# Patient Record
Sex: FEMALE | Race: BLACK OR AFRICAN AMERICAN | NOT HISPANIC OR LATINO | Employment: FULL TIME | URBAN - METROPOLITAN AREA
[De-identification: names, ages, dates, MRNs, and addresses within clinical notes are randomized per-mention and may not be internally consistent; named-entity substitution may affect disease eponyms.]

---

## 2018-07-16 PROBLEM — E78.5 HLD (HYPERLIPIDEMIA): Status: ACTIVE | Noted: 2018-07-16

## 2018-07-16 PROBLEM — I10 HTN (HYPERTENSION), BENIGN: Status: ACTIVE | Noted: 2018-07-16

## 2018-07-16 PROBLEM — E11.9 DM TYPE 2 (DIABETES MELLITUS, TYPE 2): Status: ACTIVE | Noted: 2018-07-16

## 2018-08-22 ENCOUNTER — OFFICE VISIT (OUTPATIENT)
Dept: INTERNAL MEDICINE | Facility: CLINIC | Age: 53
End: 2018-08-22
Payer: COMMERCIAL

## 2018-08-22 VITALS
BODY MASS INDEX: 37.29 KG/M2 | DIASTOLIC BLOOD PRESSURE: 88 MMHG | SYSTOLIC BLOOD PRESSURE: 138 MMHG | RESPIRATION RATE: 18 BRPM | HEIGHT: 62 IN | HEART RATE: 66 BPM | WEIGHT: 202.63 LBS | TEMPERATURE: 99 F

## 2018-08-22 DIAGNOSIS — Z00.00 ANNUAL PHYSICAL EXAM: Primary | ICD-10-CM

## 2018-08-22 DIAGNOSIS — Z79.4 TYPE 2 DIABETES MELLITUS WITHOUT COMPLICATION, WITH LONG-TERM CURRENT USE OF INSULIN: ICD-10-CM

## 2018-08-22 DIAGNOSIS — E11.9 TYPE 2 DIABETES MELLITUS WITHOUT COMPLICATION, WITH LONG-TERM CURRENT USE OF INSULIN: ICD-10-CM

## 2018-08-22 DIAGNOSIS — I10 HTN (HYPERTENSION), BENIGN: ICD-10-CM

## 2018-08-22 DIAGNOSIS — E66.9 OBESITY (BMI 30-39.9): ICD-10-CM

## 2018-08-22 DIAGNOSIS — E78.5 HYPERLIPIDEMIA, UNSPECIFIED HYPERLIPIDEMIA TYPE: ICD-10-CM

## 2018-08-22 DIAGNOSIS — G47.33 OSA (OBSTRUCTIVE SLEEP APNEA): ICD-10-CM

## 2018-08-22 DIAGNOSIS — Z12.31 ENCOUNTER FOR SCREENING MAMMOGRAM FOR BREAST CANCER: ICD-10-CM

## 2018-08-22 PROCEDURE — 99999 PR PBB SHADOW E&M-EST. PATIENT-LVL III: CPT | Mod: PBBFAC,,, | Performed by: INTERNAL MEDICINE

## 2018-08-22 PROCEDURE — 99386 PREV VISIT NEW AGE 40-64: CPT | Mod: S$GLB,,, | Performed by: INTERNAL MEDICINE

## 2018-08-22 RX ORDER — AMLODIPINE BESYLATE 10 MG/1
10 TABLET ORAL DAILY
Qty: 90 TABLET | Refills: 3 | Status: SHIPPED | OUTPATIENT
Start: 2018-08-22 | End: 2019-05-16

## 2018-08-22 NOTE — PROGRESS NOTES
Subjective:       Patient ID: Grecia Boyd is a 53 y.o. female.    Chief Complaint: Establish Care and Annual Exam (diabetes, HTN, & sleep apnea)    HPI   53 y.o. Female here for annual exam.     Cholesterol: (needs)  Vaccines: Influenza (2017); Tetanus (2013); Pneumovax (done)  Eye exam: needs  Mammogram: needs  Gyn exam: declined  Colonoscopy: 2017- normal per pt    Exercise: walks daily  Diet: regular    Past Medical History:  No date: Diabetes mellitus, type 2  No date: Hyperlipidemia  No date: Hypertension  No date: Obesity  Past Surgical History:  No date: ADENOIDECTOMY  No date: HYSTERECTOMY  No date: THYROIDECTOMY, PARTIAL  No date: TONSILLECTOMY  Social History    Socioeconomic History      Marital status:       Spouse name: Not on file      Number of children: 2      Years of education: Not on file      Highest education level: Not on file    Social Needs      Financial resource strain: Not on file      Food insecurity - worry: Not on file      Food insecurity - inability: Not on file      Transportation needs - medical: Not on file      Transportation needs - non-medical: Not on file    Occupational History      Not on file    Tobacco Use      Smoking status: Never Smoker      Smokeless tobacco: Never Used    Substance and Sexual Activity      Alcohol use: Yes        Comment: rare      Drug use: No      Sexual activity: Yes        Partners: Male    Review of patient's allergies indicates:  No Known Allergies  Grecia Boyd had no medications administered during this visit.    Review of Systems   Constitutional: Negative for activity change, appetite change, chills, diaphoresis, fatigue, fever and unexpected weight change.   HENT: Negative for congestion, mouth sores, postnasal drip, rhinorrhea, sinus pressure, sneezing, sore throat, trouble swallowing and voice change.    Eyes: Negative for pain, discharge and visual disturbance.   Respiratory: Negative for cough, shortness of breath and  wheezing.    Cardiovascular: Negative for chest pain, palpitations and leg swelling.   Gastrointestinal: Negative for abdominal pain, blood in stool, constipation, diarrhea, nausea and vomiting.   Endocrine: Negative for cold intolerance and heat intolerance.   Genitourinary: Negative for difficulty urinating, dysuria, frequency, hematuria and urgency.   Musculoskeletal: Negative for arthralgias and myalgias.   Skin: Negative for rash and wound.   Allergic/Immunologic: Negative for environmental allergies and food allergies.   Neurological: Negative for dizziness, tremors, seizures, syncope, weakness, light-headedness and headaches.   Hematological: Negative for adenopathy. Does not bruise/bleed easily.   Psychiatric/Behavioral: Negative for confusion and sleep disturbance. The patient is not nervous/anxious.        Objective:      Physical Exam   Constitutional: She is oriented to person, place, and time. She appears well-developed and well-nourished. No distress.   HENT:   Head: Normocephalic and atraumatic.   Right Ear: External ear normal.   Left Ear: External ear normal.   Nose: Nose normal.   Mouth/Throat: Oropharynx is clear and moist. No oropharyngeal exudate.   Eyes: Conjunctivae and EOM are normal. Pupils are equal, round, and reactive to light. Right eye exhibits no discharge. Left eye exhibits no discharge. No scleral icterus.   Neck: Neck supple. No JVD present. No thyromegaly present.   Cardiovascular: Normal rate, regular rhythm, normal heart sounds and intact distal pulses.   No murmur heard.  Pulses:       Dorsalis pedis pulses are 2+ on the right side, and 2+ on the left side.        Posterior tibial pulses are 2+ on the right side, and 2+ on the left side.   Pulmonary/Chest: Effort normal and breath sounds normal. No respiratory distress. She has no wheezes. She has no rales. She exhibits no tenderness.   Abdominal: Soft. Bowel sounds are normal. She exhibits no distension. There is no tenderness.  There is no guarding.   Musculoskeletal: She exhibits no edema.   Feet:   Right Foot:   Protective Sensation: 4 sites tested. 4 sites sensed.   Skin Integrity: Negative for ulcer, blister or skin breakdown.   Left Foot:   Protective Sensation: 4 sites tested. 4 sites sensed.   Skin Integrity: Negative for ulcer, blister or skin breakdown.   Lymphadenopathy:     She has no cervical adenopathy.   Neurological: She is alert and oriented to person, place, and time. No cranial nerve deficit. Coordination normal.   Skin: Skin is warm and dry. No rash noted. She is not diaphoretic. No pallor.   Psychiatric: She has a normal mood and affect. Judgment normal.   Nursing note and vitals reviewed.      Assessment:       1. Annual physical exam    2. HTN (hypertension), benign    3. Type 2 diabetes mellitus without complication, with long-term current use of insulin    4. Hyperlipidemia, unspecified hyperlipidemia type    5. Obesity (BMI 30-39.9)    6. CHIDI (obstructive sleep apnea)    7. Encounter for screening mammogram for breast cancer        Plan:    1. Blood work ordered       Vaccines: Influenza (2017); Tetanus (2013); Pneumovax (done)       Eye exam: needs       Mammogram: needs       Gyn exam: declined       Colonoscopy: 2017- normal per pt   2. HTN- increase Norvasc to 10 mg and continue HCTZ 25 mg/Losartan 100 mg daily   3. T2DM- check HA1C       Continue Metformin  mg qam and Amaryl 2 mg daily    4. HLD- continue Lipitor 40 mg daily   5. Obesity- pt advised on proper diet/exercise for weight loss   6. CHIDI- stable on CPAP   7. Mammo ordered   8. F/u in 6 months

## 2018-09-01 ENCOUNTER — LAB VISIT (OUTPATIENT)
Dept: LAB | Facility: HOSPITAL | Age: 53
End: 2018-09-01
Attending: INTERNAL MEDICINE
Payer: COMMERCIAL

## 2018-09-01 DIAGNOSIS — Z79.4 TYPE 2 DIABETES MELLITUS WITHOUT COMPLICATION, WITH LONG-TERM CURRENT USE OF INSULIN: ICD-10-CM

## 2018-09-01 DIAGNOSIS — Z00.00 ANNUAL PHYSICAL EXAM: ICD-10-CM

## 2018-09-01 DIAGNOSIS — E11.9 TYPE 2 DIABETES MELLITUS WITHOUT COMPLICATION, WITH LONG-TERM CURRENT USE OF INSULIN: ICD-10-CM

## 2018-09-01 DIAGNOSIS — I10 HTN (HYPERTENSION), BENIGN: ICD-10-CM

## 2018-09-01 DIAGNOSIS — E78.5 HYPERLIPIDEMIA, UNSPECIFIED HYPERLIPIDEMIA TYPE: ICD-10-CM

## 2018-09-01 DIAGNOSIS — Z12.31 ENCOUNTER FOR SCREENING MAMMOGRAM FOR BREAST CANCER: ICD-10-CM

## 2018-09-01 DIAGNOSIS — E66.9 OBESITY (BMI 30-39.9): ICD-10-CM

## 2018-09-01 LAB
ALBUMIN SERPL BCP-MCNC: 3.7 G/DL
ALP SERPL-CCNC: 64 U/L
ALT SERPL W/O P-5'-P-CCNC: 10 U/L
ANION GAP SERPL CALC-SCNC: 11 MMOL/L
AST SERPL-CCNC: 17 U/L
BASOPHILS # BLD AUTO: 0.05 K/UL
BASOPHILS NFR BLD: 0.6 %
BILIRUB SERPL-MCNC: 0.6 MG/DL
BUN SERPL-MCNC: 10 MG/DL
CALCIUM SERPL-MCNC: 9.2 MG/DL
CHLORIDE SERPL-SCNC: 101 MMOL/L
CHOLEST SERPL-MCNC: 162 MG/DL
CHOLEST/HDLC SERPL: 3.4 {RATIO}
CO2 SERPL-SCNC: 28 MMOL/L
CREAT SERPL-MCNC: 0.7 MG/DL
DIFFERENTIAL METHOD: ABNORMAL
EOSINOPHIL # BLD AUTO: 0.1 K/UL
EOSINOPHIL NFR BLD: 1.2 %
ERYTHROCYTE [DISTWIDTH] IN BLOOD BY AUTOMATED COUNT: 14.5 %
EST. GFR  (AFRICAN AMERICAN): >60 ML/MIN/1.73 M^2
EST. GFR  (NON AFRICAN AMERICAN): >60 ML/MIN/1.73 M^2
ESTIMATED AVG GLUCOSE: 146 MG/DL
GLUCOSE SERPL-MCNC: 113 MG/DL
HBA1C MFR BLD HPLC: 6.7 %
HCT VFR BLD AUTO: 36.7 %
HDLC SERPL-MCNC: 48 MG/DL
HDLC SERPL: 29.6 %
HGB BLD-MCNC: 11.5 G/DL
IMM GRANULOCYTES # BLD AUTO: 0.02 K/UL
IMM GRANULOCYTES NFR BLD AUTO: 0.2 %
LDLC SERPL CALC-MCNC: 89.2 MG/DL
LYMPHOCYTES # BLD AUTO: 3.6 K/UL
LYMPHOCYTES NFR BLD: 40.2 %
MCH RBC QN AUTO: 26.6 PG
MCHC RBC AUTO-ENTMCNC: 31.3 G/DL
MCV RBC AUTO: 85 FL
MONOCYTES # BLD AUTO: 0.6 K/UL
MONOCYTES NFR BLD: 7.2 %
NEUTROPHILS # BLD AUTO: 4.5 K/UL
NEUTROPHILS NFR BLD: 50.6 %
NONHDLC SERPL-MCNC: 114 MG/DL
NRBC BLD-RTO: 0 /100 WBC
PLATELET # BLD AUTO: 415 K/UL
PMV BLD AUTO: 8.8 FL
POTASSIUM SERPL-SCNC: 3.8 MMOL/L
PROT SERPL-MCNC: 7.3 G/DL
RBC # BLD AUTO: 4.33 M/UL
SODIUM SERPL-SCNC: 140 MMOL/L
TRIGL SERPL-MCNC: 124 MG/DL
TSH SERPL DL<=0.005 MIU/L-ACNC: 0.66 UIU/ML
WBC # BLD AUTO: 8.92 K/UL

## 2018-09-01 PROCEDURE — 83036 HEMOGLOBIN GLYCOSYLATED A1C: CPT

## 2018-09-01 PROCEDURE — 80053 COMPREHEN METABOLIC PANEL: CPT

## 2018-09-01 PROCEDURE — 36415 COLL VENOUS BLD VENIPUNCTURE: CPT | Mod: PO

## 2018-09-01 PROCEDURE — 80061 LIPID PANEL: CPT

## 2018-09-01 PROCEDURE — 85025 COMPLETE CBC W/AUTO DIFF WBC: CPT

## 2018-09-01 PROCEDURE — 84443 ASSAY THYROID STIM HORMONE: CPT

## 2018-10-19 ENCOUNTER — OFFICE VISIT (OUTPATIENT)
Dept: URGENT CARE | Facility: CLINIC | Age: 53
End: 2018-10-19
Payer: COMMERCIAL

## 2018-10-19 ENCOUNTER — TELEPHONE (OUTPATIENT)
Dept: INTERNAL MEDICINE | Facility: CLINIC | Age: 53
End: 2018-10-19

## 2018-10-19 VITALS
RESPIRATION RATE: 18 BRPM | OXYGEN SATURATION: 100 % | BODY MASS INDEX: 37.17 KG/M2 | DIASTOLIC BLOOD PRESSURE: 54 MMHG | WEIGHT: 202 LBS | TEMPERATURE: 98 F | HEIGHT: 62 IN | SYSTOLIC BLOOD PRESSURE: 111 MMHG | HEART RATE: 70 BPM

## 2018-10-19 DIAGNOSIS — M79.601 PAIN OF RIGHT UPPER EXTREMITY: Primary | ICD-10-CM

## 2018-10-19 PROCEDURE — 96372 THER/PROPH/DIAG INJ SC/IM: CPT | Mod: S$GLB,,, | Performed by: NURSE PRACTITIONER

## 2018-10-19 PROCEDURE — 99214 OFFICE O/P EST MOD 30 MIN: CPT | Mod: 25,S$GLB,, | Performed by: NURSE PRACTITIONER

## 2018-10-19 PROCEDURE — 3008F BODY MASS INDEX DOCD: CPT | Mod: CPTII,S$GLB,, | Performed by: NURSE PRACTITIONER

## 2018-10-19 RX ORDER — TRAMADOL HYDROCHLORIDE 50 MG/1
50 TABLET ORAL
Qty: 7 TABLET | Refills: 0 | Status: SHIPPED | OUTPATIENT
Start: 2018-10-19 | End: 2018-10-26

## 2018-10-19 RX ORDER — METHYLPREDNISOLONE 4 MG/1
TABLET ORAL
Qty: 1 PACKAGE | Refills: 0 | Status: SHIPPED | OUTPATIENT
Start: 2018-10-19 | End: 2019-01-21 | Stop reason: ALTCHOICE

## 2018-10-19 RX ORDER — KETOROLAC TROMETHAMINE 30 MG/ML
30 INJECTION, SOLUTION INTRAMUSCULAR; INTRAVENOUS
Status: COMPLETED | OUTPATIENT
Start: 2018-10-19 | End: 2018-10-19

## 2018-10-19 RX ADMIN — KETOROLAC TROMETHAMINE 30 MG: 30 INJECTION, SOLUTION INTRAMUSCULAR; INTRAVENOUS at 09:10

## 2018-10-19 NOTE — TELEPHONE ENCOUNTER
----- Message from Tatiana Littlejohn sent at 10/19/2018  8:51 AM CDT -----  Contact: Grecia Boyd [phone]  Grecia would like to know if her medical records were received. Please call to advise.

## 2018-10-19 NOTE — PATIENT INSTRUCTIONS
Medrol Dosepak if pain continues to be severe after Toradol injection.  Tramadol at night as needed for severe pain.  Follow up closely with PCP.  Go to the ER for any worsening symptoms including numbness or weakness.  Myalgias  Myalgias are another word for muscle aches and soreness. This is a symptom, not a disease. Myalgias can have many causes. A cold, the flu, or an acute infection can cause them. So can any illness with a high fever. They may happen after exertion (such as heavy exercise) or injury (such as an accident or fall). Some medicines (such as statins and certain antidepressants) can cause myalgias. They can also be a symptom of chronic or ongoing medical problems (such as lupus, chronic fatigue, or hypothyroidism). With these illnesses, other serious symptoms often occur in addition to muscle pain and soreness.    Myalgias most often go away on their own. If they don't go away, come back, or are severe, testing may be needed to help find the cause.  Home care  · Rest until you feel better.  · Follow instructions that you were given for how to care for yourself. This may depend on the cause of your myalgias.   · If myalgia is thought to be due to a medicine, be sure to talk to the doctor that prescribed the medicine about the best course of action.  · To control pain, take prescription or over-the-counter medicines as directed. Unless told not to, you can try acetaminophen or ibuprofen.  Follow-up care  Follow up with your healthcare provider or as advised. If your symptoms do not go away in a few days or if they come back, follow up with your healthcare provider for an exam and testing.  When to see medical advice  Call your healthcare provider for any of the following:  · Fever of 100.4°F (38ºC) or higher, or as directed by your healthcare provider  · Pain that gets worse and not better, or that goes away and comes back  · New joint pains  · New rash  · Severe headache, neck pain, drowsiness, or  confusion  Date Last Reviewed: 3/1/2017  © 2889-7048 [employer]. [address] [place] [address], [place] [address]. All rights reserved. This information is not intended as a substitute for professional medical care. Always follow your healthcare professional's instructions.

## 2018-10-19 NOTE — TELEPHONE ENCOUNTER
Notified patient that we did not receive her medical records from [facility]. Stated she will go to [facility] to get it.

## 2018-10-19 NOTE — PROGRESS NOTES
"Subjective:       Patient ID: Grecia Boyd is a 53 y.o. female.    Vitals:  height is 5' 2" (1.575 m) and weight is 91.6 kg (202 lb). Her temperature is 98.1 °F (36.7 °C). Her blood pressure is 111/54 (abnormal) and her pulse is 70. Her respiration is 18 and oxygen saturation is 100%.     Chief Complaint: Arm Pain    Patient presents with worsening right arm pain x1 week.  No trauma.  Pain worse at night.  States that she had difficulty sleeping last night s/t pain.  She is unable to lift her arm above level of the shoulder s/t pain.  No numbness or weakness.  She did have some tingling in her fingertips yesterday.  Denies headache or visual changes.  No rash.  No neck pain.      Arm Pain    The incident occurred more than 1 week ago. The incident occurred at home. There was no injury mechanism. The quality of the pain is described as aching. The pain is at a severity of 10/10. The pain is severe. The pain has been constant since the incident. Associated symptoms include tingling (none at present.). Pertinent negatives include no chest pain, muscle weakness or numbness. Nothing aggravates the symptoms. She has tried NSAIDs for the symptoms. The treatment provided no relief.     Review of Systems   Constitution: Negative for chills and fever.   HENT: Negative for sore throat.    Eyes: Negative for blurred vision.   Cardiovascular: Negative for chest pain.   Respiratory: Negative for shortness of breath.    Skin: Negative for rash.   Musculoskeletal: Positive for joint pain. Negative for back pain.        Right arm pain      Gastrointestinal: Negative for abdominal pain, diarrhea, nausea and vomiting.   Neurological: Positive for tingling (none at present.). Negative for headaches and numbness.   Psychiatric/Behavioral: The patient is not nervous/anxious.        Objective:      Physical Exam   Constitutional: She is oriented to person, place, and time. Vital signs are normal. She appears well-developed and " well-nourished. She is active and cooperative. No distress.   HENT:   Head: Normocephalic and atraumatic.   Nose: Nose normal.   Mouth/Throat: Oropharynx is clear and moist and mucous membranes are normal.   Eyes: Conjunctivae and lids are normal.   Neck: Trachea normal, normal range of motion, full passive range of motion without pain and phonation normal. Neck supple.   Cardiovascular: Normal rate, regular rhythm, normal heart sounds, intact distal pulses and normal pulses.   Pulmonary/Chest: Effort normal and breath sounds normal.   Abdominal: Soft. Normal appearance and bowel sounds are normal. She exhibits no abdominal bruit, no pulsatile midline mass and no mass.   Musculoskeletal: She exhibits no edema or deformity.        Right shoulder: She exhibits decreased range of motion. She exhibits no tenderness, no bony tenderness, no swelling, no effusion, no crepitus, no deformity, no laceration, no pain, no spasm, normal pulse and normal strength.        Right elbow: Normal.       Right wrist: Normal.        Cervical back: Normal.        Right upper arm: She exhibits no tenderness, no bony tenderness, no swelling, no edema, no deformity and no laceration.        Right forearm: She exhibits no tenderness, no bony tenderness, no swelling, no edema, no deformity and no laceration.        Right hand: Normal.   Neurological: She is alert and oriented to person, place, and time. She has normal strength and normal reflexes. She displays no atrophy and no tremor. No cranial nerve deficit or sensory deficit. She exhibits normal muscle tone. Coordination and gait normal.   Skin: Skin is warm, dry and intact. She is not diaphoretic.   Psychiatric: She has a normal mood and affect. Her speech is normal and behavior is normal. Judgment and thought content normal. Cognition and memory are normal.   Nursing note and vitals reviewed.      Assessment:       1. Pain of right upper extremity        Plan:       Suspect  radiculopathy.  Discussed risks of raising A1C with patient.  She states that pain is severe.  Advised only to start medrol serena if Toradol injection does not work.  Agrees to this.    Pain of right upper extremity  -     ketorolac injection 30 mg  -     methylPREDNISolone (MEDROL DOSEPACK) 4 mg tablet; Take all pills on one row at one time. 24 hours later take the second row etc till all meds taken  Dispense: 1 Package; Refill: 0  -     traMADol (ULTRAM) 50 mg tablet; Take 1 tablet (50 mg total) by mouth every 24 hours as needed for Pain (use especially at bedtime as needed.).  Dispense: 7 tablet; Refill: 0      Patient Instructions   Medrol Dosepak if pain continues to be severe after Toradol injection.  Tramadol at night as needed for severe pain.  Follow up closely with PCP.  Go to the ER for any worsening symptoms including numbness or weakness.  Myalgias  Myalgias are another word for muscle aches and soreness. This is a symptom, not a disease. Myalgias can have many causes. A cold, the flu, or an acute infection can cause them. So can any illness with a high fever. They may happen after exertion (such as heavy exercise) or injury (such as an accident or fall). Some medicines (such as statins and certain antidepressants) can cause myalgias. They can also be a symptom of chronic or ongoing medical problems (such as lupus, chronic fatigue, or hypothyroidism). With these illnesses, other serious symptoms often occur in addition to muscle pain and soreness.    Myalgias most often go away on their own. If they don't go away, come back, or are severe, testing may be needed to help find the cause.  Home care  · Rest until you feel better.  · Follow instructions that you were given for how to care for yourself. This may depend on the cause of your myalgias.   · If myalgia is thought to be due to a medicine, be sure to talk to the doctor that prescribed the medicine about the best course of action.  · To control pain,  take prescription or over-the-counter medicines as directed. Unless told not to, you can try acetaminophen or ibuprofen.  Follow-up care  Follow up with your healthcare provider or as advised. If your symptoms do not go away in a few days or if they come back, follow up with your healthcare provider for an exam and testing.  When to see medical advice  Call your healthcare provider for any of the following:  · Fever of 100.4°F (38ºC) or higher, or as directed by your healthcare provider  · Pain that gets worse and not better, or that goes away and comes back  · New joint pains  · New rash  · Severe headache, neck pain, drowsiness, or confusion  Date Last Reviewed: 3/1/2017  © 4716-2114 [employer]. [address] [place] [address], [place] [address]. All rights reserved. This information is not intended as a substitute for professional medical care. Always follow your healthcare professional's instructions.

## 2018-11-01 ENCOUNTER — TELEPHONE (OUTPATIENT)
Dept: INTERNAL MEDICINE | Facility: CLINIC | Age: 53
End: 2018-11-01

## 2018-11-01 NOTE — TELEPHONE ENCOUNTER
----- Message from Liza Boyd sent at 11/1/2018 12:23 PM CDT -----  Contact: Self [phone] or [phone]  Patient would like to get a referral.  Does the patient already have the specialty clinic appointment scheduled:  no  If yes, what date is the appointment scheduled:     Referral to what specialty:  Orthopedics   Reason (be specific):  Right   Did you confirm the insurance currently in Epic is correct (important for a referral): yes   Does the patient want the referral with a specific physician:  no  Is the specialist an [facility]:  [facility]  Comments:

## 2018-11-01 NOTE — TELEPHONE ENCOUNTER
----- Message from Tatiana Littlejohn sent at 11/1/2018  3:51 PM CDT -----  Contact: Grecia Boyd [phone]  Patient is returning a phone call.  Who left a message for the patient: Debra Shahid LPN   Does patient know what this is regarding:    Comments:

## 2018-11-02 ENCOUNTER — OFFICE VISIT (OUTPATIENT)
Dept: INTERNAL MEDICINE | Facility: CLINIC | Age: 53
End: 2018-11-02
Payer: COMMERCIAL

## 2018-11-02 ENCOUNTER — HOSPITAL ENCOUNTER (OUTPATIENT)
Dept: RADIOLOGY | Facility: HOSPITAL | Age: 53
Discharge: HOME | End: 2018-11-02
Attending: INTERNAL MEDICINE
Payer: COMMERCIAL

## 2018-11-02 VITALS
WEIGHT: 198.44 LBS | DIASTOLIC BLOOD PRESSURE: 92 MMHG | RESPIRATION RATE: 16 BRPM | BODY MASS INDEX: 36.52 KG/M2 | TEMPERATURE: 98 F | SYSTOLIC BLOOD PRESSURE: 124 MMHG | HEART RATE: 68 BPM | HEIGHT: 62 IN

## 2018-11-02 DIAGNOSIS — M25.511 ACUTE PAIN OF RIGHT SHOULDER: ICD-10-CM

## 2018-11-02 DIAGNOSIS — M25.511 ACUTE PAIN OF RIGHT SHOULDER: Primary | ICD-10-CM

## 2018-11-02 DIAGNOSIS — M75.51 BURSITIS OF RIGHT SHOULDER: ICD-10-CM

## 2018-11-02 PROCEDURE — 99999 PR PBB SHADOW E&M-EST. PATIENT-LVL III: CPT | Mod: PBBFAC,,, | Performed by: INTERNAL MEDICINE

## 2018-11-02 PROCEDURE — 20610 DRAIN/INJ JOINT/BURSA W/O US: CPT | Mod: RT,S$GLB,, | Performed by: INTERNAL MEDICINE

## 2018-11-02 PROCEDURE — 99214 OFFICE O/P EST MOD 30 MIN: CPT | Mod: 25,S$GLB,, | Performed by: INTERNAL MEDICINE

## 2018-11-02 PROCEDURE — 3008F BODY MASS INDEX DOCD: CPT | Mod: CPTII,S$GLB,, | Performed by: INTERNAL MEDICINE

## 2018-11-02 PROCEDURE — 73030 X-RAY EXAM OF SHOULDER: CPT | Mod: 26,RT,, | Performed by: RADIOLOGY

## 2018-11-02 PROCEDURE — 73030 X-RAY EXAM OF SHOULDER: CPT | Mod: TC,PO,RT

## 2018-11-02 RX ORDER — TRIAMCINOLONE ACETONIDE 40 MG/ML
40 INJECTION, SUSPENSION INTRA-ARTICULAR; INTRAMUSCULAR
Status: COMPLETED | OUTPATIENT
Start: 2018-11-02 | End: 2018-11-02

## 2018-11-02 RX ORDER — HYDROCODONE BITARTRATE AND ACETAMINOPHEN 7.5; 325 MG/1; MG/1
1 TABLET ORAL EVERY 6 HOURS PRN
Qty: 20 TABLET | Refills: 0 | Status: SHIPPED | OUTPATIENT
Start: 2018-11-02 | End: 2019-06-26

## 2018-11-02 RX ADMIN — TRIAMCINOLONE ACETONIDE 40 MG: 40 INJECTION, SUSPENSION INTRA-ARTICULAR; INTRAMUSCULAR at 04:11

## 2018-11-02 NOTE — PROGRESS NOTES
percocetSubjective:       Patient ID: Grecia Boyd is a 53 y.o. female.    Chief Complaint: right shoulder pain (pain at 6 now, worse when lays down. saw urgent care drElvira)    HPI   Pt c/o 2 weeks of worsening right sided shoulder pain which is radiating down to her hand. It is described as a dull ache at rest and sharp with movement. Pt was given a steroid pack and Tramadol which has not helped much.   Review of Systems   Constitutional: Negative for activity change, appetite change, chills, diaphoresis, fatigue, fever and unexpected weight change.   HENT: Negative for postnasal drip, rhinorrhea, sinus pressure, sneezing, sore throat, trouble swallowing and voice change.    Respiratory: Negative for cough, shortness of breath and wheezing.    Cardiovascular: Negative for chest pain, palpitations and leg swelling.   Gastrointestinal: Negative for abdominal pain, blood in stool, constipation, diarrhea, nausea and vomiting.   Genitourinary: Negative for dysuria.   Musculoskeletal: Negative for arthralgias and myalgias.   Skin: Negative for rash and wound.   Allergic/Immunologic: Negative for environmental allergies and food allergies.   Hematological: Negative for adenopathy. Does not bruise/bleed easily.       Objective:      Physical Exam   Constitutional: She is oriented to person, place, and time. She appears well-developed and well-nourished. No distress.   HENT:   Head: Normocephalic and atraumatic.   Nose: Nose normal.   Eyes: Conjunctivae and EOM are normal. Pupils are equal, round, and reactive to light. Right eye exhibits no discharge. Left eye exhibits no discharge. No scleral icterus.   Neck: Neck supple. No JVD present.   Cardiovascular: Normal rate, regular rhythm, normal heart sounds and intact distal pulses.   Pulmonary/Chest: Effort normal and breath sounds normal. No respiratory distress. She has no wheezes. She has no rales.   Musculoskeletal: She exhibits no edema.        Right shoulder: She  exhibits tenderness and pain.   Lymphadenopathy:     She has no cervical adenopathy.   Neurological: She is alert and oriented to person, place, and time.   Skin: Skin is warm and dry. No rash noted. She is not diaphoretic. No pallor.       Assessment:       1. Acute pain of right shoulder    2. Bursitis of right shoulder        Plan:    1. S/p bursa injection        X-ray shoulder       Rx Norco PRN    Procedure Note(bursa injection):   After the potential risk's and benefit's were discussed with pt and verbal consent was obtained, pt's right lateral shoulder area was prepped with alcohol. A total of 8 cc containing 5 cc Marcaine 0.5 %, 2 cc Lidocaine 1 %, 1 cc Kenalog 40 mg/cc was injected into right subdeltoid bursa. The pt tolerated the procedure well without any complications.

## 2018-11-02 NOTE — TELEPHONE ENCOUNTER
Urgent care note says to see pcp if not better.    I left msg to do this or call to advise where pain is so we can put in referral correctly.

## 2018-11-21 ENCOUNTER — HOSPITAL ENCOUNTER (OUTPATIENT)
Dept: RADIOLOGY | Facility: HOSPITAL | Age: 53
Discharge: HOME | End: 2018-11-21
Attending: INTERNAL MEDICINE
Payer: COMMERCIAL

## 2018-11-21 ENCOUNTER — OFFICE VISIT (OUTPATIENT)
Dept: OPTOMETRY | Facility: CLINIC | Age: 53
End: 2018-11-21
Payer: COMMERCIAL

## 2018-11-21 DIAGNOSIS — I10 HTN (HYPERTENSION), BENIGN: ICD-10-CM

## 2018-11-21 DIAGNOSIS — Z79.4 TYPE 2 DIABETES MELLITUS WITHOUT COMPLICATION, WITH LONG-TERM CURRENT USE OF INSULIN: ICD-10-CM

## 2018-11-21 DIAGNOSIS — Z00.00 ANNUAL PHYSICAL EXAM: ICD-10-CM

## 2018-11-21 DIAGNOSIS — Z12.31 ENCOUNTER FOR SCREENING MAMMOGRAM FOR BREAST CANCER: ICD-10-CM

## 2018-11-21 DIAGNOSIS — H52.4 ASTIGMATISM OF BOTH EYES WITH PRESBYOPIA: ICD-10-CM

## 2018-11-21 DIAGNOSIS — H52.203 ASTIGMATISM OF BOTH EYES WITH PRESBYOPIA: ICD-10-CM

## 2018-11-21 DIAGNOSIS — E66.9 OBESITY (BMI 30-39.9): ICD-10-CM

## 2018-11-21 DIAGNOSIS — E78.5 HYPERLIPIDEMIA, UNSPECIFIED HYPERLIPIDEMIA TYPE: ICD-10-CM

## 2018-11-21 DIAGNOSIS — E11.9 TYPE 2 DIABETES MELLITUS WITHOUT COMPLICATION, WITH LONG-TERM CURRENT USE OF INSULIN: ICD-10-CM

## 2018-11-21 DIAGNOSIS — E11.9 TYPE 2 DIABETES MELLITUS WITHOUT RETINOPATHY: Primary | ICD-10-CM

## 2018-11-21 PROCEDURE — 92015 DETERMINE REFRACTIVE STATE: CPT | Mod: S$GLB,,, | Performed by: OPTOMETRIST

## 2018-11-21 PROCEDURE — 92004 COMPRE OPH EXAM NEW PT 1/>: CPT | Mod: S$GLB,,, | Performed by: OPTOMETRIST

## 2018-11-21 PROCEDURE — 77067 SCR MAMMO BI INCL CAD: CPT | Mod: TC,PO

## 2018-11-21 PROCEDURE — 99999 PR PBB SHADOW E&M-EST. PATIENT-LVL II: CPT | Mod: PBBFAC,,, | Performed by: OPTOMETRIST

## 2018-11-21 PROCEDURE — 77067 SCR MAMMO BI INCL CAD: CPT | Mod: 26,,, | Performed by: RADIOLOGY

## 2018-11-21 PROCEDURE — 77063 BREAST TOMOSYNTHESIS BI: CPT | Mod: TC,PO

## 2018-11-21 PROCEDURE — 77063 BREAST TOMOSYNTHESIS BI: CPT | Mod: 26,,, | Performed by: RADIOLOGY

## 2018-11-21 NOTE — LETTER
November 21, 2018      Scott Tripathi DO  [address] [facility]  [place] [address]           [place] [facility]  [address] [facility] [place] [address]  Phone: [phone]  Fax: [phone]          Patient: Grecia Boyd   MR Number: [id number]   YOB: 1965   Date of Visit: 11/21/2018       Dear Dr. Scott Tripathi:    Thank you for referring Grecia Boyd to me for evaluation. Attached you will find relevant portions of my assessment and plan of care.    If you have questions, please do not hesitate to call me. I look forward to following Grecia Boyd along with you.    Sincerely,    Scott Soto, OD    Enclosure  CC:  No Recipients    If you would like to receive this communication electronically, please contact [email or web address] or [phone] to request more information on [employer] Link access.    For providers and/or their staff who would like to refer a patient to [facility], please contact us through our one-stop-shop provider referral line, [facility] , at [phone].    If you feel you have received this communication in error or would no longer like to receive these types of communications, please e-mail [email or web address]

## 2018-11-21 NOTE — PROGRESS NOTES
HPI     CANDICE: 2017  Pt states her Va is sometimes blurry with her near Va.. States she had got   some OTc readers but did not bring them with her nor knows the strength.   No f/f  DM  No gtts  No Sx   LBS:114 yesterday   Hemoglobin A1C       Date                     Value               Ref Range             Status                09/01/2018               6.7 (H)             4.0 - 5.6 %           Final              Comment:    ADA Screening Guidelines:  5.7-6.4%  Consistent with   prediabetes  >or=6.5%  Consistent with diabetes  High levels of fetal   hemoglobin interfere with the HbA1C  assay. Heterozygous hemoglobin   variants (HbS, HgC, etc)do  not significantly interfere with this assay.     However, presence of multiple variants may affect accuracy.    ----------      Last edited by Scott Soto, OD on 11/21/2018  1:07 PM. (History)        ROS     Positive for: Endocrine (DM)    Negative for: Constitutional, Gastrointestinal, Neurological, Skin,   Genitourinary, Musculoskeletal, HENT, Cardiovascular, Eyes, Respiratory,   Psychiatric, Allergic/Imm, Heme/Lymph    Last edited by Scott Soto, OD on 11/21/2018  1:13 PM. (History)        Assessment /Plan     For exam results, see Encounter Report.    Type 2 diabetes mellitus without retinopathy    Astigmatism of both eyes with presbyopia      1. Pt has been wearing otc readers, now needs bifocals.  Wrote Rx--discussed PALs/adaptation    PLAN:    rtc 1 yr

## 2018-12-04 RX ORDER — LOSARTAN POTASSIUM 100 MG/1
100 TABLET ORAL DAILY
Qty: 90 TABLET | Refills: 3 | Status: SHIPPED | OUTPATIENT
Start: 2018-12-04 | End: 2019-06-04

## 2018-12-04 RX ORDER — HYDROCHLOROTHIAZIDE 25 MG/1
25 TABLET ORAL DAILY
Qty: 90 TABLET | Refills: 3 | Status: SHIPPED | OUTPATIENT
Start: 2018-12-04 | End: 2020-02-06

## 2018-12-04 NOTE — TELEPHONE ENCOUNTER
"----- Message from Marlin Viera sent at 12/4/2018 12:15 PM CST -----  Contact: [phone]  RX request - refill or new RX.  Is this a refill or new RX:  Refill   RX name and strength: amLODIPine (NORVASC) 10 MG tablet  Directions:   Is this a 30 day or 90 day RX:  90   Local pharmacy or mail order pharmacy:  [facility]  Pharmacy name and phone # (DON'T enter "on file" or "in chart"):   Comments:      RX request - refill or new RX.  Is this a refill or new RX:  Refill   RX name and strength: hydroCHLOROthiazide (HYDRODIURIL) 25 MG tablet  Directions:   Is this a 30 day or 90 day RX:  90 day   Local pharmacy or mail order pharmacy:  [facility]   Pharmacy name and phone # (DON'T enter "on file" or "in chart"): [facility] [address] [phone] (Phone)   Comments:      RX request - refill or new RX.  Is this a refill or new RX: refill    RX name and strength: losartan (COZAAR) 100 MG tablet  Directions:   Is this a 30 day or 90 day RX:  90 day   Local pharmacy or mail order pharmacy:  [facility]  Pharmacy name and phone # (DON'T enter "on file" or "in chart"): [facility] [address] [phone] (Phone)   Comments:      RX request - refill or new RX.  Is this a refill or new RX:    RX name and strength:   Directions:   Is this a 30 day or 90 day RX:    Local pharmacy or mail order pharmacy:    Pharmacy name and phone # (DON'T enter "on file" or "in chart"): [facility] [address] [phone] (Phone)   Comments:          "

## 2018-12-04 NOTE — TELEPHONE ENCOUNTER
I confirmed with her amlodipine already sent to pharmacy 8/22/18.  We will send others. Seen for annual 8/22/18    Please approve.  Thanks abbi

## 2019-01-10 ENCOUNTER — TELEPHONE (OUTPATIENT)
Dept: INTERNAL MEDICINE | Facility: CLINIC | Age: 54
End: 2019-01-10

## 2019-01-10 DIAGNOSIS — Z00.00 ANNUAL PHYSICAL EXAM: Primary | ICD-10-CM

## 2019-01-10 DIAGNOSIS — E11.69 TYPE 2 DIABETES MELLITUS WITH OTHER SPECIFIED COMPLICATION, UNSPECIFIED WHETHER LONG TERM INSULIN USE: ICD-10-CM

## 2019-01-10 DIAGNOSIS — I10 HYPERTENSION, UNSPECIFIED TYPE: ICD-10-CM

## 2019-01-10 NOTE — TELEPHONE ENCOUNTER
----- Message from Tatiana Littlejohn sent at 1/10/2019  3:33 PM CST -----  Doctor appointment and lab have been scheduled.  Please link lab orders to the lab appointment.  Date of doctor appointment:  1/21  Physical or EP:  EP   Date of lab appointment:  1/5  Comments:

## 2019-01-19 ENCOUNTER — LAB VISIT (OUTPATIENT)
Dept: LAB | Facility: HOSPITAL | Age: 54
End: 2019-01-19
Attending: INTERNAL MEDICINE
Payer: COMMERCIAL

## 2019-01-19 DIAGNOSIS — I10 HYPERTENSION, UNSPECIFIED TYPE: ICD-10-CM

## 2019-01-19 DIAGNOSIS — E11.69 TYPE 2 DIABETES MELLITUS WITH OTHER SPECIFIED COMPLICATION, UNSPECIFIED WHETHER LONG TERM INSULIN USE: ICD-10-CM

## 2019-01-19 LAB
ALBUMIN SERPL BCP-MCNC: 3.7 G/DL
ALP SERPL-CCNC: 76 U/L
ALT SERPL W/O P-5'-P-CCNC: 11 U/L
ANION GAP SERPL CALC-SCNC: 7 MMOL/L
AST SERPL-CCNC: 15 U/L
BASOPHILS # BLD AUTO: 0.06 K/UL
BASOPHILS NFR BLD: 0.6 %
BILIRUB SERPL-MCNC: 0.5 MG/DL
BUN SERPL-MCNC: 8 MG/DL
CALCIUM SERPL-MCNC: 9.5 MG/DL
CHLORIDE SERPL-SCNC: 102 MMOL/L
CHOLEST SERPL-MCNC: 153 MG/DL
CHOLEST/HDLC SERPL: 3 {RATIO}
CO2 SERPL-SCNC: 30 MMOL/L
CREAT SERPL-MCNC: 0.8 MG/DL
DIFFERENTIAL METHOD: ABNORMAL
EOSINOPHIL # BLD AUTO: 0.1 K/UL
EOSINOPHIL NFR BLD: 1 %
ERYTHROCYTE [DISTWIDTH] IN BLOOD BY AUTOMATED COUNT: 13.6 %
EST. GFR  (AFRICAN AMERICAN): >60 ML/MIN/1.73 M^2
EST. GFR  (NON AFRICAN AMERICAN): >60 ML/MIN/1.73 M^2
ESTIMATED AVG GLUCOSE: 146 MG/DL
GLUCOSE SERPL-MCNC: 135 MG/DL
HBA1C MFR BLD HPLC: 6.7 %
HCT VFR BLD AUTO: 38.1 %
HDLC SERPL-MCNC: 51 MG/DL
HDLC SERPL: 33.3 %
HGB BLD-MCNC: 11.7 G/DL
IMM GRANULOCYTES # BLD AUTO: 0.04 K/UL
IMM GRANULOCYTES NFR BLD AUTO: 0.4 %
LDLC SERPL CALC-MCNC: 83.2 MG/DL
LYMPHOCYTES # BLD AUTO: 3.9 K/UL
LYMPHOCYTES NFR BLD: 37.1 %
MCH RBC QN AUTO: 25.9 PG
MCHC RBC AUTO-ENTMCNC: 30.7 G/DL
MCV RBC AUTO: 85 FL
MONOCYTES # BLD AUTO: 0.7 K/UL
MONOCYTES NFR BLD: 6.4 %
NEUTROPHILS # BLD AUTO: 5.8 K/UL
NEUTROPHILS NFR BLD: 54.5 %
NONHDLC SERPL-MCNC: 102 MG/DL
NRBC BLD-RTO: 0 /100 WBC
PLATELET # BLD AUTO: 462 K/UL
PMV BLD AUTO: 8.7 FL
POTASSIUM SERPL-SCNC: 3.9 MMOL/L
PROT SERPL-MCNC: 7.7 G/DL
RBC # BLD AUTO: 4.51 M/UL
SODIUM SERPL-SCNC: 139 MMOL/L
TRIGL SERPL-MCNC: 94 MG/DL
WBC # BLD AUTO: 10.55 K/UL

## 2019-01-19 PROCEDURE — 85025 COMPLETE CBC W/AUTO DIFF WBC: CPT

## 2019-01-19 PROCEDURE — 80061 LIPID PANEL: CPT

## 2019-01-19 PROCEDURE — 36415 COLL VENOUS BLD VENIPUNCTURE: CPT | Mod: PO

## 2019-01-19 PROCEDURE — 80053 COMPREHEN METABOLIC PANEL: CPT

## 2019-01-19 PROCEDURE — 83036 HEMOGLOBIN GLYCOSYLATED A1C: CPT

## 2019-01-21 ENCOUNTER — OFFICE VISIT (OUTPATIENT)
Dept: INTERNAL MEDICINE | Facility: CLINIC | Age: 54
End: 2019-01-21
Payer: COMMERCIAL

## 2019-01-21 VITALS
TEMPERATURE: 98 F | HEIGHT: 62 IN | RESPIRATION RATE: 18 BRPM | HEART RATE: 66 BPM | SYSTOLIC BLOOD PRESSURE: 120 MMHG | DIASTOLIC BLOOD PRESSURE: 84 MMHG | WEIGHT: 203.94 LBS | BODY MASS INDEX: 37.53 KG/M2

## 2019-01-21 DIAGNOSIS — E78.5 HYPERLIPIDEMIA, UNSPECIFIED HYPERLIPIDEMIA TYPE: ICD-10-CM

## 2019-01-21 DIAGNOSIS — Z79.4 TYPE 2 DIABETES MELLITUS WITHOUT COMPLICATION, WITH LONG-TERM CURRENT USE OF INSULIN: ICD-10-CM

## 2019-01-21 DIAGNOSIS — I10 HTN (HYPERTENSION), BENIGN: Primary | ICD-10-CM

## 2019-01-21 DIAGNOSIS — M79.89 LEFT LEG SWELLING: ICD-10-CM

## 2019-01-21 DIAGNOSIS — G47.33 OSA (OBSTRUCTIVE SLEEP APNEA): ICD-10-CM

## 2019-01-21 DIAGNOSIS — E66.9 OBESITY (BMI 30-39.9): ICD-10-CM

## 2019-01-21 DIAGNOSIS — E11.9 TYPE 2 DIABETES MELLITUS WITHOUT COMPLICATION, WITH LONG-TERM CURRENT USE OF INSULIN: ICD-10-CM

## 2019-01-21 PROCEDURE — 99999 PR PBB SHADOW E&M-EST. PATIENT-LVL III: ICD-10-PCS | Mod: PBBFAC,,, | Performed by: INTERNAL MEDICINE

## 2019-01-21 PROCEDURE — 99214 OFFICE O/P EST MOD 30 MIN: CPT | Mod: S$GLB,,, | Performed by: INTERNAL MEDICINE

## 2019-01-21 PROCEDURE — 99999 PR PBB SHADOW E&M-EST. PATIENT-LVL III: CPT | Mod: PBBFAC,,, | Performed by: INTERNAL MEDICINE

## 2019-01-21 PROCEDURE — 99214 PR OFFICE/OUTPT VISIT, EST, LEVL IV, 30-39 MIN: ICD-10-PCS | Mod: S$GLB,,, | Performed by: INTERNAL MEDICINE

## 2019-01-21 RX ORDER — METFORMIN HYDROCHLORIDE 500 MG/1
TABLET, EXTENDED RELEASE ORAL
Qty: 180 TABLET | Refills: 3 | Status: SHIPPED | OUTPATIENT
Start: 2019-01-21 | End: 2019-06-12 | Stop reason: SDUPTHER

## 2019-01-21 NOTE — PROGRESS NOTES
Subjective:       Patient ID: Grecia Boyd is a 53 y.o. female.    Chief Complaint: Follow-up (6 month); Arm Pain (right); and Leg Swelling    HPI   52 y/o Female with HTN, T2DM, HLD, Obesity and CHIDI is here for 6 month f/u. Pt is c/o 2 wks of persistent LLE leg swelling. It gets worse as the day progresses and improves with elevation.   Review of Systems   Constitutional: Negative for activity change, appetite change, chills, diaphoresis, fatigue, fever and unexpected weight change.   HENT: Negative for postnasal drip, rhinorrhea, sinus pressure, sneezing, sore throat, trouble swallowing and voice change.    Respiratory: Negative for cough, shortness of breath and wheezing.    Cardiovascular: Positive for leg swelling. Negative for chest pain and palpitations.   Gastrointestinal: Negative for abdominal pain, blood in stool, constipation, diarrhea, nausea and vomiting.   Genitourinary: Negative for dysuria.   Musculoskeletal: Negative for arthralgias and myalgias.   Skin: Negative for rash and wound.   Allergic/Immunologic: Negative for environmental allergies and food allergies.   Hematological: Negative for adenopathy. Does not bruise/bleed easily.       Objective:      Physical Exam   Constitutional: She is oriented to person, place, and time. She appears well-developed and well-nourished. No distress.   HENT:   Head: Normocephalic and atraumatic.   Right Ear: External ear normal.   Left Ear: External ear normal.   Nose: Nose normal.   Mouth/Throat: Oropharynx is clear and moist. No oropharyngeal exudate.   Eyes: Conjunctivae and EOM are normal. Pupils are equal, round, and reactive to light. Right eye exhibits no discharge. Left eye exhibits no discharge. No scleral icterus.   Neck: Neck supple. No JVD present.   Cardiovascular: Normal rate, regular rhythm, normal heart sounds and intact distal pulses.   Pulmonary/Chest: Effort normal and breath sounds normal. No respiratory distress. She has no wheezes. She  has no rales.   Musculoskeletal: She exhibits edema (1+ in LLE).   Lymphadenopathy:     She has no cervical adenopathy.   Neurological: She is alert and oriented to person, place, and time.   Skin: Skin is warm and dry. No rash noted. She is not diaphoretic. No pallor.       Assessment:       1. HTN (hypertension), benign    2. Type 2 diabetes mellitus without complication, with long-term current use of insulin    3. Hyperlipidemia, unspecified hyperlipidemia type    4. Obesity (BMI 30-39.9)    5. CHIDI (obstructive sleep apnea)    6. Left leg swelling        Plan:    2. HTN- stable on Norvasc 10 mg/HCTZ 25 mg/Losartan 100 mg daily   3. T2DM- last HA1C of 6.7(1/19)       Increase Metformin XR to 1 gm qam and continue Amaryl 2 mg daily    4. HLD- continue Lipitor 40 mg daily   5. Obesity- pt advised on proper diet/exercise for weight loss   6. CHIDI- stable on CPAP   7. Check LE US to r/o DVT

## 2019-01-21 NOTE — TELEPHONE ENCOUNTER
Left msg rx sent to [facility] at 4:15, give them 2 hours to get it ready for .

## 2019-01-21 NOTE — TELEPHONE ENCOUNTER
"----- Message from Han Davies sent at 1/21/2019  3:07 PM CST -----  Contact: Patient @ [phone]  RX request - refill or new RX.  Is this a refill or new RX:  Refill  ##She is completely out of this medication##  RX name and strength: metFORMIN (GLUCOPHAGE-XR) 500 MG 24 hr tablet  Directions:   Is this a 30 day or 90 day RX:  30 day supply  Local pharmacy or mail order pharmacy:  Take 1 tablet by mouth 2 (two) times daily.  - Oral  Pharmacy name and phone # (DON'T enter "on file" or "in chart"): [facility] [phone] (Phone)  [phone] (Fax)  Comments:  She is completely out of the medication.      "

## 2019-01-24 ENCOUNTER — CLINICAL SUPPORT (OUTPATIENT)
Dept: CARDIOLOGY | Facility: CLINIC | Age: 54
End: 2019-01-24
Attending: INTERNAL MEDICINE
Payer: COMMERCIAL

## 2019-01-24 DIAGNOSIS — M79.89 LEFT LEG SWELLING: ICD-10-CM

## 2019-01-24 PROCEDURE — 93971 CV US DOPPLER VENOUS LEG LEFT (CUPID ONLY): ICD-10-PCS | Mod: LT,S$GLB,, | Performed by: INTERNAL MEDICINE

## 2019-01-24 PROCEDURE — 93971 EXTREMITY STUDY: CPT | Mod: LT,S$GLB,, | Performed by: INTERNAL MEDICINE

## 2019-01-25 ENCOUNTER — TELEPHONE (OUTPATIENT)
Dept: INTERNAL MEDICINE | Facility: CLINIC | Age: 54
End: 2019-01-25

## 2019-01-25 NOTE — TELEPHONE ENCOUNTER
----- Message from Tatiana Littlejohn sent at 1/25/2019 10:28 AM CST -----  Contact: Grecia Boyd [phone]  Patient is returning a phone call.  Who left a message for the patient: Debra Shahid LPN   Does patient know what this is regarding:  Results   Comments:

## 2019-01-25 NOTE — TELEPHONE ENCOUNTER
----- Message from Radha Mai sent at 1/25/2019  9:12 AM CST -----  Contact: [phone]  Patient would like to get test results    Name of test (lab, mammo, etc.):   [facility]    Date of test:  01/24/19    Ordering provider: Bhumi    Where was the test performed:  [facility]    Would you prefer a response via [facility]?:  No    Comments:

## 2019-02-01 ENCOUNTER — TELEPHONE (OUTPATIENT)
Dept: INTERNAL MEDICINE | Facility: CLINIC | Age: 54
End: 2019-02-01

## 2019-02-01 NOTE — TELEPHONE ENCOUNTER
No answer on 1st number and no name. Left msg I was calling cell.    Left msg on cell hctz was [facility] to [facility] 12[facility]4/18 90 pills x 3 refills so talk to pharmacist and they should find it on your profile to fill.

## 2019-02-01 NOTE — TELEPHONE ENCOUNTER
----- Message from Marlin Viera sent at 2/1/2019 12:10 PM CST -----  Contact: call pt [phone] after 3 pm  Patient is returning a phone call.  Who left a message for the patient: abbi  Does patient know what this is regarding:    Comments:

## 2019-02-01 NOTE — TELEPHONE ENCOUNTER
"----- Message from Radha Mai sent at 2/1/2019  9:23 AM CST -----  Contact: [phone] or [phone]  RX request - refill or new RX.  Is this a refill or new RX:  Refill  RX name and strength: hydroCHLOROthiazide (HYDRODIURIL) 25 MG tablet  Directions:   Is this a 30 day or 90 day RX:    Local pharmacy or mail order pharmacy:  Local  Pharmacy name and phone # (DON'T enter "on file" or "in chart"): [facility] [address] [place] [address] - [address] [place] [address] & [facility] [phone] (Phone)  [phone] (Fax)  Comments:        "

## 2019-02-01 NOTE — TELEPHONE ENCOUNTER
Left msg earlier that rx sent to pharmacy 12/4/18 for 90 x 3.  Contact pharmacist for refill that must be on her profile on h old.

## 2019-02-01 NOTE — TELEPHONE ENCOUNTER
She called again and i spoke to her.  She says [facility] states they dont have 12/4 rx.  I told her i'd call it in again.    Left msg on recorder 90x 3 ok 12/4/18 hctz

## 2019-04-22 RX ORDER — GLIMEPIRIDE 2 MG/1
TABLET ORAL
Qty: 90 TABLET | Refills: 3 | Status: SHIPPED | OUTPATIENT
Start: 2019-04-22 | End: 2020-02-07

## 2019-04-22 RX ORDER — ATORVASTATIN CALCIUM 40 MG/1
TABLET, FILM COATED ORAL
Qty: 90 TABLET | Refills: 3 | Status: SHIPPED | OUTPATIENT
Start: 2019-04-22 | End: 2020-07-16 | Stop reason: SDUPTHER

## 2019-05-16 ENCOUNTER — LAB VISIT (OUTPATIENT)
Dept: LAB | Facility: HOSPITAL | Age: 54
End: 2019-05-16
Attending: INTERNAL MEDICINE
Payer: COMMERCIAL

## 2019-05-16 ENCOUNTER — OFFICE VISIT (OUTPATIENT)
Dept: INTERNAL MEDICINE | Facility: CLINIC | Age: 54
End: 2019-05-16
Payer: COMMERCIAL

## 2019-05-16 VITALS
HEART RATE: 92 BPM | RESPIRATION RATE: 18 BRPM | DIASTOLIC BLOOD PRESSURE: 64 MMHG | TEMPERATURE: 98 F | BODY MASS INDEX: 36.26 KG/M2 | HEIGHT: 62 IN | WEIGHT: 197.06 LBS | SYSTOLIC BLOOD PRESSURE: 126 MMHG

## 2019-05-16 DIAGNOSIS — E11.9 TYPE 2 DIABETES MELLITUS WITHOUT COMPLICATION, WITH LONG-TERM CURRENT USE OF INSULIN: ICD-10-CM

## 2019-05-16 DIAGNOSIS — R60.0 BILATERAL LEG EDEMA: Primary | ICD-10-CM

## 2019-05-16 DIAGNOSIS — Z79.4 TYPE 2 DIABETES MELLITUS WITHOUT COMPLICATION, WITH LONG-TERM CURRENT USE OF INSULIN: ICD-10-CM

## 2019-05-16 DIAGNOSIS — E78.5 HYPERLIPIDEMIA, UNSPECIFIED HYPERLIPIDEMIA TYPE: ICD-10-CM

## 2019-05-16 DIAGNOSIS — E66.9 OBESITY (BMI 30-39.9): ICD-10-CM

## 2019-05-16 DIAGNOSIS — R60.0 BILATERAL LEG EDEMA: ICD-10-CM

## 2019-05-16 DIAGNOSIS — G47.33 OSA (OBSTRUCTIVE SLEEP APNEA): ICD-10-CM

## 2019-05-16 DIAGNOSIS — I10 HTN (HYPERTENSION), BENIGN: ICD-10-CM

## 2019-05-16 LAB
ANION GAP SERPL CALC-SCNC: 14 MMOL/L (ref 8–16)
BNP SERPL-MCNC: <10 PG/ML (ref 0–99)
BUN SERPL-MCNC: 10 MG/DL (ref 6–20)
CALCIUM SERPL-MCNC: 11.1 MG/DL (ref 8.7–10.5)
CHLORIDE SERPL-SCNC: 96 MMOL/L (ref 95–110)
CO2 SERPL-SCNC: 30 MMOL/L (ref 23–29)
CREAT SERPL-MCNC: 0.9 MG/DL (ref 0.5–1.4)
EST. GFR  (AFRICAN AMERICAN): >60 ML/MIN/1.73 M^2
EST. GFR  (NON AFRICAN AMERICAN): >60 ML/MIN/1.73 M^2
ESTIMATED AVG GLUCOSE: 217 MG/DL (ref 68–131)
GLUCOSE SERPL-MCNC: 149 MG/DL (ref 70–110)
HBA1C MFR BLD HPLC: 9.2 % (ref 4–5.6)
POTASSIUM SERPL-SCNC: 3.6 MMOL/L (ref 3.5–5.1)
SODIUM SERPL-SCNC: 140 MMOL/L (ref 136–145)

## 2019-05-16 PROCEDURE — 83036 HEMOGLOBIN GLYCOSYLATED A1C: CPT

## 2019-05-16 PROCEDURE — 99214 OFFICE O/P EST MOD 30 MIN: CPT | Mod: S$GLB,,, | Performed by: INTERNAL MEDICINE

## 2019-05-16 PROCEDURE — 36415 COLL VENOUS BLD VENIPUNCTURE: CPT | Mod: PO

## 2019-05-16 PROCEDURE — 83880 ASSAY OF NATRIURETIC PEPTIDE: CPT

## 2019-05-16 PROCEDURE — 99999 PR PBB SHADOW E&M-EST. PATIENT-LVL IV: CPT | Mod: PBBFAC,,, | Performed by: INTERNAL MEDICINE

## 2019-05-16 PROCEDURE — 99999 PR PBB SHADOW E&M-EST. PATIENT-LVL IV: ICD-10-PCS | Mod: PBBFAC,,, | Performed by: INTERNAL MEDICINE

## 2019-05-16 PROCEDURE — 80048 BASIC METABOLIC PNL TOTAL CA: CPT

## 2019-05-16 PROCEDURE — 99214 PR OFFICE/OUTPT VISIT, EST, LEVL IV, 30-39 MIN: ICD-10-PCS | Mod: S$GLB,,, | Performed by: INTERNAL MEDICINE

## 2019-05-16 RX ORDER — AMLODIPINE BESYLATE 5 MG/1
5 TABLET ORAL DAILY
Qty: 90 TABLET | Refills: 3 | Status: SHIPPED | OUTPATIENT
Start: 2019-05-16 | End: 2020-07-16 | Stop reason: SDUPTHER

## 2019-05-16 RX ORDER — FUROSEMIDE 40 MG/1
40 TABLET ORAL EVERY MORNING
Qty: 14 TABLET | Refills: 0 | Status: SHIPPED | OUTPATIENT
Start: 2019-05-16 | End: 2019-06-12

## 2019-05-16 RX ORDER — POTASSIUM CHLORIDE 750 MG/1
10 CAPSULE, EXTENDED RELEASE ORAL DAILY
Qty: 14 CAPSULE | Refills: 0 | Status: SHIPPED | OUTPATIENT
Start: 2019-05-16 | End: 2019-06-26

## 2019-05-16 RX ORDER — PERMETHRIN 50 MG/G
CREAM TOPICAL
COMMUNITY
Start: 2016-10-10 | End: 2019-06-04

## 2019-05-16 NOTE — PROGRESS NOTES
Subjective:       Patient ID: Grecia Boyd is a 53 y.o. female.    Chief Complaint: Leg Swelling (both lower legs & ankles, painful) and Hyperglycemia (BS this am 171)    HPI   54 y/o Female with HTN, T2DM, HLD, Obesity and CHIDI is here for evaluation of 2 wks of persistent B/L LE leg swelling. It is worse the longer she stands and improves with elevation. No hx of CHF.   Review of Systems   Constitutional: Negative for activity change, appetite change, chills, diaphoresis, fatigue, fever and unexpected weight change.   HENT: Negative for postnasal drip, rhinorrhea, sinus pressure, sneezing, sore throat, trouble swallowing and voice change.    Respiratory: Negative for cough, shortness of breath and wheezing.    Cardiovascular: Positive for leg swelling. Negative for chest pain and palpitations.   Gastrointestinal: Negative for abdominal pain, blood in stool, constipation, diarrhea, nausea and vomiting.   Genitourinary: Negative for dysuria.   Musculoskeletal: Negative for arthralgias and myalgias.   Skin: Negative for rash and wound.   Allergic/Immunologic: Negative for environmental allergies and food allergies.   Hematological: Negative for adenopathy. Does not bruise/bleed easily.       Objective:      Physical Exam   Constitutional: She is oriented to person, place, and time. She appears well-developed and well-nourished. No distress.   HENT:   Head: Normocephalic and atraumatic.   Eyes: Pupils are equal, round, and reactive to light. Conjunctivae and EOM are normal. Right eye exhibits no discharge. Left eye exhibits no discharge. No scleral icterus.   Neck: Neck supple. No JVD present.   Cardiovascular: Normal rate, regular rhythm, normal heart sounds and intact distal pulses.   Pulmonary/Chest: Effort normal and breath sounds normal. No respiratory distress. She has no wheezes. She has no rales.   Musculoskeletal: She exhibits edema (1+ in B/L LE's).   Lymphadenopathy:     She has no cervical adenopathy.    Neurological: She is alert and oriented to person, place, and time.   Skin: Skin is warm and dry. No rash noted. She is not diaphoretic. No pallor.       Assessment:       1. Bilateral leg edema    2. HTN (hypertension), benign    3. Type 2 diabetes mellitus without complication, with long-term current use of insulin    4. Hyperlipidemia, unspecified hyperlipidemia type    5. Obesity (BMI 30-39.9)    6. CHIDI (obstructive sleep apnea)        Plan:    1. Check ECHO/BNP/BMP       Rx Lasix 40 mg qam       Decrease Norvasc to 5 mg daily   2. HTN- stable on Norvasc/Losartan/HCTZ   3. T2DM- last HA1C of 6.7(1/19)       Increase Metformin XR to 1 gm qam and continue Amaryl 2 mg daily    4. HLD- continue Lipitor 40 mg daily   5. Obesity- pt advised on proper diet/exercise for weight loss   6. CHIDI- stable on CPAP   7. F/u in 2 wk

## 2019-05-17 ENCOUNTER — TELEPHONE (OUTPATIENT)
Dept: INTERNAL MEDICINE | Facility: CLINIC | Age: 54
End: 2019-05-17

## 2019-05-17 ENCOUNTER — HOSPITAL ENCOUNTER (OUTPATIENT)
Dept: CARDIOLOGY | Facility: CLINIC | Age: 54
Discharge: HOME | End: 2019-05-17
Attending: INTERNAL MEDICINE
Payer: COMMERCIAL

## 2019-05-17 VITALS
BODY MASS INDEX: 36.25 KG/M2 | WEIGHT: 197 LBS | DIASTOLIC BLOOD PRESSURE: 64 MMHG | HEART RATE: 75 BPM | HEIGHT: 62 IN | SYSTOLIC BLOOD PRESSURE: 124 MMHG

## 2019-05-17 DIAGNOSIS — R60.0 BILATERAL LEG EDEMA: ICD-10-CM

## 2019-05-17 LAB
ASCENDING AORTA: 3.2 CM
BSA FOR ECHO PROCEDURE: 1.98 M2
CV ECHO LV RWT: 0.41 CM
DOP CALC LVOT AREA: 2.54 CM2
DOP CALC LVOT DIAMETER: 1.8 CM
DOP CALC LVOT PEAK VEL: 0.93 M/S
DOP CALC LVOT STROKE VOLUME: 47.21 CM3
DOP CALCLVOT PEAK VEL VTI: 18.56 CM
E WAVE DECELERATION TIME: 187.68 MSEC
E/A RATIO: 1.24
E/E' RATIO: 9.33
ECHO LV POSTERIOR WALL: 0.84 CM (ref 0.6–1.1)
FRACTIONAL SHORTENING: 39 % (ref 28–44)
INTERVENTRICULAR SEPTUM: 0.83 CM (ref 0.6–1.1)
IVRT: 0.11 MSEC
LA MAJOR: 4.27 CM
LA MINOR: 4.98 CM
LA WIDTH: 3.17 CM
LEFT ATRIUM SIZE: 3.06 CM
LEFT ATRIUM VOLUME INDEX: 20 ML/M2
LEFT ATRIUM VOLUME: 37.91 CM3
LEFT INTERNAL DIMENSION IN SYSTOLE: 2.5 CM (ref 2.1–4)
LEFT VENTRICLE DIASTOLIC VOLUME INDEX: 38.99 ML/M2
LEFT VENTRICLE DIASTOLIC VOLUME: 74.06 ML
LEFT VENTRICLE MASS INDEX: 54.3 G/M2
LEFT VENTRICLE SYSTOLIC VOLUME INDEX: 11.7 ML/M2
LEFT VENTRICLE SYSTOLIC VOLUME: 22.23 ML
LEFT VENTRICULAR INTERNAL DIMENSION IN DIASTOLE: 4.1 CM (ref 3.5–6)
LEFT VENTRICULAR MASS: 103.08 G
LV LATERAL E/E' RATIO: 8.91
LV SEPTAL E/E' RATIO: 9.8
MV PEAK A VEL: 0.79 M/S
MV PEAK E VEL: 0.98 M/S
PULM VEIN S/D RATIO: 0.94
PV PEAK D VEL: 0.49 M/S
PV PEAK S VEL: 0.46 M/S
RA MAJOR: 3.92 CM
RA PRESSURE: 3 MMHG
RA WIDTH: 3.62 CM
RIGHT VENTRICULAR END-DIASTOLIC DIMENSION: 2.62 CM
RV TISSUE DOPPLER FREE WALL SYSTOLIC VELOCITY 1 (APICAL 4 CHAMBER VIEW): 11.31 M/S
SINUS: 3.12 CM
STJ: 2.79 CM
TDI LATERAL: 0.11
TDI SEPTAL: 0.1
TDI: 0.11
TRICUSPID ANNULAR PLANE SYSTOLIC EXCURSION: 2.02 CM

## 2019-05-17 PROCEDURE — 93306 TRANSTHORACIC ECHO (TTE) COMPLETE (CUPID ONLY): ICD-10-PCS | Mod: S$GLB,,, | Performed by: INTERNAL MEDICINE

## 2019-05-17 PROCEDURE — 93306 TTE W/DOPPLER COMPLETE: CPT | Mod: S$GLB,,, | Performed by: INTERNAL MEDICINE

## 2019-05-17 NOTE — TELEPHONE ENCOUNTER
----- Message from Scott Tripathi DO sent at 5/17/2019  6:49 AM CDT -----  Poor control of diabetes- make sure to document blood sugars 2x/daily until our f/u appt  Normal kidney function

## 2019-05-17 NOTE — TELEPHONE ENCOUNTER
----- Message from Scott Tripathi DO sent at 5/17/2019 11:07 AM CDT -----  No signs of heart failure per ECHO study

## 2019-06-04 ENCOUNTER — HOSPITAL ENCOUNTER (EMERGENCY)
Facility: HOSPITAL | Age: 54
Discharge: HOME | End: 2019-06-04
Attending: EMERGENCY MEDICINE
Payer: COMMERCIAL

## 2019-06-04 ENCOUNTER — NURSE TRIAGE (OUTPATIENT)
Dept: ADMINISTRATIVE | Facility: CLINIC | Age: 54
End: 2019-06-04

## 2019-06-04 VITALS
OXYGEN SATURATION: 99 % | SYSTOLIC BLOOD PRESSURE: 121 MMHG | HEIGHT: 62 IN | TEMPERATURE: 98 F | DIASTOLIC BLOOD PRESSURE: 69 MMHG | RESPIRATION RATE: 17 BRPM | BODY MASS INDEX: 35.7 KG/M2 | HEART RATE: 67 BPM | WEIGHT: 194 LBS

## 2019-06-04 DIAGNOSIS — R53.1 GENERALIZED WEAKNESS: Primary | ICD-10-CM

## 2019-06-04 LAB
ALBUMIN SERPL BCP-MCNC: 3.8 G/DL (ref 3.5–5.2)
ALP SERPL-CCNC: 79 U/L (ref 55–135)
ALT SERPL W/O P-5'-P-CCNC: 15 U/L (ref 10–44)
ANION GAP SERPL CALC-SCNC: 13 MMOL/L (ref 8–16)
AST SERPL-CCNC: 21 U/L (ref 10–40)
BACTERIA #/AREA URNS AUTO: ABNORMAL /HPF
BASOPHILS # BLD AUTO: 0.05 K/UL (ref 0–0.2)
BASOPHILS NFR BLD: 0.5 % (ref 0–1.9)
BILIRUB SERPL-MCNC: 0.4 MG/DL (ref 0.1–1)
BILIRUB UR QL STRIP: NEGATIVE
BUN SERPL-MCNC: 10 MG/DL (ref 6–20)
CALCIUM SERPL-MCNC: 9.3 MG/DL (ref 8.7–10.5)
CHLORIDE SERPL-SCNC: 101 MMOL/L (ref 95–110)
CLARITY UR REFRACT.AUTO: ABNORMAL
CO2 SERPL-SCNC: 25 MMOL/L (ref 23–29)
COLOR UR AUTO: YELLOW
CREAT SERPL-MCNC: 0.8 MG/DL (ref 0.5–1.4)
DIFFERENTIAL METHOD: ABNORMAL
EOSINOPHIL # BLD AUTO: 0.1 K/UL (ref 0–0.5)
EOSINOPHIL NFR BLD: 1.1 % (ref 0–8)
ERYTHROCYTE [DISTWIDTH] IN BLOOD BY AUTOMATED COUNT: 13.6 % (ref 11.5–14.5)
EST. GFR  (AFRICAN AMERICAN): >60 ML/MIN/1.73 M^2
EST. GFR  (NON AFRICAN AMERICAN): >60 ML/MIN/1.73 M^2
GLUCOSE SERPL-MCNC: 119 MG/DL (ref 70–110)
GLUCOSE UR QL STRIP: NEGATIVE
HCT VFR BLD AUTO: 35.8 % (ref 37–48.5)
HGB BLD-MCNC: 11.4 G/DL (ref 12–16)
HGB UR QL STRIP: ABNORMAL
IMM GRANULOCYTES # BLD AUTO: 0.02 K/UL (ref 0–0.04)
IMM GRANULOCYTES NFR BLD AUTO: 0.2 % (ref 0–0.5)
KETONES UR QL STRIP: NEGATIVE
LEUKOCYTE ESTERASE UR QL STRIP: NEGATIVE
LYMPHOCYTES # BLD AUTO: 3.9 K/UL (ref 1–4.8)
LYMPHOCYTES NFR BLD: 40.9 % (ref 18–48)
MCH RBC QN AUTO: 26 PG (ref 27–31)
MCHC RBC AUTO-ENTMCNC: 31.8 G/DL (ref 32–36)
MCV RBC AUTO: 82 FL (ref 82–98)
MICROSCOPIC COMMENT: ABNORMAL
MONOCYTES # BLD AUTO: 0.7 K/UL (ref 0.3–1)
MONOCYTES NFR BLD: 6.8 % (ref 4–15)
NEUTROPHILS # BLD AUTO: 4.8 K/UL (ref 1.8–7.7)
NEUTROPHILS NFR BLD: 50.5 % (ref 38–73)
NITRITE UR QL STRIP: NEGATIVE
NRBC BLD-RTO: 0 /100 WBC
PH UR STRIP: 6 [PH] (ref 5–8)
PLATELET # BLD AUTO: 392 K/UL (ref 150–350)
PMV BLD AUTO: 8.7 FL (ref 9.2–12.9)
POCT GLUCOSE: 141 MG/DL (ref 70–110)
POTASSIUM SERPL-SCNC: 3.3 MMOL/L (ref 3.5–5.1)
PROT SERPL-MCNC: 7.6 G/DL (ref 6–8.4)
PROT UR QL STRIP: NEGATIVE
RBC # BLD AUTO: 4.39 M/UL (ref 4–5.4)
RBC #/AREA URNS AUTO: 2 /HPF (ref 0–4)
SODIUM SERPL-SCNC: 139 MMOL/L (ref 136–145)
SP GR UR STRIP: 1.01 (ref 1–1.03)
SQUAMOUS #/AREA URNS AUTO: 19 /HPF
TSH SERPL DL<=0.005 MIU/L-ACNC: 0.58 UIU/ML (ref 0.4–4)
URN SPEC COLLECT METH UR: ABNORMAL
WBC # BLD AUTO: 9.51 K/UL (ref 3.9–12.7)
WBC #/AREA URNS AUTO: 2 /HPF (ref 0–5)

## 2019-06-04 PROCEDURE — 84443 ASSAY THYROID STIM HORMONE: CPT

## 2019-06-04 PROCEDURE — 25000003 PHARM REV CODE 250: Performed by: PHYSICIAN ASSISTANT

## 2019-06-04 PROCEDURE — 96360 HYDRATION IV INFUSION INIT: CPT

## 2019-06-04 PROCEDURE — 80053 COMPREHEN METABOLIC PANEL: CPT

## 2019-06-04 PROCEDURE — 99284 PR EMERGENCY DEPT VISIT,LEVEL IV: ICD-10-PCS | Mod: ,,, | Performed by: PHYSICIAN ASSISTANT

## 2019-06-04 PROCEDURE — 99284 EMERGENCY DEPT VISIT MOD MDM: CPT | Mod: 25

## 2019-06-04 PROCEDURE — 85025 COMPLETE CBC W/AUTO DIFF WBC: CPT

## 2019-06-04 PROCEDURE — 99284 EMERGENCY DEPT VISIT MOD MDM: CPT | Mod: ,,, | Performed by: PHYSICIAN ASSISTANT

## 2019-06-04 PROCEDURE — 82962 GLUCOSE BLOOD TEST: CPT

## 2019-06-04 PROCEDURE — 81001 URINALYSIS AUTO W/SCOPE: CPT

## 2019-06-04 RX ORDER — POTASSIUM CHLORIDE 20 MEQ/1
40 TABLET, EXTENDED RELEASE ORAL
Status: COMPLETED | OUTPATIENT
Start: 2019-06-04 | End: 2019-06-04

## 2019-06-04 RX ADMIN — SODIUM CHLORIDE 1000 ML: 0.9 INJECTION, SOLUTION INTRAVENOUS at 12:06

## 2019-06-04 RX ADMIN — POTASSIUM CHLORIDE 40 MEQ: 1500 TABLET, EXTENDED RELEASE ORAL at 01:06

## 2019-06-04 NOTE — DISCHARGE INSTRUCTIONS
Rest.  Drink plenty of fluids.  Follow up with your doctor in 2-3 days if you do not improve.  If you have new or worsening symptoms, return to the ER promptly.

## 2019-06-04 NOTE — ED TRIAGE NOTES
Pt reports that she took her blood sugar this morning a couple of times and it was between 180 and 190.  She went to work and became dizzy, took her glucose and it was 160.  Pt states that her blood sugar usually runs around 120.

## 2019-06-04 NOTE — ED PROVIDER NOTES
"Encounter Date: 6/4/2019    SCRIBE #1 NOTE: I, Son Dariela, am scribing for, and in the presence of,  Dr. Alarcon . I have scribed the following portions of the note - the APC attestation.       History     Chief Complaint   Patient presents with    Hyperglycemia     Pt states her blood sugar was 160-190's this am.  States she was feeling bad this am so her doctor told her to come to ED.  Pt states "it never runs that high".  Pt on Metformin & Glyburide  Pt also c/o blurred vision this am-states not as bad as it was earlier.     53-year-old female with medical comorbidities significant for type 2 DM on metformin and glimepiride, HTN, HLD presents to the ED with a chief complaint of hyperglycemia and weakness. Patient reports generalized weakness and malaise as well as some blurry vision that began this morning.  Patient also had an episode of lightheadedness.  Patient checked her sugar at work and it was 190.  Patient states that this is significantly higher than her baseline.  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  Patient denies fever, chills, abdominal pain, nausea vomiting, diarrhea, cough, sore throat, nasal congestion, dysuria, polyuria, chest pain, shortness of breath, heart palpitations, headache.         Review of patient's allergies indicates:  No Known Allergies  Past Medical History:   Diagnosis Date    Diabetes mellitus, type 2     Hyperlipidemia     Hypertension     Obesity      Past Surgical History:   Procedure Laterality Date    ADENOIDECTOMY      BREAST BIOPSY Right     @ age 18    HYSTERECTOMY      THYROIDECTOMY, PARTIAL      TONSILLECTOMY       Family History   Problem Relation Age of Onset    Diabetes Mother     Heart disease Mother     Hypertension Mother     Hyperlipidemia Mother     Stroke Mother     Cataracts Mother     Cancer Paternal Aunt         Lung    Diabetes Paternal Grandmother     Breast cancer Paternal Aunt     Macular degeneration " Paternal Aunt      Social History     Tobacco Use    Smoking status: Never Smoker    Smokeless tobacco: Never Used   Substance Use Topics    Alcohol use: Yes     Comment: rare    Drug use: No     Review of Systems   Constitutional: Positive for fatigue. Negative for chills and fever.   HENT: Negative for congestion and sore throat.    Respiratory: Negative for cough and shortness of breath.    Cardiovascular: Negative for chest pain and palpitations.   Gastrointestinal: Negative for abdominal pain, diarrhea, nausea and vomiting.   Endocrine: Negative for polyuria.   Genitourinary: Negative for dysuria and urgency.   Musculoskeletal: Negative for back pain.   Skin: Negative for rash.   Neurological: Positive for weakness and light-headedness. Negative for headaches.   Hematological: Does not bruise/bleed easily.       Physical Exam     Initial Vitals [06/04/19 1016]   BP Pulse Resp Temp SpO2   (!) 182/97 75 16 98.2 °F (36.8 °C) 100 %      MAP       --         Physical Exam    Nursing note and vitals reviewed.  Constitutional: She appears well-developed and well-nourished. She is not diaphoretic.  Non-toxic appearance. She does not appear ill. No distress.   HENT:   Head: Normocephalic and atraumatic.   Eyes: EOM are normal. Pupils are equal, round, and reactive to light.   Neck: Neck supple.   Cardiovascular: Normal rate and regular rhythm. Exam reveals no gallop and no friction rub.    No murmur heard.  No peripheral pitting edema   Pulmonary/Chest: Effort normal and breath sounds normal. No accessory muscle usage. No tachypnea. No respiratory distress. She has no decreased breath sounds. She has no wheezes. She has no rhonchi. She has no rales.   Abdominal: Soft. She exhibits no distension. There is no tenderness.   Neurological: She is alert. She has normal strength.   No facial weakness, speech clear.    Skin: No rash noted.   Psychiatric: She has a normal mood and affect. Her behavior is normal.         ED  Course   Procedures  Labs Reviewed   URINALYSIS, REFLEX TO URINE CULTURE - Abnormal; Notable for the following components:       Result Value    Appearance, UA Hazy (*)     Occult Blood UA 1+ (*)     All other components within normal limits    Narrative:     Preferred Collection Type->Urine, Clean Catch   CBC W/ AUTO DIFFERENTIAL - Abnormal; Notable for the following components:    Hemoglobin 11.4 (*)     Hematocrit 35.8 (*)     Mean Corpuscular Hemoglobin 26.0 (*)     Mean Corpuscular Hemoglobin Conc 31.8 (*)     Platelets 392 (*)     MPV 8.7 (*)     All other components within normal limits   COMPREHENSIVE METABOLIC PANEL - Abnormal; Notable for the following components:    Potassium 3.3 (*)     Glucose 119 (*)     All other components within normal limits   URINALYSIS MICROSCOPIC - Abnormal; Notable for the following components:    Bacteria Few (*)     All other components within normal limits    Narrative:     Preferred Collection Type->Urine, Clean Catch   POCT GLUCOSE - Abnormal; Notable for the following components:    POCT Glucose 141 (*)     All other components within normal limits   TSH          Imaging Results    None          Medical Decision Making:   History:   Old Medical Records: I decided to obtain old medical records.  Differential Diagnosis:   My differential diagnosis includes but is not limited to:  Anemia, dehydration, electrolyte abnormality, thyroid imbalance, renal insufficiency  Clinical Tests:   Lab Tests: Ordered and Reviewed       APC / Resident Notes:   53-year-old female presents for evaluation of generalized weakness and elevated blood sugars at home.  Patient is hypertensive at 182/97.  Vitals otherwise within normal limits. Patient is nontoxic appearing and in no acute distress. Unremarkable physical exam.    CMP reveals a glucose of 119.  Mild hypokalemia at 3.3.  This was repleted orally in the ED.  No significant anemia.  UA without evidence of significant UTI or hematuria.   Patient was given 1 L of IV fluids in the ED.  No clear etiology of patient's symptoms noted on workup today.  I have advised patient to rest, drink plenty of fluids.  PCP follow-up within the next week if her symptoms do not improve.  ED return precautions given.  Stable for discharge.  I have reviewed the patient's records and discussed this case with my supervising physician.         Scribe Attestation:   Scribe #1: I performed the above scribed service and the documentation accurately describes the services I performed. I attest to the accuracy of the note.    Attending Attestation:     Physician Attestation Statement for NP/PA:   I discussed this assessment and plan of this patient with the NP/PA, but I did not personally examine the patient. The face to face encounter was performed by the NP/PA.    Other NP/PA Attestation Additions:    History of Present Illness: 54 y/o woman presents for evaluation of elevated glucose noted this morning.                       Clinical Impression:       ICD-10-CM ICD-9-CM   1. Generalized weakness R53.1 780.79         Disposition:   Disposition: Discharged  Condition: Stable                        Sonal Brown PA-C  06/04/19 2029       Sonal Brown PA-C  06/04/19 2030

## 2019-06-04 NOTE — TELEPHONE ENCOUNTER
"    Reason for Disposition   SEVERE dizziness (e.g., unable to stand, requires support to walk, feels like passing out now)    Protocols used: DIZZINESS-A-OH    Grecia states she is dizzy, has blurred vision, and woke up this morning to  CBG of 193.  Has not eaten, and is still 162.  She continues to feel very off balance, and says she is no longer taking the lasix, but symptoms worse now.  Has been drinking lots of water this morning, has not eaten, and her CBG is still "very high" per Grecia's report, compared to her norms.  [facility], recommend ED for evaluation.  Message to Scott Tripathi DO, pcp.  Please contact patient directly with any additional care advice.    "

## 2019-06-05 ENCOUNTER — TELEPHONE (OUTPATIENT)
Dept: INTERNAL MEDICINE | Facility: CLINIC | Age: 54
End: 2019-06-05

## 2019-06-05 NOTE — TELEPHONE ENCOUNTER
----- Message from Paula Gomez sent at 6/5/2019  7:10 AM CDT -----  Contact: self/ [phone]  Patient would like to get medical advice.  Symptoms (please be specific):    How long has patient had these symptoms:    Pharmacy name and phone # (copy/paste from chart):    Any drug allergies (copy/paste from chart):      Would the patient rather a call back or a response via [facility]?:    Comments:  Patient sugar is 188 this morning . went to the ER on yesterday. Patient has not taken her medication this morning, please call and advise.

## 2019-06-12 ENCOUNTER — OFFICE VISIT (OUTPATIENT)
Dept: INTERNAL MEDICINE | Facility: CLINIC | Age: 54
End: 2019-06-12
Payer: COMMERCIAL

## 2019-06-12 VITALS
BODY MASS INDEX: 36.84 KG/M2 | RESPIRATION RATE: 18 BRPM | SYSTOLIC BLOOD PRESSURE: 146 MMHG | TEMPERATURE: 98 F | WEIGHT: 200.19 LBS | DIASTOLIC BLOOD PRESSURE: 86 MMHG | HEART RATE: 64 BPM | HEIGHT: 62 IN

## 2019-06-12 DIAGNOSIS — E11.9 TYPE 2 DIABETES MELLITUS WITHOUT COMPLICATION, WITH LONG-TERM CURRENT USE OF INSULIN: ICD-10-CM

## 2019-06-12 DIAGNOSIS — R60.0 BILATERAL LEG EDEMA: Primary | ICD-10-CM

## 2019-06-12 DIAGNOSIS — Z79.4 TYPE 2 DIABETES MELLITUS WITHOUT COMPLICATION, WITH LONG-TERM CURRENT USE OF INSULIN: ICD-10-CM

## 2019-06-12 PROCEDURE — 99214 OFFICE O/P EST MOD 30 MIN: CPT | Mod: S$GLB,,, | Performed by: INTERNAL MEDICINE

## 2019-06-12 PROCEDURE — 99999 PR PBB SHADOW E&M-EST. PATIENT-LVL III: CPT | Mod: PBBFAC,,, | Performed by: INTERNAL MEDICINE

## 2019-06-12 PROCEDURE — 99214 PR OFFICE/OUTPT VISIT, EST, LEVL IV, 30-39 MIN: ICD-10-PCS | Mod: S$GLB,,, | Performed by: INTERNAL MEDICINE

## 2019-06-12 PROCEDURE — 99999 PR PBB SHADOW E&M-EST. PATIENT-LVL III: ICD-10-PCS | Mod: PBBFAC,,, | Performed by: INTERNAL MEDICINE

## 2019-06-12 RX ORDER — METFORMIN HYDROCHLORIDE 500 MG/1
TABLET, EXTENDED RELEASE ORAL
Qty: 360 TABLET | Refills: 3 | Status: SHIPPED | OUTPATIENT
Start: 2019-06-12 | End: 2019-07-19 | Stop reason: SDUPTHER

## 2019-06-12 NOTE — PROGRESS NOTES
Subjective:       Patient ID: Grecia Boyd is a 53 y.o. female.    Chief Complaint: Follow-up (2 week- ER visit 6-4 diabetes & blood pressure check)    HPI   Pt here for 2 wk f/u regarding LE leg swelling. Her Norvasc was decreased and she was started on Lasix 40 mg daily. Her leg swelling has resolved.     Pt also has DM and her last A1c was 9.2. Since that time her Metformin was increased to 2 gm daily and she has continued on Amaryl. Am blood sugars are currently running in the mid 100s.   Review of Systems   Constitutional: Negative for activity change, appetite change, chills, diaphoresis, fatigue, fever and unexpected weight change.   HENT: Negative for postnasal drip, rhinorrhea, sinus pressure, sneezing, sore throat, trouble swallowing and voice change.    Respiratory: Negative for cough, shortness of breath and wheezing.    Cardiovascular: Negative for chest pain, palpitations and leg swelling.   Gastrointestinal: Negative for abdominal pain, blood in stool, constipation, diarrhea, nausea and vomiting.   Genitourinary: Negative for dysuria.   Musculoskeletal: Negative for arthralgias and myalgias.   Skin: Negative for rash and wound.   Allergic/Immunologic: Negative for environmental allergies and food allergies.   Hematological: Negative for adenopathy. Does not bruise/bleed easily.       Objective:      Physical Exam   Constitutional: She is oriented to person, place, and time. She appears well-developed and well-nourished. No distress.   HENT:   Head: Normocephalic and atraumatic.   Eyes: Pupils are equal, round, and reactive to light. Conjunctivae and EOM are normal. Right eye exhibits no discharge. Left eye exhibits no discharge. No scleral icterus.   Neck: Neck supple. No JVD present.   Cardiovascular: Normal rate, regular rhythm, normal heart sounds and intact distal pulses.   Pulmonary/Chest: Effort normal and breath sounds normal. No respiratory distress. She has no wheezes. She has no rales.    Abdominal: Soft. Bowel sounds are normal. There is no tenderness.   Musculoskeletal: She exhibits no edema.   Lymphadenopathy:     She has no cervical adenopathy.   Neurological: She is alert and oriented to person, place, and time.   Skin: Skin is warm and dry. No rash noted. She is not diaphoretic. No pallor.       Assessment:       1. Bilateral leg edema    2. Type 2 diabetes mellitus without complication, with long-term current use of insulin        Plan:    1. Resolved, suspect component from Norvasc which was decreased   2. Rx Jardiance 10 mg qam and continue Metformin/Amaryl        Document blood sugars BID   3. F/u in 2 wks

## 2019-06-24 DIAGNOSIS — E11.9 TYPE 2 DIABETES MELLITUS WITHOUT COMPLICATION: ICD-10-CM

## 2019-06-26 ENCOUNTER — OFFICE VISIT (OUTPATIENT)
Dept: INTERNAL MEDICINE | Facility: CLINIC | Age: 54
End: 2019-06-26
Payer: COMMERCIAL

## 2019-06-26 VITALS
DIASTOLIC BLOOD PRESSURE: 86 MMHG | TEMPERATURE: 98 F | HEIGHT: 62 IN | HEART RATE: 62 BPM | WEIGHT: 194.44 LBS | BODY MASS INDEX: 35.78 KG/M2 | SYSTOLIC BLOOD PRESSURE: 120 MMHG

## 2019-06-26 DIAGNOSIS — E11.9 TYPE 2 DIABETES MELLITUS WITHOUT COMPLICATION, WITH LONG-TERM CURRENT USE OF INSULIN: Primary | ICD-10-CM

## 2019-06-26 DIAGNOSIS — Z79.4 TYPE 2 DIABETES MELLITUS WITHOUT COMPLICATION, WITH LONG-TERM CURRENT USE OF INSULIN: Primary | ICD-10-CM

## 2019-06-26 PROCEDURE — 99213 PR OFFICE/OUTPT VISIT, EST, LEVL III, 20-29 MIN: ICD-10-PCS | Mod: S$GLB,,, | Performed by: INTERNAL MEDICINE

## 2019-06-26 PROCEDURE — 99999 PR PBB SHADOW E&M-EST. PATIENT-LVL III: CPT | Mod: PBBFAC,,, | Performed by: INTERNAL MEDICINE

## 2019-06-26 PROCEDURE — 99213 OFFICE O/P EST LOW 20 MIN: CPT | Mod: S$GLB,,, | Performed by: INTERNAL MEDICINE

## 2019-06-26 PROCEDURE — 99999 PR PBB SHADOW E&M-EST. PATIENT-LVL III: ICD-10-PCS | Mod: PBBFAC,,, | Performed by: INTERNAL MEDICINE

## 2019-06-26 NOTE — PROGRESS NOTES
Subjective:       Patient ID: Grecia Boyd is a 53 y.o. female.    Chief Complaint: Annual Exam (fasting)    HPI   Pt here for 2 wk f/u regarding uncontrolled DM. Jardiance was added to her metformin/Amaryl. Blood sugars are running in the mid 100s. No SE's from the medication.   Review of Systems   Constitutional: Negative for activity change, appetite change, chills, diaphoresis, fatigue, fever and unexpected weight change.   HENT: Negative for postnasal drip, rhinorrhea, sinus pressure, sneezing, sore throat, trouble swallowing and voice change.    Respiratory: Negative for cough, shortness of breath and wheezing.    Cardiovascular: Negative for chest pain, palpitations and leg swelling.   Gastrointestinal: Negative for abdominal pain, blood in stool, constipation, diarrhea, nausea and vomiting.   Genitourinary: Negative for dysuria.   Musculoskeletal: Negative for arthralgias and myalgias.   Skin: Negative for rash and wound.   Allergic/Immunologic: Negative for environmental allergies and food allergies.   Hematological: Negative for adenopathy. Does not bruise/bleed easily.       Objective:      Physical Exam   Constitutional: She is oriented to person, place, and time. She appears well-developed and well-nourished. No distress.   HENT:   Head: Normocephalic and atraumatic.   Eyes: Pupils are equal, round, and reactive to light. Conjunctivae and EOM are normal. Right eye exhibits no discharge. Left eye exhibits no discharge. No scleral icterus.   Neck: Neck supple. No JVD present.   Cardiovascular: Normal rate, regular rhythm, normal heart sounds and intact distal pulses.   Pulmonary/Chest: Effort normal and breath sounds normal. No respiratory distress. She has no wheezes. She has no rales.   Musculoskeletal: She exhibits no edema.   Lymphadenopathy:     She has no cervical adenopathy.   Neurological: She is alert and oriented to person, place, and time.   Skin: Skin is warm and dry. No rash noted. She is  not diaphoretic. No pallor.       Assessment:       1. Type 2 diabetes mellitus without complication, with long-term current use of insulin        Plan:    1. Increase Jardiance to 25 mg qam and Metformin to 1 gm BID, continue Amaryl 2 mg daily       Document blood sugars BID   2. F/u in 2 wks

## 2019-07-19 ENCOUNTER — TELEPHONE (OUTPATIENT)
Dept: INTERNAL MEDICINE | Facility: CLINIC | Age: 54
End: 2019-07-19

## 2019-07-19 RX ORDER — METFORMIN HYDROCHLORIDE 500 MG/1
TABLET, EXTENDED RELEASE ORAL
Qty: 360 TABLET | Refills: 3 | Status: SHIPPED | OUTPATIENT
Start: 2019-07-19 | End: 2020-07-16 | Stop reason: SDUPTHER

## 2019-07-19 NOTE — TELEPHONE ENCOUNTER
----- Message from Christen Goodson sent at 7/19/2019  7:40 AM CDT -----  Contact: pt [phone]  Patient is calling for an RX refill or new RX.  Is this a refill or new RX:  New   RX name and strength: metFORMIN (GLUCOPHAGE-XR) 500 MG 24 hr tablet  Directions (copy/paste from chart):  Per pt taking 2 tablets in am and 2 in pm   Is this a 30 day or 90 day RX:  90  Local pharmacy or mail order pharmacy:  Local   Pharmacy name and phone # (copy/paste from chart):   [facility] [address] [place] [address] [phone] (Phone)  [phone] (Fax)  Comments:

## 2019-07-22 ENCOUNTER — TELEPHONE (OUTPATIENT)
Dept: INTERNAL MEDICINE | Facility: CLINIC | Age: 54
End: 2019-07-22

## 2019-07-22 ENCOUNTER — OFFICE VISIT (OUTPATIENT)
Dept: INTERNAL MEDICINE | Facility: CLINIC | Age: 54
End: 2019-07-22
Payer: COMMERCIAL

## 2019-07-22 VITALS
HEIGHT: 62 IN | TEMPERATURE: 98 F | RESPIRATION RATE: 18 BRPM | WEIGHT: 189.13 LBS | BODY MASS INDEX: 34.8 KG/M2 | SYSTOLIC BLOOD PRESSURE: 122 MMHG | HEART RATE: 63 BPM | DIASTOLIC BLOOD PRESSURE: 88 MMHG

## 2019-07-22 DIAGNOSIS — Z11.1 TUBERCULOSIS SCREENING: ICD-10-CM

## 2019-07-22 DIAGNOSIS — Z00.00 ANNUAL PHYSICAL EXAM: Primary | ICD-10-CM

## 2019-07-22 DIAGNOSIS — E11.9 TYPE 2 DIABETES MELLITUS WITHOUT COMPLICATION, WITH LONG-TERM CURRENT USE OF INSULIN: Primary | ICD-10-CM

## 2019-07-22 DIAGNOSIS — Z00.00 ANNUAL PHYSICAL EXAM: ICD-10-CM

## 2019-07-22 DIAGNOSIS — Z79.4 TYPE 2 DIABETES MELLITUS WITHOUT COMPLICATION, WITH LONG-TERM CURRENT USE OF INSULIN: Primary | ICD-10-CM

## 2019-07-22 PROCEDURE — 99999 PR PBB SHADOW E&M-EST. PATIENT-LVL III: ICD-10-PCS | Mod: PBBFAC,,, | Performed by: INTERNAL MEDICINE

## 2019-07-22 PROCEDURE — 86580 POCT TB SKIN TEST: ICD-10-PCS | Mod: S$GLB,,, | Performed by: INTERNAL MEDICINE

## 2019-07-22 PROCEDURE — 99999 PR PBB SHADOW E&M-EST. PATIENT-LVL III: CPT | Mod: PBBFAC,,, | Performed by: INTERNAL MEDICINE

## 2019-07-22 PROCEDURE — 86580 TB INTRADERMAL TEST: CPT | Mod: S$GLB,,, | Performed by: INTERNAL MEDICINE

## 2019-07-22 PROCEDURE — 99213 OFFICE O/P EST LOW 20 MIN: CPT | Mod: S$GLB,,, | Performed by: INTERNAL MEDICINE

## 2019-07-22 PROCEDURE — 99213 PR OFFICE/OUTPT VISIT, EST, LEVL III, 20-29 MIN: ICD-10-PCS | Mod: S$GLB,,, | Performed by: INTERNAL MEDICINE

## 2019-07-22 NOTE — PROGRESS NOTES
Subjective:       Patient ID: Grecia Boyd is a 53 y.o. female.    Chief Complaint: Follow-up (2 week) and Immunizations (TB test for work)    HPI   Pt here for f/u regarding T2DM. Her Jardiance and Metformin was increased at last visit and she continued on Amaryl. Blood sugars at home are running in the low to mid 100s. No SE's from the medications.     Pt is also requesting a TB skin test for her employer.   Review of Systems   Constitutional: Negative for activity change, appetite change, chills, diaphoresis, fatigue, fever and unexpected weight change.   HENT: Negative for postnasal drip, rhinorrhea, sinus pressure, sneezing, sore throat, trouble swallowing and voice change.    Respiratory: Negative for cough, shortness of breath and wheezing.    Cardiovascular: Negative for chest pain, palpitations and leg swelling.   Gastrointestinal: Negative for abdominal pain, blood in stool, constipation, diarrhea, nausea and vomiting.   Genitourinary: Negative for dysuria.   Musculoskeletal: Negative for arthralgias and myalgias.   Skin: Negative for rash and wound.   Allergic/Immunologic: Negative for environmental allergies and food allergies.   Hematological: Negative for adenopathy. Does not bruise/bleed easily.       Objective:      Physical Exam   Constitutional: She is oriented to person, place, and time. She appears well-developed and well-nourished. No distress.   HENT:   Head: Normocephalic and atraumatic.   Eyes: Pupils are equal, round, and reactive to light. Conjunctivae and EOM are normal. Right eye exhibits no discharge. Left eye exhibits no discharge. No scleral icterus.   Neck: Neck supple. No JVD present.   Cardiovascular: Normal rate, regular rhythm, normal heart sounds and intact distal pulses.   Pulmonary/Chest: Effort normal and breath sounds normal. No respiratory distress. She has no wheezes. She has no rales.   Musculoskeletal: She exhibits no edema.   Lymphadenopathy:     She has no cervical  adenopathy.   Neurological: She is alert and oriented to person, place, and time.   Skin: Skin is warm and dry. No rash noted. She is not diaphoretic. No pallor.       Assessment:       1. Type 2 diabetes mellitus without complication, with long-term current use of insulin    2. Tuberculosis screening    3. Annual physical exam        Plan:    1. Controlled per logs       CPT       Repeat HA1C in 3 months   2. TB skin test placed, RTC on Wed to have it read   3. Annual labs ordered    4. F/u in 3 months for annual exam

## 2019-07-22 NOTE — TELEPHONE ENCOUNTER
----- Message from Ana Mai sent at 7/22/2019  9:25 AM CDT -----  Contact: SELF   Doctor appointment and lab have been scheduled.  Please link lab orders to the lab appointment.  Date of doctor appointment:  09/23  Date of lab appointment:  09/21  Physical or EP: PHYSICAL  Comments:

## 2019-07-24 ENCOUNTER — CLINICAL SUPPORT (OUTPATIENT)
Dept: INTERNAL MEDICINE | Facility: CLINIC | Age: 54
End: 2019-07-24
Payer: COMMERCIAL

## 2019-07-24 LAB
TB INDURATION - 48 HR READ: 0 MM
TB INDURATION - 72 HR READ: NORMAL MM
TB SKIN TEST - 48 HR READ: NEGATIVE
TB SKIN TEST - 72 HR READ: NORMAL

## 2019-07-29 ENCOUNTER — TELEPHONE (OUTPATIENT)
Dept: INTERNAL MEDICINE | Facility: CLINIC | Age: 54
End: 2019-07-29

## 2019-07-29 RX ORDER — NITROFURANTOIN 25; 75 MG/1; MG/1
100 CAPSULE ORAL 2 TIMES DAILY
Qty: 10 CAPSULE | Refills: 0 | Status: SHIPPED | OUTPATIENT
Start: 2019-07-29 | End: 2019-10-23

## 2019-07-29 NOTE — TELEPHONE ENCOUNTER
----- Message from Dannielle Chamberlain sent at 7/29/2019 12:32 PM CDT -----  Patient is requesting a call from the office regarding UTI.  Patient would like the doctor to call something into pharmacy for her to take.    Please advise, thank you

## 2019-07-29 NOTE — TELEPHONE ENCOUNTER
Spoke with pt who advises that she has been experiencing burning with urination today.    Denies lower abd pain, lower back pain or other.    She is requesting that antibiotics are sent in to her pharmacy.    Please advise.    Thanks.

## 2019-08-22 ENCOUNTER — CLINICAL SUPPORT (OUTPATIENT)
Dept: DIABETES | Facility: CLINIC | Age: 54
End: 2019-08-22
Payer: COMMERCIAL

## 2019-08-22 DIAGNOSIS — E11.9 TYPE 2 DIABETES MELLITUS WITHOUT COMPLICATION, WITH LONG-TERM CURRENT USE OF INSULIN: ICD-10-CM

## 2019-08-22 DIAGNOSIS — Z79.4 TYPE 2 DIABETES MELLITUS WITHOUT COMPLICATION, WITH LONG-TERM CURRENT USE OF INSULIN: ICD-10-CM

## 2019-08-22 PROCEDURE — G0108 DIAB MANAGE TRN  PER INDIV: HCPCS | Mod: S$GLB,,, | Performed by: DIETITIAN, REGISTERED

## 2019-08-22 PROCEDURE — G0108 PR DIAB MANAGE TRN  PER INDIV: ICD-10-PCS | Mod: S$GLB,,, | Performed by: DIETITIAN, REGISTERED

## 2019-08-22 NOTE — PROGRESS NOTES
Diabetes Education  Author: Kinga Tee RD, CDE; visit was conducted by Thien Serrato RD; visit observed by Kinga Tee RD, CDE  Date: 8/22/2019    Diabetes Care Management Summary  Diabetes Education Record Assessment/Progress: Initial  Current Diabetes Risk Level: Moderate     Last A1c:   Lab Results   Component Value Date    HGBA1C 9.2 (H) 05/16/2019     Last visit with Diabetes Educator: : 08/22/2019    Diabetes Type  Diabetes Type : Type II    Diabetes History  Diabetes Diagnosis: 5-10 years  Current Treatment: Oral Medication(Jardiance, Metformin, Glimepiride)  Reviewed Problem List with Patient: Yes    Health Maintenance was reviewed today with patient. Discussed with patient importance of routine eye exams, foot exams/foot care, blood work (i.e.: A1c, microalbumin, and lipid), dental visits, yearly flu vaccine, and pneumonia vaccine as indicated by PCP. Patient verbalized understanding.     Health Maintenance Topics with due status: Not Due       Topic Last Completion Date    Colonoscopy 01/01/2017    Mammogram 11/21/2018    Eye Exam 11/21/2018    Lipid Panel 01/19/2019    Hemoglobin A1c 05/16/2019    Low Dose Statin 07/22/2019    Influenza Vaccine Not Due     Health Maintenance Due   Topic Date Due    Urine Microalbumin  08/22/1976    TETANUS VACCINE  08/22/1984    Pneumococcal Vaccine (Medium Risk) (1 of 1 - PPSV23) 08/22/1985    Foot Exam  08/22/2019     Nutrition  Meal Planning: 3 meals per day, diet drinks  What type of beverages do you drink?: diet soda/tea, water, milk  Meal Plan 24 Hour Recall - Breakfast: pancakes, grits  Meal Plan 24 Hour Recall - Lunch: casseroles  Meal Plan 24 Hour Recall - Dinner: salmon, baked chicken, vegetables, starches  Meal Plan 24 Hour Recall - Snack: fruit  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    Monitoring   Self Monitoring : Checks BS daily, in AM  Blood Glucose Logs: No  Do you use a personal  continuous glucose monitor?: No  In the last month, how often have you had a low blood sugar reaction?: never; had one low BG a while back before lunch  What are your symptoms of low blood sugar?: dizzy   How do you treat low blood sugar?: Eat  Can you tell when your blood sugar is too high?: yes  How do you treat high blood sugar?: (None)    Exercise   Exercise Type: bike riding; 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   Intensity: Moderate  Frequency: (once a week)    Current Diabetes Treatment   Current Treatment: Oral Medication(Jardiance, Metformin, Glimepiride)    Social History  Preferred Learning Method: Face to Face  Primary Support: Self  Smoking Status: Never a Smoker  Alcohol Use: Never    Barriers to Change  Barriers to Change: None  Learning Challenges : None    Readiness to Learn   Readiness to Learn : Acceptance    Cultural Influences  Cultural Influences: None    Diabetes Education Assessment/Progress  Diabetes Disease Process (diabetes disease process and treatment options): Discussion, Instructed, Individual Session, Written Materials Provided, Demonstrates Understanding/Competency(verbalizes/demonstrates)  Nutrition (Incorporating nutritional management into one's lifestyle): Discussion, Individual Session, Instructed, Demonstrates Understanding/Competency (verbalizes/demonstrates), Written Materials Provided  Physical Activity (incorporating physical activity into one's lifestyle): Discussion, Written Materials Provided, Instructed, Individual Session, Demonstrates Understanding/Competency (verbalizes/demonstrates)  Medications (states correct name, dose, onset, peak, duration, side effects & timing of meds): Discussion, Demonstrates Understanding/Competency(verbalizes/demonstrates), Individual Session, Instructed, Written Materials Provided  Monitoring (monitoring blood glucose/other parameters & using results): Discussion, Demonstrates Understanding/Competency  (verbalizes/demonstrates), Individual Session, Instructed, Written Materials Provided  Acute Complications (preventing, detecting, and treating acute complications): Discussion, Demonstrates Understanding/Competency (verbalizes/demonstrates), Individual Session, Written Materials Provided, Instructed  Chronic Complications (preventing, detecting, and treating chronic complications): Instructed, Written Materials Provided, Individual Session, Discussion, Demonstrates Understanding/Competency (verbalizes/demonstrates)  Clinical (diabetes, other pertinent medical history, and relevant comorbidities reviewed during visit): Instructed, Individual Session, Discussion  Cognitive (knowledge of self-management skills, functional health literacy): Instructed, Discussion, Individual Session  Psychosocial (emotional response to diabetes): Not Covered/Deferred(Time; will complete at follow up visit)  Diabetes Distress and Support Systems: Not Covered/Deferred(Time; will complete at follow up visit)  Behavioral (readiness for change, lifestyle practices, self-care behaviors): Instructed, Discussion, Individual Session, Demonstrates Understanding/Competency (verbalizes/demonstrates)    Goals  Patient has selected/evaluated goals during today's session: Yes, selected  Healthy Eating: Set(Continue to reduce portions of CHO intake)    Diabetes Care Plan/Intervention  Education Plan/Intervention: Individual Follow-Up DSMT    Diabetes Meal Plan  Carbohydrate Per Meal: 30-45g  Carbohydrate Per Snack : 7-15g    Today's Self-Management Care Plan was developed with the patient's input and is based on barriers identified during today's assessment.    The long and short-term goals in the care plan were written with the patient/caregiver's input. The patient has agreed to work toward these goals to improve her overall diabetes control.      The patient received a copy of today's self-management plan and verbalized understanding of the care  plan, goals, and all of today's instructions.      The patient was encouraged to communicate with her physician and care team regarding her condition(s) and treatment.  I provided the patient with my contact information today and encouraged her to contact me via phone or patient portal as needed.     Education Units of Time   Time Spent: 60 min

## 2019-10-19 ENCOUNTER — LAB VISIT (OUTPATIENT)
Dept: LAB | Facility: HOSPITAL | Age: 54
End: 2019-10-19
Attending: INTERNAL MEDICINE
Payer: COMMERCIAL

## 2019-10-19 DIAGNOSIS — Z79.4 TYPE 2 DIABETES MELLITUS WITHOUT COMPLICATION, WITH LONG-TERM CURRENT USE OF INSULIN: ICD-10-CM

## 2019-10-19 DIAGNOSIS — Z00.00 ANNUAL PHYSICAL EXAM: ICD-10-CM

## 2019-10-19 DIAGNOSIS — E11.9 TYPE 2 DIABETES MELLITUS WITHOUT COMPLICATION, WITH LONG-TERM CURRENT USE OF INSULIN: ICD-10-CM

## 2019-10-19 LAB
BASOPHILS # BLD AUTO: 0.05 K/UL (ref 0–0.2)
BASOPHILS NFR BLD: 0.7 % (ref 0–1.9)
DIFFERENTIAL METHOD: ABNORMAL
EOSINOPHIL # BLD AUTO: 0.1 K/UL (ref 0–0.5)
EOSINOPHIL NFR BLD: 0.9 % (ref 0–8)
ERYTHROCYTE [DISTWIDTH] IN BLOOD BY AUTOMATED COUNT: 15.8 % (ref 11.5–14.5)
ESTIMATED AVG GLUCOSE: 148 MG/DL (ref 68–131)
HBA1C MFR BLD HPLC: 6.8 % (ref 4–5.6)
HCT VFR BLD AUTO: 40.9 % (ref 37–48.5)
HGB BLD-MCNC: 12.5 G/DL (ref 12–16)
IMM GRANULOCYTES # BLD AUTO: 0.02 K/UL (ref 0–0.04)
IMM GRANULOCYTES NFR BLD AUTO: 0.3 % (ref 0–0.5)
LYMPHOCYTES # BLD AUTO: 3.5 K/UL (ref 1–4.8)
LYMPHOCYTES NFR BLD: 45.8 % (ref 18–48)
MCH RBC QN AUTO: 26.4 PG (ref 27–31)
MCHC RBC AUTO-ENTMCNC: 30.6 G/DL (ref 32–36)
MCV RBC AUTO: 87 FL (ref 82–98)
MONOCYTES # BLD AUTO: 0.4 K/UL (ref 0.3–1)
MONOCYTES NFR BLD: 5.7 % (ref 4–15)
NEUTROPHILS # BLD AUTO: 3.6 K/UL (ref 1.8–7.7)
NEUTROPHILS NFR BLD: 46.6 % (ref 38–73)
NRBC BLD-RTO: 0 /100 WBC
PLATELET # BLD AUTO: 436 K/UL (ref 150–350)
PMV BLD AUTO: 8.7 FL (ref 9.2–12.9)
RBC # BLD AUTO: 4.73 M/UL (ref 4–5.4)
TSH SERPL DL<=0.005 MIU/L-ACNC: 1.05 UIU/ML (ref 0.4–4)
WBC # BLD AUTO: 7.67 K/UL (ref 3.9–12.7)

## 2019-10-19 PROCEDURE — 84443 ASSAY THYROID STIM HORMONE: CPT

## 2019-10-19 PROCEDURE — 85025 COMPLETE CBC W/AUTO DIFF WBC: CPT

## 2019-10-19 PROCEDURE — 36415 COLL VENOUS BLD VENIPUNCTURE: CPT | Mod: PO

## 2019-10-19 PROCEDURE — 83036 HEMOGLOBIN GLYCOSYLATED A1C: CPT

## 2019-10-21 ENCOUNTER — TELEPHONE (OUTPATIENT)
Dept: INTERNAL MEDICINE | Facility: CLINIC | Age: 54
End: 2019-10-21

## 2019-10-21 DIAGNOSIS — Z00.00 ANNUAL PHYSICAL EXAM: Primary | ICD-10-CM

## 2019-10-21 NOTE — TELEPHONE ENCOUNTER
----- Message from Parkercarolynn Allen sent at 10/21/2019  9:27 AM CDT -----  Regarding: Lab Client Service  Contact: [phone]  Good Morning,    My name is Mia Allen I work in the Lab Client Services. We had a problem with some lab work on this patient. If someone from your office could call us at [phone] or ext. [phone] that would be great. Anyone in my department can help.      Thank you

## 2019-10-21 NOTE — TELEPHONE ENCOUNTER
----- Message from Parkercarolynn Allen sent at 10/21/2019  9:27 AM CDT -----  Regarding: Lab Client Service  Contact: [phone]  Good Morning,    My name is Mia Allen I work in the Lab Client Services. We had a problem with some lab work on this patient. If someone from your office could call us at [phone] or ext. [phone] that would be great. Anyone in my department can help.      Thank you

## 2019-10-21 NOTE — TELEPHONE ENCOUNTER
----- Message from Tegan Howell sent at 10/21/2019  3:09 PM CDT -----  Contact: [phone]  Type: Returning a call    Who left a message?    When did the practice call?    Comments:please advise, thanks .

## 2019-10-21 NOTE — TELEPHONE ENCOUNTER
CMP,Lipid and TSH was hemolysed .    Need new orders and will call patient to have labs drawn again

## 2019-10-22 ENCOUNTER — LAB VISIT (OUTPATIENT)
Dept: LAB | Facility: HOSPITAL | Age: 54
End: 2019-10-22
Attending: INTERNAL MEDICINE
Payer: COMMERCIAL

## 2019-10-22 DIAGNOSIS — Z00.00 ANNUAL PHYSICAL EXAM: ICD-10-CM

## 2019-10-22 LAB
ALBUMIN SERPL BCP-MCNC: 3.9 G/DL (ref 3.5–5.2)
ALP SERPL-CCNC: 77 U/L (ref 55–135)
ALT SERPL W/O P-5'-P-CCNC: 11 U/L (ref 10–44)
ANION GAP SERPL CALC-SCNC: 10 MMOL/L (ref 8–16)
AST SERPL-CCNC: 14 U/L (ref 10–40)
BILIRUB SERPL-MCNC: 0.6 MG/DL (ref 0.1–1)
BUN SERPL-MCNC: 11 MG/DL (ref 6–20)
CALCIUM SERPL-MCNC: 9.7 MG/DL (ref 8.7–10.5)
CHLORIDE SERPL-SCNC: 100 MMOL/L (ref 95–110)
CHOLEST SERPL-MCNC: 234 MG/DL (ref 120–199)
CHOLEST/HDLC SERPL: 4.5 {RATIO} (ref 2–5)
CO2 SERPL-SCNC: 30 MMOL/L (ref 23–29)
CREAT SERPL-MCNC: 0.8 MG/DL (ref 0.5–1.4)
EST. GFR  (AFRICAN AMERICAN): >60 ML/MIN/1.73 M^2
EST. GFR  (NON AFRICAN AMERICAN): >60 ML/MIN/1.73 M^2
GLUCOSE SERPL-MCNC: 126 MG/DL (ref 70–110)
HDLC SERPL-MCNC: 52 MG/DL (ref 40–75)
HDLC SERPL: 22.2 % (ref 20–50)
LDLC SERPL CALC-MCNC: 152.8 MG/DL (ref 63–159)
NONHDLC SERPL-MCNC: 182 MG/DL
POTASSIUM SERPL-SCNC: 4.3 MMOL/L (ref 3.5–5.1)
PROT SERPL-MCNC: 7.9 G/DL (ref 6–8.4)
SODIUM SERPL-SCNC: 140 MMOL/L (ref 136–145)
TRIGL SERPL-MCNC: 146 MG/DL (ref 30–150)
TSH SERPL DL<=0.005 MIU/L-ACNC: 1.01 UIU/ML (ref 0.4–4)

## 2019-10-22 PROCEDURE — 80061 LIPID PANEL: CPT

## 2019-10-22 PROCEDURE — 84443 ASSAY THYROID STIM HORMONE: CPT

## 2019-10-22 PROCEDURE — 80053 COMPREHEN METABOLIC PANEL: CPT

## 2019-10-22 PROCEDURE — 36415 COLL VENOUS BLD VENIPUNCTURE: CPT | Mod: PO

## 2019-10-23 ENCOUNTER — OFFICE VISIT (OUTPATIENT)
Dept: INTERNAL MEDICINE | Facility: CLINIC | Age: 54
End: 2019-10-23
Payer: COMMERCIAL

## 2019-10-23 VITALS
HEIGHT: 62 IN | SYSTOLIC BLOOD PRESSURE: 130 MMHG | WEIGHT: 191.38 LBS | BODY MASS INDEX: 35.22 KG/M2 | HEART RATE: 60 BPM | TEMPERATURE: 98 F | DIASTOLIC BLOOD PRESSURE: 84 MMHG

## 2019-10-23 DIAGNOSIS — Z79.4 TYPE 2 DIABETES MELLITUS WITHOUT COMPLICATION, WITH LONG-TERM CURRENT USE OF INSULIN: ICD-10-CM

## 2019-10-23 DIAGNOSIS — E11.9 TYPE 2 DIABETES MELLITUS WITHOUT COMPLICATION, WITH LONG-TERM CURRENT USE OF INSULIN: ICD-10-CM

## 2019-10-23 DIAGNOSIS — Z12.31 ENCOUNTER FOR SCREENING MAMMOGRAM FOR BREAST CANCER: ICD-10-CM

## 2019-10-23 DIAGNOSIS — G47.33 OSA (OBSTRUCTIVE SLEEP APNEA): ICD-10-CM

## 2019-10-23 DIAGNOSIS — E66.9 OBESITY (BMI 30-39.9): ICD-10-CM

## 2019-10-23 DIAGNOSIS — I10 HTN (HYPERTENSION), BENIGN: ICD-10-CM

## 2019-10-23 DIAGNOSIS — E78.5 HYPERLIPIDEMIA, UNSPECIFIED HYPERLIPIDEMIA TYPE: ICD-10-CM

## 2019-10-23 DIAGNOSIS — Z00.00 ANNUAL PHYSICAL EXAM: Primary | ICD-10-CM

## 2019-10-23 PROCEDURE — 99999 PR PBB SHADOW E&M-EST. PATIENT-LVL III: CPT | Mod: PBBFAC,,, | Performed by: INTERNAL MEDICINE

## 2019-10-23 PROCEDURE — 90471 FLU VACCINE (QUAD) GREATER THAN OR EQUAL TO 3YO PRESERVATIVE FREE IM: ICD-10-PCS | Mod: S$GLB,,, | Performed by: INTERNAL MEDICINE

## 2019-10-23 PROCEDURE — 99396 PREV VISIT EST AGE 40-64: CPT | Mod: 25,S$GLB,, | Performed by: INTERNAL MEDICINE

## 2019-10-23 PROCEDURE — 90686 FLU VACCINE (QUAD) GREATER THAN OR EQUAL TO 3YO PRESERVATIVE FREE IM: ICD-10-PCS | Mod: S$GLB,,, | Performed by: INTERNAL MEDICINE

## 2019-10-23 PROCEDURE — 99999 PR PBB SHADOW E&M-EST. PATIENT-LVL III: ICD-10-PCS | Mod: PBBFAC,,, | Performed by: INTERNAL MEDICINE

## 2019-10-23 PROCEDURE — 90471 IMMUNIZATION ADMIN: CPT | Mod: S$GLB,,, | Performed by: INTERNAL MEDICINE

## 2019-10-23 PROCEDURE — 90686 IIV4 VACC NO PRSV 0.5 ML IM: CPT | Mod: S$GLB,,, | Performed by: INTERNAL MEDICINE

## 2019-10-23 PROCEDURE — 99396 PR PREVENTIVE VISIT,EST,40-64: ICD-10-PCS | Mod: 25,S$GLB,, | Performed by: INTERNAL MEDICINE

## 2019-10-23 RX ORDER — LOSARTAN POTASSIUM 25 MG/1
25 TABLET ORAL DAILY
Qty: 90 TABLET | Refills: 3 | Status: SHIPPED | OUTPATIENT
Start: 2019-10-23 | End: 2020-07-16 | Stop reason: SDUPTHER

## 2019-10-23 NOTE — PROGRESS NOTES
Subjective:       Patient ID: Grecia Boyd is a 54 y.o. female.    Chief Complaint: Annual Exam (review labs)    HPI   54 y.o. Female here for annual exam.     Vaccines: Influenza (2019); Tetanus (2013); Pneumovax (done)  Eye exam: 11/18  Mammogram: 11/18  Gyn exam: declined  Colonoscopy: 2017- normal per pt     Exercise: walks daily  Diet: regular     Past Medical History:  No date: Diabetes mellitus, type 2  No date: Hyperlipidemia  No date: Hypertension  No date: Obesity  Past Surgical History:  No date: ADENOIDECTOMY  No date: HYSTERECTOMY  No date: THYROIDECTOMY, PARTIAL  No date: TONSILLECTOMY  Social History    Socioeconomic History      Marital status:       Spouse name: Not on file      Number of children: 2      Years of education: Not on file      Highest education level: Not on file    Social Needs      Financial resource strain: Not on file      Food insecurity - worry: Not on file      Food insecurity - inability: Not on file      Transportation needs - medical: Not on file      Transportation needs - non-medical: Not on file    Occupational History      Not on file    Tobacco Use      Smoking status: Never Smoker      Smokeless tobacco: Never Used    Substance and Sexual Activity      Alcohol use: Yes        Comment: rare      Drug use: No      Sexual activity: Yes        Partners: Male     Review of patient's allergies indicates:  No Known Allergies  Review of Systems   Constitutional: Negative for activity change, appetite change, chills, diaphoresis, fatigue, fever and unexpected weight change.   HENT: Negative for congestion, mouth sores, postnasal drip, rhinorrhea, sinus pressure, sneezing, sore throat, trouble swallowing and voice change.    Eyes: Negative for pain, discharge and visual disturbance.   Respiratory: Negative for cough, shortness of breath and wheezing.    Cardiovascular: Negative for chest pain, palpitations and leg swelling.   Gastrointestinal: Negative for abdominal  pain, blood in stool, constipation, diarrhea, nausea and vomiting.   Endocrine: Negative for cold intolerance and heat intolerance.   Genitourinary: Negative for difficulty urinating, dysuria, frequency, hematuria and urgency.   Musculoskeletal: Negative for arthralgias and myalgias.   Skin: Negative for rash and wound.   Allergic/Immunologic: Negative for environmental allergies and food allergies.   Neurological: Negative for dizziness, tremors, seizures, syncope, weakness, light-headedness and headaches.   Hematological: Negative for adenopathy. Does not bruise/bleed easily.   Psychiatric/Behavioral: Negative for confusion and sleep disturbance. The patient is not nervous/anxious.        Objective:      Physical Exam   Constitutional: She is oriented to person, place, and time. She appears well-developed and well-nourished. No distress.   HENT:   Head: Normocephalic and atraumatic.   Right Ear: External ear normal.   Left Ear: External ear normal.   Nose: Nose normal.   Mouth/Throat: Oropharynx is clear and moist. No oropharyngeal exudate.   Eyes: Pupils are equal, round, and reactive to light. Conjunctivae and EOM are normal. Right eye exhibits no discharge. Left eye exhibits no discharge. No scleral icterus.   Neck: Neck supple. No JVD present. No thyromegaly present.   Cardiovascular: Normal rate, regular rhythm, normal heart sounds and intact distal pulses.   No murmur heard.  Pulses:       Dorsalis pedis pulses are 2+ on the right side, and 2+ on the left side.        Posterior tibial pulses are 2+ on the right side, and 2+ on the left side.   Pulmonary/Chest: Effort normal and breath sounds normal. No respiratory distress. She has no wheezes. She has no rales. She exhibits no tenderness.   Abdominal: Soft. Bowel sounds are normal. She exhibits no distension. There is no tenderness. There is no guarding.   Musculoskeletal: She exhibits no edema.   Feet:   Right Foot:   Protective Sensation: 4 sites tested. 4  sites sensed.   Skin Integrity: Negative for ulcer, blister or skin breakdown.   Left Foot:   Protective Sensation: 4 sites tested. 4 sites sensed.   Skin Integrity: Negative for ulcer, blister or skin breakdown.   Lymphadenopathy:     She has no cervical adenopathy.   Neurological: She is alert and oriented to person, place, and time. No cranial nerve deficit. Coordination normal.   Skin: Skin is warm and dry. No rash noted. She is not diaphoretic. No pallor.   Psychiatric: She has a normal mood and affect. Judgment normal.   Nursing note and vitals reviewed.      Assessment:       1. Annual physical exam    2. HTN (hypertension), benign    3. Type 2 diabetes mellitus without complication, with long-term current use of insulin    4. Hyperlipidemia, unspecified hyperlipidemia type    5. Obesity (BMI 30-39.9)    6. CHIDI (obstructive sleep apnea)    7. Encounter for screening mammogram for breast cancer        Plan:    1. Blood work reviewed with pt       Vaccines: Influenza (2019); Tetanus (2013); Pneumovax (done)       Eye exam: 11/18       Mammogram: 11/18       Gyn exam: declined       Colonoscopy: 2017- normal per pt   2. HTN- stable on Norvasc/HCTZ/Losartan    3. T2DM- last HA1C of 6.8(10/19)<--9.2(5/19)<--6.7(1/19)       Stable on Metformin/Jardiance/Amaryl   4. HLD- continue Lipitor 40 mg daily   5. Obesity- pt advised on proper diet/exercise for weight loss   6. CHIDI- stable on CPAP   7. Mammo ordered   8. F/u in 6 months

## 2019-11-04 RX ORDER — EMPAGLIFLOZIN 10 MG/1
TABLET, FILM COATED ORAL
Qty: 30 TABLET | Refills: 0 | Status: SHIPPED | OUTPATIENT
Start: 2019-11-04 | End: 2020-07-16

## 2019-11-19 ENCOUNTER — PATIENT OUTREACH (OUTPATIENT)
Dept: ADMINISTRATIVE | Facility: OTHER | Age: 54
End: 2019-11-19

## 2019-11-19 ENCOUNTER — TELEPHONE (OUTPATIENT)
Dept: INTERNAL MEDICINE | Facility: CLINIC | Age: 54
End: 2019-11-19

## 2019-11-19 NOTE — TELEPHONE ENCOUNTER
Annual 10-23-19    LMVM: needs to make appt to be evaluated for UTI; patient thinks it is due to the Jardiance

## 2019-11-19 NOTE — TELEPHONE ENCOUNTER
----- Message from Amnaarielle Sewell sent at 11/19/2019  7:26 AM CST -----  Contact: [phone]  Patient would like to get medical advice.  Symptoms (please be specific):  Painful urination, frequency. Poss UTI from an Medication she's on  Called JARDIANCE   How long has patient had these symptoms: started Sunday   Pharmacy name and phone # (copy/paste from chart): [facility]  [phone] (Phone)  [phone] (Fax)  Any drug allergies (copy/paste from chart):   NKDA   Would the patient rather a call back or a response via [facility]?:  Phone   Comments:

## 2019-11-19 NOTE — TELEPHONE ENCOUNTER
----- Message from Amna Sewell sent at 11/19/2019  8:17 AM CST -----  Contact: [phone]  Patient is returning a phone call.  Who left a message for the patient: Nurse  Does patient know what this is regarding:  UTI  Comments:

## 2019-11-25 ENCOUNTER — TELEPHONE (OUTPATIENT)
Dept: INTERNAL MEDICINE | Facility: CLINIC | Age: 54
End: 2019-11-25

## 2019-11-25 NOTE — TELEPHONE ENCOUNTER
----- Message from Laura Menezes sent at 11/25/2019 12:20 PM CST -----  Contact: self/[phone]  Patient called in regards her missed appointment on 11/20/19 uti, patient is asking for a courtesy call from office. Thank you

## 2019-11-25 NOTE — TELEPHONE ENCOUNTER
----- Message from Anastasiia Khan sent at 11/25/2019  1:57 PM CST -----  Contact: self   Patient is returning a phone call.  Who left a message for the patient: ?  Does patient know what this is regarding:  appt for UTI  Comments:

## 2019-11-26 ENCOUNTER — HOSPITAL ENCOUNTER (OUTPATIENT)
Dept: RADIOLOGY | Facility: HOSPITAL | Age: 54
Discharge: HOME | End: 2019-11-26
Attending: INTERNAL MEDICINE
Payer: COMMERCIAL

## 2019-11-26 ENCOUNTER — OFFICE VISIT (OUTPATIENT)
Dept: INTERNAL MEDICINE | Facility: CLINIC | Age: 54
End: 2019-11-26
Payer: COMMERCIAL

## 2019-11-26 VITALS
HEART RATE: 69 BPM | TEMPERATURE: 98 F | WEIGHT: 180.56 LBS | DIASTOLIC BLOOD PRESSURE: 82 MMHG | SYSTOLIC BLOOD PRESSURE: 114 MMHG | RESPIRATION RATE: 16 BRPM | BODY MASS INDEX: 33.23 KG/M2 | HEIGHT: 62 IN

## 2019-11-26 DIAGNOSIS — N30.00 ACUTE CYSTITIS WITHOUT HEMATURIA: Primary | ICD-10-CM

## 2019-11-26 DIAGNOSIS — R30.0 DYSURIA: ICD-10-CM

## 2019-11-26 DIAGNOSIS — Z12.31 ENCOUNTER FOR SCREENING MAMMOGRAM FOR BREAST CANCER: ICD-10-CM

## 2019-11-26 PROCEDURE — 99214 PR OFFICE/OUTPT VISIT, EST, LEVL IV, 30-39 MIN: ICD-10-PCS | Mod: S$GLB,,, | Performed by: INTERNAL MEDICINE

## 2019-11-26 PROCEDURE — 77067 SCR MAMMO BI INCL CAD: CPT | Mod: 26,,, | Performed by: RADIOLOGY

## 2019-11-26 PROCEDURE — 77067 SCR MAMMO BI INCL CAD: CPT | Mod: TC,PO

## 2019-11-26 PROCEDURE — 77063 BREAST TOMOSYNTHESIS BI: CPT | Mod: 26,,, | Performed by: RADIOLOGY

## 2019-11-26 PROCEDURE — 77063 MAMMO DIGITAL SCREENING BILAT WITH TOMOSYNTHESIS_CAD: ICD-10-PCS | Mod: 26,,, | Performed by: RADIOLOGY

## 2019-11-26 PROCEDURE — 99999 PR PBB SHADOW E&M-EST. PATIENT-LVL III: ICD-10-PCS | Mod: PBBFAC,,, | Performed by: INTERNAL MEDICINE

## 2019-11-26 PROCEDURE — 99214 OFFICE O/P EST MOD 30 MIN: CPT | Mod: S$GLB,,, | Performed by: INTERNAL MEDICINE

## 2019-11-26 PROCEDURE — 77067 MAMMO DIGITAL SCREENING BILAT WITH TOMOSYNTHESIS_CAD: ICD-10-PCS | Mod: 26,,, | Performed by: RADIOLOGY

## 2019-11-26 PROCEDURE — 99999 PR PBB SHADOW E&M-EST. PATIENT-LVL III: CPT | Mod: PBBFAC,,, | Performed by: INTERNAL MEDICINE

## 2019-11-26 RX ORDER — AMOXICILLIN AND CLAVULANATE POTASSIUM 875; 125 MG/1; MG/1
1 TABLET, FILM COATED ORAL 2 TIMES DAILY
Qty: 14 TABLET | Refills: 0 | Status: SHIPPED | OUTPATIENT
Start: 2019-11-26 | End: 2019-11-29

## 2019-11-26 NOTE — PROGRESS NOTES
Subjective:       Patient ID: Grecia Boyd is a 54 y.o. female.    Chief Complaint: Urinary Tract Infection (burning)    HPI   Pt here for evaluation of a few days of worsening dysuria, increased frequency/urgency. No hematuria, fevers/chills.   Review of Systems   Constitutional: Negative for activity change, appetite change, chills, diaphoresis, fatigue, fever and unexpected weight change.   HENT: Negative for postnasal drip, rhinorrhea, sinus pressure, sneezing, sore throat, trouble swallowing and voice change.    Respiratory: Negative for cough, shortness of breath and wheezing.    Cardiovascular: Negative for chest pain, palpitations and leg swelling.   Gastrointestinal: Negative for abdominal pain, blood in stool, constipation, diarrhea, nausea and vomiting.   Genitourinary: Positive for dysuria, frequency and urgency. Negative for hematuria.   Musculoskeletal: Negative for arthralgias and myalgias.   Skin: Negative for rash and wound.   Allergic/Immunologic: Negative for environmental allergies and food allergies.   Hematological: Negative for adenopathy. Does not bruise/bleed easily.       Objective:      Physical Exam   Constitutional: She is oriented to person, place, and time. She appears well-developed and well-nourished. No distress.   HENT:   Head: Normocephalic and atraumatic.   Eyes: Pupils are equal, round, and reactive to light. Conjunctivae and EOM are normal. Right eye exhibits no discharge. Left eye exhibits no discharge. No scleral icterus.   Neck: Neck supple. No JVD present.   Cardiovascular: Normal rate, regular rhythm, normal heart sounds and intact distal pulses.   Pulmonary/Chest: Effort normal and breath sounds normal. No respiratory distress. She has no wheezes. She has no rales.   Abdominal: Soft. Bowel sounds are normal. There is no tenderness.   Musculoskeletal: She exhibits no edema.   Lymphadenopathy:     She has no cervical adenopathy.   Neurological: She is alert and oriented  to person, place, and time.   Skin: Skin is warm and dry. No rash noted. She is not diaphoretic. No pallor.       Assessment:       1. Acute cystitis without hematuria    2. Dysuria        Plan:    1/2. Check UA/urine Cx          Rx Augmentin BID x 7 days

## 2019-11-29 ENCOUNTER — TELEPHONE (OUTPATIENT)
Dept: INTERNAL MEDICINE | Facility: CLINIC | Age: 54
End: 2019-11-29

## 2019-11-29 RX ORDER — DOXYCYCLINE 100 MG/1
100 CAPSULE ORAL 2 TIMES DAILY
Qty: 14 CAPSULE | Refills: 0 | Status: SHIPPED | OUTPATIENT
Start: 2019-11-29 | End: 2019-12-06

## 2020-02-06 ENCOUNTER — TELEPHONE (OUTPATIENT)
Dept: INTERNAL MEDICINE | Facility: CLINIC | Age: 55
End: 2020-02-06

## 2020-02-06 RX ORDER — HYDROCHLOROTHIAZIDE 25 MG/1
TABLET ORAL
Qty: 90 TABLET | Refills: 3 | Status: SHIPPED | OUTPATIENT
Start: 2020-02-06 | End: 2020-07-16 | Stop reason: SDUPTHER

## 2020-02-06 NOTE — TELEPHONE ENCOUNTER
----- Message from Tegan Howell sent at 2/6/2020  1:19 PM CST -----  Contact: [phone]  Type: Rx Clarification/ Additional Information/ Questions    Medication:JARDIANCE 10 mg Tab    What questions do you have about the medication, if any? Insurance doesn't cover    What information is needed? Medication change     Pharmacy number:[id number] [address] & [facility]   [phone] (Phone)  [phone] (Fax)    Comments:please advise, thanks

## 2020-02-07 RX ORDER — GLIMEPIRIDE 4 MG/1
4 TABLET ORAL
Qty: 90 TABLET | Refills: 3 | Status: SHIPPED | OUTPATIENT
Start: 2020-02-07 | End: 2020-07-16 | Stop reason: SDUPTHER

## 2020-02-08 ENCOUNTER — LAB VISIT (OUTPATIENT)
Dept: LAB | Facility: HOSPITAL | Age: 55
End: 2020-02-08
Attending: INTERNAL MEDICINE
Payer: COMMERCIAL

## 2020-02-08 DIAGNOSIS — E78.5 HYPERLIPIDEMIA, UNSPECIFIED HYPERLIPIDEMIA TYPE: ICD-10-CM

## 2020-02-08 LAB
CHOLEST SERPL-MCNC: 184 MG/DL (ref 120–199)
CHOLEST/HDLC SERPL: 3.3 {RATIO} (ref 2–5)
HDLC SERPL-MCNC: 55 MG/DL (ref 40–75)
HDLC SERPL: 29.9 % (ref 20–50)
LDLC SERPL CALC-MCNC: 110.8 MG/DL (ref 63–159)
NONHDLC SERPL-MCNC: 129 MG/DL
TRIGL SERPL-MCNC: 91 MG/DL (ref 30–150)

## 2020-02-08 PROCEDURE — 36415 COLL VENOUS BLD VENIPUNCTURE: CPT | Mod: PO

## 2020-02-08 PROCEDURE — 80061 LIPID PANEL: CPT

## 2020-02-26 ENCOUNTER — TELEPHONE (OUTPATIENT)
Dept: INTERNAL MEDICINE | Facility: CLINIC | Age: 55
End: 2020-02-26

## 2020-02-26 NOTE — TELEPHONE ENCOUNTER
Per Dr. Tripathi, Mucinex for dry cough; gargle with salt water 3 times a day; throat lozenges. Instructed to call back if she has any other questions

## 2020-02-26 NOTE — TELEPHONE ENCOUNTER
----- Message from Thania Whitney sent at 2/26/2020  9:10 AM CST -----  Contact: Patient [phone]  Patient has been having a dry cough for the last few days. Wants to now what she can take OTC.    Please call and advise.    Thank You

## 2020-02-28 ENCOUNTER — TELEPHONE (OUTPATIENT)
Dept: INTERNAL MEDICINE | Facility: CLINIC | Age: 55
End: 2020-02-28

## 2020-02-28 NOTE — TELEPHONE ENCOUNTER
----- Message from Anastasiia Khan sent at 2/28/2020  4:32 PM CST -----  Contact: self   Would like to get medical advice.  Symptoms (please be specific):  Chest soreness from coughing  How long has patient had these symptoms:  2/25/2020  Pharmacy name and phone #:  [id number] - [place] [id number] [facility] [phone] (Phone)  [phone] (Fax)  Any drug allergies (copy from chart):    See chart  Would the patient rather a call back or a response via [facility]?:  Call back  Comments:

## 2020-02-28 NOTE — TELEPHONE ENCOUNTER
"Was told 2/26 " Per Dr. Tripathi, Mucinex for dry cough; gargle with salt water 3 times a day; throat lozenges. Instructed to call back if she has any other questions "    She says she is doing what  suggested and Says has Non productive cough and chest is sore from coughing      I told her this may need to run its course and it is ok to use advil or tylenol as need and if not improving to go to urgent care for eval.  Also advised to push fluids.     "

## 2020-02-29 ENCOUNTER — OFFICE VISIT (OUTPATIENT)
Dept: URGENT CARE | Facility: CLINIC | Age: 55
End: 2020-02-29
Payer: COMMERCIAL

## 2020-02-29 VITALS
HEART RATE: 58 BPM | DIASTOLIC BLOOD PRESSURE: 78 MMHG | OXYGEN SATURATION: 100 % | RESPIRATION RATE: 20 BRPM | WEIGHT: 180 LBS | BODY MASS INDEX: 33.13 KG/M2 | TEMPERATURE: 98 F | HEIGHT: 62 IN | SYSTOLIC BLOOD PRESSURE: 143 MMHG

## 2020-02-29 DIAGNOSIS — R05.9 COUGH IN ADULT: ICD-10-CM

## 2020-02-29 DIAGNOSIS — J20.8 VIRAL BRONCHITIS: Primary | ICD-10-CM

## 2020-02-29 PROCEDURE — 71046 X-RAY EXAM CHEST 2 VIEWS: CPT | Mod: S$GLB,,, | Performed by: RADIOLOGY

## 2020-02-29 PROCEDURE — 71046 XR CHEST PA AND LATERAL: ICD-10-PCS | Mod: S$GLB,,, | Performed by: RADIOLOGY

## 2020-02-29 PROCEDURE — 99214 PR OFFICE/OUTPT VISIT, EST, LEVL IV, 30-39 MIN: ICD-10-PCS | Mod: S$GLB,,, | Performed by: NURSE PRACTITIONER

## 2020-02-29 PROCEDURE — 99214 OFFICE O/P EST MOD 30 MIN: CPT | Mod: S$GLB,,, | Performed by: NURSE PRACTITIONER

## 2020-02-29 RX ORDER — PROMETHAZINE HYDROCHLORIDE AND DEXTROMETHORPHAN HYDROBROMIDE 6.25; 15 MG/5ML; MG/5ML
5 SYRUP ORAL NIGHTLY PRN
Qty: 120 ML | Refills: 0 | Status: SHIPPED | OUTPATIENT
Start: 2020-02-29 | End: 2020-07-16

## 2020-02-29 RX ORDER — BENZONATATE 100 MG/1
100 CAPSULE ORAL EVERY 6 HOURS PRN
Qty: 30 CAPSULE | Refills: 0 | Status: SHIPPED | OUTPATIENT
Start: 2020-02-29 | End: 2020-03-30

## 2020-02-29 RX ORDER — ALBUTEROL SULFATE 90 UG/1
2 AEROSOL, METERED RESPIRATORY (INHALATION) EVERY 6 HOURS PRN
Qty: 18 G | Refills: 0 | Status: ON HOLD | OUTPATIENT
Start: 2020-02-29 | End: 2020-12-14 | Stop reason: SDUPTHER

## 2020-02-29 NOTE — PROGRESS NOTES
"Subjective:       Patient ID: Grecia Boyd is a 54 y.o. female.    Vitals:  height is 5' 2" (1.575 m) and weight is 81.6 kg (180 lb). Her temperature is 98.1 °F (36.7 °C). Her blood pressure is 143/78 (abnormal) and her pulse is 58 (abnormal). Her respiration is 20 and oxygen saturation is 100%.     Chief Complaint: Cough    Pt has been having a cough since last Saturday. She isn't coughing anything up but is having chest pain from the cough.pt took mucin ex that hasnt helped.    Cough   This is a new problem. The current episode started in the past 7 days. The problem has been unchanged. The problem occurs constantly. The cough is non-productive. Associated symptoms include chest pain. Pertinent negatives include no chills, fever, headaches, myalgias, rash, sore throat or shortness of breath. She has tried OTC cough suppressant for the symptoms. The treatment provided no relief.       Constitution: Negative for chills, fatigue and fever.   HENT: Negative for congestion and sore throat.    Neck: Negative for painful lymph nodes.   Cardiovascular: Positive for chest pain. Negative for leg swelling.   Eyes: Negative for double vision and blurred vision.   Respiratory: Negative for cough and shortness of breath.    Gastrointestinal: Negative for nausea, vomiting and diarrhea.   Genitourinary: Negative for dysuria, frequency, urgency and history of kidney stones.   Musculoskeletal: Negative for joint pain, joint swelling, muscle cramps and muscle ache.   Skin: Negative for color change, pale, rash and bruising.   Allergic/Immunologic: Negative for seasonal allergies.   Neurological: Negative for dizziness, history of vertigo, light-headedness, passing out and headaches.   Hematologic/Lymphatic: Negative for swollen lymph nodes.   Psychiatric/Behavioral: Negative for nervous/anxious, sleep disturbance and depression. The patient is not nervous/anxious.        Objective:      Physical Exam   Constitutional: She is " oriented to person, place, and time. She appears well-developed and well-nourished. She is cooperative.  Non-toxic appearance. She does not have a sickly appearance. She does not appear ill. No distress.   HENT:   Head: Normocephalic and atraumatic.   Right Ear: Hearing, tympanic membrane, external ear and ear canal normal.   Left Ear: Hearing, tympanic membrane, external ear and ear canal normal.   Nose: Nose normal. No mucosal edema, rhinorrhea or nasal deformity. No epistaxis. Right sinus exhibits no maxillary sinus tenderness and no frontal sinus tenderness. Left sinus exhibits no maxillary sinus tenderness and no frontal sinus tenderness.   Mouth/Throat: Uvula is midline, oropharynx is clear and moist and mucous membranes are normal. No trismus in the jaw. Normal dentition. No uvula swelling. No oropharyngeal exudate, posterior oropharyngeal edema or posterior oropharyngeal erythema.   Post nasal drip    Eyes: Conjunctivae and lids are normal. No scleral icterus.   Neck: Trachea normal, full passive range of motion without pain and phonation normal. Neck supple. No neck rigidity. No edema and no erythema present.   Cardiovascular: Normal rate, regular rhythm, normal heart sounds, intact distal pulses and normal pulses.   Pulmonary/Chest: Effort normal and breath sounds normal. No respiratory distress. She has no decreased breath sounds. She has no rhonchi.   Chest tenderness to touch  Dry cough       Abdominal: Normal appearance.   Musculoskeletal: Normal range of motion. She exhibits no edema or deformity.   Neurological: She is alert and oriented to person, place, and time. She exhibits normal muscle tone. Coordination normal.   Skin: Skin is warm, dry, intact, not diaphoretic and not pale.   Psychiatric: She has a normal mood and affect. Her speech is normal and behavior is normal. Judgment and thought content normal. Cognition and memory are normal.   Nursing note and vitals reviewed.      X-ray Chest Pa  And Lateral    Result Date: 2/29/2020  EXAMINATION: XR CHEST PA AND LATERAL CLINICAL HISTORY: Cough TECHNIQUE: PA and lateral views of the chest were performed. COMPARISON: None FINDINGS: The cardiac and mediastinal silhouettes appear within normal limits.   The lungs are clear bilaterally.  No acute osseous findings demonstrated.     No acute findings. Electronically signed by: Gerardo Doyle MD Date:    02/29/2020 Time:    10:29  Assessment:       1. Viral bronchitis    2. Cough in adult        Plan:         Viral bronchitis  -     albuterol (PROVENTIL HFA) 90 mcg/actuation inhaler; Inhale 2 puffs into the lungs every 6 (six) hours as needed for Wheezing. Rescue  Dispense: 18 g; Refill: 0  -     benzonatate (TESSALON PERLES) 100 MG capsule; Take 1 capsule (100 mg total) by mouth every 6 (six) hours as needed.  Dispense: 30 capsule; Refill: 0  -     promethazine-dextromethorphan (PROMETHAZINE-DM) 6.25-15 mg/5 mL Syrp; Take 5 mLs by mouth nightly as needed.  Dispense: 120 mL; Refill: 0    Cough in adult  -     X-Ray Chest PA And Lateral; Future; Expected date: 02/29/2020      Patient Instructions   Use an antihistamine such as Claritin, Zyrtec or Allegra to dry you out.     Use mucinex (guaifenisin) to break up mucous up to 2400mg/day to loosen any mucous. The mucinex DM pill has a cough suppressant that can be used at night to stop the tickle at the back of your throat.    Use albuterol inhaler every 4-6 hours as needed for chest tightness.  Take Tessalon Perles every 6 hr as needed for cough suppression    May use cough syrup nightly.  May make drowsy use with caution.    Use Flonase 1-2 sprays/nostril per day. It is a local acting steroid nasal spray, if you develop a bloody nose, stop using the medication immediately.    Take ibuprofen as directed for minor pain.    Drink plenty of fluids and get rest.  Follow-up with PCP if symptoms persist or worsen.      Bronchitis, Viral (Adult)    You have a viral  bronchitis. Bronchitis is inflammation and swelling of the lining of the lungs. This is often caused by an infection. Symptoms include a dry, hacking cough that is worse at night. The cough may bring up yellow-green mucus. You may also feel short of breath or wheeze. Other symptoms may include tiredness, chest discomfort, and chills.  Bronchitis that is caused by a virus is not treated with antibiotics. Instead, medicines may be given to help relieve symptoms. Symptoms can last up to 2 weeks, although the cough may last much longer.  This illness is contagious during the first few days and is spread through the air by coughing and sneezing, or by direct contact (touching the sick person and then touching your own eyes, nose, or mouth).  Most viral illnesses resolve within 10 to 14 days with rest and simple home remedies, although they may sometimes last for several weeks.  Home care  · If symptoms are severe, rest at home for the first 2 to 3 days. When you go back to your usual activities, don't let yourself get too tired.  · Do not smoke. Also avoid being exposed to secondhand smoke.  · You may use over-the-counter medicine to control fever or pain, unless another pain medicine was prescribed. (Note: If you have chronic liver or kidney disease or have ever had a stomach ulcer or gastrointestinal bleeding, talk with your healthcare provider before using these medicines. Also talk to your provider if you are taking medicine to prevent blood clots.) Aspirin should never be given to anyone younger than 18 years of age who is ill with a viral infection or fever. It may cause severe liver or brain damage.  · Your appetite may be poor, so a light diet is fine. Avoid dehydration by drinking 6 to 8 glasses of fluids per day (such as water, soft drinks, sports drinks, juices, tea, or soup). Extra fluids will help loosen secretions in the nose and lungs.  · Over-the-counter cough, cold, and sore-throat medicines will not  shorten the length of the illness, but they may help to reduce symptoms. (Note: Do not use decongestants if you have high blood pressure.)  Follow-up care  Follow up with your healthcare provider, or as advised. If you had an X-ray or ECG (electrocardiogram), a specialist will review it. You will be notified of any new findings that may affect your care.  Note: If you are age 65 or older, or if you have a chronic lung disease or condition that affects your immune system, or you smoke, talk to your healthcare provider about having pneumococcal vaccinations and a yearly influenza vaccination (flu shot).  When to seek medical advice  Call your healthcare provider right away if any of these occur:  · Fever of 100.4°F (38°C) or higher  · Coughing up increased amounts of colored sputum  · Weakness, drowsiness, headache, facial pain, ear pain, or a stiff neck  Call 911, or get immediate medical care  Contact emergency services right away if any of these occur:  · Coughing up blood  · Worsening weakness, drowsiness, headache, or stiff neck  · Trouble breathing, wheezing, or pain with breathing  Date Last Reviewed: 9/13/2015  © 9602-5007 [employer]. [address] [place] [address], [place] [address]. All rights reserved. This information is not intended as a substitute for professional medical care. Always follow your healthcare professional's instructions.

## 2020-07-16 ENCOUNTER — OFFICE VISIT (OUTPATIENT)
Dept: INTERNAL MEDICINE | Facility: CLINIC | Age: 55
End: 2020-07-16
Payer: COMMERCIAL

## 2020-07-16 VITALS
TEMPERATURE: 98 F | RESPIRATION RATE: 18 BRPM | WEIGHT: 196 LBS | HEIGHT: 62 IN | SYSTOLIC BLOOD PRESSURE: 138 MMHG | BODY MASS INDEX: 36.07 KG/M2 | DIASTOLIC BLOOD PRESSURE: 86 MMHG

## 2020-07-16 DIAGNOSIS — G47.33 OSA (OBSTRUCTIVE SLEEP APNEA): ICD-10-CM

## 2020-07-16 DIAGNOSIS — E11.9 TYPE 2 DIABETES MELLITUS WITHOUT COMPLICATION, WITH LONG-TERM CURRENT USE OF INSULIN: ICD-10-CM

## 2020-07-16 DIAGNOSIS — E78.5 HYPERLIPIDEMIA, UNSPECIFIED HYPERLIPIDEMIA TYPE: ICD-10-CM

## 2020-07-16 DIAGNOSIS — E66.9 OBESITY (BMI 30-39.9): ICD-10-CM

## 2020-07-16 DIAGNOSIS — I10 HTN (HYPERTENSION), BENIGN: Primary | ICD-10-CM

## 2020-07-16 DIAGNOSIS — Z79.4 TYPE 2 DIABETES MELLITUS WITHOUT COMPLICATION, WITH LONG-TERM CURRENT USE OF INSULIN: ICD-10-CM

## 2020-07-16 PROCEDURE — 99214 PR OFFICE/OUTPT VISIT, EST, LEVL IV, 30-39 MIN: ICD-10-PCS | Mod: 25,S$GLB,, | Performed by: INTERNAL MEDICINE

## 2020-07-16 PROCEDURE — 99999 PR PBB SHADOW E&M-EST. PATIENT-LVL III: ICD-10-PCS | Mod: PBBFAC,,, | Performed by: INTERNAL MEDICINE

## 2020-07-16 PROCEDURE — 90732 PPSV23 VACC 2 YRS+ SUBQ/IM: CPT | Mod: S$GLB,,, | Performed by: INTERNAL MEDICINE

## 2020-07-16 PROCEDURE — 90732 PNEUMOCOCCAL POLYSACCHARIDE VACCINE 23-VALENT =>2YO SQ IM: ICD-10-PCS | Mod: S$GLB,,, | Performed by: INTERNAL MEDICINE

## 2020-07-16 PROCEDURE — 99999 PR PBB SHADOW E&M-EST. PATIENT-LVL III: CPT | Mod: PBBFAC,,, | Performed by: INTERNAL MEDICINE

## 2020-07-16 PROCEDURE — 90471 IMMUNIZATION ADMIN: CPT | Mod: S$GLB,,, | Performed by: INTERNAL MEDICINE

## 2020-07-16 PROCEDURE — 90471 PNEUMOCOCCAL POLYSACCHARIDE VACCINE 23-VALENT =>2YO SQ IM: ICD-10-PCS | Mod: S$GLB,,, | Performed by: INTERNAL MEDICINE

## 2020-07-16 PROCEDURE — 99214 OFFICE O/P EST MOD 30 MIN: CPT | Mod: 25,S$GLB,, | Performed by: INTERNAL MEDICINE

## 2020-07-16 RX ORDER — LOSARTAN POTASSIUM 25 MG/1
25 TABLET ORAL DAILY
Qty: 90 TABLET | Refills: 3 | Status: SHIPPED | OUTPATIENT
Start: 2020-07-16 | End: 2020-11-12 | Stop reason: SDUPTHER

## 2020-07-16 RX ORDER — GLIMEPIRIDE 4 MG/1
4 TABLET ORAL
Qty: 90 TABLET | Refills: 3 | Status: SHIPPED | OUTPATIENT
Start: 2020-07-16 | End: 2021-07-21 | Stop reason: SDUPTHER

## 2020-07-16 RX ORDER — METFORMIN HYDROCHLORIDE 500 MG/1
TABLET, EXTENDED RELEASE ORAL
Qty: 360 TABLET | Refills: 3 | Status: SHIPPED | OUTPATIENT
Start: 2020-07-16 | End: 2021-08-09 | Stop reason: SDUPTHER

## 2020-07-16 RX ORDER — HYDROCHLOROTHIAZIDE 25 MG/1
TABLET ORAL
Qty: 90 TABLET | Refills: 3 | Status: SHIPPED | OUTPATIENT
Start: 2020-07-16 | End: 2021-08-26 | Stop reason: SDUPTHER

## 2020-07-16 RX ORDER — AMLODIPINE BESYLATE 5 MG/1
5 TABLET ORAL DAILY
Qty: 90 TABLET | Refills: 3 | Status: SHIPPED | OUTPATIENT
Start: 2020-07-16 | End: 2021-07-21

## 2020-07-16 RX ORDER — ATORVASTATIN CALCIUM 40 MG/1
40 TABLET, FILM COATED ORAL DAILY
Qty: 90 TABLET | Refills: 3 | Status: SHIPPED | OUTPATIENT
Start: 2020-07-16 | End: 2021-08-04

## 2020-07-16 NOTE — PROGRESS NOTES
Subjective:       Patient ID: Grecia Boyd is a 54 y.o. female.    Chief Complaint: Follow-up (6 mo) and Medication Refill    HPI   Pt with HTN, T2DM, HLD, Obesity, CHIDI is here for f/u. She is requesting med refills. No acute complaints today.   Review of Systems   Constitutional: Negative for activity change, appetite change, chills, diaphoresis, fatigue, fever and unexpected weight change.   HENT: Negative for postnasal drip, rhinorrhea, sinus pressure/congestion, sneezing, sore throat, trouble swallowing and voice change.    Respiratory: Negative for cough, shortness of breath and wheezing.    Cardiovascular: Negative for chest pain, palpitations and leg swelling.   Gastrointestinal: Negative for abdominal pain, blood in stool, constipation, diarrhea, nausea and vomiting.   Genitourinary: Negative for dysuria.   Musculoskeletal: Negative for arthralgias and myalgias.   Integumentary:  Negative for rash and wound.   Allergic/Immunologic: Negative for environmental allergies and food allergies.   Hematological: Negative for adenopathy. Does not bruise/bleed easily.         Objective:      Physical Exam  Constitutional:       General: She is not in acute distress.     Appearance: She is well-developed. She is not diaphoretic.   HENT:      Head: Normocephalic and atraumatic.      Right Ear: External ear normal.      Left Ear: External ear normal.      Nose: Nose normal.      Mouth/Throat:      Pharynx: No oropharyngeal exudate.   Eyes:      General: No scleral icterus.        Right eye: No discharge.         Left eye: No discharge.      Conjunctiva/sclera: Conjunctivae normal.      Pupils: Pupils are equal, round, and reactive to light.   Neck:      Musculoskeletal: Neck supple.      Vascular: No JVD.   Cardiovascular:      Rate and Rhythm: Normal rate and regular rhythm.      Heart sounds: Normal heart sounds.   Pulmonary:      Effort: Pulmonary effort is normal. No respiratory distress.      Breath sounds: Normal  breath sounds. No wheezing or rales.   Lymphadenopathy:      Cervical: No cervical adenopathy.   Skin:     General: Skin is warm and dry.      Coloration: Skin is not pale.      Findings: No rash.   Neurological:      Mental Status: She is alert and oriented to person, place, and time.         Assessment:       1. HTN (hypertension), benign    2. Type 2 diabetes mellitus without complication, with long-term current use of insulin    3. Hyperlipidemia, unspecified hyperlipidemia type    4. Obesity (BMI 30-39.9)    5. CHIDI (obstructive sleep apnea)        Plan:    1. HTN- stable on Norvasc/HCTZ/Losartan    2. T2DM- last HA1C of 6.8(10/19)<--9.2(5/19)<--6.7(1/19)       Stable on Metformin/Amaryl, pt unable to afford Jardiance   3. HLD- continue Lipitor 40 mg daily   4. Obesity- pt advised on proper diet/exercise for weight loss   5. CHIDI- stable on CPAP

## 2020-08-03 ENCOUNTER — PATIENT OUTREACH (OUTPATIENT)
Dept: ADMINISTRATIVE | Facility: OTHER | Age: 55
End: 2020-08-03

## 2020-08-03 NOTE — PROGRESS NOTES
Care Everywhere: updated  Immunization: updated  Health Maintenance: updated  Media Review:   Legacy Review:   Order placed:   Upcoming appts:optometry and hemoglobin a1c 8/5/2020

## 2020-08-04 ENCOUNTER — TELEPHONE (OUTPATIENT)
Dept: INTERNAL MEDICINE | Facility: CLINIC | Age: 55
End: 2020-08-04

## 2020-08-04 DIAGNOSIS — Z11.1 SCREENING FOR TUBERCULOSIS: Primary | ICD-10-CM

## 2020-08-04 NOTE — TELEPHONE ENCOUNTER
----- Message from Eli Sanders sent at 8/4/2020  9:52 AM CDT -----  Regarding: tb test  Contact: self   Pt states she would like to schedule a TB test for her job. Please call and advise

## 2020-08-05 ENCOUNTER — TELEPHONE (OUTPATIENT)
Dept: OPTOMETRY | Facility: CLINIC | Age: 55
End: 2020-08-05

## 2020-08-05 ENCOUNTER — CLINICAL SUPPORT (OUTPATIENT)
Dept: INTERNAL MEDICINE | Facility: CLINIC | Age: 55
End: 2020-08-05
Payer: COMMERCIAL

## 2020-08-05 ENCOUNTER — LAB VISIT (OUTPATIENT)
Dept: LAB | Facility: HOSPITAL | Age: 55
End: 2020-08-05
Attending: INTERNAL MEDICINE
Payer: COMMERCIAL

## 2020-08-05 DIAGNOSIS — Z79.4 TYPE 2 DIABETES MELLITUS WITHOUT COMPLICATION, WITH LONG-TERM CURRENT USE OF INSULIN: ICD-10-CM

## 2020-08-05 DIAGNOSIS — I10 HTN (HYPERTENSION), BENIGN: ICD-10-CM

## 2020-08-05 DIAGNOSIS — E66.9 OBESITY (BMI 30-39.9): ICD-10-CM

## 2020-08-05 DIAGNOSIS — E78.5 HYPERLIPIDEMIA, UNSPECIFIED HYPERLIPIDEMIA TYPE: ICD-10-CM

## 2020-08-05 DIAGNOSIS — G47.33 OSA (OBSTRUCTIVE SLEEP APNEA): ICD-10-CM

## 2020-08-05 DIAGNOSIS — E11.9 TYPE 2 DIABETES MELLITUS WITHOUT COMPLICATION, WITH LONG-TERM CURRENT USE OF INSULIN: ICD-10-CM

## 2020-08-05 LAB
ALBUMIN SERPL BCP-MCNC: 4.2 G/DL (ref 3.5–5.2)
ALP SERPL-CCNC: 76 U/L (ref 55–135)
ALT SERPL W/O P-5'-P-CCNC: 12 U/L (ref 10–44)
ANION GAP SERPL CALC-SCNC: 9 MMOL/L (ref 8–16)
AST SERPL-CCNC: 16 U/L (ref 10–40)
BASOPHILS # BLD AUTO: 0.05 K/UL (ref 0–0.2)
BASOPHILS NFR BLD: 0.5 % (ref 0–1.9)
BILIRUB SERPL-MCNC: 0.5 MG/DL (ref 0.1–1)
BUN SERPL-MCNC: 7 MG/DL (ref 6–20)
CALCIUM SERPL-MCNC: 9.9 MG/DL (ref 8.7–10.5)
CHLORIDE SERPL-SCNC: 101 MMOL/L (ref 95–110)
CO2 SERPL-SCNC: 32 MMOL/L (ref 23–29)
CREAT SERPL-MCNC: 0.8 MG/DL (ref 0.5–1.4)
DIFFERENTIAL METHOD: ABNORMAL
EOSINOPHIL # BLD AUTO: 0.1 K/UL (ref 0–0.5)
EOSINOPHIL NFR BLD: 1.2 % (ref 0–8)
ERYTHROCYTE [DISTWIDTH] IN BLOOD BY AUTOMATED COUNT: 13.2 % (ref 11.5–14.5)
EST. GFR  (AFRICAN AMERICAN): >60 ML/MIN/1.73 M^2
EST. GFR  (NON AFRICAN AMERICAN): >60 ML/MIN/1.73 M^2
ESTIMATED AVG GLUCOSE: 157 MG/DL (ref 68–131)
GLUCOSE SERPL-MCNC: 76 MG/DL (ref 70–110)
HBA1C MFR BLD HPLC: 7.1 % (ref 4–5.6)
HCT VFR BLD AUTO: 39.9 % (ref 37–48.5)
HGB BLD-MCNC: 12 G/DL (ref 12–16)
IMM GRANULOCYTES # BLD AUTO: 0.03 K/UL (ref 0–0.04)
IMM GRANULOCYTES NFR BLD AUTO: 0.3 % (ref 0–0.5)
LYMPHOCYTES # BLD AUTO: 4.4 K/UL (ref 1–4.8)
LYMPHOCYTES NFR BLD: 47.1 % (ref 18–48)
MCH RBC QN AUTO: 26 PG (ref 27–31)
MCHC RBC AUTO-ENTMCNC: 30.1 G/DL (ref 32–36)
MCV RBC AUTO: 87 FL (ref 82–98)
MONOCYTES # BLD AUTO: 0.6 K/UL (ref 0.3–1)
MONOCYTES NFR BLD: 6.3 % (ref 4–15)
NEUTROPHILS # BLD AUTO: 4.1 K/UL (ref 1.8–7.7)
NEUTROPHILS NFR BLD: 44.6 % (ref 38–73)
NRBC BLD-RTO: 0 /100 WBC
PLATELET # BLD AUTO: 402 K/UL (ref 150–350)
PMV BLD AUTO: 8.6 FL (ref 9.2–12.9)
POTASSIUM SERPL-SCNC: 3.8 MMOL/L (ref 3.5–5.1)
PROT SERPL-MCNC: 8.1 G/DL (ref 6–8.4)
RBC # BLD AUTO: 4.61 M/UL (ref 4–5.4)
SODIUM SERPL-SCNC: 142 MMOL/L (ref 136–145)
WBC # BLD AUTO: 9.26 K/UL (ref 3.9–12.7)

## 2020-08-05 PROCEDURE — 86580 TB INTRADERMAL TEST: CPT | Mod: S$GLB,,, | Performed by: INTERNAL MEDICINE

## 2020-08-05 PROCEDURE — 85025 COMPLETE CBC W/AUTO DIFF WBC: CPT

## 2020-08-05 PROCEDURE — 83036 HEMOGLOBIN GLYCOSYLATED A1C: CPT

## 2020-08-05 PROCEDURE — 36415 COLL VENOUS BLD VENIPUNCTURE: CPT | Mod: PO

## 2020-08-05 PROCEDURE — 86580 POCT TB SKIN TEST: ICD-10-PCS | Mod: S$GLB,,, | Performed by: INTERNAL MEDICINE

## 2020-08-05 PROCEDURE — 80053 COMPREHEN METABOLIC PANEL: CPT

## 2020-08-05 NOTE — PROGRESS NOTES
pateint arrived for PPD skin test for work. PPD diven to left forearm, tolerated well. See charting

## 2020-08-05 NOTE — TELEPHONE ENCOUNTER
----- Message from Greta Yang sent at 8/5/2020  9:30 AM CDT -----  Pt is requesting to be seen earlier if there is anything open. She has a 1pm appt today

## 2020-08-06 ENCOUNTER — TELEPHONE (OUTPATIENT)
Dept: INTERNAL MEDICINE | Facility: CLINIC | Age: 55
End: 2020-08-06

## 2020-08-06 NOTE — TELEPHONE ENCOUNTER
----- Message from Scott Tripathi DO sent at 8/6/2020  7:51 AM CDT -----  Decent control of diabetes  Normal kidney function/blood counts

## 2020-08-07 ENCOUNTER — CLINICAL SUPPORT (OUTPATIENT)
Dept: INTERNAL MEDICINE | Facility: CLINIC | Age: 55
End: 2020-08-07
Payer: COMMERCIAL

## 2020-08-07 LAB
TB INDURATION - 48 HR READ: 0 MM
TB INDURATION - 72 HR READ: NORMAL
TB SKIN TEST - 48 HR READ: NEGATIVE
TB SKIN TEST - 72 HR READ: NORMAL

## 2020-08-13 ENCOUNTER — TELEPHONE (OUTPATIENT)
Dept: INTERNAL MEDICINE | Facility: CLINIC | Age: 55
End: 2020-08-13

## 2020-08-13 DIAGNOSIS — Z00.00 ANNUAL PHYSICAL EXAM: Primary | ICD-10-CM

## 2020-08-13 NOTE — TELEPHONE ENCOUNTER
----- Message from Mona Cervantes sent at 8/13/2020 12:01 PM CDT -----  Contact: Otoniel [phone]--  Patient Requesting Order    Order Needed:--Annual labs--    Communication Preference:--Grecia--[phone]--    Additional Information:--Please place orders for pt annual labs on 10/23 at 7:15 am and please link to appointment.

## 2020-10-20 ENCOUNTER — LAB VISIT (OUTPATIENT)
Dept: LAB | Facility: HOSPITAL | Age: 55
End: 2020-10-20
Attending: INTERNAL MEDICINE
Payer: COMMERCIAL

## 2020-10-20 DIAGNOSIS — Z00.00 ANNUAL PHYSICAL EXAM: ICD-10-CM

## 2020-10-20 LAB
ALBUMIN SERPL BCP-MCNC: 4.2 G/DL (ref 3.5–5.2)
ALP SERPL-CCNC: 82 U/L (ref 55–135)
ALT SERPL W/O P-5'-P-CCNC: 15 U/L (ref 10–44)
ANION GAP SERPL CALC-SCNC: 13 MMOL/L (ref 8–16)
AST SERPL-CCNC: 14 U/L (ref 10–40)
BASOPHILS # BLD AUTO: 0.05 K/UL (ref 0–0.2)
BASOPHILS NFR BLD: 0.5 % (ref 0–1.9)
BILIRUB SERPL-MCNC: 0.7 MG/DL (ref 0.1–1)
BUN SERPL-MCNC: 10 MG/DL (ref 6–20)
CALCIUM SERPL-MCNC: 9.3 MG/DL (ref 8.7–10.5)
CHLORIDE SERPL-SCNC: 97 MMOL/L (ref 95–110)
CHOLEST SERPL-MCNC: 141 MG/DL (ref 120–199)
CHOLEST/HDLC SERPL: 2.9 {RATIO} (ref 2–5)
CO2 SERPL-SCNC: 28 MMOL/L (ref 23–29)
CREAT SERPL-MCNC: 0.8 MG/DL (ref 0.5–1.4)
DIFFERENTIAL METHOD: ABNORMAL
EOSINOPHIL # BLD AUTO: 0.1 K/UL (ref 0–0.5)
EOSINOPHIL NFR BLD: 1.1 % (ref 0–8)
ERYTHROCYTE [DISTWIDTH] IN BLOOD BY AUTOMATED COUNT: 14.4 % (ref 11.5–14.5)
EST. GFR  (AFRICAN AMERICAN): >60 ML/MIN/1.73 M^2
EST. GFR  (NON AFRICAN AMERICAN): >60 ML/MIN/1.73 M^2
ESTIMATED AVG GLUCOSE: 151 MG/DL (ref 68–131)
GLUCOSE SERPL-MCNC: 126 MG/DL (ref 70–110)
HBA1C MFR BLD HPLC: 6.9 % (ref 4–5.6)
HCT VFR BLD AUTO: 39.4 % (ref 37–48.5)
HDLC SERPL-MCNC: 48 MG/DL (ref 40–75)
HDLC SERPL: 34 % (ref 20–50)
HGB BLD-MCNC: 12 G/DL (ref 12–16)
IMM GRANULOCYTES # BLD AUTO: 0.03 K/UL (ref 0–0.04)
IMM GRANULOCYTES NFR BLD AUTO: 0.3 % (ref 0–0.5)
LDLC SERPL CALC-MCNC: 63 MG/DL (ref 63–159)
LYMPHOCYTES # BLD AUTO: 3.7 K/UL (ref 1–4.8)
LYMPHOCYTES NFR BLD: 40.4 % (ref 18–48)
MCH RBC QN AUTO: 26.3 PG (ref 27–31)
MCHC RBC AUTO-ENTMCNC: 30.5 G/DL (ref 32–36)
MCV RBC AUTO: 86 FL (ref 82–98)
MONOCYTES # BLD AUTO: 0.6 K/UL (ref 0.3–1)
MONOCYTES NFR BLD: 6 % (ref 4–15)
NEUTROPHILS # BLD AUTO: 4.7 K/UL (ref 1.8–7.7)
NEUTROPHILS NFR BLD: 51.7 % (ref 38–73)
NONHDLC SERPL-MCNC: 93 MG/DL
NRBC BLD-RTO: 0 /100 WBC
PLATELET # BLD AUTO: 451 K/UL (ref 150–350)
PMV BLD AUTO: 8.7 FL (ref 9.2–12.9)
POTASSIUM SERPL-SCNC: 3.4 MMOL/L (ref 3.5–5.1)
PROT SERPL-MCNC: 7.8 G/DL (ref 6–8.4)
RBC # BLD AUTO: 4.56 M/UL (ref 4–5.4)
SODIUM SERPL-SCNC: 138 MMOL/L (ref 136–145)
TRIGL SERPL-MCNC: 150 MG/DL (ref 30–150)
TSH SERPL DL<=0.005 MIU/L-ACNC: 1.32 UIU/ML (ref 0.4–4)
WBC # BLD AUTO: 9.18 K/UL (ref 3.9–12.7)

## 2020-10-20 PROCEDURE — 83036 HEMOGLOBIN GLYCOSYLATED A1C: CPT

## 2020-10-20 PROCEDURE — 36415 COLL VENOUS BLD VENIPUNCTURE: CPT | Mod: PN

## 2020-10-20 PROCEDURE — 80053 COMPREHEN METABOLIC PANEL: CPT

## 2020-10-20 PROCEDURE — 85025 COMPLETE CBC W/AUTO DIFF WBC: CPT

## 2020-10-20 PROCEDURE — 80061 LIPID PANEL: CPT

## 2020-10-20 PROCEDURE — 84443 ASSAY THYROID STIM HORMONE: CPT

## 2020-11-12 ENCOUNTER — OFFICE VISIT (OUTPATIENT)
Dept: INTERNAL MEDICINE | Facility: CLINIC | Age: 55
End: 2020-11-12
Payer: COMMERCIAL

## 2020-11-12 VITALS
SYSTOLIC BLOOD PRESSURE: 110 MMHG | WEIGHT: 198 LBS | HEIGHT: 62 IN | TEMPERATURE: 98 F | HEART RATE: 93 BPM | DIASTOLIC BLOOD PRESSURE: 72 MMHG | BODY MASS INDEX: 36.44 KG/M2

## 2020-11-12 DIAGNOSIS — S89.91XS RIGHT KNEE INJURY, SEQUELA: ICD-10-CM

## 2020-11-12 DIAGNOSIS — Z23 NEED FOR VACCINATION: ICD-10-CM

## 2020-11-12 DIAGNOSIS — I10 HTN (HYPERTENSION), BENIGN: ICD-10-CM

## 2020-11-12 DIAGNOSIS — E78.5 HYPERLIPIDEMIA, UNSPECIFIED HYPERLIPIDEMIA TYPE: ICD-10-CM

## 2020-11-12 DIAGNOSIS — Z79.4 TYPE 2 DIABETES MELLITUS WITHOUT COMPLICATION, WITH LONG-TERM CURRENT USE OF INSULIN: ICD-10-CM

## 2020-11-12 DIAGNOSIS — Z12.4 SCREENING FOR CERVICAL CANCER: ICD-10-CM

## 2020-11-12 DIAGNOSIS — Z12.39 ENCOUNTER FOR SCREENING FOR MALIGNANT NEOPLASM OF BREAST, UNSPECIFIED SCREENING MODALITY: ICD-10-CM

## 2020-11-12 DIAGNOSIS — G47.33 OSA (OBSTRUCTIVE SLEEP APNEA): ICD-10-CM

## 2020-11-12 DIAGNOSIS — E66.9 OBESITY (BMI 30-39.9): ICD-10-CM

## 2020-11-12 DIAGNOSIS — M25.461 SWELLING OF JOINT OF RIGHT KNEE: ICD-10-CM

## 2020-11-12 DIAGNOSIS — E11.9 TYPE 2 DIABETES MELLITUS WITHOUT COMPLICATION, WITH LONG-TERM CURRENT USE OF INSULIN: ICD-10-CM

## 2020-11-12 DIAGNOSIS — Z00.00 ANNUAL PHYSICAL EXAM: Primary | ICD-10-CM

## 2020-11-12 DIAGNOSIS — M25.561 ACUTE PAIN OF RIGHT KNEE: ICD-10-CM

## 2020-11-12 PROCEDURE — 99999 PR PBB SHADOW E&M-EST. PATIENT-LVL IV: ICD-10-PCS | Mod: PBBFAC,,, | Performed by: NURSE PRACTITIONER

## 2020-11-12 PROCEDURE — 99396 PR PREVENTIVE VISIT,EST,40-64: ICD-10-PCS | Mod: 25,S$GLB,, | Performed by: NURSE PRACTITIONER

## 2020-11-12 PROCEDURE — 90471 IMMUNIZATION ADMIN: CPT | Mod: S$GLB,,, | Performed by: NURSE PRACTITIONER

## 2020-11-12 PROCEDURE — 99999 PR PBB SHADOW E&M-EST. PATIENT-LVL IV: CPT | Mod: PBBFAC,,, | Performed by: NURSE PRACTITIONER

## 2020-11-12 PROCEDURE — 90686 FLU VACCINE (QUAD) GREATER THAN OR EQUAL TO 3YO PRESERVATIVE FREE IM: ICD-10-PCS | Mod: S$GLB,,, | Performed by: NURSE PRACTITIONER

## 2020-11-12 PROCEDURE — 99396 PREV VISIT EST AGE 40-64: CPT | Mod: 25,S$GLB,, | Performed by: NURSE PRACTITIONER

## 2020-11-12 PROCEDURE — 90686 IIV4 VACC NO PRSV 0.5 ML IM: CPT | Mod: S$GLB,,, | Performed by: NURSE PRACTITIONER

## 2020-11-12 PROCEDURE — 90471 FLU VACCINE (QUAD) GREATER THAN OR EQUAL TO 3YO PRESERVATIVE FREE IM: ICD-10-PCS | Mod: S$GLB,,, | Performed by: NURSE PRACTITIONER

## 2020-11-12 RX ORDER — LOSARTAN POTASSIUM 25 MG/1
25 TABLET ORAL DAILY
Qty: 90 TABLET | Refills: 3 | Status: SHIPPED | OUTPATIENT
Start: 2020-11-12 | End: 2021-09-27

## 2020-11-12 NOTE — PROGRESS NOTES
[facility]    Chief Complaint      Chief Complaint   Patient presents with    Annual Exam    Leg Pain     right     History of Present Illness      Grecia Boyd is a 55 y.o. female patient of Dr. Patton who is new to me and presents today for annual visit exam and ER f/u.  Patient denies shortness of breath or chest pain, reviewed meds and history of patient.  Pt reports she went to the ER on Halloween for R know/leg pain    ER notes:    55-year-old female presents to ED, for evaluation of right knee pain and swelling, S/P ground level fall 2 weeks ago, while coming out of the grocery store.  She reports she was carrying bags, and tripped on the concrete and fell on to her right knee, states she has been ambulatory, with mild pain up until last night, when the pain became extremely severe, to the point she could not sleep.  She reports 10/10 pain, describes it as a throbbing type pain.  She denies hitting her head, or LOC.  She denies any other complaints of pain, injury or otherwise, she denies calf pain.  On my assessment she has obvious swelling to right knee, with no bruising, or erythema, no obvious deformity, peripheral pulses intact, full range of motion, she is ambulatory to ED.  ED Management:  Xray results discussed as well as RICE and pain management with NSAIDs and tylenol; pt also informed of possibility of occult fractures and possible need for repeat imaging in 7-10 days if symptoms do not improve. All questions answered. Pt told to f/u with pcp in the next 2-3 days for re-check and urged to rter with any concerns. Pt v/u       Vaccines: Influenza (2019); Tetanus (2013); Pneumovax (done)  Eye exam: 11/18  Mammogram: ordered  Gyn exam: declined  Colonoscopy: 2017- normal per pt  Foot exam- done in appt today, all ok     Exercise: walks daily  Diet: regular     Past Medical History:  No date: Diabetes mellitus, type 2  No date: Hyperlipidemia  No date: Hypertension  No  date: Obesity  Past Surgical History:  No date: ADENOIDECTOMY  No date: HYSTERECTOMY  No date: THYROIDECTOMY, PARTIAL  No date: TONSILLECTOMY  Social History    Socioeconomic History      Marital status:       Spouse name: Not on file      Number of children: 2      Years of education: Not on file      Highest education level: Not on file    Social Needs      Financial resource strain: Not on file      Food insecurity - worry: Not on file      Food insecurity - inability: Not on file      Transportation needs - medical: Not on file      Transportation needs - non-medical: Not on file    Occupational History      Not on file    Tobacco Use      Smoking status: Never Smoker      Smokeless tobacco: Never Used    Substance and Sexual Activity      Alcohol use: Yes        Comment: rare      Drug use: No      Sexual activity: Yes        Partners: Male       Problem List Items Addressed This Visit        Cardiac/Vascular    HLD (hyperlipidemia)    Relevant Medications    losartan (COZAAR) 25 MG tablet    HTN (hypertension), benign    Relevant Medications    losartan (COZAAR) 25 MG tablet       Endocrine    DM type 2 (diabetes mellitus, type 2)    Relevant Medications    losartan (COZAAR) 25 MG tablet    Obesity (BMI 30-39.9)    Relevant Medications    losartan (COZAAR) 25 MG tablet       Other    CHIDI (obstructive sleep apnea)    Relevant Medications    losartan (COZAAR) 25 MG tablet      Other Visit Diagnoses     Annual physical exam    -  Primary    Right knee injury, sequela        Relevant Orders    Ambulatory referral/consult to Orthopedics    Acute pain of right knee        Relevant Orders    Ambulatory referral/consult to Orthopedics    Swelling of joint of right knee        Relevant Orders    Ambulatory referral/consult to Orthopedics    Encounter for screening for malignant neoplasm of breast, unspecified screening modality        Relevant Orders    Mammo Digital Screening Bilat w/ Andrew    Need for  vaccination        Screening for cervical cancer        Relevant Orders    Ambulatory referral/consult to Gynecology          Health Maintenance   Topic Date Due    Hepatitis C Screening  1965    TETANUS VACCINE  08/22/1984    Eye Exam  11/21/2019    Foot Exam  10/23/2020    Hemoglobin A1c  01/20/2021    Lipid Panel  10/20/2021    Low Dose Statin  11/12/2021    Mammogram  11/26/2021    Pneumococcal Vaccine (Medium Risk)  Completed       Past Medical History:   Diagnosis Date    Diabetes mellitus, type 2     Hyperlipidemia     Hypertension     Obesity        Past Surgical History:   Procedure Laterality Date    ADENOIDECTOMY      BREAST BIOPSY Right     @ age 18    HYSTERECTOMY      THYROIDECTOMY, PARTIAL      TONSILLECTOMY         family history includes Breast cancer in her paternal aunt; Cancer in her paternal aunt; Cataracts in her mother; Diabetes in her mother and paternal grandmother; Heart disease in her mother; Hyperlipidemia in her mother; Hypertension in her mother; Macular degeneration in her paternal aunt; Stroke in her mother.    Social History     Tobacco Use    Smoking status: Never Smoker    Smokeless tobacco: Never Used   Substance Use Topics    Alcohol use: Yes     Comment: rare    Drug use: No       Review of Systems   Constitutional: Negative for chills and fever.   HENT: Negative for congestion, sinus pain and sore throat.    Eyes: Negative for blurred vision.   Respiratory: Negative for cough, shortness of breath and wheezing.    Cardiovascular: Negative for chest pain, palpitations and leg swelling.   Gastrointestinal: Negative for abdominal pain, constipation, diarrhea, nausea and vomiting.   Genitourinary: Negative for dysuria.   Musculoskeletal: Negative for myalgias.   Skin: Negative for rash.   Neurological: Negative for dizziness, weakness and headaches.   Psychiatric/Behavioral: Negative for depression. The patient is not nervous/anxious.         Outpatient  "Encounter Medications as of 11/12/2020   Medication Sig Dispense Refill    albuterol (PROVENTIL HFA) 90 mcg/actuation inhaler Inhale 2 puffs into the lungs every 6 (six) hours as needed for Wheezing. Rescue 18 g 0    amLODIPine (NORVASC) 5 MG tablet Take 1 tablet (5 mg total) by mouth once daily. 90 tablet 3    atorvastatin (LIPITOR) 40 MG tablet Take 1 tablet (40 mg total) by mouth once daily. 90 tablet 3    glimepiride (AMARYL) 4 MG tablet Take 1 tablet (4 mg total) by mouth before breakfast. 90 tablet 3    hydroCHLOROthiazide (HYDRODIURIL) 25 MG tablet TAKE 1 TABLET(25 MG) BY MOUTH EVERY DAY 90 tablet 3    losartan (COZAAR) 25 MG tablet Take 1 tablet (25 mg total) by mouth once daily. 90 tablet 3    metFORMIN (GLUCOPHAGE-XR) 500 MG XR 24hr tablet 2 tabs PO BID  tablet 3    [DISCONTINUED] losartan (COZAAR) 25 MG tablet Take 1 tablet (25 mg total) by mouth once daily. 90 tablet 3     No facility-administered encounter medications on file as of 11/12/2020.         Review of patient's allergies indicates:  No Known Allergies    Physical Exam      Vital Signs  Temp: 97.7 °F (36.5 °C)  Temp src: Temporal  Pulse: 93  BP: 110/72  BP Location: Left arm  Patient Position: Sitting  Pain Score:   8  Pain Loc: Leg  Height and Weight  Height: 5' 2" (157.5 cm)  Weight: 89.8 kg (197 lb 15.6 oz)  BSA (Calculated - sq m): 1.98 sq meters  BMI (Calculated): 36.2  Weight in (lb) to have BMI = 25: 136.4]    Physical Exam  Vitals signs and nursing note reviewed.   Constitutional:       Appearance: Normal appearance. She is well-developed.   HENT:      Head: Normocephalic and atraumatic.      Right Ear: External ear normal.      Left Ear: External ear normal.   Eyes:      Conjunctiva/sclera: Conjunctivae normal.      Pupils: Pupils are equal, round, and reactive to light.   Neck:      Musculoskeletal: Normal range of motion and neck supple.      Thyroid: No thyromegaly.      Vascular: No JVD.      Trachea: No tracheal " deviation.   Cardiovascular:      Rate and Rhythm: Normal rate and regular rhythm.      Heart sounds: Normal heart sounds.   Pulmonary:      Effort: Pulmonary effort is normal.      Breath sounds: Normal breath sounds.   Abdominal:      General: Bowel sounds are normal.      Palpations: Abdomen is soft.   Musculoskeletal: Normal range of motion.   Lymphadenopathy:      Cervical: No cervical adenopathy.   Skin:     General: Skin is warm and dry.   Neurological:      Mental Status: She is alert and oriented to person, place, and time.   Psychiatric:         Behavior: Behavior normal.         Thought Content: Thought content normal.         Judgment: Judgment normal.          Laboratory:  CBC:  Recent Labs   Lab Result Units 10/20/20  0708   WBC K/uL 9.18   RBC M/uL 4.56   Hemoglobin g/dL 12.0   Hematocrit % 39.4   Platelets K/uL 451*   MCV fL 86   MCH pg 26.3*   MCHC g/dL 30.5*     CMP:  Recent Labs   Lab Result Units 10/20/20  0708   Glucose mg/dL 126*   Calcium mg/dL 9.3   Albumin g/dL 4.2   Total Protein g/dL 7.8   Sodium mmol/L 138   Potassium mmol/L 3.4*   CO2 mmol/L 28   Chloride mmol/L 97   BUN mg/dL 10   Alkaline Phosphatase U/L 82   ALT U/L 15   AST U/L 14   Total Bilirubin mg/dL 0.7     URINALYSIS:  Recent Labs   Lab Result Units 10/20/20  0718   Bacteria /hpf Few*      LIPIDS:  Recent Labs   Lab Result Units 10/20/20  0708   TSH uIU/mL 1.316   HDL mg/dL 48   Cholesterol mg/dL 141   Triglycerides mg/dL 150   LDL Cholesterol mg/dL 63.0   HDL/Cholesterol Ratio % 34.0   Non-HDL Cholesterol mg/dL 93   Total Cholesterol/HDL Ratio  2.9     TSH:  Recent Labs   Lab Result Units 10/20/20  0708   TSH uIU/mL 1.316     A1C:  Recent Labs   Lab Result Units 10/20/20  0708   Hemoglobin A1C % 6.9*         Assessment/Plan     Grecia Boyd is a 55 y.o.female with:    Encounter Diagnoses   Name Primary?    Annual physical exam Yes    Hyperlipidemia, unspecified hyperlipidemia type     HTN (hypertension), benign     Type  2 diabetes mellitus without complication, with long-term current use of insulin     Obesity (BMI 30-39.9)     CHIDI (obstructive sleep apnea)     Right knee injury, sequela     Acute pain of right knee     Swelling of joint of right knee     Encounter for screening for malignant neoplasm of breast, unspecified screening modality     Need for vaccination     Screening for cervical cancer         Will refer to ortho for further eval and treat    1.- HTN- stable  2.-obesity- trying to monitor diet better  3.-CHIDI- stable  4.- flu vaccine today in office    -Continue current medications and maintain follow up with specialists.  Return to clinic in 6 months or sooner for any concerns       Erin Jiminez, NP-C [facility]

## 2020-11-27 NOTE — PROGRESS NOTES
CC: right knee pain    55 y.o. Female presents today for evaluation of her right knee pain. Patient reports she fell at the beginning of October 2020, and she states she went to Urgent Care on 10/31/2020 and reports x-rays showed no fracture. Patient states she has been icing and using warm compress, with no relief. Patient reports swelling, difficulty weight-bearing, and difficulty bending her knee. Patient states she bought a brace which has not helped. Patient localizes pain to anterior knee, and reports shooting pains that cause her knee to give out. Patient reports 8/10 burning, achy pain today. Patient denies numbness/tingling.     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  Eyes: Patient denies eye pain or vision changes.  CVS: Patient denies chest pain.  Lungs: Patient denies shortness of breath or cough.  Abdomen: Patient denies any stomach pain, nausea, vomiting, or diarrhea  Skin: Patient denies skin rash or itching.    Musculoskeletal: Patient denies recent injuries or trauma.  Neuro: Patient denies any numbness or tingling in upper or lower extremities.  Psych: Patient denies any current anxiety or nervousness.    PAST MEDICAL HISTORY:   Past Medical History:   Diagnosis Date    Diabetes mellitus, type 2     Hyperlipidemia     Hypertension     Obesity        PAST  SURGICAL HISTORY:  Past Surgical History:   Procedure Laterality Date    ADENOIDECTOMY      BREAST BIOPSY Right     @ age 18    HYSTERECTOMY      2009    THYROIDECTOMY, PARTIAL      TONSILLECTOMY         FAMILY HISTORY:  Family History   Problem Relation Age of Onset    Diabetes Mother     Heart disease Mother     Hypertension Mother     Hyperlipidemia Mother     Stroke Mother     Cataracts Mother     Cancer Paternal Aunt         Lung    Diabetes Paternal Grandmother     Breast cancer Paternal Aunt     Macular degeneration Paternal Aunt     Breast cancer Cousin     Colon cancer Neg Hx     Ovarian cancer Neg Hx        SOCIAL HISTORY:  Social History     Socioeconomic History    Marital status:      Spouse name: Not on file    Number of children: 2    Years of education: Not on file    Highest education level: Not on file   Occupational History    Not on file   Social Needs    Financial resource strain: Not on file    Food insecurity     Worry: Not on file     Inability: Not on file    Transportation needs     Medical: Not on file     Non-medical: Not on file   Tobacco Use    Smoking status: Never Smoker    Smokeless tobacco: Never Used   Substance and Sexual Activity    Alcohol use: Yes     Comment: rare    Drug use: No    Sexual activity: Yes     Partners: Male   Lifestyle    Physical activity     Days per week: Not on file     Minutes per session: Not on file    Stress: Not on file   Relationships    Social connections     Talks on phone: Not on file     Gets together: Not on file     Attends Holiness service: Not on file     Active member of club or organization: Not on file     Attends meetings of clubs or organizations: Not on file     Relationship status: Not on file   Other Topics Concern    Not on file   Social History Narrative    Not on file       MEDICATIONS:     Current Outpatient Medications:     albuterol (PROVENTIL HFA) 90 mcg/actuation inhaler, Inhale 2  "puffs into the lungs every 6 (six) hours as needed for Wheezing. Rescue, Disp: 18 g, Rfl: 0    amLODIPine (NORVASC) 5 MG tablet, Take 1 tablet (5 mg total) by mouth once daily., Disp: 90 tablet, Rfl: 3    estradioL (ESTRACE) 1 MG tablet, Take 1 tablet (1 mg total) by mouth once daily., Disp: 30 tablet, Rfl: 11    glimepiride (AMARYL) 4 MG tablet, Take 1 tablet (4 mg total) by mouth before breakfast., Disp: 90 tablet, Rfl: 3    hydroCHLOROthiazide (HYDRODIURIL) 25 MG tablet, TAKE 1 TABLET(25 MG) BY MOUTH EVERY DAY, Disp: 90 tablet, Rfl: 3    losartan (COZAAR) 25 MG tablet, Take 1 tablet (25 mg total) by mouth once daily., Disp: 90 tablet, Rfl: 3    metFORMIN (GLUCOPHAGE-XR) 500 MG XR 24hr tablet, 2 tabs PO BID WM, Disp: 360 tablet, Rfl: 3    prasterone, dhea, (INTRAROSA) 6.5 mg Inst, Place 6.5 mg vaginally every evening., Disp: 30 each, Rfl: 11    atorvastatin (LIPITOR) 40 MG tablet, Take 1 tablet (40 mg total) by mouth once daily. (Patient not taking: Reported on 2020), Disp: 90 tablet, Rfl: 3    naproxen (NAPROSYN) 500 MG tablet, Take 1 tablet (500 mg total) by mouth 2 (two) times daily with meals., Disp: 60 tablet, Rfl: 3    ALLERGIES:   Review of patient's allergies indicates:  No Known Allergies     IMAGIN. X-ray ordered due to right knee pain. 3 views taken 10/31/2020 at ED.  2. X-ray images were reviewed by Corey Go MD and then directly with patient.  3. FINDINGS: Three views right knee. No acute displaced fracture or dislocation of the knee.  There may be a small suprapatellar effusion.  Degenerative changes are noted of the knee and superior aspect of the patella.  No radiopaque foreign body.    4. IMPRESSION: 1. No acute displaced fracture or dislocation of the knee noting there may be a small suprapatellar effusion.    PHYSICAL EXAMINATION:  /60   Ht 5' 2" (1.575 m)   Wt 92.5 kg (204 lb)   BMI 37.31 kg/m²   Vitals signs and nursing note have been " reviewed.    General: In no acute distress, well developed, well nourished, no diaphoresis  Eyes: EOM full and smooth, no eye redness or discharge  HEENT: normocephalic and atraumatic, neck supple, trachea midline, no nasal discharge  Cardiovascular: no LE edema  Lungs: respirations non-labored, no conversational dyspnea   Neuro: AAOx3, CN2-12 grossly intact  Skin: No rashes, warm and dry  Psychiatric: cooperative, pleasant, mood and affect appropriate for age    Right Knee:   Gait: Severely antalgic    Inspection/Palpation:   -Rubor   -Calor  ++Effusion   +Patella ballotable   -Patellar apprehension  -Retinacular tenderness   +Patellar crepitus   Patellar tilt grossly normal     TTP at:  +Joint line   -MCL   -LCL   -Popliteal region   -Quad tendon   -Patella  -Pat lig  -Pat border  +Med condyle   =Lat condyle   -Pes   -Prox fibula   -Tib tub  -Gerdy's tubercle  -Hamstring tendons/Ischial tuberosity    ROM (*with pain):   Ext: 15*   Flex:30*       Due to severity of patients pain and limited ROM, special tests could not be performed.     IMAGIN. X-ray ordered due to right knee pain. 4 views taken today.   2. X-ray images were reviewed personally by me and then directly with patient.  3. FINDINGS: Three views right knee: No fracture dislocation bone destruction seen.  Joint space is well maintained.  Suprapatellar effusion.   No trauma seen    4. IMPRESSION:  Suprapatellar effusion with no signs of degeneration    ASSESSMENT:      ICD-10-CM ICD-9-CM   1. Right knee injury, sequela  S89.91XS 908.9   2. Acute pain of right knee  M25.561 719.46   3. Knee effusion, right  M25.461 719.06   4. Effusion of right knee  M25.461 719.06         PLAN:  Based on patient history and physical exam findings, I am concerned for internal derangement of the patient's right knee.  I will move for with MRI for further evaluation.  Pending MRI results patient may be referred to one of my surgeon colleagues or I will aspirate her  knee and inject with cortisone.  Follow-up after MRI results    Future planning includes - MRI right knee    All questions were answered to the best of my ability and all concerns were addressed at this time.    Follow up for above, or sooner if needed.      This note is dictated using the M*Modal Fluency Direct word recognition program. There are word recognition mistakes that are occasionally missed on review.

## 2020-11-30 ENCOUNTER — OFFICE VISIT (OUTPATIENT)
Dept: OBSTETRICS AND GYNECOLOGY | Facility: CLINIC | Age: 55
End: 2020-11-30
Payer: COMMERCIAL

## 2020-11-30 ENCOUNTER — HOSPITAL ENCOUNTER (OUTPATIENT)
Dept: RADIOLOGY | Facility: HOSPITAL | Age: 55
Discharge: HOME | End: 2020-11-30
Attending: NURSE PRACTITIONER
Payer: COMMERCIAL

## 2020-11-30 VITALS
SYSTOLIC BLOOD PRESSURE: 120 MMHG | DIASTOLIC BLOOD PRESSURE: 78 MMHG | BODY MASS INDEX: 37.69 KG/M2 | HEIGHT: 62 IN | WEIGHT: 204.81 LBS

## 2020-11-30 DIAGNOSIS — Z01.419 ENCOUNTER FOR ANNUAL ROUTINE GYNECOLOGICAL EXAMINATION: Primary | ICD-10-CM

## 2020-11-30 DIAGNOSIS — N89.8 VAGINAL DRYNESS: ICD-10-CM

## 2020-11-30 DIAGNOSIS — R23.2 HOT FLASHES: ICD-10-CM

## 2020-11-30 DIAGNOSIS — Z12.39 ENCOUNTER FOR SCREENING FOR MALIGNANT NEOPLASM OF BREAST, UNSPECIFIED SCREENING MODALITY: ICD-10-CM

## 2020-11-30 DIAGNOSIS — Z12.4 SCREENING FOR CERVICAL CANCER: ICD-10-CM

## 2020-11-30 PROCEDURE — 77067 SCR MAMMO BI INCL CAD: CPT | Mod: TC,PN

## 2020-11-30 PROCEDURE — 99386 PREV VISIT NEW AGE 40-64: CPT | Mod: S$GLB,,, | Performed by: NURSE PRACTITIONER

## 2020-11-30 PROCEDURE — 77063 MAMMO DIGITAL SCREENING BILAT WITH TOMO: ICD-10-PCS | Mod: 26,,, | Performed by: RADIOLOGY

## 2020-11-30 PROCEDURE — 99999 PR PBB SHADOW E&M-EST. PATIENT-LVL III: ICD-10-PCS | Mod: PBBFAC,,, | Performed by: NURSE PRACTITIONER

## 2020-11-30 PROCEDURE — 77067 MAMMO DIGITAL SCREENING BILAT WITH TOMO: ICD-10-PCS | Mod: 26,,, | Performed by: RADIOLOGY

## 2020-11-30 PROCEDURE — 99386 PR PREVENTIVE VISIT,NEW,40-64: ICD-10-PCS | Mod: S$GLB,,, | Performed by: NURSE PRACTITIONER

## 2020-11-30 PROCEDURE — 77063 BREAST TOMOSYNTHESIS BI: CPT | Mod: 26,,, | Performed by: RADIOLOGY

## 2020-11-30 PROCEDURE — 77067 SCR MAMMO BI INCL CAD: CPT | Mod: 26,,, | Performed by: RADIOLOGY

## 2020-11-30 PROCEDURE — 99999 PR PBB SHADOW E&M-EST. PATIENT-LVL III: CPT | Mod: PBBFAC,,, | Performed by: NURSE PRACTITIONER

## 2020-11-30 RX ORDER — ESTRADIOL 1 MG/1
1 TABLET ORAL DAILY
Qty: 30 TABLET | Refills: 11 | Status: SHIPPED | OUTPATIENT
Start: 2020-11-30 | End: 2021-02-11

## 2020-11-30 RX ORDER — PRASTERONE 6.5 MG/1
6.5 INSERT VAGINAL NIGHTLY
Qty: 30 EACH | Refills: 11 | Status: SHIPPED | OUTPATIENT
Start: 2020-11-30 | End: 2020-12-30

## 2020-11-30 NOTE — PROGRESS NOTES
"CC: Well woman exam    Grecia Boyd is a 55 y.o. female  presents for a well woman exam.  No issues, problems, or complaints.   No LMP recorded. Patient is postmenopausal. H/o hyst for fibroids in  (per pt).  She complains of hot flashes and night sweats. She also c/o vaginal dryness, dyspareunia, and difficulty achieving orgasm. Pt denies h/o VTE, HTN, liver disease, cardiac disease, migraine with aura. Non smoker.        Patient denies any postmenopausal bleeding.     denies vaginal itching or irritation.  denies vaginal discharge.    She is sexually active with 1 partner .    History of abnormal pap: No  Last Pap: Many years ago  Last MMG: Yes, 21- report not yet in chart  Last Colonoscopy:  Yes - at [facility], 5 years ago per pt.       Past Medical History:   Diagnosis Date    Diabetes mellitus, type 2     Hyperlipidemia     Hypertension     Obesity      Past Surgical History:   Procedure Laterality Date    ADENOIDECTOMY      BREAST BIOPSY Right     @ age 18    HYSTERECTOMY      2009    THYROIDECTOMY, PARTIAL      TONSILLECTOMY       Family History   Problem Relation Age of Onset    Diabetes Mother     Heart disease Mother     Hypertension Mother     Hyperlipidemia Mother     Stroke Mother     Cataracts Mother     Cancer Paternal Aunt         Lung    Diabetes Paternal Grandmother     Breast cancer Paternal Aunt     Macular degeneration Paternal Aunt     Breast cancer Cousin     Colon cancer Neg Hx     Ovarian cancer Neg Hx      Social History     Tobacco Use    Smoking status: Never Smoker    Smokeless tobacco: Never Used   Substance Use Topics    Alcohol use: Yes     Comment: rare    Drug use: No     OB History        2    Para   2    Term   2            AB        Living           SAB        TAB        Ectopic        Multiple        Live Births                     /78   Ht 5' 2" (1.575 m)   Wt 92.9 kg (204 lb 12.9 oz)   BMI 37.46 kg/m² "     ROS:  GENERAL: Denies weight gain or weight loss. Feeling well overall.   SKIN: Denies rash or lesions.   HEAD: Denies head injury or headache.   NODES: Denies enlarged lymph nodes.   CHEST: Denies chest pain or shortness of breath.   CARDIOVASCULAR: Denies palpitations or left sided chest pain.   ABDOMEN: No abdominal pain, constipation, diarrhea, nausea, vomiting or rectal bleeding.   URINARY: No frequency, dysuria, hematuria, or burning on urination.  REPRODUCTIVE: See HPI.   BREASTS: The patient performs breast self-examination and denies pain, lumps, or nipple discharge.   HEMATOLOGIC: No easy bruisability or excessive bleeding.   MUSCULOSKELETAL: Denies joint pain or swelling.   NEUROLOGIC: Denies syncope or weakness.   PSYCHIATRIC: Denies depression, anxiety or mood swings.    PHYSICAL EXAM:   APPEARANCE: Well nourished, well developed, in no acute distress.  AFFECT: WNL, alert and oriented x 3.  SKIN: No acne or hirsutism.  NECK: Neck symmetric without masses or thyromegaly.  NODES: No inguinal, cervical, axillary or femoral lymph node enlargement.  CHEST: Good respiratory effort.   ABDOMEN: Soft. No tenderness or masses. No hepatosplenomegaly. No hernias.  BREASTS: Symmetrical, no skin changes or visible lesions. No palpable masses, nipple discharge bilaterally.  PELVIC: Normal external female genitalia without lesions. Normal hair distribution. Adequate perineal body, normal urethral meatus. Vagina atrophic without lesions or discharge. No significant cystocele or rectocele. Bimanual exam shows uterus and cervix to be surgically absent. Adnexa without masses or tenderness.  RECTAL: Rectovaginal exam confirms above with normal sphincter tone, no masses.  EXTREMITIES: No edema.      ASSESSMENT AND PLAN:    ICD-10-CM ICD-9-CM    1. Encounter for annual routine gynecological examination  Z01.419 V72.31    2. Screening for cervical cancer  Z12.4 V76.2 Ambulatory referral/consult to Gynecology   3. Hot  flashes  R23.2 782.62 Estradiol   4. Vaginal dryness  N89.8 625.8 prasterone, dhea, (INTRAROSA) 6.5 mg Inst         Patient was counseled today on A.C.S. Pap guidelines and recommendations for yearly pelvic exams, mammograms and monthly self breast exams; to see her PCP for other health maintenance.     A full discussion of the benefit-risk ratio of hormonal replacement therapy was carried out. Improvement in vasomotor and other climacteric symptoms is discussed, including possible improvements in sleep and mood. Reduction of risk for osteoporosis was explained. We discussed the study data showing increased risk of thrombo-embolic events such as myocardial infarction, stroke and also possibly breast cancer with estrogen replacement, and how this might affect her. The range of side effects such as breast tenderness, weight gain and including possible increases in lifetime risk of breast cancer and possible thrombotic complications was discussed. We also discussed ACOG's recommendation to use hormone replacement therapy for the relief of hot flashes alone and to be on the lowest dose possible for the shortest amount of time. We reviewed alternative methods that have been shown to improve symptoms as well including gabapentin, SSRI, SNRI, clonidine, etc.  Alternative such as herbal and soy-based products were reviewed. Rx for 1 mg estrace sent. Estradiol level checked today. Will send intrarosa for dyspareunia and vaginal dryness.        Well Woman:   - Pap smear: No indication for pap in patient s/p hysterectomy with no hx of VIRAL 2 or greater  - Mammogram: up to date  - Colonoscopy: up to date  - Dexa: due age 66  - Immunizations: with PCP  - Labs: with PCP  - Exercise counseling provided    F/u 1 year/PRN

## 2020-11-30 NOTE — LETTER
November 30, 2020      Kinga Reina NP  [address] [facility] [place] [address]           [facility]  [address] [facility] [address]  Phone: [phone]  Fax: [phone]          Patient: Grecia Boyd   MR Number: [id number]   YOB: 1965   Date of Visit: 11/30/2020       Dear Kinga Reina:    Thank you for referring Grecia Boyd to me for evaluation. Attached you will find relevant portions of my assessment and plan of care.    If you have questions, please do not hesitate to call me. I look forward to following Grecia Boyd along with you.    Sincerely,    Melba Lee NP    Enclosure  CC:  No Recipients    If you would like to receive this communication electronically, please contact [email or web address] or [phone] to request more information on [email or web address] Link access.    For providers and/or their staff who would like to refer a patient to [facility], please contact us through our one-stop-shop provider referral line, [facility], at [phone].    If you feel you have received this communication in error or would no longer like to receive these types of communications, please e-mail [email or web address]

## 2020-12-01 ENCOUNTER — PATIENT OUTREACH (OUTPATIENT)
Dept: ADMINISTRATIVE | Facility: OTHER | Age: 55
End: 2020-12-01

## 2020-12-01 ENCOUNTER — TELEPHONE (OUTPATIENT)
Dept: SPORTS MEDICINE | Facility: CLINIC | Age: 55
End: 2020-12-01

## 2020-12-01 DIAGNOSIS — E11.9 TYPE 2 DIABETES MELLITUS WITHOUT COMPLICATION, UNSPECIFIED WHETHER LONG TERM INSULIN USE: Primary | ICD-10-CM

## 2020-12-01 DIAGNOSIS — M25.561 RIGHT KNEE PAIN: Primary | ICD-10-CM

## 2020-12-01 NOTE — PROGRESS NOTES
Care Everywhere:   Immunization: updated  Health Maintenance: updated  Media Review: review for outside eye exam report  Legacy Review:   Order placed: eye screening photo   Upcoming appts:

## 2020-12-01 NOTE — TELEPHONE ENCOUNTER
Called patient and left VM to inform them to arrive 15 mins early to complete x-rays prior to appointment on 12/2.    Ailyn Burrell MS, OTC  Clinical Assistant to Dr. Liliana Malloy

## 2020-12-02 ENCOUNTER — OFFICE VISIT (OUTPATIENT)
Dept: SPORTS MEDICINE | Facility: CLINIC | Age: 55
End: 2020-12-02
Payer: COMMERCIAL

## 2020-12-02 ENCOUNTER — TELEPHONE (OUTPATIENT)
Dept: INTERNAL MEDICINE | Facility: CLINIC | Age: 55
End: 2020-12-02

## 2020-12-02 ENCOUNTER — HOSPITAL ENCOUNTER (OUTPATIENT)
Dept: RADIOLOGY | Facility: HOSPITAL | Age: 55
Discharge: HOME | End: 2020-12-02
Attending: STUDENT IN AN ORGANIZED HEALTH CARE EDUCATION/TRAINING PROGRAM
Payer: COMMERCIAL

## 2020-12-02 VITALS
BODY MASS INDEX: 37.54 KG/M2 | HEIGHT: 62 IN | DIASTOLIC BLOOD PRESSURE: 60 MMHG | SYSTOLIC BLOOD PRESSURE: 125 MMHG | WEIGHT: 204 LBS

## 2020-12-02 DIAGNOSIS — S89.91XS RIGHT KNEE INJURY, SEQUELA: ICD-10-CM

## 2020-12-02 DIAGNOSIS — M25.561 RIGHT KNEE PAIN: ICD-10-CM

## 2020-12-02 DIAGNOSIS — M25.461 KNEE EFFUSION, RIGHT: ICD-10-CM

## 2020-12-02 DIAGNOSIS — M25.561 ACUTE PAIN OF RIGHT KNEE: ICD-10-CM

## 2020-12-02 DIAGNOSIS — M25.461 EFFUSION OF RIGHT KNEE: ICD-10-CM

## 2020-12-02 PROCEDURE — 99204 OFFICE O/P NEW MOD 45 MIN: CPT | Mod: S$GLB,,, | Performed by: STUDENT IN AN ORGANIZED HEALTH CARE EDUCATION/TRAINING PROGRAM

## 2020-12-02 PROCEDURE — 99999 PR PBB SHADOW E&M-EST. PATIENT-LVL III: ICD-10-PCS | Mod: PBBFAC,,, | Performed by: STUDENT IN AN ORGANIZED HEALTH CARE EDUCATION/TRAINING PROGRAM

## 2020-12-02 PROCEDURE — 73562 XR KNEE ORTHO RIGHT WITH FLEXION: ICD-10-PCS | Mod: 26,LT,, | Performed by: RADIOLOGY

## 2020-12-02 PROCEDURE — 99204 PR OFFICE/OUTPT VISIT, NEW, LEVL IV, 45-59 MIN: ICD-10-PCS | Mod: S$GLB,,, | Performed by: STUDENT IN AN ORGANIZED HEALTH CARE EDUCATION/TRAINING PROGRAM

## 2020-12-02 PROCEDURE — 73564 XR KNEE ORTHO RIGHT WITH FLEXION: ICD-10-PCS | Mod: 26,RT,, | Performed by: RADIOLOGY

## 2020-12-02 PROCEDURE — 73562 X-RAY EXAM OF KNEE 3: CPT | Mod: 26,LT,, | Performed by: RADIOLOGY

## 2020-12-02 PROCEDURE — 73562 X-RAY EXAM OF KNEE 3: CPT | Mod: TC,PN,LT

## 2020-12-02 PROCEDURE — 99999 PR PBB SHADOW E&M-EST. PATIENT-LVL III: CPT | Mod: PBBFAC,,, | Performed by: STUDENT IN AN ORGANIZED HEALTH CARE EDUCATION/TRAINING PROGRAM

## 2020-12-02 PROCEDURE — 73564 X-RAY EXAM KNEE 4 OR MORE: CPT | Mod: 26,RT,, | Performed by: RADIOLOGY

## 2020-12-02 RX ORDER — NAPROXEN 500 MG/1
500 TABLET ORAL 2 TIMES DAILY WITH MEALS
Qty: 60 TABLET | Refills: 3 | Status: SHIPPED | OUTPATIENT
Start: 2020-12-02 | End: 2021-02-11

## 2020-12-02 NOTE — TELEPHONE ENCOUNTER
mammogram result:    Findings:     The breasts are heterogeneously dense, which may obscure small masses. There is no evidence of suspicious masses, microcalcifications or architectural distortion.     Impression:  No mammographic evidence of malignancy.        Recommendation:  Routine screening mammogram in 1 year is recommended.

## 2020-12-02 NOTE — LETTER
December 2, 2020      Kinga Reina NP  [address] [facility] [facility] [place] [address]           [facility]  [address] [address]  Phone: [phone]  Fax: [phone]          Patient: Grecia Boyd   MR Number: [id number]   YOB: 1965   Date of Visit: 12/2/2020       Dear Kinga Reina:    Thank you for referring Grecia Boyd to me for evaluation. Attached you will find relevant portions of my assessment and plan of care.    If you have questions, please do not hesitate to call me. I look forward to following Grecia Boyd along with you.    Sincerely,    Liliana Malloy MD    Enclosure  CC:  No Recipients    If you would like to receive this communication electronically, please contact [email or web address] or [phone] to request more information on [email or web address] Link access.    For providers and/or their staff who would like to refer a patient to [facility], please contact us through our one-stop-shop provider referral line, [facility], at [phone].    If you feel you have received this communication in error or would no longer like to receive these types of communications, please e-mail [email or web address]

## 2020-12-03 ENCOUNTER — TELEPHONE (OUTPATIENT)
Dept: INTERNAL MEDICINE | Facility: CLINIC | Age: 55
End: 2020-12-03

## 2020-12-03 NOTE — TELEPHONE ENCOUNTER
----- Message from Lindsey Gomez MA sent at 12/3/2020 12:52 PM CST -----  Contact: [phone]  Calling to get test results.  Name of test (lab, x-ray): Mammogram  Date of test: 11/30/20  Where was the test performed:   Comments:

## 2020-12-10 ENCOUNTER — TELEPHONE (OUTPATIENT)
Dept: INTERNAL MEDICINE | Facility: CLINIC | Age: 55
End: 2020-12-10

## 2020-12-10 NOTE — TELEPHONE ENCOUNTER
----- Message from Beth Hope sent at 12/10/2020  8:53 AM CST -----  Regarding: Pt [phone]  The patient tested positive for covid and she's been taking Mucinex and Robitussin DM and she wants to know if this is ok because she has diabetes.    Thank you

## 2020-12-10 NOTE — TELEPHONE ENCOUNTER
Spoke to patient , per Dr. Tripathi , okay to take Mucinex or robitussin DM not together follow the  directions.

## 2020-12-11 ENCOUNTER — HOSPITAL ENCOUNTER (INPATIENT)
Facility: HOSPITAL | Age: 55
LOS: 2 days | Discharge: HOME | DRG: 177 | End: 2020-12-14
Attending: EMERGENCY MEDICINE | Admitting: HOSPITALIST
Payer: COMMERCIAL

## 2020-12-11 DIAGNOSIS — R07.9 CHEST PAIN: ICD-10-CM

## 2020-12-11 DIAGNOSIS — U07.1 COVID-19 VIRUS DETECTED: ICD-10-CM

## 2020-12-11 DIAGNOSIS — U07.1 PNEUMONIA DUE TO COVID-19 VIRUS: Primary | ICD-10-CM

## 2020-12-11 DIAGNOSIS — Z20.822 SUSPECTED COVID-19 VIRUS INFECTION: ICD-10-CM

## 2020-12-11 DIAGNOSIS — J20.8 VIRAL BRONCHITIS: ICD-10-CM

## 2020-12-11 DIAGNOSIS — J12.82 PNEUMONIA DUE TO COVID-19 VIRUS: Primary | ICD-10-CM

## 2020-12-11 LAB
ALBUMIN SERPL BCP-MCNC: 3.8 G/DL (ref 3.5–5.2)
ALP SERPL-CCNC: 61 U/L (ref 55–135)
ALT SERPL W/O P-5'-P-CCNC: 14 U/L (ref 10–44)
ANION GAP SERPL CALC-SCNC: 16 MMOL/L (ref 8–16)
AST SERPL-CCNC: 27 U/L (ref 10–40)
BASOPHILS # BLD AUTO: 0.02 K/UL (ref 0–0.2)
BASOPHILS NFR BLD: 0.2 % (ref 0–1.9)
BILIRUB SERPL-MCNC: 0.7 MG/DL (ref 0.1–1)
BUN SERPL-MCNC: 9 MG/DL (ref 6–20)
CALCIUM SERPL-MCNC: 9.1 MG/DL (ref 8.7–10.5)
CHLORIDE SERPL-SCNC: 96 MMOL/L (ref 95–110)
CK SERPL-CCNC: 202 U/L (ref 20–180)
CO2 SERPL-SCNC: 24 MMOL/L (ref 23–29)
CREAT SERPL-MCNC: 1 MG/DL (ref 0.5–1.4)
CRP SERPL-MCNC: 135.3 MG/L (ref 0–8.2)
CTP QC/QA: YES
DIFFERENTIAL METHOD: ABNORMAL
EOSINOPHIL # BLD AUTO: 0 K/UL (ref 0–0.5)
EOSINOPHIL NFR BLD: 0 % (ref 0–8)
ERYTHROCYTE [DISTWIDTH] IN BLOOD BY AUTOMATED COUNT: 13.2 % (ref 11.5–14.5)
EST. GFR  (AFRICAN AMERICAN): >60 ML/MIN/1.73 M^2
EST. GFR  (NON AFRICAN AMERICAN): >60 ML/MIN/1.73 M^2
GLUCOSE SERPL-MCNC: 137 MG/DL (ref 70–110)
HCT VFR BLD AUTO: 35.8 % (ref 37–48.5)
HGB BLD-MCNC: 11.5 G/DL (ref 12–16)
IMM GRANULOCYTES # BLD AUTO: 0.1 K/UL (ref 0–0.04)
IMM GRANULOCYTES NFR BLD AUTO: 0.8 % (ref 0–0.5)
LACTATE SERPL-SCNC: 1.3 MMOL/L (ref 0.5–2.2)
LDH SERPL L TO P-CCNC: 535 U/L (ref 110–260)
LYMPHOCYTES # BLD AUTO: 2 K/UL (ref 1–4.8)
LYMPHOCYTES NFR BLD: 16.8 % (ref 18–48)
MCH RBC QN AUTO: 25.9 PG (ref 27–31)
MCHC RBC AUTO-ENTMCNC: 32.1 G/DL (ref 32–36)
MCV RBC AUTO: 81 FL (ref 82–98)
MONOCYTES # BLD AUTO: 0.4 K/UL (ref 0.3–1)
MONOCYTES NFR BLD: 3.5 % (ref 4–15)
NEUTROPHILS # BLD AUTO: 9.4 K/UL (ref 1.8–7.7)
NEUTROPHILS NFR BLD: 78.7 % (ref 38–73)
NRBC BLD-RTO: 0 /100 WBC
PLATELET # BLD AUTO: 318 K/UL (ref 150–350)
PLATELET BLD QL SMEAR: ABNORMAL
PMV BLD AUTO: 8.9 FL (ref 9.2–12.9)
POTASSIUM SERPL-SCNC: 3.6 MMOL/L (ref 3.5–5.1)
PROT SERPL-MCNC: 8.6 G/DL (ref 6–8.4)
RBC # BLD AUTO: 4.44 M/UL (ref 4–5.4)
SARS-COV-2 RDRP RESP QL NAA+PROBE: POSITIVE
SODIUM SERPL-SCNC: 136 MMOL/L (ref 136–145)
TROPONIN I SERPL DL<=0.01 NG/ML-MCNC: <0.006 NG/ML (ref 0–0.03)
WBC # BLD AUTO: 11.97 K/UL (ref 3.9–12.7)

## 2020-12-11 PROCEDURE — 85025 COMPLETE CBC W/AUTO DIFF WBC: CPT

## 2020-12-11 PROCEDURE — 83605 ASSAY OF LACTIC ACID: CPT

## 2020-12-11 PROCEDURE — 63600175 PHARM REV CODE 636 W HCPCS: Performed by: EMERGENCY MEDICINE

## 2020-12-11 PROCEDURE — 96366 THER/PROPH/DIAG IV INF ADDON: CPT

## 2020-12-11 PROCEDURE — U0002 COVID-19 LAB TEST NON-CDC: HCPCS | Performed by: EMERGENCY MEDICINE

## 2020-12-11 PROCEDURE — 86140 C-REACTIVE PROTEIN: CPT

## 2020-12-11 PROCEDURE — 96367 TX/PROPH/DG ADDL SEQ IV INF: CPT

## 2020-12-11 PROCEDURE — 87040 BLOOD CULTURE FOR BACTERIA: CPT | Mod: 59

## 2020-12-11 PROCEDURE — 82550 ASSAY OF CK (CPK): CPT

## 2020-12-11 PROCEDURE — 83615 LACTATE (LD) (LDH) ENZYME: CPT

## 2020-12-11 PROCEDURE — 93005 ELECTROCARDIOGRAM TRACING: CPT

## 2020-12-11 PROCEDURE — 96365 THER/PROPH/DIAG IV INF INIT: CPT

## 2020-12-11 PROCEDURE — 80053 COMPREHEN METABOLIC PANEL: CPT

## 2020-12-11 PROCEDURE — 25000003 PHARM REV CODE 250: Performed by: EMERGENCY MEDICINE

## 2020-12-11 PROCEDURE — 93010 ELECTROCARDIOGRAM REPORT: CPT | Mod: ,,, | Performed by: INTERNAL MEDICINE

## 2020-12-11 PROCEDURE — 84484 ASSAY OF TROPONIN QUANT: CPT

## 2020-12-11 PROCEDURE — 99291 CRITICAL CARE FIRST HOUR: CPT | Mod: 25

## 2020-12-11 PROCEDURE — 82728 ASSAY OF FERRITIN: CPT

## 2020-12-11 PROCEDURE — 93010 EKG 12-LEAD: ICD-10-PCS | Mod: ,,, | Performed by: INTERNAL MEDICINE

## 2020-12-11 PROCEDURE — 96375 TX/PRO/DX INJ NEW DRUG ADDON: CPT

## 2020-12-11 RX ORDER — IBUPROFEN 600 MG/1
600 TABLET ORAL
Status: COMPLETED | OUTPATIENT
Start: 2020-12-11 | End: 2020-12-11

## 2020-12-11 RX ORDER — PROMETHAZINE HYDROCHLORIDE AND CODEINE PHOSPHATE 6.25; 1 MG/5ML; MG/5ML
5 SOLUTION ORAL
Status: COMPLETED | OUTPATIENT
Start: 2020-12-11 | End: 2020-12-11

## 2020-12-11 RX ORDER — DEXAMETHASONE SODIUM PHOSPHATE 4 MG/ML
6 INJECTION, SOLUTION INTRA-ARTICULAR; INTRALESIONAL; INTRAMUSCULAR; INTRAVENOUS; SOFT TISSUE
Status: COMPLETED | OUTPATIENT
Start: 2020-12-11 | End: 2020-12-11

## 2020-12-11 RX ADMIN — PROMETHAZINE HYDROCHLORIDE AND CODEINE PHOSPHATE 5 ML: 10; 6.25 SOLUTION ORAL at 05:12

## 2020-12-11 RX ADMIN — DEXAMETHASONE SODIUM PHOSPHATE 6 MG: 4 INJECTION, SOLUTION INTRA-ARTICULAR; INTRALESIONAL; INTRAMUSCULAR; INTRAVENOUS; SOFT TISSUE at 09:12

## 2020-12-11 RX ADMIN — CEFTRIAXONE 2 G: 2 INJECTION, SOLUTION INTRAVENOUS at 09:12

## 2020-12-11 RX ADMIN — AZITHROMYCIN 500 MG: 500 INJECTION, POWDER, LYOPHILIZED, FOR SOLUTION INTRAVENOUS at 09:12

## 2020-12-11 RX ADMIN — IBUPROFEN 600 MG: 600 TABLET, FILM COATED ORAL at 04:12

## 2020-12-11 NOTE — ED PROVIDER NOTES
Encounter Date: 12/11/2020    SCRIBE #1 NOTE: I, Janice Nugent, am scribing for, and in the presence of, Dr. Sheridan.       History     Chief Complaint   Patient presents with    Cough     + covid. reports worsening cough and fever     Time seen by provider: 3:40 PM    This is a 55 y.o. female with hx of HTN and DM who presents with complaint of cough. Patient reports there has been a COVID-19 outbreak at work and several of her co-workers tested positive, so they decided to test all employees. Patient was tested four days ago and was told the following day she tested positive. She became symptomatic three days ago, initially with cough and lack of taste and smell. She came here for worsening cough over the last two days. She reports difficulty sleeping due to cough, fatigue, fever, few episodes of diarrhea, and decreased appetite. She denies chest pain. She does not have a pulse oximeter at home. She has been taking tylenol for fever with some relief. This is the extent of the patient's complaints at this time.    The history is provided by the patient.     Review of patient's allergies indicates:  No Known Allergies  Past Medical History:   Diagnosis Date    Diabetes mellitus, type 2     Hyperlipidemia     Hypertension     Obesity      Past Surgical History:   Procedure Laterality Date    ADENOIDECTOMY      BREAST BIOPSY Right     @ age 18    HYSTERECTOMY      2009    THYROIDECTOMY, PARTIAL      TONSILLECTOMY       Family History   Problem Relation Age of Onset    Diabetes Mother     Heart disease Mother     Hypertension Mother     Hyperlipidemia Mother     Stroke Mother     Cataracts Mother     Cancer Paternal Aunt         Lung    Diabetes Paternal Grandmother     Breast cancer Paternal Aunt     Macular degeneration Paternal Aunt     Breast cancer Cousin     Colon cancer Neg Hx     Ovarian cancer Neg Hx      Social History     Tobacco Use    Smoking status: Never Smoker    Smokeless tobacco:  Never Used   Substance Use Topics    Alcohol use: Yes     Comment: rare    Drug use: No     Review of Systems   Constitutional: Positive for appetite change, fatigue and fever.   HENT: Negative for sore throat.         Positive for loss of taste and smell.   Respiratory: Positive for cough. Negative for shortness of breath.    Cardiovascular: Negative for chest pain.   Gastrointestinal: Positive for diarrhea. Negative for nausea.   Genitourinary: Negative for dysuria.   Musculoskeletal: Negative for back pain.   Skin: Negative for color change, rash and wound.   Neurological: Negative for weakness.   Hematological: Does not bruise/bleed easily.   Psychiatric/Behavioral: Positive for sleep disturbance.   All other systems reviewed and are negative.      Physical Exam     Initial Vitals [12/11/20 1527]   BP Pulse Resp Temp SpO2   (!) 120/59 101 (!) 26 (!) 102.9 °F (39.4 °C) 97 %      MAP       --         Physical Exam    Nursing note and vitals reviewed.  Constitutional: She appears well-developed and well-nourished. She is not diaphoretic. No distress.   HENT:   Head: Normocephalic and atraumatic.   Eyes: EOM are normal. Pupils are equal, round, and reactive to light.   Neck: Normal range of motion. Neck supple.   Cardiovascular: Normal rate, regular rhythm and normal heart sounds. Exam reveals no gallop and no friction rub.    No murmur heard.  Pulmonary/Chest: Breath sounds normal. No respiratory distress. She has no wheezes. She has no rhonchi. She has no rales.   Abdominal: Soft. Bowel sounds are normal. She exhibits no distension. There is no abdominal tenderness. There is no rebound and no guarding.   Musculoskeletal: Normal range of motion. No tenderness or edema.      Comments: No peripheral edema.   Neurological: She is alert and oriented to person, place, and time.   Skin: Skin is warm and dry.         ED Course   Critical Care    Date/Time: 12/11/2020 9:25 PM  Performed by: Rima Sheridan MD  Authorized  by: Rima Sheridan MD   Direct patient critical care time: 25 minutes  Additional history critical care time: 9 minutes  Ordering / reviewing critical care time: 8 minutes  Documentation critical care time: 10 minutes  Consulting other physicians critical care time: 8 minutes  Total critical care time (exclusive of procedural time) : 60 minutes  Critical care time was exclusive of separately billable procedures and treating other patients and teaching time.  Critical care was necessary to treat or prevent imminent or life-threatening deterioration of the following conditions: respiratory failure.  Critical care was time spent personally by me on the following activities: examination of patient, review of old charts, obtaining history from patient or surrogate, development of treatment plan with patient or surrogate, ordering and performing treatments and interventions, ordering and review of laboratory studies, ordering and review of radiographic studies, re-evaluation of patient's condition, evaluation of patient's response to treatment and discussions with consultants.        Labs Reviewed   CBC W/ AUTO DIFFERENTIAL - Abnormal; Notable for the following components:       Result Value    Hemoglobin 11.5 (*)     Hematocrit 35.8 (*)     MCV 81 (*)     MCH 25.9 (*)     MPV 8.9 (*)     Immature Granulocytes 0.8 (*)     Gran # (ANC) 9.4 (*)     Immature Grans (Abs) 0.10 (*)     Gran % 78.7 (*)     Lymph % 16.8 (*)     Mono % 3.5 (*)     All other components within normal limits   COMPREHENSIVE METABOLIC PANEL - Abnormal; Notable for the following components:    Glucose 137 (*)     Total Protein 8.6 (*)     All other components within normal limits   C-REACTIVE PROTEIN - Abnormal; Notable for the following components:    .3 (*)     All other components within normal limits   FERRITIN - Abnormal; Notable for the following components:    Ferritin 323 (*)     All other components within normal limits   LACTATE  DEHYDROGENASE - Abnormal; Notable for the following components:     (*)     All other components within normal limits   CK - Abnormal; Notable for the following components:     (*)     All other components within normal limits   SARS-COV-2 RDRP GENE - Abnormal; Notable for the following components:    POC Rapid COVID Positive (*)     All other components within normal limits   LACTIC ACID, PLASMA   TROPONIN I     EKG Readings: (Independently Interpreted)   Normal sinus rhythm at rate of 93. Nonspecific ST abnormality diffusely.     ECG Results          EKG 12-lead (Final result)  Result time 12/14/20 07:37:10    Final result by Interface, Lab In [facility] (12/14/20 07:37:10)                 Narrative:    Test Reason : Z20.828,    Vent. Rate : 093 BPM     Atrial Rate : 093 BPM     P-R Int : 160 ms          QRS Dur : 082 ms      QT Int : 342 ms       P-R-T Axes : 065 085 034 degrees     QTc Int : 425 ms    Normal sinus rhythm  Nonspecific ST abnormality  Abnormal ECG    Confirmed by Martinez Lane MD ([id number]) on 12/14/2020 7:37:03 AM    Referred By: [email or web address]   SELF           Confirmed By:Martinez Lane MD                             EKG 12-LEAD (Final result)  Result time 12/28/20 10:22:29    Final result by Unknown User (12/28/20 10:22:29)                                Imaging Results          X-Ray Chest AP Portable (Final result)  Result time 12/11/20 16:43:04    Final result by Yoandy Carrera MD (12/11/20 16:43:04)                 Impression:      1. Patchy increased interstitial and parenchymal attenuation, differential would potentially include congestive change with edema however infectious or inflammatory process, particularly viral process, can present in this fashion.  Correlation is advised.      Electronically signed by: Yoandy Carrera MD  Date:    12/11/2020  Time:    16:43             Narrative:    EXAMINATION:  XR CHEST AP PORTABLE    CLINICAL HISTORY:  Suspected Covid-19 Virus  Infection;    TECHNIQUE:  Single frontal view of the chest was performed.    COMPARISON:  02/29/2020    FINDINGS:  The cardiomediastinal silhouette is prominent noting magnification by technique.  There is elevation of the right hemidiaphragm.  There is no pleural effusion.  The trachea is midline.  The lungs are symmetrically expanded bilaterally with patchy increased interstitial and parenchymal attenuation projected over the bilateral lower lung zones and in a perihilar district..  No large focal consolidation seen.  There is no pneumothorax.  The osseous structures are remarkable for degenerative changes..                              X-Rays:   Independently Interpreted Readings:   Chest X-Ray: Patchy infiltrates bilateral lower lung fields and left mid lung. No confluent consolidation. No effusion. No edema.     Medical Decision Making:   History:   Old Medical Records: I decided to obtain old medical records.  Initial Assessment:   55 y.o. female with PMHx DM, HTN complaining of worsening cough after recent diagnosis of COVID. .Febrile in ED with normal Oxygen saturation. Plan labs, CXR, cough suppressant, antipyretic, disposition dependent on workup.  Differential Diagnosis:   Bronchitis, URI, allergies, irritants, pulmonary edema, GERD, laryngitis, tracheitis, asthma, sinusitis, pneumonia, viral, COPD, medication side effect, COVID-19  Independently Interpreted Test(s):   I have ordered and independently interpreted X-rays - see prior notes.  I have ordered and independently interpreted EKG Reading(s) - see prior notes  Clinical Tests:   Lab Tests: Ordered and Reviewed  Radiological Study: Ordered and Reviewed  Medical Tests: Ordered and Reviewed  Other:   I have discussed this case with another health care provider.            Scribe Attestation:   Scribe #1: I performed the above scribed service and the documentation accurately describes the services I performed. I attest to the accuracy of the  note.    Attending Attestation:           Physician Attestation for Scribe:  Physician Attestation Statement for Scribe #1: I, Dr. Sheridan, reviewed documentation, as scribed by Janice Nugent in my presence, and it is both accurate and complete.                 ED Course as of Dec 28 2150   Fri Dec 11, 2020   2039 Patient persistently tachypneic throughout ED stay with elevated CRP and low absolute lymphocyte count. [facility]    [AC]   2123 Called Transfer Center. Patient accepted by Dr. Flores at [facility].    [AC]   2129 Discussed plan with patient. She is agreeable with transfer.    [AC]      ED Course User Index  [AC] Janice Nugent            Clinical Impression:       ICD-10-CM ICD-9-CM   1. Pneumonia due to COVID-19 virus  U07.1 480.8    J12.89    2. Suspected COVID-19 virus infection  Z20.828 V01.79   3. Chest pain  R07.9 786.50   4. Viral bronchitis  J20.8 466.0                          ED Disposition Condition    Transfer to Another Facility Stable                            Rima Sheridan MD  12/28/20 2150

## 2020-12-12 PROBLEM — Z20.822 SUSPECTED COVID-19 VIRUS INFECTION: Status: ACTIVE | Noted: 2020-12-12

## 2020-12-12 PROBLEM — J12.82 PNEUMONIA DUE TO COVID-19 VIRUS: Status: ACTIVE | Noted: 2020-12-12

## 2020-12-12 PROBLEM — E11.9 TYPE 2 DIABETES MELLITUS WITHOUT COMPLICATION, WITHOUT LONG-TERM CURRENT USE OF INSULIN: Status: ACTIVE | Noted: 2020-12-12

## 2020-12-12 PROBLEM — U07.1 COVID-19 VIRUS INFECTION: Status: ACTIVE | Noted: 2020-12-12

## 2020-12-12 PROBLEM — E87.6 HYPOKALEMIA: Status: ACTIVE | Noted: 2020-12-12

## 2020-12-12 PROBLEM — J96.01 ACUTE HYPOXEMIC RESPIRATORY FAILURE: Status: ACTIVE | Noted: 2020-12-12

## 2020-12-12 PROBLEM — U07.1 PNEUMONIA DUE TO COVID-19 VIRUS: Status: ACTIVE | Noted: 2020-12-12

## 2020-12-12 LAB
25(OH)D3+25(OH)D2 SERPL-MCNC: 8 NG/ML (ref 30–96)
ALBUMIN SERPL BCP-MCNC: 3.8 G/DL (ref 3.5–5.2)
ALP SERPL-CCNC: 69 U/L (ref 55–135)
ALT SERPL W/O P-5'-P-CCNC: 14 U/L (ref 10–44)
ANION GAP SERPL CALC-SCNC: 18 MMOL/L (ref 8–16)
AST SERPL-CCNC: 24 U/L (ref 10–40)
BASOPHILS # BLD AUTO: 0.02 K/UL (ref 0–0.2)
BASOPHILS NFR BLD: 0.1 % (ref 0–1.9)
BILIRUB SERPL-MCNC: 0.6 MG/DL (ref 0.1–1)
BNP SERPL-MCNC: <10 PG/ML (ref 0–99)
BUN SERPL-MCNC: 13 MG/DL (ref 6–20)
CALCIUM SERPL-MCNC: 8.8 MG/DL (ref 8.7–10.5)
CHLORIDE SERPL-SCNC: 93 MMOL/L (ref 95–110)
CO2 SERPL-SCNC: 26 MMOL/L (ref 23–29)
CREAT SERPL-MCNC: 1.3 MG/DL (ref 0.5–1.4)
D DIMER PPP IA.FEU-MCNC: 0.21 MG/L FEU
DIFFERENTIAL METHOD: ABNORMAL
EOSINOPHIL # BLD AUTO: 0 K/UL (ref 0–0.5)
EOSINOPHIL NFR BLD: 0 % (ref 0–8)
ERYTHROCYTE [DISTWIDTH] IN BLOOD BY AUTOMATED COUNT: 13.3 % (ref 11.5–14.5)
EST. GFR  (AFRICAN AMERICAN): 53.7 ML/MIN/1.73 M^2
EST. GFR  (NON AFRICAN AMERICAN): 46.6 ML/MIN/1.73 M^2
FERRITIN SERPL-MCNC: 323 NG/ML (ref 20–300)
GLUCOSE SERPL-MCNC: 184 MG/DL (ref 70–110)
HCT VFR BLD AUTO: 36.4 % (ref 37–48.5)
HGB BLD-MCNC: 11.6 G/DL (ref 12–16)
IMM GRANULOCYTES # BLD AUTO: 0.19 K/UL (ref 0–0.04)
IMM GRANULOCYTES NFR BLD AUTO: 1.3 % (ref 0–0.5)
INR PPP: 1 (ref 0.8–1.2)
LYMPHOCYTES # BLD AUTO: 2 K/UL (ref 1–4.8)
LYMPHOCYTES NFR BLD: 13 % (ref 18–48)
MAGNESIUM SERPL-MCNC: 2.2 MG/DL (ref 1.6–2.6)
MCH RBC QN AUTO: 26.3 PG (ref 27–31)
MCHC RBC AUTO-ENTMCNC: 31.9 G/DL (ref 32–36)
MCV RBC AUTO: 83 FL (ref 82–98)
MONOCYTES # BLD AUTO: 0.5 K/UL (ref 0.3–1)
MONOCYTES NFR BLD: 3.1 % (ref 4–15)
NEUTROPHILS # BLD AUTO: 12.4 K/UL (ref 1.8–7.7)
NEUTROPHILS NFR BLD: 82.5 % (ref 38–73)
NRBC BLD-RTO: 0 /100 WBC
PHOSPHATE SERPL-MCNC: 3.9 MG/DL (ref 2.7–4.5)
PLATELET # BLD AUTO: 335 K/UL (ref 150–350)
PMV BLD AUTO: 8.9 FL (ref 9.2–12.9)
POCT GLUCOSE: 137 MG/DL (ref 70–110)
POCT GLUCOSE: 145 MG/DL (ref 70–110)
POCT GLUCOSE: 186 MG/DL (ref 70–110)
POCT GLUCOSE: 212 MG/DL (ref 70–110)
POCT GLUCOSE: 226 MG/DL (ref 70–110)
POCT GLUCOSE: 266 MG/DL (ref 70–110)
POTASSIUM SERPL-SCNC: 3.4 MMOL/L (ref 3.5–5.1)
PROCALCITONIN SERPL IA-MCNC: 0.04 NG/ML
PROT SERPL-MCNC: 8.5 G/DL (ref 6–8.4)
PROTHROMBIN TIME: 10.9 SEC (ref 9–12.5)
RBC # BLD AUTO: 4.41 M/UL (ref 4–5.4)
SODIUM SERPL-SCNC: 137 MMOL/L (ref 136–145)
WBC # BLD AUTO: 15 K/UL (ref 3.9–12.7)

## 2020-12-12 PROCEDURE — 83880 ASSAY OF NATRIURETIC PEPTIDE: CPT

## 2020-12-12 PROCEDURE — 36415 COLL VENOUS BLD VENIPUNCTURE: CPT

## 2020-12-12 PROCEDURE — 83735 ASSAY OF MAGNESIUM: CPT

## 2020-12-12 PROCEDURE — 80053 COMPREHEN METABOLIC PANEL: CPT

## 2020-12-12 PROCEDURE — 25000003 PHARM REV CODE 250: Performed by: STUDENT IN AN ORGANIZED HEALTH CARE EDUCATION/TRAINING PROGRAM

## 2020-12-12 PROCEDURE — 94799 UNLISTED PULMONARY SVC/PX: CPT

## 2020-12-12 PROCEDURE — 20600001 HC STEP DOWN PRIVATE ROOM

## 2020-12-12 PROCEDURE — 63700000 PHARM REV CODE 250 ALT 637 W/O HCPCS: Performed by: HOSPITALIST

## 2020-12-12 PROCEDURE — 84100 ASSAY OF PHOSPHORUS: CPT

## 2020-12-12 PROCEDURE — 94761 N-INVAS EAR/PLS OXIMETRY MLT: CPT

## 2020-12-12 PROCEDURE — 85610 PROTHROMBIN TIME: CPT

## 2020-12-12 PROCEDURE — 27000646 HC AEROBIKA DEVICE

## 2020-12-12 PROCEDURE — 94664 DEMO&/EVAL PT USE INHALER: CPT

## 2020-12-12 PROCEDURE — 25000003 PHARM REV CODE 250: Performed by: HOSPITALIST

## 2020-12-12 PROCEDURE — 85025 COMPLETE CBC W/AUTO DIFF WBC: CPT

## 2020-12-12 PROCEDURE — 99223 1ST HOSP IP/OBS HIGH 75: CPT | Mod: ,,, | Performed by: HOSPITALIST

## 2020-12-12 PROCEDURE — 82306 VITAMIN D 25 HYDROXY: CPT

## 2020-12-12 PROCEDURE — C9399 UNCLASSIFIED DRUGS OR BIOLOG: HCPCS | Performed by: HOSPITALIST

## 2020-12-12 PROCEDURE — 85379 FIBRIN DEGRADATION QUANT: CPT

## 2020-12-12 PROCEDURE — 99223 PR INITIAL HOSPITAL CARE,LEVL III: ICD-10-PCS | Mod: ,,, | Performed by: HOSPITALIST

## 2020-12-12 PROCEDURE — 63600175 PHARM REV CODE 636 W HCPCS: Performed by: HOSPITALIST

## 2020-12-12 PROCEDURE — 99900035 HC TECH TIME PER 15 MIN (STAT)

## 2020-12-12 PROCEDURE — 84145 PROCALCITONIN (PCT): CPT

## 2020-12-12 RX ORDER — IBUPROFEN 200 MG
24 TABLET ORAL
Status: DISCONTINUED | OUTPATIENT
Start: 2020-12-12 | End: 2020-12-14 | Stop reason: HOSPADM

## 2020-12-12 RX ORDER — CHOLECALCIFEROL (VITAMIN D3) 25 MCG
1000 TABLET ORAL DAILY
Status: DISCONTINUED | OUTPATIENT
Start: 2020-12-12 | End: 2020-12-14 | Stop reason: HOSPADM

## 2020-12-12 RX ORDER — AZITHROMYCIN 250 MG/1
250 TABLET, FILM COATED ORAL DAILY
Status: DISCONTINUED | OUTPATIENT
Start: 2020-12-12 | End: 2020-12-14 | Stop reason: HOSPADM

## 2020-12-12 RX ORDER — ONDANSETRON 2 MG/ML
4 INJECTION INTRAMUSCULAR; INTRAVENOUS EVERY 8 HOURS PRN
Status: DISCONTINUED | OUTPATIENT
Start: 2020-12-12 | End: 2020-12-14 | Stop reason: HOSPADM

## 2020-12-12 RX ORDER — ACETAMINOPHEN 325 MG/1
650 TABLET ORAL EVERY 4 HOURS PRN
Status: DISCONTINUED | OUTPATIENT
Start: 2020-12-12 | End: 2020-12-14 | Stop reason: HOSPADM

## 2020-12-12 RX ORDER — CEFTRIAXONE 1 G/1
1 INJECTION, POWDER, FOR SOLUTION INTRAMUSCULAR; INTRAVENOUS
Status: DISCONTINUED | OUTPATIENT
Start: 2020-12-12 | End: 2020-12-14 | Stop reason: HOSPADM

## 2020-12-12 RX ORDER — GLUCAGON 1 MG
1 KIT INJECTION
Status: DISCONTINUED | OUTPATIENT
Start: 2020-12-12 | End: 2020-12-14 | Stop reason: HOSPADM

## 2020-12-12 RX ORDER — INSULIN ASPART 100 [IU]/ML
1-10 INJECTION, SOLUTION INTRAVENOUS; SUBCUTANEOUS
Status: DISCONTINUED | OUTPATIENT
Start: 2020-12-12 | End: 2020-12-14 | Stop reason: HOSPADM

## 2020-12-12 RX ORDER — HEPARIN SODIUM 5000 [USP'U]/ML
5000 INJECTION, SOLUTION INTRAVENOUS; SUBCUTANEOUS EVERY 8 HOURS
Status: DISCONTINUED | OUTPATIENT
Start: 2020-12-12 | End: 2020-12-14 | Stop reason: HOSPADM

## 2020-12-12 RX ORDER — BENZONATATE 100 MG/1
100 CAPSULE ORAL 3 TIMES DAILY PRN
Status: DISCONTINUED | OUTPATIENT
Start: 2020-12-12 | End: 2020-12-14 | Stop reason: HOSPADM

## 2020-12-12 RX ORDER — POTASSIUM CHLORIDE 750 MG/1
40 CAPSULE, EXTENDED RELEASE ORAL ONCE
Status: COMPLETED | OUTPATIENT
Start: 2020-12-12 | End: 2020-12-12

## 2020-12-12 RX ORDER — INSULIN ASPART 100 [IU]/ML
5 INJECTION, SOLUTION INTRAVENOUS; SUBCUTANEOUS
Status: DISCONTINUED | OUTPATIENT
Start: 2020-12-12 | End: 2020-12-14 | Stop reason: HOSPADM

## 2020-12-12 RX ORDER — ACETAMINOPHEN 325 MG/1
650 TABLET ORAL EVERY 8 HOURS PRN
Status: DISCONTINUED | OUTPATIENT
Start: 2020-12-12 | End: 2020-12-14 | Stop reason: HOSPADM

## 2020-12-12 RX ORDER — MUPIROCIN 20 MG/G
OINTMENT TOPICAL 2 TIMES DAILY
Status: DISCONTINUED | OUTPATIENT
Start: 2020-12-12 | End: 2020-12-14 | Stop reason: HOSPADM

## 2020-12-12 RX ORDER — ASCORBIC ACID 500 MG
500 TABLET ORAL 2 TIMES DAILY
Status: DISCONTINUED | OUTPATIENT
Start: 2020-12-12 | End: 2020-12-14 | Stop reason: HOSPADM

## 2020-12-12 RX ORDER — SODIUM CHLORIDE 0.9 % (FLUSH) 0.9 %
10 SYRINGE (ML) INJECTION
Status: DISCONTINUED | OUTPATIENT
Start: 2020-12-12 | End: 2020-12-14 | Stop reason: HOSPADM

## 2020-12-12 RX ORDER — IBUPROFEN 200 MG
16 TABLET ORAL
Status: DISCONTINUED | OUTPATIENT
Start: 2020-12-12 | End: 2020-12-14 | Stop reason: HOSPADM

## 2020-12-12 RX ADMIN — INSULIN ASPART 5 UNITS: 100 INJECTION, SOLUTION INTRAVENOUS; SUBCUTANEOUS at 09:12

## 2020-12-12 RX ADMIN — DEXAMETHASONE 6 MG: 4 TABLET ORAL at 01:12

## 2020-12-12 RX ADMIN — OXYCODONE HYDROCHLORIDE AND ACETAMINOPHEN 500 MG: 500 TABLET ORAL at 09:12

## 2020-12-12 RX ADMIN — BENZONATATE 100 MG: 100 CAPSULE ORAL at 08:12

## 2020-12-12 RX ADMIN — MUPIROCIN: 20 OINTMENT TOPICAL at 08:12

## 2020-12-12 RX ADMIN — TRAZODONE HYDROCHLORIDE 25 MG: 50 TABLET ORAL at 11:12

## 2020-12-12 RX ADMIN — THERA TABS 1 TABLET: TAB at 09:12

## 2020-12-12 RX ADMIN — POTASSIUM CHLORIDE 40 MEQ: 750 CAPSULE, EXTENDED RELEASE ORAL at 12:12

## 2020-12-12 RX ADMIN — INSULIN ASPART 1 UNITS: 100 INJECTION, SOLUTION INTRAVENOUS; SUBCUTANEOUS at 03:12

## 2020-12-12 RX ADMIN — OXYCODONE HYDROCHLORIDE AND ACETAMINOPHEN 500 MG: 500 TABLET ORAL at 08:12

## 2020-12-12 RX ADMIN — INSULIN ASPART 4 UNITS: 100 INJECTION, SOLUTION INTRAVENOUS; SUBCUTANEOUS at 12:12

## 2020-12-12 RX ADMIN — INSULIN ASPART 5 UNITS: 100 INJECTION, SOLUTION INTRAVENOUS; SUBCUTANEOUS at 05:12

## 2020-12-12 RX ADMIN — INSULIN DETEMIR 15 UNITS: 100 INJECTION, SOLUTION SUBCUTANEOUS at 02:12

## 2020-12-12 RX ADMIN — INSULIN DETEMIR 15 UNITS: 100 INJECTION, SOLUTION SUBCUTANEOUS at 08:12

## 2020-12-12 RX ADMIN — HEPARIN SODIUM 5000 UNITS: 5000 INJECTION INTRAVENOUS; SUBCUTANEOUS at 05:12

## 2020-12-12 RX ADMIN — HEPARIN SODIUM 5000 UNITS: 5000 INJECTION INTRAVENOUS; SUBCUTANEOUS at 01:12

## 2020-12-12 RX ADMIN — INSULIN ASPART 4 UNITS: 100 INJECTION, SOLUTION INTRAVENOUS; SUBCUTANEOUS at 09:12

## 2020-12-12 RX ADMIN — MUPIROCIN: 20 OINTMENT TOPICAL at 09:12

## 2020-12-12 RX ADMIN — REMDESIVIR 200 MG: 100 INJECTION, POWDER, LYOPHILIZED, FOR SOLUTION INTRAVENOUS at 04:12

## 2020-12-12 RX ADMIN — Medication 1000 UNITS: at 09:12

## 2020-12-12 RX ADMIN — INSULIN ASPART 5 UNITS: 100 INJECTION, SOLUTION INTRAVENOUS; SUBCUTANEOUS at 12:12

## 2020-12-12 RX ADMIN — INSULIN ASPART 2 UNITS: 100 INJECTION, SOLUTION INTRAVENOUS; SUBCUTANEOUS at 08:12

## 2020-12-12 RX ADMIN — BENZONATATE 100 MG: 100 CAPSULE ORAL at 02:12

## 2020-12-12 RX ADMIN — HEPARIN SODIUM 5000 UNITS: 5000 INJECTION INTRAVENOUS; SUBCUTANEOUS at 10:12

## 2020-12-12 RX ADMIN — AZITHROMYCIN MONOHYDRATE 250 MG: 250 TABLET ORAL at 09:12

## 2020-12-12 RX ADMIN — CEFTRIAXONE SODIUM 1 G: 1 INJECTION, POWDER, FOR SOLUTION INTRAMUSCULAR; INTRAVENOUS at 08:12

## 2020-12-12 NOTE — PLAN OF CARE
(Physician in Lead of Transfers)   Outside Transfer Acceptance Note / Regional Referral Center      Upon patient arrival to floor, please call extension [phone] (if no answer, this will flip to a beeper, so enter your call back number) for Hospital Medicine admit team assignment and for additional admit orders for the patient.  Do not page the attending physician associated with the patient on arrival (this physician may not be on duty at the time of arrival).  Rather, always call [phone] to reach the triage physician for orders and team assignment.      Transferring Physician: Dr. Sheridan    Accepting Physician: Alexander West MD    Date of Acceptance: 12/11/2020    Transferring Facility: [facility]    Reason for Transfer: covid +     Report from Transferring Physician/Hospital course: The patient is a 54 y/o female with PMH of HTN, HLP, obesity, and DM who presents with cough and fever. Found to be covid positive as there was an outbreak at her work. She was febrile to 102.9 and CXR showed Patchy increased interstitial and parenchymal attenuation, differential would potentially include congestive change with edema however infectious or inflammatory process, particularly viral process, can present in this fashion. , , .3. Given rocephin and azithromycin. Given antitussives. Stayed tachypneic but does not appear to need ICU. Patient is stable on RA.       Labs & Radiographs: see EPIC      To Do List:   1) Admit with covid + protocols       Alexander West MD  Hospital Medicine Staff

## 2020-12-12 NOTE — CARE UPDATE
RT assessed. Pt bs cl/dim upper lobes and dim middle and LL. No wheeze. Pt spo2 .94% on RA. Pt awake and alert. Pt has dry cough. Rt recommend hyperinflation and airway clearance techniques. O2 PRN for SOB. Rt to continue to monitor.

## 2020-12-12 NOTE — NURSING
No acute events overnight. Pt is AAO, VSS, afebrile, and remained on room air in no respiratory distress. No complaints of pain. Independent and ambulatory. Bed is locked, in lowest position, side rails are raised x2, and call bell is within reach. Will continue to monitor.

## 2020-12-12 NOTE — H&P
[facility] Medicine  History and Physical  [facility]      Patient Name: Grecia Boyd  MRN:  [id number]  Hospital Medicine Team: Networked reference to record PCT  Karina Wood DO  Date of Admission:  12/11/2020     Length of Stay:  LOS: 0 days     Principal Problem: COVID-19 Virus Infection    Chief complaint: Cough and SOB        HPI    Ms. Boyd is a 55 year old female with PMH of HTN, HLD, NIDDM2, obesity presents as a transfer from [facility] ED for management of COVID-19 pneumonia. Patient states there was COVID outbreak at her workplace at  center and supervisor advised her to get tested. Patient got tested positive for COVID on 12/7/20 at a drive thru center and has been isolating herself at home. She presented to ED with worsening dry and difficulty catching her breath during coughing spell. She also reports fever, chills, myalgia, diarrhea, loss of taste and smell. She has been taking tylenol and mucinex at home for fever and cough.     She was febrile to 102.9 in ED  and CXR showed Patchy increased interstitial opacities. She was tachypneic with RR in low 30s and hypoxic to 92% on room air which improved to 96% on 2 liter supplemental oxygen.     She received phenergan with codeine, dexamethasone, ceftriaxone and azithromycin prior to transfer.     During my interview patient has nonproductive cough but not in distress on 2 liter supplemental oxygen.         Review of Systems    Constitutional: Positive for fever, chills, fatigue, poor appetite   HENT: Positive for loss of taste and smell. Negative for sore throat, negative for trouble swallowing.    Eyes: Negative for photophobia, visual disturbance.   Respiratory: Positive for cough, shortness of breath  Cardiovascular: Negative for chest pain, palpitations, leg swelling.   Gastrointestinal: Positive for diarrhea. Negative for abdominal pain, constipation, nausea, vomiting.   Endocrine: Negative for cold intolerance,  heat intolerance.   Genitourinary: Negative for dysuria, frequency.   Musculoskeletal: Negative for arthralgias, myalgias.   Skin: Negative for rash  Neurological: Negative for dizziness, syncope, light-headedness.   Psychiatric/Behavioral: Negative for confusion, hallucinations, anxiety    Past Medical History:   Diagnosis Date    Diabetes mellitus, type 2     Hyperlipidemia     Hypertension     Obesity      Past Surgical History:   Procedure Laterality Date    ADENOIDECTOMY      BREAST BIOPSY Right     @ age 18    HYSTERECTOMY      2009    THYROIDECTOMY, PARTIAL      TONSILLECTOMY       Family History   Problem Relation Age of Onset    Diabetes Mother     Heart disease Mother     Hypertension Mother     Hyperlipidemia Mother     Stroke Mother     Cataracts Mother     Cancer Paternal Aunt         Lung    Diabetes Paternal Grandmother     Breast cancer Paternal Aunt     Macular degeneration Paternal Aunt     Breast cancer Cousin     Colon cancer Neg Hx     Ovarian cancer Neg Hx      Social History     Socioeconomic History    Marital status:      Spouse name: Not on file    Number of children: 2    Years of education: Not on file    Highest education level: Not on file   Occupational History    Not on file   Social Needs    Financial resource strain: Not on file    Food insecurity     Worry: Not on file     Inability: Not on file    Transportation needs     Medical: Not on file     Non-medical: Not on file   Tobacco Use    Smoking status: Never Smoker    Smokeless tobacco: Never Used   Substance and Sexual Activity    Alcohol use: Yes     Comment: rare    Drug use: No    Sexual activity: Yes     Partners: Male   Lifestyle    Physical activity     Days per week: Not on file     Minutes per session: Not on file    Stress: Not on file   Relationships    Social connections     Talks on phone: Not on file     Gets together: Not on file     Attends Dru[employer] service: Not on  file     Active member of club or organization: Not on file     Attends meetings of clubs or organizations: Not on file     Relationship status: Not on file   Other Topics Concern    Not on file   Social History Narrative    Not on file       Medications  No current facility-administered medications on file prior to encounter.      Current Outpatient Medications on File Prior to Encounter   Medication Sig Dispense Refill    albuterol (PROVENTIL HFA) 90 mcg/actuation inhaler Inhale 2 puffs into the lungs every 6 (six) hours as needed for Wheezing. Rescue 18 g 0    amLODIPine (NORVASC) 5 MG tablet Take 1 tablet (5 mg total) by mouth once daily. 90 tablet 3    atorvastatin (LIPITOR) 40 MG tablet Take 1 tablet (40 mg total) by mouth once daily. (Patient not taking: Reported on 11/30/2020) 90 tablet 3    estradioL (ESTRACE) 1 MG tablet Take 1 tablet (1 mg total) by mouth once daily. 30 tablet 11    glimepiride (AMARYL) 4 MG tablet Take 1 tablet (4 mg total) by mouth before breakfast. 90 tablet 3    hydroCHLOROthiazide (HYDRODIURIL) 25 MG tablet TAKE 1 TABLET(25 MG) BY MOUTH EVERY DAY 90 tablet 3    losartan (COZAAR) 25 MG tablet Take 1 tablet (25 mg total) by mouth once daily. 90 tablet 3    metFORMIN (GLUCOPHAGE-XR) 500 MG XR 24hr tablet 2 tabs PO BID  tablet 3    naproxen (NAPROSYN) 500 MG tablet Take 1 tablet (500 mg total) by mouth 2 (two) times daily with meals. 60 tablet 3    prasterone, dhea, (INTRAROSA) 6.5 mg Inst Place 6.5 mg vaginally every evening. 30 each 11       Allergies  Patient has no known allergies.    Physical Examination  Temp:  [97.9 °F (36.6 °C)-102.9 °F (39.4 °C)]   Pulse:  []   Resp:  [18-32]   BP: (103-131)/(55-79)   SpO2:  [91 %-99 %]     Gen: NAD, conversant  Head: NC, AT  Eyes: PERRLA, EOMI  Throat: MMM, OP clear  CV: RRR, no M/R/G, no peripheral edema, no JVD  Resp: coarse bilateral breath sounds, no increased work of breathing on 2 liter supplemental oxygen via N/C    GI: Soft, NT, ND, +BS  Ext: MAEW, no c/c/e  Neuro: AAOx3, CN grossly intact, no focal neurologic deficits  Psychiatry: Normal mood, normal affect    Laboratory:  Recent Labs   Lab 12/11/20  1620 12/12/20  0120   WBC 11.97 15.00*   LYMPH 16.8*  2.0 13.0*  2.0   HGB 11.5* 11.6*   HCT 35.8* 36.4*    335     Recent Labs   Lab 12/11/20  1620 12/12/20  0120    137   K 3.6 3.4*   CL 96 93*   CO2 24 26   BUN 9 13   CREATININE 1.0 1.3   * 184*   CALCIUM 9.1 8.8   MG  --  2.2   PHOS  --  3.9     Recent Labs   Lab 12/11/20  1620 12/12/20  0120   ALKPHOS 61 69   ALT 14 14   AST 27 24   ALBUMIN 3.8 3.8   PROT 8.6* 8.5*   BILITOT 0.7 0.6   INR  --  1.0      Recent Labs     12/11/20  1620 12/11/20  1621 12/12/20  0120   DDIMER  --   --  0.21   FERRITIN 323*  --   --    .3*  --   --    *  --   --    BNP  --   --  <10   TROPONINI  --  <0.006  --    LACTATE 1.3  --   --        All labs within the last 24 hours were reviewed.     Microbiology:  Lab Results   Component Value Date    [id number] Positive (A) 12/11/2020       Microbiology Results (last 7 days)     Procedure Component Value Units Date/Time    Blood culture x two cultures. Draw prior to antibiotics. [[id number]] Collected: 12/11/20 1620    Order Status: Sent Specimen: Blood from Peripheral, Hand, Left Updated: 12/11/20 1849    Blood culture x two cultures. Draw prior to antibiotics. [[id number]] Collected: 12/11/20 1635    Order Status: Sent Specimen: Blood from Peripheral, Hand, Right Updated: 12/11/20 1849            Imaging      Results for orders placed during the hospital encounter of 05/17/19   Transthoracic echo (TTE) 2D with Color Flow    Narrative · Normal left ventricular systolic function. The estimated ejection   fraction is 65%  · Normal LV diastolic function.  · No wall motion abnormalities.  · Normal right ventricular systolic function.  · Normal central venous pressure (3 mm Hg).          X-Ray Chest AP  Portable  Narrative: EXAMINATION:  XR CHEST AP PORTABLE    CLINICAL HISTORY:  Suspected Covid-19 Virus Infection;    TECHNIQUE:  Single frontal view of the chest was performed.    COMPARISON:  02/29/2020    FINDINGS:  The cardiomediastinal silhouette is prominent noting magnification by technique.  There is elevation of the right hemidiaphragm.  There is no pleural effusion.  The trachea is midline.  The lungs are symmetrically expanded bilaterally with patchy increased interstitial and parenchymal attenuation projected over the bilateral lower lung zones and in a perihilar district..  No large focal consolidation seen.  There is no pneumothorax.  The osseous structures are remarkable for degenerative changes..  Impression: 1. Patchy increased interstitial and parenchymal attenuation, differential would potentially include congestive change with edema however infectious or inflammatory process, particularly viral process, can present in this fashion.  Correlation is advised.    Electronically signed by: Yoandy Carrera MD  Date:    12/11/2020  Time:    16:43      All imaging within the last 24 hours was reviewed.       Assessment and Plan:    Active Hospital Problems    Diagnosis  POA    *Pneumonia due to COVID-19 virus [U07.1, J12.89]  Yes    Suspected COVID-19 virus infection [Z20.828]  Yes    COVID-19 virus infection [U07.1]  Yes    Acute hypoxemic respiratory failure [J96.01]  Yes    Type 2 diabetes mellitus without complication, without long-term current use of insulin [E11.9]  Yes    Obesity (BMI 30-39.9) [E66.9]  Yes    HTN (hypertension), benign [I10]  Yes    HLD (hyperlipidemia) [E78.5]  Yes      Resolved Hospital Problems   No resolved problems to display.       COVID-19 Virus Infection  Viral Pneumonia due to COVID-19  - COVID-19 testing: Positive 12/7/20, 12/11/20   - Isolation: Airborne/Droplet. Surgical mask on patient. Notify Infection Control  - Diagnostics: Trend Q48hrs if stable, more  frequently if patient decompensating        - CBC       - CMP       - Mg        - D-dimer       - Ferritin       - CRP       - CPK       - LDH       - Vitamin D       - BNP       - Troponin       - Procalcitonin       - Rapid influenza       - Rapid infection panel (if BMT/organ transplant)       - Legionella antigen       - Sputum Culture       - Blood Culture       - Urinalysis with reflex culture       - ECG       - CXR  Lymphopenia, hyponatremia, hyperferritinemia, elevated troponin, elevated d-dimer, age, and medical comorbidities are significant predictors of poor clinical outcome    - Management: Per [facility] COVID Treatment Protocol    - Telemetry & Continuous Pulse Oximetry    - Nutrition:        - Multivitamin PO daily       - Boost supplement       - Vitamin D 1000IU daily if deficient       - Ascorbic acid 500mg PO bid    - Supportive Care:       - acetaminophen 650mg PO Q6hr PRN fever/headache       - loperamide PRN viral diarrhea       - IVF if indicated, restrictive strategy preferred, no maintenance IV if able       - VTE PPx: enoxaparin or heparin SQ unless contraindicated    - Antibiotics:       - ceftriaxone 1g Q24h x 5 days       - azithromycin 500mg po x1, then 250mg po daily x 4 days     - Remdesivir 200 mg IV x 1 and then 100 mg IV daily x 4 doses      - Dexamethasone 6 mg daily x 10 day.       Acute Hypoxemic Respiratory Failure    -hypoxic on room air with O2 sat 92% which improved to 96% with 2 liter supplemental oxygen via N/C.    - Order RT consult via Respiratory Communication for COVID Protocols    - Incentive Spirometer Q4h, Flutter Valve Q4h    - Continuous Pulse Oximetry, goal SpO2 92-96%    - Supplemental O2 via LFNC, VentiMask, or HFNC (see Respiratory Support Oxygen Therapies)    - If wheezing, albuterol INH Q6h scheduled & PRN    - Proning Protocol if patient is a candidate (see  Proning Protocol)   - GCS >13, able to self-prone    - If deterioration, may warrant trial of NIPPV  in neg pressure room or immediate ICU consult    #HTN  -stable and continue home dose amlodipine, losartan     #NIDDM2 (recent A1C 7)  -hold metformin and glimepiride   -levemir 15, novolog 5 TIDWM and moderate SSI while on steroid   -monitor for hyperglycemia while on dexamethasone     #Hyperlipidemia  -hold home atorvastatin in setting of mild elevated CPK         VTE High Risk Prophylaxis:   VTE Risk Mitigation (From admission, onward)         Ordered     heparin (porcine) injection 5,000 Units  Every 8 hours      12/12/20 0055     IP VTE HIGH RISK PATIENT  Once      12/12/20 0055     Place sequential compression device  Until discontinued      12/12/20 0055                  High Risk Conditions:  Patient has a condition that poses threat to life and bodily function: acute hypoxic respiratory failure due to COVID-19 infection.     Karina Wood DO.  Staff physician, Hospital Medicine.

## 2020-12-13 LAB
ALBUMIN SERPL BCP-MCNC: 3.3 G/DL (ref 3.5–5.2)
ALP SERPL-CCNC: 81 U/L (ref 55–135)
ALT SERPL W/O P-5'-P-CCNC: 17 U/L (ref 10–44)
ANION GAP SERPL CALC-SCNC: 13 MMOL/L (ref 8–16)
AST SERPL-CCNC: 25 U/L (ref 10–40)
BASOPHILS # BLD AUTO: 0.03 K/UL (ref 0–0.2)
BASOPHILS NFR BLD: 0.2 % (ref 0–1.9)
BILIRUB SERPL-MCNC: 0.5 MG/DL (ref 0.1–1)
BUN SERPL-MCNC: 18 MG/DL (ref 6–20)
CALCIUM SERPL-MCNC: 9.4 MG/DL (ref 8.7–10.5)
CHLORIDE SERPL-SCNC: 97 MMOL/L (ref 95–110)
CO2 SERPL-SCNC: 28 MMOL/L (ref 23–29)
CREAT SERPL-MCNC: 0.9 MG/DL (ref 0.5–1.4)
DIFFERENTIAL METHOD: ABNORMAL
EOSINOPHIL # BLD AUTO: 0 K/UL (ref 0–0.5)
EOSINOPHIL NFR BLD: 0 % (ref 0–8)
ERYTHROCYTE [DISTWIDTH] IN BLOOD BY AUTOMATED COUNT: 13.4 % (ref 11.5–14.5)
EST. GFR  (AFRICAN AMERICAN): >60 ML/MIN/1.73 M^2
EST. GFR  (NON AFRICAN AMERICAN): >60 ML/MIN/1.73 M^2
GLUCOSE SERPL-MCNC: 221 MG/DL (ref 70–110)
HCT VFR BLD AUTO: 37.9 % (ref 37–48.5)
HGB BLD-MCNC: 11.6 G/DL (ref 12–16)
IMM GRANULOCYTES # BLD AUTO: 0.33 K/UL (ref 0–0.04)
IMM GRANULOCYTES NFR BLD AUTO: 1.7 % (ref 0–0.5)
LYMPHOCYTES # BLD AUTO: 2.7 K/UL (ref 1–4.8)
LYMPHOCYTES NFR BLD: 14.3 % (ref 18–48)
MAGNESIUM SERPL-MCNC: 2.3 MG/DL (ref 1.6–2.6)
MCH RBC QN AUTO: 25.6 PG (ref 27–31)
MCHC RBC AUTO-ENTMCNC: 30.6 G/DL (ref 32–36)
MCV RBC AUTO: 84 FL (ref 82–98)
MONOCYTES # BLD AUTO: 0.6 K/UL (ref 0.3–1)
MONOCYTES NFR BLD: 3.3 % (ref 4–15)
NEUTROPHILS # BLD AUTO: 15.3 K/UL (ref 1.8–7.7)
NEUTROPHILS NFR BLD: 80.5 % (ref 38–73)
NRBC BLD-RTO: 0 /100 WBC
PHOSPHATE SERPL-MCNC: 3.8 MG/DL (ref 2.7–4.5)
PLATELET # BLD AUTO: 451 K/UL (ref 150–350)
PMV BLD AUTO: 9.4 FL (ref 9.2–12.9)
POCT GLUCOSE: 203 MG/DL (ref 70–110)
POCT GLUCOSE: 224 MG/DL (ref 70–110)
POCT GLUCOSE: 248 MG/DL (ref 70–110)
POCT GLUCOSE: 266 MG/DL (ref 70–110)
POTASSIUM SERPL-SCNC: 3.7 MMOL/L (ref 3.5–5.1)
PROT SERPL-MCNC: 8.1 G/DL (ref 6–8.4)
RBC # BLD AUTO: 4.53 M/UL (ref 4–5.4)
SODIUM SERPL-SCNC: 138 MMOL/L (ref 136–145)
WBC # BLD AUTO: 18.94 K/UL (ref 3.9–12.7)

## 2020-12-13 PROCEDURE — 99232 PR SUBSEQUENT HOSPITAL CARE,LEVL II: ICD-10-PCS | Mod: ,,, | Performed by: STUDENT IN AN ORGANIZED HEALTH CARE EDUCATION/TRAINING PROGRAM

## 2020-12-13 PROCEDURE — 85025 COMPLETE CBC W/AUTO DIFF WBC: CPT

## 2020-12-13 PROCEDURE — 80053 COMPREHEN METABOLIC PANEL: CPT

## 2020-12-13 PROCEDURE — 83735 ASSAY OF MAGNESIUM: CPT

## 2020-12-13 PROCEDURE — 84100 ASSAY OF PHOSPHORUS: CPT

## 2020-12-13 PROCEDURE — 63700000 PHARM REV CODE 250 ALT 637 W/O HCPCS: Performed by: HOSPITALIST

## 2020-12-13 PROCEDURE — 36415 COLL VENOUS BLD VENIPUNCTURE: CPT

## 2020-12-13 PROCEDURE — 25000003 PHARM REV CODE 250: Performed by: STUDENT IN AN ORGANIZED HEALTH CARE EDUCATION/TRAINING PROGRAM

## 2020-12-13 PROCEDURE — 99232 SBSQ HOSP IP/OBS MODERATE 35: CPT | Mod: ,,, | Performed by: STUDENT IN AN ORGANIZED HEALTH CARE EDUCATION/TRAINING PROGRAM

## 2020-12-13 PROCEDURE — 20600001 HC STEP DOWN PRIVATE ROOM

## 2020-12-13 PROCEDURE — 63600175 PHARM REV CODE 636 W HCPCS: Performed by: HOSPITALIST

## 2020-12-13 PROCEDURE — 25000003 PHARM REV CODE 250: Performed by: HOSPITALIST

## 2020-12-13 PROCEDURE — 25000003 PHARM REV CODE 250: Performed by: FAMILY MEDICINE

## 2020-12-13 RX ORDER — GUAIFENESIN/DEXTROMETHORPHAN 100-10MG/5
10 SYRUP ORAL EVERY 6 HOURS PRN
Status: DISCONTINUED | OUTPATIENT
Start: 2020-12-13 | End: 2020-12-14 | Stop reason: HOSPADM

## 2020-12-13 RX ADMIN — INSULIN ASPART 5 UNITS: 100 INJECTION, SOLUTION INTRAVENOUS; SUBCUTANEOUS at 08:12

## 2020-12-13 RX ADMIN — THERA TABS 1 TABLET: TAB at 08:12

## 2020-12-13 RX ADMIN — HEPARIN SODIUM 5000 UNITS: 5000 INJECTION INTRAVENOUS; SUBCUTANEOUS at 05:12

## 2020-12-13 RX ADMIN — GUAIFENESIN AND DEXTROMETHORPHAN 10 ML: 100; 10 SYRUP ORAL at 01:12

## 2020-12-13 RX ADMIN — OXYCODONE HYDROCHLORIDE AND ACETAMINOPHEN 500 MG: 500 TABLET ORAL at 10:12

## 2020-12-13 RX ADMIN — INSULIN ASPART 4 UNITS: 100 INJECTION, SOLUTION INTRAVENOUS; SUBCUTANEOUS at 12:12

## 2020-12-13 RX ADMIN — TRAZODONE HYDROCHLORIDE 25 MG: 50 TABLET ORAL at 10:12

## 2020-12-13 RX ADMIN — GUAIFENESIN AND DEXTROMETHORPHAN HYDROBROMIDE 1 TABLET: 600; 30 TABLET, EXTENDED RELEASE ORAL at 04:12

## 2020-12-13 RX ADMIN — HEPARIN SODIUM 5000 UNITS: 5000 INJECTION INTRAVENOUS; SUBCUTANEOUS at 10:12

## 2020-12-13 RX ADMIN — OXYCODONE HYDROCHLORIDE AND ACETAMINOPHEN 500 MG: 500 TABLET ORAL at 08:12

## 2020-12-13 RX ADMIN — HEPARIN SODIUM 5000 UNITS: 5000 INJECTION INTRAVENOUS; SUBCUTANEOUS at 04:12

## 2020-12-13 RX ADMIN — INSULIN ASPART 4 UNITS: 100 INJECTION, SOLUTION INTRAVENOUS; SUBCUTANEOUS at 08:12

## 2020-12-13 RX ADMIN — INSULIN ASPART 2 UNITS: 100 INJECTION, SOLUTION INTRAVENOUS; SUBCUTANEOUS at 09:12

## 2020-12-13 RX ADMIN — GUAIFENESIN AND DEXTROMETHORPHAN 10 ML: 100; 10 SYRUP ORAL at 10:12

## 2020-12-13 RX ADMIN — AZITHROMYCIN MONOHYDRATE 250 MG: 250 TABLET ORAL at 08:12

## 2020-12-13 RX ADMIN — INSULIN ASPART 5 UNITS: 100 INJECTION, SOLUTION INTRAVENOUS; SUBCUTANEOUS at 04:12

## 2020-12-13 RX ADMIN — CEFTRIAXONE SODIUM 1 G: 1 INJECTION, POWDER, FOR SOLUTION INTRAMUSCULAR; INTRAVENOUS at 10:12

## 2020-12-13 RX ADMIN — BENZONATATE 100 MG: 100 CAPSULE ORAL at 10:12

## 2020-12-13 RX ADMIN — MUPIROCIN: 20 OINTMENT TOPICAL at 10:12

## 2020-12-13 RX ADMIN — REMDESIVIR 100 MG: 100 INJECTION, POWDER, LYOPHILIZED, FOR SOLUTION INTRAVENOUS at 04:12

## 2020-12-13 RX ADMIN — INSULIN ASPART 5 UNITS: 100 INJECTION, SOLUTION INTRAVENOUS; SUBCUTANEOUS at 12:12

## 2020-12-13 RX ADMIN — DEXAMETHASONE 6 MG: 4 TABLET ORAL at 08:12

## 2020-12-13 RX ADMIN — MUPIROCIN: 20 OINTMENT TOPICAL at 08:12

## 2020-12-13 RX ADMIN — INSULIN ASPART 4 UNITS: 100 INJECTION, SOLUTION INTRAVENOUS; SUBCUTANEOUS at 04:12

## 2020-12-13 RX ADMIN — Medication 1000 UNITS: at 08:12

## 2020-12-13 RX ADMIN — INSULIN DETEMIR 15 UNITS: 100 INJECTION, SOLUTION SUBCUTANEOUS at 09:12

## 2020-12-13 NOTE — CONSULTS
[facility]   Hospital Medicine  Telemedicine Consult Note    Patient Name: Grecia Boyd  MRN: [id number]  Admission Date: 12/11/2020  Hospital Length of Stay: 0 days  Attending Physician: Daisy Moore MD   Primary Care Provider: Scott Tripathi DO          [facility] Medicine consulted for Grecia Boyd to be followed through telemedicine modalities.  The patient is currently not accepted for virtual visits due to [facility] service capacity limitations.        Cheryle Ko MD  Department of Hospital Medicine   [facility]

## 2020-12-13 NOTE — NURSING
No acute events overnight. VSS, afebrile, and remained on room air in no respiratory distress. No complaints of pain. Independent and ambulatory. Bed is locked, in lowest position, side rails are raised x2, and call bell is within reach. Will continue to monitor.

## 2020-12-13 NOTE — NURSING
Home Oxygen Evaluation    Date Performed: 2020    1) Patient's Home O2 Sat on room air, while at rest: 94        If O2 sats on room air at rest are 88% or below, patient qualifies. No additional testing needed. Document N/A in steps 2 and 3. If 89% or above, complete steps 2.      2) Patient's O2 Sat on room air while exercisin        If O2 sats on room air while exercising remain 89% or above patient does not qualify, no further testing needed Document N/A in step 3. If O2 sats on room air while exercising are 88% or below, continue to step 3.      3) Patient's O2 Sat while exercising on O2: N/A       (Must show improvement from #2 for patients to qualify)    If O2 sats improve on oxygen, patient qualifies for portable oxygen. If not, the patient does not qualify.

## 2020-12-13 NOTE — PROGRESS NOTES
Hospital Medicine  Progress Note  [facility]      Patient Name: Grecia Boyd  MRN:  [id number]  [facility] Medicine Team: [facility]OSP MED R Daisy Moore MD  Date of Admission:  12/11/2020     Length of Stay:  LOS: 1 day       Principal Problem:  Pneumonia due to COVID-19 virus          Interval History:   Patient with significant improvement in symptoms since admission. Six minute walk test performed today but patient still with intermittent desaturations. Will continue current treatment and anticipate discharge tomorrow.       Review of Systems:  Constitutional: Negative for fever, chills, fatigue, poor appetite   HENT: Negative for loss of taste and smell. Negative for sore throat, negative for trouble swallowing.    Eyes: Negative for photophobia, visual disturbance.   Respiratory: Positive for cough, shortness of breath (improvement)  Cardiovascular: Negative for chest pain, palpitations, leg swelling.   Gastrointestinal: Negative for abdominal pain, constipation, nausea, vomiting.   Endocrine: Negative for cold intolerance, heat intolerance.   Genitourinary: Negative for dysuria, frequency.   Musculoskeletal: Negative for arthralgias, myalgias.   Skin: Negative for rash  Neurological: Negative for dizziness, syncope, light-headedness.   Psychiatric/Behavioral: Negative for confusion, hallucinations, anxiety    Inpatient Medications:    Current Facility-Administered Medications:     acetaminophen tablet 650 mg, 650 mg, Oral, Q4H PRN, Karina Wood, DO    acetaminophen tablet 650 mg, 650 mg, Oral, Q8H PRN, Karina Wood, DO    ascorbic acid (vitamin C) tablet 500 mg, 500 mg, Oral, BID, Karina Wood DO, 500 mg at 12/13/20 0849    azithromycin tablet 250 mg, 250 mg, Oral, Daily, Karina Wood DO, 250 mg at 12/13/20 0849    benzonatate capsule 100 mg, 100 mg, Oral, TID PRN, Karina Wood DO, 100 mg at 12/12/20 2051    cefTRIAXone injection 1 g, 1 g, Intravenous, Q24H, Karina Wood DO,  1 g at 12/12/20 2049    dexAMETHasone tablet 6 mg, 6 mg, Oral, Daily, Arup K. Nina, DO, 6 mg at 12/13/20 0849    dextromethorphan-guaifenesin  mg/5 ml liquid 10 mL, 10 mL, Oral, Q6H PRN, Scott Farrar MD, 10 mL at 12/13/20 0156    dextromethorphan-guaifenesin  mg per 12 hr tablet 1 tablet, 1 tablet, Oral, BID, Daisy Moore MD, 1 tablet at 12/13/20 1621    dextrose 50% injection 12.5 g, 12.5 g, Intravenous, PRN, Arup K. Nina, DO    dextrose 50% injection 12.5 g, 12.5 g, Intravenous, PRN, Arup K. Nina, DO    dextrose 50% injection 25 g, 25 g, Intravenous, PRN, Arup K. Nina, DO    dextrose 50% injection 25 g, 25 g, Intravenous, PRN, Arup K. Nina, DO    glucagon (human recombinant) injection 1 mg, 1 mg, Intramuscular, PRN, Arup K. Nina, DO    glucagon (human recombinant) injection 1 mg, 1 mg, Intramuscular, PRN, Arup K. Nina, DO    glucose chewable tablet 16 g, 16 g, Oral, PRN, Arup K. Nina, DO    glucose chewable tablet 16 g, 16 g, Oral, PRN, Arup K. Nina, DO    glucose chewable tablet 24 g, 24 g, Oral, PRN, Arup K. Nina, DO    glucose chewable tablet 24 g, 24 g, Oral, PRN, Arup K. Nina, DO    heparin (porcine) injection 5,000 Units, 5,000 Units, Subcutaneous, Q8H, Arup K. Nina, DO, 5,000 Units at 12/13/20 1621    insulin aspart U-100 pen 1-10 Units, 1-10 Units, Subcutaneous, QID (AC + HS) PRN, Arup K. Nina, DO, 4 Units at 12/13/20 1623    insulin aspart U-100 pen 5 Units, 5 Units, Subcutaneous, TIDWM, Arup K. Nina, DO, 5 Units at 12/13/20 1622    insulin detemir U-100 pen 15 Units, 15 Units, Subcutaneous, QHS, Karina Wood DO, 15 Units at 12/12/20 2057    multivitamin tablet, 1 tablet, Oral, Daily, Karina Wood DO, 1 tablet at 12/13/20 0849    mupirocin 2 % ointment, , Nasal, BID, Alexander West MD    ondansetron injection 4 mg, 4 mg, Intravenous, Q8H PRN, Karina Wood DO    [COMPLETED] remdesivir 200 mg in sodium chloride 0.9% 250 mL infusion, 200 mg, Intravenous,  "Q24H, Last Rate: 250 mL/hr at 12/12/20 1640, 200 mg at 12/12/20 1640 **FOLLOWED BY** remdesivir 100 mg in sodium chloride 0.9% 250 mL infusion, 100 mg, Intravenous, Q24H, Karina K. Nina, DO, Last Rate: 250 mL/hr at 12/13/20 1621, 100 mg at 12/13/20 1621    sodium chloride 0.9% flush 10 mL, 10 mL, Intravenous, PRN, Arup K. Nina, DO    trazodone split tablet 25 mg, 25 mg, Oral, Nightly PRN, Arup K. Nina, DO, 25 mg at 12/12/20 2339    vitamin D 1000 units tablet 1,000 Units, 1,000 Units, Oral, Daily, Arup K. Nina, DO, 1,000 Units at 12/13/20 0849      Physical Exam:    No intake or output data in the 24 hours ending 12/13/20 1628  Wt Readings from Last 3 Encounters:   12/12/20 86.2 kg (190 lb 0.6 oz)   12/11/20 92.5 kg (204 lb)   12/02/20 92.5 kg (204 lb)       /65 (BP Location: Left arm, Patient Position: Sitting)   Pulse 69   Temp 97.9 °F (36.6 °C) (Oral)   Resp 18   Ht 5' 2" (1.575 m)   Wt 86.2 kg (190 lb 0.6 oz)   SpO2 (!) 93%   BMI 34.76 kg/m²     GEN: NAD, conversant  Resp: coarse bilateral breath sounds, no wheezes or rales, normal work of breathing   CV: RRR, no m/r/g, no edema  GI: soft, NTND  Skin: no rash  Neuro: AAOx3, CN grossly intact, no focal neurologic deficits    Laboratory:  Lab Results   Component Value Date    [id number] Positive (A) 12/11/2020       Recent Labs   Lab 12/11/20  1620 12/12/20  0120 12/13/20  0610   WBC 11.97 15.00* 18.94*   LYMPH 16.8*  2.0 13.0*  2.0 14.3*  2.7   HGB 11.5* 11.6* 11.6*   HCT 35.8* 36.4* 37.9    335 451*     Recent Labs   Lab 12/11/20  1620 12/12/20  0120 12/13/20  0610    137 138   K 3.6 3.4* 3.7   CL 96 93* 97   CO2 24 26 28   BUN 9 13 18   CREATININE 1.0 1.3 0.9   * 184* 221*   CALCIUM 9.1 8.8 9.4   MG  --  2.2 2.3   PHOS  --  3.9 3.8     Recent Labs   Lab 12/11/20  1620 12/12/20  0120 12/13/20  0610   ALKPHOS 61 69 81   ALT 14 14 17   AST 27 24 25   ALBUMIN 3.8 3.8 3.3*   PROT 8.6* 8.5* 8.1   BILITOT 0.7 0.6 0.5   INR  --  " 1.0  --         Recent Labs     12/11/20  1620 12/11/20  1621 12/12/20  0120   DDIMER  --   --  0.21   FERRITIN 323*  --   --    .3*  --   --    *  --   --    BNP  --   --  <10   TROPONINI  --  <0.006  --    *  --   --        All labs within the last 24 hours were reviewed.     Microbiology:  Microbiology Results (last 7 days)     Procedure Component Value Units Date/Time    Blood culture x two cultures. Draw prior to antibiotics. [[id number]] Collected: 12/11/20 1620    Order Status: Completed Specimen: Blood from Peripheral, Hand, Left Updated: 12/12/20 2012     Blood Culture, Routine No growth to date      No Growth to date    Narrative:      Aerobic and anaerobic    Blood culture x two cultures. Draw prior to antibiotics. [[id number]] Collected: 12/11/20 1635    Order Status: Completed Specimen: Blood from Peripheral, Hand, Right Updated: 12/12/20 2012     Blood Culture, Routine No growth to date      No Growth to date    Narrative:      Aerobic and anaerobic            Imaging      Results for orders placed during the hospital encounter of 05/17/19   Transthoracic echo (TTE) 2D with Color Flow    Narrative · Normal left ventricular systolic function. The estimated ejection   fraction is 65%  · Normal LV diastolic function.  · No wall motion abnormalities.  · Normal right ventricular systolic function.  · Normal central venous pressure (3 mm Hg).          X-Ray Chest AP Portable  Narrative: EXAMINATION:  XR CHEST AP PORTABLE    CLINICAL HISTORY:  Suspected Covid-19 Virus Infection;    TECHNIQUE:  Single frontal view of the chest was performed.    COMPARISON:  02/29/2020    FINDINGS:  The cardiomediastinal silhouette is prominent noting magnification by technique.  There is elevation of the right hemidiaphragm.  There is no pleural effusion.  The trachea is midline.  The lungs are symmetrically expanded bilaterally with patchy increased interstitial and parenchymal attenuation projected over  the bilateral lower lung zones and in a perihilar district..  No large focal consolidation seen.  There is no pneumothorax.  The osseous structures are remarkable for degenerative changes..  Impression: 1. Patchy increased interstitial and parenchymal attenuation, differential would potentially include congestive change with edema however infectious or inflammatory process, particularly viral process, can present in this fashion.  Correlation is advised.    Electronically signed by: Yoandy Carrera MD  Date:    12/11/2020  Time:    16:43      All imaging within the last 24 hours was reviewed.     Assessment and Plan:    Active Hospital Problems    Diagnosis  POA    *Pneumonia due to COVID-19 virus [U07.1, J12.89]  Yes    Suspected COVID-19 virus infection [Z20.828]  Yes    COVID-19 virus infection [U07.1]  Yes    Acute hypoxemic respiratory failure [J96.01]  Yes    Type 2 diabetes mellitus without complication, without long-term current use of insulin [E11.9]  Yes    Obesity (BMI 30-39.9) [E66.9]  Yes    HTN (hypertension), benign [I10]  Yes    HLD (hyperlipidemia) [E78.5]  Yes      Resolved Hospital Problems   No resolved problems to display.       COVID-19 Virus Infection  Viral Pneumonia due to COVID-19  - COVID-19 testing: Positive 12/7/20, 12/11/20   - Isolation: Airborne/Droplet. Surgical mask on patient. Notify Infection Control  - Diagnostics: Trend Q48hrs if stable, more frequently if patient decompensating          - CBC       - CMP       - Mg        - D-dimer       - Ferritin       - CRP       - CPK       - LDH       - Vitamin D       - BNP       - Troponin       - Procalcitonin       - Rapid influenza       - Rapid infection panel (if BMT/organ transplant)       - Legionella antigen       - Sputum Culture       - Blood Culture       - Urinalysis with reflex culture       - ECG       - CXR  Lymphopenia, hyponatremia, hyperferritinemia, elevated troponin, elevated d-dimer, age, and medical  comorbidities are significant predictors of poor clinical outcome     - Management: Per [facility] COVID Treatment Protocol    - Telemetry & Continuous Pulse Oximetry    - Nutrition:        - Multivitamin PO daily       - Boost supplement       - Vitamin D 1000IU daily if deficient       - Ascorbic acid 500mg PO bid    - Supportive Care:       - acetaminophen 650mg PO Q6hr PRN fever/headache       - loperamide PRN viral diarrhea       - IVF if indicated, restrictive strategy preferred, no maintenance IV if able       - VTE PPx: enoxaparin or heparin SQ unless contraindicated    - Antibiotics:       - ceftriaxone 1g Q24h x 5 days       - azithromycin 500mg po x1, then 250mg po daily x 4 days     - Remdesivir 200 mg IV x 1 and then 100 mg IV daily x 4 doses      - Dexamethasone 6 mg daily x 10 day.         Acute Hypoxemic Respiratory Failure    -hypoxic on room air with O2 sat 92% which improved to 96% with 2 liter supplemental oxygen via N/C.    - Order RT consult via Respiratory Communication for COVID Protocols    - Incentive Spirometer Q4h, Flutter Valve Q4h    - Continuous Pulse Oximetry, goal SpO2 92-96%    - Supplemental O2 via LFNC, VentiMask, or HFNC (see Respiratory Support Oxygen Therapies)    - If wheezing, albuterol INH Q6h scheduled & PRN    - Proning Protocol if patient is a candidate (see  Proning Protocol)              - GCS >13, able to self-prone    - If deterioration, may warrant trial of NIPPV in neg pressure room or immediate ICU consult     #HTN  -stable and continue home dose amlodipine, losartan      #NIDDM2 (recent A1C 7)  -hold metformin and glimepiride   -levemir 15, novolog 5 TIDWM and moderate SSI while on steroid   -monitor for hyperglycemia while on dexamethasone      #Hyperlipidemia  -hold home atorvastatin in setting of mild elevated CPK            VTE High Risk Prophylaxis:       VTE Risk Mitigation (From admission, onward)                  Ordered        heparin (porcine) injection  5,000 Units  Every 8 hours      12/12/20 0055        IP VTE HIGH RISK PATIENT  Once      12/12/20 0055        Place sequential compression device  Until discontinued      12/12/20 0055                        High Risk Conditions:  Patient has a condition that poses threat to life and bodily function: acute hypoxic respiratory failure due to COVID-19 infection.      Daisy Moore MD  [facility] Medicine

## 2020-12-13 NOTE — PLAN OF CARE
Pt Aox4. Vitals WNL, but having episodes of sob at times physician aware. 6 minute walk test performed today, sats dropped to 86-88. MD made aware, d/c being held until 12/13/20. Patient c/o of congestion, mucinex prescribed and given. Pt instructed on deep cough to help with congestion, verbalized understanding. No s/s of distress noted at this time. Will continue to monitor.

## 2020-12-14 VITALS
HEART RATE: 58 BPM | TEMPERATURE: 98 F | DIASTOLIC BLOOD PRESSURE: 72 MMHG | WEIGHT: 190.06 LBS | HEIGHT: 62 IN | BODY MASS INDEX: 34.98 KG/M2 | OXYGEN SATURATION: 93 % | RESPIRATION RATE: 20 BRPM | SYSTOLIC BLOOD PRESSURE: 136 MMHG

## 2020-12-14 LAB
ALBUMIN SERPL BCP-MCNC: 3.2 G/DL (ref 3.5–5.2)
ALP SERPL-CCNC: 77 U/L (ref 55–135)
ALT SERPL W/O P-5'-P-CCNC: 16 U/L (ref 10–44)
ANION GAP SERPL CALC-SCNC: 12 MMOL/L (ref 8–16)
ANISOCYTOSIS BLD QL SMEAR: SLIGHT
AST SERPL-CCNC: 22 U/L (ref 10–40)
BASOPHILS # BLD AUTO: 0.06 K/UL (ref 0–0.2)
BASOPHILS NFR BLD: 0.4 % (ref 0–1.9)
BILIRUB SERPL-MCNC: 0.5 MG/DL (ref 0.1–1)
BUN SERPL-MCNC: 19 MG/DL (ref 6–20)
CALCIUM SERPL-MCNC: 9.1 MG/DL (ref 8.7–10.5)
CHLORIDE SERPL-SCNC: 98 MMOL/L (ref 95–110)
CO2 SERPL-SCNC: 29 MMOL/L (ref 23–29)
CREAT SERPL-MCNC: 1 MG/DL (ref 0.5–1.4)
CRP SERPL-MCNC: 44.1 MG/L (ref 0–8.2)
D DIMER PPP IA.FEU-MCNC: 0.26 MG/L FEU
DIFFERENTIAL METHOD: ABNORMAL
EOSINOPHIL # BLD AUTO: 0 K/UL (ref 0–0.5)
EOSINOPHIL NFR BLD: 0 % (ref 0–8)
ERYTHROCYTE [DISTWIDTH] IN BLOOD BY AUTOMATED COUNT: 13.3 % (ref 11.5–14.5)
EST. GFR  (AFRICAN AMERICAN): >60 ML/MIN/1.73 M^2
EST. GFR  (NON AFRICAN AMERICAN): >60 ML/MIN/1.73 M^2
FERRITIN SERPL-MCNC: 391 NG/ML (ref 20–300)
GLUCOSE SERPL-MCNC: 202 MG/DL (ref 70–110)
HCT VFR BLD AUTO: 37.1 % (ref 37–48.5)
HGB BLD-MCNC: 11.5 G/DL (ref 12–16)
IMM GRANULOCYTES # BLD AUTO: 0.83 K/UL (ref 0–0.04)
IMM GRANULOCYTES NFR BLD AUTO: 4.9 % (ref 0–0.5)
LDH SERPL L TO P-CCNC: 322 U/L (ref 110–260)
LYMPHOCYTES # BLD AUTO: 2.5 K/UL (ref 1–4.8)
LYMPHOCYTES NFR BLD: 14.8 % (ref 18–48)
MAGNESIUM SERPL-MCNC: 2.5 MG/DL (ref 1.6–2.6)
MCH RBC QN AUTO: 25.8 PG (ref 27–31)
MCHC RBC AUTO-ENTMCNC: 31 G/DL (ref 32–36)
MCV RBC AUTO: 83 FL (ref 82–98)
MONOCYTES # BLD AUTO: 0.9 K/UL (ref 0.3–1)
MONOCYTES NFR BLD: 5.5 % (ref 4–15)
NEUTROPHILS # BLD AUTO: 12.5 K/UL (ref 1.8–7.7)
NEUTROPHILS NFR BLD: 74.4 % (ref 38–73)
NRBC BLD-RTO: 0 /100 WBC
PHOSPHATE SERPL-MCNC: 4.1 MG/DL (ref 2.7–4.5)
PLATELET # BLD AUTO: 555 K/UL (ref 150–350)
PLATELET BLD QL SMEAR: ABNORMAL
PMV BLD AUTO: 9 FL (ref 9.2–12.9)
POCT GLUCOSE: 126 MG/DL (ref 70–110)
POCT GLUCOSE: 223 MG/DL (ref 70–110)
POIKILOCYTOSIS BLD QL SMEAR: SLIGHT
POTASSIUM SERPL-SCNC: 3.6 MMOL/L (ref 3.5–5.1)
PROT SERPL-MCNC: 7.7 G/DL (ref 6–8.4)
RBC # BLD AUTO: 4.45 M/UL (ref 4–5.4)
SODIUM SERPL-SCNC: 139 MMOL/L (ref 136–145)
WBC # BLD AUTO: 16.77 K/UL (ref 3.9–12.7)

## 2020-12-14 PROCEDURE — 99239 HOSP IP/OBS DSCHRG MGMT >30: CPT | Mod: ,,, | Performed by: STUDENT IN AN ORGANIZED HEALTH CARE EDUCATION/TRAINING PROGRAM

## 2020-12-14 PROCEDURE — 63600175 PHARM REV CODE 636 W HCPCS: Performed by: HOSPITALIST

## 2020-12-14 PROCEDURE — 83735 ASSAY OF MAGNESIUM: CPT

## 2020-12-14 PROCEDURE — 86140 C-REACTIVE PROTEIN: CPT

## 2020-12-14 PROCEDURE — 82728 ASSAY OF FERRITIN: CPT

## 2020-12-14 PROCEDURE — 85379 FIBRIN DEGRADATION QUANT: CPT

## 2020-12-14 PROCEDURE — 93005 ELECTROCARDIOGRAM TRACING: CPT

## 2020-12-14 PROCEDURE — 25000003 PHARM REV CODE 250: Performed by: STUDENT IN AN ORGANIZED HEALTH CARE EDUCATION/TRAINING PROGRAM

## 2020-12-14 PROCEDURE — 80053 COMPREHEN METABOLIC PANEL: CPT

## 2020-12-14 PROCEDURE — 25000003 PHARM REV CODE 250: Performed by: HOSPITALIST

## 2020-12-14 PROCEDURE — 84100 ASSAY OF PHOSPHORUS: CPT

## 2020-12-14 PROCEDURE — 25000003 PHARM REV CODE 250: Performed by: FAMILY MEDICINE

## 2020-12-14 PROCEDURE — 36415 COLL VENOUS BLD VENIPUNCTURE: CPT

## 2020-12-14 PROCEDURE — 83615 LACTATE (LD) (LDH) ENZYME: CPT

## 2020-12-14 PROCEDURE — 63700000 PHARM REV CODE 250 ALT 637 W/O HCPCS: Performed by: HOSPITALIST

## 2020-12-14 PROCEDURE — 85025 COMPLETE CBC W/AUTO DIFF WBC: CPT

## 2020-12-14 PROCEDURE — 93010 ELECTROCARDIOGRAM REPORT: CPT | Mod: ,,, | Performed by: INTERNAL MEDICINE

## 2020-12-14 PROCEDURE — 93010 EKG 12-LEAD: ICD-10-PCS | Mod: ,,, | Performed by: INTERNAL MEDICINE

## 2020-12-14 PROCEDURE — 99239 PR HOSPITAL DISCHARGE DAY,>30 MIN: ICD-10-PCS | Mod: ,,, | Performed by: STUDENT IN AN ORGANIZED HEALTH CARE EDUCATION/TRAINING PROGRAM

## 2020-12-14 RX ORDER — ALBUTEROL SULFATE 90 UG/1
2 AEROSOL, METERED RESPIRATORY (INHALATION) EVERY 6 HOURS PRN
Qty: 18 G | Refills: 0 | Status: SHIPPED | OUTPATIENT
Start: 2020-12-14 | End: 2020-12-15 | Stop reason: SDUPTHER

## 2020-12-14 RX ADMIN — Medication 1000 UNITS: at 08:12

## 2020-12-14 RX ADMIN — INSULIN ASPART 5 UNITS: 100 INJECTION, SOLUTION INTRAVENOUS; SUBCUTANEOUS at 08:12

## 2020-12-14 RX ADMIN — GUAIFENESIN AND DEXTROMETHORPHAN 10 ML: 100; 10 SYRUP ORAL at 05:12

## 2020-12-14 RX ADMIN — BENZONATATE 100 MG: 100 CAPSULE ORAL at 05:12

## 2020-12-14 RX ADMIN — MUPIROCIN: 20 OINTMENT TOPICAL at 08:12

## 2020-12-14 RX ADMIN — HEPARIN SODIUM 5000 UNITS: 5000 INJECTION INTRAVENOUS; SUBCUTANEOUS at 05:12

## 2020-12-14 RX ADMIN — INSULIN ASPART 4 UNITS: 100 INJECTION, SOLUTION INTRAVENOUS; SUBCUTANEOUS at 08:12

## 2020-12-14 RX ADMIN — GUAIFENESIN AND DEXTROMETHORPHAN HYDROBROMIDE 1 TABLET: 600; 30 TABLET, EXTENDED RELEASE ORAL at 08:12

## 2020-12-14 RX ADMIN — OXYCODONE HYDROCHLORIDE AND ACETAMINOPHEN 500 MG: 500 TABLET ORAL at 08:12

## 2020-12-14 RX ADMIN — ACETAMINOPHEN 650 MG: 325 TABLET ORAL at 05:12

## 2020-12-14 RX ADMIN — DEXAMETHASONE 6 MG: 4 TABLET ORAL at 08:12

## 2020-12-14 RX ADMIN — THERA TABS 1 TABLET: TAB at 08:12

## 2020-12-14 RX ADMIN — INSULIN ASPART 5 UNITS: 100 INJECTION, SOLUTION INTRAVENOUS; SUBCUTANEOUS at 11:12

## 2020-12-14 RX ADMIN — AZITHROMYCIN MONOHYDRATE 250 MG: 250 TABLET ORAL at 08:12

## 2020-12-14 NOTE — PLAN OF CARE
Currently stable for discharge -  Presently on room air. Will have 6 minute walk test to determine home oxygen needs if indicated / NO OXYGEN NECESSARY -  Anticipate home with FAMILY NO NEEDS.  Will continue to follow.    Harriet Huff RN  Case Management  Ext [phone]        12/14/20 8804   Post-Acute Status   Post-Acute Placement Status Awaiting Internal Medical Clearance   Discharge Delays None known at this time   Discharge Plan   Discharge Plan A Home   Discharge Plan B Home with family

## 2020-12-14 NOTE — PLAN OF CARE
CM called and spoke with patient in [phone] for Discharge Planning Assessment. Per patient, She lives with  and Mother in a single family home on a slab foundation with 8 steps to porch and point of entry.  Patient was independent with ADLS and DID NOT use DME or in-home assistive equipment. She is not on dialysis  or COUMADIN,  takes medications as prescribed / keeps refilled / has resources for all daily and prescriptive needs. Preferred pharmacy is [facility] - Agreeable to bedside delivery.   Will have help from  /daughter and other immediate family upon discharge -  to provide transportation home. All questions addressed. Unit and CM direct numbers provided. Will continue to follow for course of hospitalization    12/11/2020  3:42 PM  Suspected COVID-19 virus infection [Z20.828]  Suspected COVID-19 virus infection [Z20.828]    PCP: Scott Tripathi DO    PHARMACY:   [facility][id number] [address] [address]  Phone: [phone] Fax: [phone]      Payor: [employer] / Plan: [employer] ([employer]) / Product Type: Commercial /       Harriet Huff RN  Case Management  Ext [phone]        12/14/20 1329   Discharge Assessment   Assessment Type Discharge Planning Assessment   Confirmed/corrected address and phone number on facesheet? Yes   Assessment information obtained from? Patient   Expected Length of Stay (days) 2   Communicated expected length of stay with patient/caregiver yes   Prior to hospitilization cognitive status: Alert/Oriented;No Deficits   Prior to hospitalization functional status: Independent   Current cognitive status: Alert/Oriented;No Deficits   Current Functional Status: Independent   Facility Arrived From: [facility]   Lives With spouse;parent(s)   Able to Return to Prior Arrangements yes   Is patient able to care for self  after discharge? Yes   Who are your caregiver(s) and their phone number(s)? Buck Boyd Daughter [phone]   Patient's perception of discharge disposition home or selfcare   Readmission Within the Last 30 Days no previous admission in last 30 days   Patient currently being followed by outpatient case management? No   Patient currently receives any other outside agency services? No   Equipment Currently Used at Home none   Do you have any problems affording any of your prescribed medications? No   Is the patient taking medications as prescribed? yes   Does the patient have transportation home? Yes   Transportation Anticipated family or friend will provide   Dialysis Name and Scheduled days N/A   Does the patient receive services at the Coumadin Clinic? No   Discharge Plan A Home   Discharge Plan B Home with family   DME Needed Upon Discharge  none   Patient/Family in Agreement with Plan yes

## 2020-12-14 NOTE — PLAN OF CARE
"  Problem: Fall Injury Risk  Goal: Absence of Fall and Fall-Related Injury  Outcome: Ongoing, Progressing     Problem: Adult Inpatient Plan of Care  Goal: Plan of Care Review  Outcome: Ongoing, Progressing  Goal: Patient-Specific Goal (Individualization)  Outcome: Ongoing, Progressing  Goal: Absence of Hospital-Acquired Illness or Injury  Outcome: Ongoing, Progressing  Goal: Optimal Comfort and Wellbeing  Outcome: Ongoing, Progressing  Goal: Readiness for Transition of Care  Outcome: Ongoing, Progressing  Goal: Rounds/Family Conference  Outcome: Ongoing, Progressing     Problem: Diabetes Comorbidity  Goal: Blood Glucose Level Within Desired Range  Outcome: Ongoing, Progressing     Patient A&Ox4. Able to follow commands and LE. Reported pleuritic CP once during shift - Med Team R paged and notified. Order for stat EKG placed and monitor showed patient in NSR. Patient denied continuous CP and stated within a few minutes, "It's gone now. The pain is more in my back. I just can't seem to get this cold out my chest." Denies dizziness. PRESLEY noted. Afebrile. VSS on RA. Monitor shows patient in NSR. Encouraged to cough and deep breathe due to congestion. Tylenol, Tessalon Perles and Robitussin given per order PRN - confirmed relief. Free from falls. Call bell within reach. Rounding in place for comfort and safety. Will continue to monitor.  "

## 2020-12-14 NOTE — PROGRESS NOTES
Home Oxygen Evaluation    Date Performed: 2020    1) Patient's Home O2 Sat on room air, while at rest: 95%        If O2 sats on room air at rest are 88% or below, patient qualifies. No additional testing needed. Document N/A in steps 2 and 3. If 89% or above, complete steps 2.      2) Patient's O2 Sat on room air while exercisin%        If O2 sats on room air while exercising remain 89% or above patient does not qualify, no further testing needed Document N/A in step 3. If O2 sats on room air while exercising are 88% or below, continue to step 3.      3) Patient's O2 Sat while exercising on O2: 93% at 0 LPM         (Must show improvement from #2 for patients to qualify)    If O2 sats improve on oxygen, patient qualifies for portable oxygen. If not, the patient does not qualify.

## 2020-12-15 ENCOUNTER — TELEPHONE (OUTPATIENT)
Dept: INTERNAL MEDICINE | Facility: CLINIC | Age: 55
End: 2020-12-15

## 2020-12-15 ENCOUNTER — TELEPHONE (OUTPATIENT)
Dept: SPORTS MEDICINE | Facility: CLINIC | Age: 55
End: 2020-12-15

## 2020-12-15 DIAGNOSIS — J20.8 VIRAL BRONCHITIS: ICD-10-CM

## 2020-12-15 RX ORDER — ALBUTEROL SULFATE 90 UG/1
2 AEROSOL, METERED RESPIRATORY (INHALATION) EVERY 6 HOURS PRN
Qty: 18 G | Refills: 5 | Status: SHIPPED | OUTPATIENT
Start: 2020-12-15 | End: 2021-02-11

## 2020-12-15 NOTE — TELEPHONE ENCOUNTER
----- Message from Sonia Rico sent at 12/15/2020  7:09 AM CST -----  Contact: Patient [phone]  Requesting an RX refill or new RX.  Is this a refill or new RX: Refill  RX name and strength: albuterol (PROVENTIL HFA) 90 mcg/actuation inhaler  Is this a 30 day or 90 day RX: 90  Pharmacy name and phone #:   [id number] - [address] [address]  Phone: [phone] Fax: [phone]    Comments:     Thank You

## 2020-12-15 NOTE — PLAN OF CARE
Patient medically ready for discharge to HOME NO NEEDS Any necessary transport setup by . This CM scheduled  necessary follow-up appointments. Family/patient aware of discharge.    Future Appointments   Date Time Provider Department Center   12/23/2020  9:00 AM Kinga Reina NP [facility] Lilian Huff RN  Case Management  Ext [phone]        12/15/20 1552   Final Note   Assessment Type Final Discharge Note   Anticipated Discharge Disposition Home   What phone number can be called within the next 1-3 days to see how you are doing after discharge? [phone]   Hospital Follow Up  Appt(s) scheduled? Yes  (JOE MANN (DR [facility] OFFICE) 12/23/2020 @ 9AM)   Discharge plans and expectations educations in teach back method with documentation complete? Yes  (PER BEDSIDE NURSE)   Right Care Referral Info   Post Acute Recommendation No Care   Post-Acute Status   Other Status No Post-Acute Service Needs   Discharge Delays None known at this time

## 2020-12-15 NOTE — TELEPHONE ENCOUNTER
A: 11-12-20 Latosha    Albuterol was refilled 12-14-20 Bull    The albuterol inhaler did not go through, can I call it in ??  The order started 12-14 and ended 12-14 ??

## 2020-12-15 NOTE — TELEPHONE ENCOUNTER
Called pt about her appointment and MRI that she cancelled. Pt stated that she tested positive for Covid. I asked pt if she would like to reschedule the MRI and f/u for the results at a later date or she can call back to reschedule when she is feeling better. Pt stated that she is no longer having pain. That it come and goes. Pt said she rather just called back if the pain in her knee comes back.     Jolie Bush MA to Dr. Liliana Higuera

## 2020-12-15 NOTE — TELEPHONE ENCOUNTER
----- Message from Gracia Gomez sent at 12/15/2020 12:54 PM CST -----  Contact: Self [phone]  Requesting an RX refill or new RX.    Is this a refill or new RX: refill    RX name and strength: albuterol (PROVENTIL HFA) 90 mcg/actuation inhaler    Is this a 30 day or 90 day RX: 30    Pharmacy name and phone # (copy/paste from chart):    [id number] [address] [address]  Phone: [phone] Fax: [phone]     Comments: Calling to refill albuterol (PROVENTIL HFA) 90 mcg/actuation inhaler. Patient states the pharmacy stated there is no record of the refill. Patient is requesting g a call back regarding message.

## 2020-12-16 ENCOUNTER — PATIENT OUTREACH (OUTPATIENT)
Dept: ADMINISTRATIVE | Facility: CLINIC | Age: 55
End: 2020-12-16

## 2020-12-16 LAB
BACTERIA BLD CULT: NORMAL
BACTERIA BLD CULT: NORMAL

## 2020-12-16 NOTE — TELEPHONE ENCOUNTER
----- Message from Rebecca Rob sent at 12/16/2020  1:34 PM CST -----  Regarding: Pt self Mobile/Home [phone]  Caller is requesting an earlier appt than we can schedule.  Caller declined first available appointment listed below. Caller will not accept being placed on the wait list and is requesting a message be sent to the provider.  When is the next available appointment:  No solution found until February  Reason for the appointment:  [place]  Patient preference of timeframe to be scheduled:  After 12/28/2020

## 2020-12-16 NOTE — PATIENT INSTRUCTIONS
Pneumonia (Adult)  Pneumonia is an infection deep within the lungs. It is in the small air sacs (alveoli). Pneumonia may be caused by a virus or bacteria. Pneumonia caused by bacteria is usually treated with an antibiotic. Severe cases may need to be treated in the hospital. Milder cases can be treated at home. Symptoms usually start to get better during the first 2 days of treatment.    Home care  Follow these guidelines when caring for yourself at home:  · Rest at home for the first 2 to 3 days, or until you feel stronger. Dont let yourself get overly tired when you go back to your activities.  · Stay away from cigarette smoke - yours or other peoples.  · You may use acetaminophen or ibuprofen to control fever or pain, unless another medicine was prescribed. If you have chronic liver or kidney disease, talk with your healthcare provider before using these medicines. Also talk with your provider if youve had a stomach ulcer or gastrointestinal bleeding. Dont give aspirin to anyone younger than 18 years of age who is ill with a fever. It may cause severe liver damage.  · Your appetite may be poor, so a light diet is fine.  · Drink 6 to 8 glasses of fluids every day to make sure you are getting enough fluids. Beverages can include water, sport drinks, sodas without caffeine, juices, tea, or soup. Fluids will help loosen secretions in the lung. This will make it easier for you to cough up the phlegm (sputum). If you also have heart or kidney disease, check with your healthcare provider before you drink extra fluids.  · Take antibiotic medicine prescribed until it is all gone, even if you are feeling better after a few days.  Follow-up care  Follow up with your healthcare provider in the next 2 to 3 days, or as advised. This is to be sure the medicine is helping you get better.  If you are 65 or older, you should get a pneumococcal vaccine and a yearly flu (influenza) shot. You should also get these vaccines if  you have chronic lung disease like asthma, emphysema, or COPD. Recently, a second type of pneumonia vaccine has become available for everyone over 65 years old. This is in addition to the previous vaccine. Ask your provider about this.  When to seek medical advice  Call your healthcare provider right away if any of these occur:  · You dont get better within the first 48 hours of treatment  · Shortness of breath gets worse  · Rapid breathing (more than 25 breaths per minute)  · Coughing up blood  · Chest pain gets worse with breathing  · Fever of 100.4°F (38°C) or higher that doesnt get better with fever medicine  · Weakness, dizziness, or fainting that gets worse  · Thirst or dry mouth that gets worse  · Sinus pain, headache, or a stiff neck  · Chest pain not caused by coughing  Date Last Reviewed: 1/1/2017 © 2000-2017 [employer]. [address] [place] [address]. All rights reserved. This information is not intended as a substitute for professional medical care. Always follow your healthcare professional's instructions.      Instructions for Patients with Confirmed or Suspected COVID-19    If you are awaiting your test result, you will either be called or it will be released to the patient portal.  If you have any questions about your test, please visit [email or web address] or call our COVID-19 information line at [phone].      Instructions for non-hospitalized or discharged patients with confirmed or suspected COVID-19:       Stay home except to get medical care.    Separate yourself from other people and animals in your home.    Call ahead before visiting your doctor.    Wear a face mask.    Cover your coughs and sneezes.    Clean your hands often.    Avoid sharing personal household items.    Clean all high-touch surfaces every day.    Monitor your symptoms. Seek prompt medical attention if your illness is worsening (e.g., difficulty breathing). Before seeking  care, call your healthcare provider.    If you have a medical emergency and must call 911, notify the dispatcher that you have or are being evaluated for COVID-19. If possible, put on a face mask before emergency medical services arrive.    Use the following symptom-based strategy to return to normal activity following a suspected or confirmed case of COVID-19. Continue isolation until:   o At least 3 days (72 hours) have passed since recovery defined as resolution of fever without the use of fever-reducing medications and improvement in respiratory symptoms (e.g. cough, shortness of breath), and   o At least 10 days have passed since the first positive test.       As one of the next steps, you will receive a call or text from the [place] Department of Health ([facility]) COVID-19 Tracing Team. See the contact information below so you know not to ignore the health departments call. It is important that you contact them back immediately so they can help.     Contact Tracer Number:  [phone]  Caller ID for most carriers: [place] Dept Health    What is contact tracing?   Contact tracing is a process that helps identify everyone who has been in close contact with an infected person. Contact tracers let those people know they may have been exposed and guide them on next steps. Confidentiality is important for everyone; no one will be told who may have exposed them to the virus.   Your involvement is important. The more we know about where and how this virus is spreading, the better chance we have at stopping it from spreading further.  What does exposure mean?   Exposure means you have been within 6 feet for more than 15 minutes with a person who has or had COVID-19.  What kind of questions do the contact tracers ask?   A contact tracer will confirm your basic contact information including name, address, phone number, and next of kin, as well as asking about any symptoms you may have had. Theyll also ask you how you  think you may have gotten sick, such as places where you may have been exposed to the virus, and people you were with. Those names will never be shared with anyone outside of that call, and will only be used to help trace and stop the spread of the virus.   I have privacy concerns. How will the state use my information?   Your privacy about your health is important. All calls are completed using call centers that use the appropriate health privacy protection measures (HIPAA compliance), meaning that your patient information is safe. No one will ever ask you any questions related to immigration status. Your health comes first.   Do I have to participate?   You do not have to participate, but we strongly encourage you to. Contact tracing can help us catch and control new outbreaks as theyre developing to keep your friends and family safe.   What if I dont hear from anyone?   If you dont receive a call within 24 hours, you can call the number above right away to inquire about your status. That line is open from 8:00 am - 8:00 p.m., 7 days a week.  Contact tracing saves lives! Together, we have the power to beat this virus and keep our loved ones and neighbors safe.       Instructions for household members, intimate partners and caregivers in a non-healthcare setting of a patient with confirmed or suspected COVID-19:         Close contacts should monitor their health and call their healthcare provider right away if they develop symptoms suggestive of COVID-19 (e.g., fever, cough, shortness of breath).    Stay home except to get medical care. Separate yourself from other people and animals in the home.   Monitor the patients symptoms. If the patient is getting sicker, call his or her healthcare provider. If the patient has a medical emergency and you need to call 911, notify the dispatch personnel that the patient has or is being evaluated for COVID-19.    Wear a facemask when around other people such as  sharing a room or vehicle and before entering a healthcare provider's office.   Cover coughs and sneezes with a tissue. Throw used tissues in a lined trash can immediately and wash hands.   Clean hands often with soap and water for at least 20 seconds or with an alcohol-based hand , rubbing hands together until they feel dry. Avoid touching your eyes, nose, and mouth with unwashed hands.   Clean all high-touch; surfaces every day, including counters, tabletops, doorknobs, bathroom fixtures, toilets, phones, keyboards, tablets, bedside tables, etc. Use a household cleaning spray or wipe according to label instructions.   Avoid sharing personal household items such as dishes, drinking glasses, cups, towels, bedding, etc. After these items are used, they should be washed thoroughly with soap and water.   Continue isolation until:   At least 3 days (72 hours) have passed since recovery defined as resolution of fever without the use of fever-reducing medications and improvement in respiratory symptoms (e.g. cough, shortness of breath), and    At least 10 days have passed since the patients first positive test.    https://www.cdc.gov/coronavirus/2019-ncov/your-health/index.htm

## 2020-12-21 ENCOUNTER — TELEPHONE (OUTPATIENT)
Dept: INTERNAL MEDICINE | Facility: CLINIC | Age: 55
End: 2020-12-21

## 2020-12-21 NOTE — TELEPHONE ENCOUNTER
Lmvm: re: to callback re:BG being 180, but when she was in the hospital they gave her insulin. Now she is out and was restarted on her metformin. She was controlled before the hospital ? ? Will she go back to only metformin?

## 2020-12-21 NOTE — TELEPHONE ENCOUNTER
----- Message from Beth Hope sent at 12/21/2020 10:48 AM CST -----  The patient said her glucose level is 180 and she wants to know if this is ok.    Thank you

## 2020-12-22 NOTE — TELEPHONE ENCOUNTER
Spoke to patient instructed per Dr. Limon, He wants her back on Metformin and Amaryl  Let me know if blood sugars are still running high next week   Sugars can be high when you are fighting an infection    Stated she would call back next week if her blood sugars continue to run high.

## 2020-12-22 NOTE — TELEPHONE ENCOUNTER
I want her back on Metformin and Amaryl  Let me know if blood sugars are still running high next week   Sugars can be high when you are fighting an infection

## 2020-12-26 ENCOUNTER — OFFICE VISIT (OUTPATIENT)
Dept: URGENT CARE | Facility: CLINIC | Age: 55
End: 2020-12-26
Payer: COMMERCIAL

## 2020-12-26 VITALS
SYSTOLIC BLOOD PRESSURE: 118 MMHG | OXYGEN SATURATION: 95 % | WEIGHT: 190.06 LBS | BODY MASS INDEX: 34.98 KG/M2 | HEIGHT: 62 IN | DIASTOLIC BLOOD PRESSURE: 81 MMHG | TEMPERATURE: 98 F | HEART RATE: 85 BPM

## 2020-12-26 DIAGNOSIS — J12.9 PNEUMONIA, VIRAL: ICD-10-CM

## 2020-12-26 DIAGNOSIS — R05.9 COUGH: ICD-10-CM

## 2020-12-26 DIAGNOSIS — U07.1 COVID-19 VIRUS INFECTION: Primary | ICD-10-CM

## 2020-12-26 PROCEDURE — 99213 PR OFFICE/OUTPT VISIT, EST, LEVL III, 20-29 MIN: ICD-10-PCS | Mod: S$GLB,,, | Performed by: FAMILY MEDICINE

## 2020-12-26 PROCEDURE — 99213 OFFICE O/P EST LOW 20 MIN: CPT | Mod: S$GLB,,, | Performed by: FAMILY MEDICINE

## 2020-12-26 NOTE — PROGRESS NOTES
"Subjective:       Patient ID: Grecia Boyd is a 55 y.o. female.    Vitals:  height is 5' 2" (1.575 m) and weight is 86.2 kg (190 lb 0.6 oz). Her temperature is 98 °F (36.7 °C). Her blood pressure is 118/81 and her pulse is 85. Her oxygen saturation is 95%.     Chief Complaint: COVID-19 Concerns    Patient is here today requesting a return to work note after being dx with COVID 2 weeks ago.  Patient was also diagnosed with viral pneumonia and was treated in hospital for antiviral treatment.  Patient states she has fully recovered apart from slight occasional cough.  Denies chest pain, shortness breath, fever, chills, weakness, body aches, loss of smell or taste.  Patient has a follow-up with PCP next week and that is where she wants to get a follow-up x-ray for pneumonia.       Constitution: Negative for chills, fatigue and fever.   HENT: Negative for congestion and sore throat.    Neck: Negative for painful lymph nodes.   Cardiovascular: Negative for chest pain and leg swelling.   Eyes: Negative for double vision and blurred vision.   Respiratory: Negative for cough and shortness of breath.    Gastrointestinal: Negative for nausea, vomiting and diarrhea.   Genitourinary: Negative for dysuria, frequency, urgency and history of kidney stones.   Musculoskeletal: Negative for joint pain, joint swelling, muscle cramps and muscle ache.   Skin: Negative for color change, pale, rash and bruising.   Allergic/Immunologic: Negative for seasonal allergies.   Neurological: Negative for dizziness, history of vertigo, light-headedness, passing out and headaches.   Hematologic/Lymphatic: Negative for swollen lymph nodes.   Psychiatric/Behavioral: Negative for nervous/anxious, sleep disturbance and depression. The patient is not nervous/anxious.        Objective:      Physical Exam   Constitutional: She is oriented to person, place, and time. She appears well-developed. She is cooperative.  Non-toxic appearance. She does not " appear ill. No distress.   HENT:   Head: Normocephalic and atraumatic.   Ears:   Right Ear: Hearing, tympanic membrane, external ear and ear canal normal.   Left Ear: Hearing, tympanic membrane, external ear and ear canal normal.   Nose: Nose normal. No mucosal edema, rhinorrhea or nasal deformity. No epistaxis. Right sinus exhibits no maxillary sinus tenderness and no frontal sinus tenderness. Left sinus exhibits no maxillary sinus tenderness and no frontal sinus tenderness.   Mouth/Throat: Uvula is midline, oropharynx is clear and moist and mucous membranes are normal. No trismus in the jaw. Normal dentition. No uvula swelling. No posterior oropharyngeal erythema.   Eyes: Conjunctivae and lids are normal. Right eye exhibits no discharge. Left eye exhibits no discharge. No scleral icterus.   Neck: Trachea normal, normal range of motion, full passive range of motion without pain and phonation normal. Neck supple.   Cardiovascular: Normal rate, regular rhythm, normal heart sounds and normal pulses.   No murmur heard.  Pulmonary/Chest: Effort normal and breath sounds normal. No stridor. No respiratory distress. She has no wheezes. She has no rhonchi. She has no rales. She exhibits no tenderness.   Abdominal: Soft. Normal appearance and bowel sounds are normal. She exhibits no distension, no pulsatile midline mass and no mass. There is no abdominal tenderness.   Musculoskeletal: Normal range of motion.         General: No deformity.   Neurological: She is alert and oriented to person, place, and time. She exhibits normal muscle tone. Coordination normal.   Skin: Skin is warm, dry, intact, not diaphoretic and not pale. Psychiatric: Her speech is normal and behavior is normal. Judgment and thought content normal.   Nursing note and vitals reviewed.        Assessment:       1. COVID-19 virus infection    2. Pneumonia, viral    3. Cough        Plan:     follow-up CXR is recommended but patient refused stating she will do  at his primary care doctor's office next week.    COVID-19 virus infection    Pneumonia, viral  -     Cancel: X-Ray Chest PA And Lateral; Future; Expected date: 12/26/2020  -     Cancel: POCT COVID-19 Rapid Screening    Cough    PLEASE READ YOUR DISCHARGE INSTRUCTIONS ENTIRELY AS IT CONTAINS IMPORTANT INFORMATION.    Please return here or go to the Emergency Department for any concerns or worsening of condition.      If you smoke, please stop smoking.    Please return or see your primary care doctor if you develop new or worsening symptoms.     Please arrange follow up with your primary medical clinic as soon as possible. You must understand that you've received an Urgent Care treatment only and that you may be released before all of your medical problems are known or treated. You, the patient, will arrange for follow up as instructed. If your symptoms worsen or fail to improve you should go to the Emergency Room.Instructions for Patients with Confirmed or Suspected COVID-19    If you are awaiting your test result, you will either be called or it will be released to the patient portal.  If you have any questions about your test, please visit [email or web address] or call our COVID-19 information line at [phone].      Instructions for non-hospitalized or discharged patients with confirmed or suspected COVID-19:       Stay home except to get medical care.    Separate yourself from other people and animals in your home.    Call ahead before visiting your doctor.    Wear a face mask.    Cover your coughs and sneezes.    Clean your hands often.    Avoid sharing personal household items.    Clean all high-touch surfaces every day.    Monitor your symptoms. Seek prompt medical attention if your illness is worsening (e.g., difficulty breathing). Before seeking care, call your healthcare provider.    If you have a medical emergency and must call 911, notify the dispatcher that you have or are being  evaluated for COVID-19. If possible, put on a face mask before emergency medical services arrive.    Use the following symptom-based strategy to return to normal activity following a suspected or confirmed case of COVID-19. Continue isolation until:   o At least 3 days (72 hours) have passed since recovery defined as resolution of fever without the use of fever-reducing medications and improvement in respiratory symptoms (e.g. cough, shortness of breath), and   o At least 10 days have passed since the first positive test.       As one of the next steps, you will receive a call or text from the [place] Department of Health ([facility]) COVID-19 Tracing Team. See the contact information below so you know not to ignore the health departments call. It is important that you contact them back immediately so they can help.     Contact Tracer Number:  [phone]  Caller ID for most carriers: [facility]    What is contact tracing?   Contact tracing is a process that helps identify everyone who has been in close contact with an infected person. Contact tracers let those people know they may have been exposed and guide them on next steps. Confidentiality is important for everyone; no one will be told who may have exposed them to the virus.   Your involvement is important. The more we know about where and how this virus is spreading, the better chance we have at stopping it from spreading further.  What does exposure mean?   Exposure means you have been within 6 feet for more than 15 minutes with a person who has or had COVID-19.  What kind of questions do the contact tracers ask?   A contact tracer will confirm your basic contact information including name, address, phone number, and next of kin, as well as asking about any symptoms you may have had. Theyll also ask you how you think you may have gotten sick, such as places where you may have been exposed to the virus, and people you were with. Those names will never  be shared with anyone outside of that call, and will only be used to help trace and stop the spread of the virus.   I have privacy concerns. How will the state use my information?   Your privacy about your health is important. All calls are completed using call centers that use the appropriate health privacy protection measures (HIPAA compliance), meaning that your patient information is safe. No one will ever ask you any questions related to immigration status. Your health comes first.   Do I have to participate?   You do not have to participate, but we strongly encourage you to. Contact tracing can help us catch and control new outbreaks as theyre developing to keep your friends and family safe.   What if I dont hear from anyone?   If you dont receive a call within 24 hours, you can call the number above right away to inquire about your status. That line is open from 8:00 am - 8:00 p.m., 7 days a week.  Contact tracing saves lives! Together, we have the power to beat this virus and keep our loved ones and neighbors safe.       Instructions for household members, intimate partners and caregivers in a non-healthcare setting of a patient with confirmed or suspected COVID-19:         Close contacts should monitor their health and call their healthcare provider right away if they develop symptoms suggestive of COVID-19 (e.g., fever, cough, shortness of breath).    Stay home except to get medical care. Separate yourself from other people and animals in the home.   Monitor the patients symptoms. If the patient is getting sicker, call his or her healthcare provider. If the patient has a medical emergency and you need to call 911, notify the dispatch personnel that the patient has or is being evaluated for COVID-19.    Wear a facemask when around other people such as sharing a room or vehicle and before entering a healthcare provider's office.   Cover coughs and sneezes with a tissue. Throw used tissues in a  lined trash can immediately and wash hands.   Clean hands often with soap and water for at least 20 seconds or with an alcohol-based hand , rubbing hands together until they feel dry. Avoid touching your eyes, nose, and mouth with unwashed hands.   Clean all high-touch; surfaces every day, including counters, tabletops, doorknobs, bathroom fixtures, toilets, phones, keyboards, tablets, bedside tables, etc. Use a household cleaning spray or wipe according to label instructions.   Avoid sharing personal household items such as dishes, drinking glasses, cups, towels, bedding, etc. After these items are used, they should be washed thoroughly with soap and water.   Continue isolation until:   At least 3 days (72 hours) have passed since recovery defined as resolution of fever without the use of fever-reducing medications and improvement in respiratory symptoms (e.g. cough, shortness of breath), and    At least 10 days have passed since the patients first positive test.    https://www.cdc.gov/coronavirus/2019-ncov/your-health/index.htm    What is Pneumonia?    Pneumonia is a serious lung infection. Many cases of pneumonia are caused by bacteria or viruses. Fungi may also cause pneumonia, but this is less common.  You may also get pneumonia after another illness, such as a cold, flu, or bronchitis. Those most at risk include older adults, smokers, and people with long-term (chronic) health problems or weak immune systems.  Healthy lungs  · Air travels in and out of the lungs through tubes called airways.  · The tubes branch into smaller passages called bronchioles. These end in tiny sacs called alveoli.  · Blood vessels surrounding the alveoli take oxygen into the bloodstream. At the same time, the alveoli remove carbon dioxide (a waste gas) from the blood. The carbon dioxide is then exhaled.  When you have pneumonia  · Pneumonia causes the bronchioles and the alveoli to fill with excess mucus and become  inflamed.  · Your bodys response may be to cough. This can help clear out the fluid.  · The fluid (or mucus) you cough up may appear green or dark yellow.  · The excess mucus may make you feel short of breath.  · The inflammation and infection may give you a fever.  What are the symptoms?  Symptoms of pneumonia can come without warning. At first, you may think you have a cold or flu. But symptoms may get worse quickly, turning into pneumonia. Symptoms can be different for bacterial and viral pneumonia. Common symptoms may include the following:  · Severe cough with green or yellow mucus that doesn't improve or that gets worse  · Fever and chills  · Nausea, vomiting or diarrhea  · Shortness of breath with normal daily activities  · Increased heart rate  · Chest pain or discomfort when breathing in or coughing  · Headache  · Excessive sweating and clammy skin  Date Last Reviewed: 12/1/2016  © 0222-8898 [employer]. [address] [place] [address], [place] [address]. All rights reserved. This information is not intended as a substitute for professional medical care. Always follow your healthcare professional's instructions.

## 2020-12-26 NOTE — LETTER
December 26, 2020      [facility] [address] [address]  Phone: [phone]  Fax: [phone]       Patient: Grecia Boyd   YOB: 1965  Date of Visit: 12/26/2020    To Whom It May Concern:    Nanci Boyd  was at [facility] on 12/26/2020.  Ms. Boyd was tested positive for COVID on 12/11/2020 and has recovered now.  She may return to work/school on 12/28/2020 with no restrictions. If you have any questions or concerns, or if I can be of further assistance, please do not hesitate to contact me.    Sincerely,    Benja Pettit MD

## 2020-12-26 NOTE — PATIENT INSTRUCTIONS
PLEASE READ YOUR DISCHARGE INSTRUCTIONS ENTIRELY AS IT CONTAINS IMPORTANT INFORMATION.    Please return here or go to the Emergency Department for any concerns or worsening of condition.      If you smoke, please stop smoking.    Please return or see your primary care doctor if you develop new or worsening symptoms.     Please arrange follow up with your primary medical clinic as soon as possible. You must understand that you've received an Urgent Care treatment only and that you may be released before all of your medical problems are known or treated. You, the patient, will arrange for follow up as instructed. If your symptoms worsen or fail to improve you should go to the Emergency Room.Instructions for Patients with Confirmed or Suspected COVID-19    If you are awaiting your test result, you will either be called or it will be released to the patient portal.  If you have any questions about your test, please visit [email or web address] or call our COVID-19 information line at [phone].      Instructions for non-hospitalized or discharged patients with confirmed or suspected COVID-19:       Stay home except to get medical care.    Separate yourself from other people and animals in your home.    Call ahead before visiting your doctor.    Wear a face mask.    Cover your coughs and sneezes.    Clean your hands often.    Avoid sharing personal household items.    Clean all high-touch surfaces every day.    Monitor your symptoms. Seek prompt medical attention if your illness is worsening (e.g., difficulty breathing). Before seeking care, call your healthcare provider.    If you have a medical emergency and must call 911, notify the dispatcher that you have or are being evaluated for COVID-19. If possible, put on a face mask before emergency medical services arrive.    Use the following symptom-based strategy to return to normal activity following a suspected or confirmed case of COVID-19.  Continue isolation until:   o At least 3 days (72 hours) have passed since recovery defined as resolution of fever without the use of fever-reducing medications and improvement in respiratory symptoms (e.g. cough, shortness of breath), and   o At least 10 days have passed since the first positive test.       As one of the next steps, you will receive a call or text from the [place] Department of Health ([facility]) COVID-19 Tracing Team. See the contact information below so you know not to ignore the health departments call. It is important that you contact them back immediately so they can help.     Contact Tracer Number:  [phone]  Caller ID for most carriers: [place] Dept Health    What is contact tracing?   Contact tracing is a process that helps identify everyone who has been in close contact with an infected person. Contact tracers let those people know they may have been exposed and guide them on next steps. Confidentiality is important for everyone; no one will be told who may have exposed them to the virus.   Your involvement is important. The more we know about where and how this virus is spreading, the better chance we have at stopping it from spreading further.  What does exposure mean?   Exposure means you have been within 6 feet for more than 15 minutes with a person who has or had COVID-19.  What kind of questions do the contact tracers ask?   A contact tracer will confirm your basic contact information including name, address, phone number, and next of kin, as well as asking about any symptoms you may have had. Theyll also ask you how you think you may have gotten sick, such as places where you may have been exposed to the virus, and people you were with. Those names will never be shared with anyone outside of that call, and will only be used to help trace and stop the spread of the virus.   I have privacy concerns. How will the state use my information?   Your privacy about your health is  important. All calls are completed using call centers that use the appropriate health privacy protection measures (HIPAA compliance), meaning that your patient information is safe. No one will ever ask you any questions related to immigration status. Your health comes first.   Do I have to participate?   You do not have to participate, but we strongly encourage you to. Contact tracing can help us catch and control new outbreaks as theyre developing to keep your friends and family safe.   What if I dont hear from anyone?   If you dont receive a call within 24 hours, you can call the number above right away to inquire about your status. That line is open from 8:00 am - 8:00 p.m., 7 days a week.  Contact tracing saves lives! Together, we have the power to beat this virus and keep our loved ones and neighbors safe.       Instructions for household members, intimate partners and caregivers in a non-healthcare setting of a patient with confirmed or suspected COVID-19:         Close contacts should monitor their health and call their healthcare provider right away if they develop symptoms suggestive of COVID-19 (e.g., fever, cough, shortness of breath).    Stay home except to get medical care. Separate yourself from other people and animals in the home.   Monitor the patients symptoms. If the patient is getting sicker, call his or her healthcare provider. If the patient has a medical emergency and you need to call 911, notify the dispatch personnel that the patient has or is being evaluated for COVID-19.    Wear a facemask when around other people such as sharing a room or vehicle and before entering a healthcare provider's office.   Cover coughs and sneezes with a tissue. Throw used tissues in a lined trash can immediately and wash hands.   Clean hands often with soap and water for at least 20 seconds or with an alcohol-based hand , rubbing hands together until they feel dry. Avoid touching your eyes,  nose, and mouth with unwashed hands.   Clean all high-touch; surfaces every day, including counters, tabletops, doorknobs, bathroom fixtures, toilets, phones, keyboards, tablets, bedside tables, etc. Use a household cleaning spray or wipe according to label instructions.   Avoid sharing personal household items such as dishes, drinking glasses, cups, towels, bedding, etc. After these items are used, they should be washed thoroughly with soap and water.   Continue isolation until:   At least 3 days (72 hours) have passed since recovery defined as resolution of fever without the use of fever-reducing medications and improvement in respiratory symptoms (e.g. cough, shortness of breath), and    At least 10 days have passed since the patients first positive test.    https://www.cdc.gov/coronavirus/2019-ncov/your-health/index.htm    What is Pneumonia?    Pneumonia is a serious lung infection. Many cases of pneumonia are caused by bacteria or viruses. Fungi may also cause pneumonia, but this is less common.  You may also get pneumonia after another illness, such as a cold, flu, or bronchitis. Those most at risk include older adults, smokers, and people with long-term (chronic) health problems or weak immune systems.  Healthy lungs  · Air travels in and out of the lungs through tubes called airways.  · The tubes branch into smaller passages called bronchioles. These end in tiny sacs called alveoli.  · Blood vessels surrounding the alveoli take oxygen into the bloodstream. At the same time, the alveoli remove carbon dioxide (a waste gas) from the blood. The carbon dioxide is then exhaled.  When you have pneumonia  · Pneumonia causes the bronchioles and the alveoli to fill with excess mucus and become inflamed.  · Your bodys response may be to cough. This can help clear out the fluid.  · The fluid (or mucus) you cough up may appear green or dark yellow.  · The excess mucus may make you feel short of breath.  · The  inflammation and infection may give you a fever.  What are the symptoms?  Symptoms of pneumonia can come without warning. At first, you may think you have a cold or flu. But symptoms may get worse quickly, turning into pneumonia. Symptoms can be different for bacterial and viral pneumonia. Common symptoms may include the following:  · Severe cough with green or yellow mucus that doesn't improve or that gets worse  · Fever and chills  · Nausea, vomiting or diarrhea  · Shortness of breath with normal daily activities  · Increased heart rate  · Chest pain or discomfort when breathing in or coughing  · Headache  · Excessive sweating and clammy skin  Date Last Reviewed: 12/1/2016  © 8473-4764 [employer]. [address] [place] [address], [place] [address]. All rights reserved. This information is not intended as a substitute for professional medical care. Always follow your healthcare professional's instructions.

## 2020-12-28 ENCOUNTER — TELEPHONE (OUTPATIENT)
Dept: SPORTS MEDICINE | Facility: CLINIC | Age: 55
End: 2020-12-28

## 2020-12-28 NOTE — TELEPHONE ENCOUNTER
Called pt on number provided. Left VM requesting return phone call.     Ailyn Burrell MS, OTC  Clinical Assistant to Dr. Liliana Malloy    ----- Message from Damaris Cardoza sent at 12/28/2020 11:58 AM CST -----  Contact: pt  Please call pt at [phone]    Patient is requesting another MRI scan appt    Thank you

## 2020-12-28 NOTE — DISCHARGE SUMMARY
Discharge Summary  Hospital Medicine  [facility]      Attending Physician on Discharge: Daisy Moore MD  Hospital Medicine Team: [facility]  Date of Admission:  12/11/2020     Date of Discharge:  12/14/2020    Active Hospital Problems    Diagnosis  POA    *Pneumonia due to COVID-19 virus [U07.1, J12.89]  Yes    Suspected COVID-19 virus infection [Z20.828]  Yes    COVID-19 virus infection [U07.1]  Yes    Acute hypoxemic respiratory failure [J96.01]  Yes    Type 2 diabetes mellitus without complication, without long-term current use of insulin [E11.9]  Yes    Obesity (BMI 30-39.9) [E66.9]  Yes    HTN (hypertension), benign [I10]  Yes    HLD (hyperlipidemia) [E78.5]  Yes      Resolved Hospital Problems   No resolved problems to display.       Consults: N/A     History of Present Illness: Ms. Boyd is a 55 year old female with PMH of HTN, HLD, NIDDM2, obesity presents as a transfer from [facility] ED for management of COVID-19 pneumonia. Patient states there was COVID outbreak at her workplace at  center and supervisor advised her to get tested. Patient got tested positive for COVID on 12/7/20 at a drive thru center and has been isolating herself at home. She presented to ED with worsening dry and difficulty catching her breath during coughing spell. She also reports fever, chills, myalgia, diarrhea, loss of taste and smell. She has been taking tylenol and mucinex at home for fever and cough.      She was febrile to 102.9 in ED  and CXR showed Patchy increased interstitial opacities. She was tachypneic with RR in low 30s and hypoxic to 92% on room air which improved to 96% on 2 liter supplemental oxygen.      She received phenergan with codeine, dexamethasone, ceftriaxone and azithromycin prior to transfer.      During my interview patient has nonproductive cough but not in distress on 2 liter supplemental oxygen.       Hospital Course:     Grecia Boyd was admitted  to Hospital Medicine for treatment of suspected COVID-19 viral infection and was treated with supportive care following a comprehensive physical, radiographic, and lab evaluation tailored to the current standard of care for COVID19. Please review the admission H&P and the studies listed below for details. She improved with supportive care and was found to be suitable for discharge home without oxygen.      On the day of discharge, isolation precautions were reviewed at length verbally. The patient was also provided with written isolation guidelines modified from the [place] Department of Health and hospitals as well as the CDC, as part of discharge paperwork. An updated phone number was obtained, which will be used to contact the patient when results are available, and positive patients will be enrolled in both the COVID-19 Home Symptom Monitoring program.    Laboratory Values:  Lab Results   Component Value Date    [id number] Positive (A) 12/11/2020       No results for input(s): WBC, LYMPH, HGB, HCT, PLT in the last 168 hours.  No results for input(s): NA, K, CL, CO2, BUN, CREATININE, GLU, CALCIUM, MG, PHOS, LIPASE, AMYLASE in the last 168 hours.  No results for input(s): ALKPHOS, ALT, AST, ALBUMIN, PROT, BILITOT, INR in the last 168 hours.     No results for input(s): DDIMER, FERRITIN, CRP, LDH, BNP, TROPONINI, CPK in the last 72 hours.    Invalid input(s): PROCALCITONIN      Microbiology:  Microbiology Results (last 7 days)     ** No results found for the last 168 hours. **          Imaging:  Imaging Results          X-Ray Chest AP Portable (Final result)  Result time 12/11/20 16:43:04    Final result by Yoandy Carrera MD (12/11/20 16:43:04)                 Impression:      1. Patchy increased interstitial and parenchymal attenuation, differential would potentially include congestive change with edema however infectious or inflammatory process, particularly viral process, can present in this fashion.   Correlation is advised.      Electronically signed by: Yoandy Carrera MD  Date:    12/11/2020  Time:    16:43             Narrative:    EXAMINATION:  XR CHEST AP PORTABLE    CLINICAL HISTORY:  Suspected Covid-19 Virus Infection;    TECHNIQUE:  Single frontal view of the chest was performed.    COMPARISON:  02/29/2020    FINDINGS:  The cardiomediastinal silhouette is prominent noting magnification by technique.  There is elevation of the right hemidiaphragm.  There is no pleural effusion.  The trachea is midline.  The lungs are symmetrically expanded bilaterally with patchy increased interstitial and parenchymal attenuation projected over the bilateral lower lung zones and in a perihilar district..  No large focal consolidation seen.  There is no pneumothorax.  The osseous structures are remarkable for degenerative changes..                                Cardiac:  ECG Results          EKG 12-lead (Final result)  Result time 12/14/20 07:37:10    Final result by Interface, [facility] (12/14/20 07:37:10)                 Narrative:    Test Reason : Z20.828,    Vent. Rate : 093 BPM     Atrial Rate : 093 BPM     P-R Int : 160 ms          QRS Dur : 082 ms      QT Int : 342 ms       P-R-T Axes : 065 085 034 degrees     QTc Int : 425 ms    Normal sinus rhythm  Nonspecific ST abnormality  Abnormal ECG    Confirmed by Martinez Lane MD ([id number]) on 12/14/2020 7:37:03 AM    Referred By: [email or web address]   SELF           Confirmed By:Martinez Lane MD                             EKG 12-LEAD (Final result)  Result time 12/28/20 10:22:29    Final result by Unknown User (12/28/20 10:22:29)                                  Results for orders placed during the hospital encounter of 05/17/19   Transthoracic echo (TTE) 2D with Color Flow    Narrative · Normal left ventricular systolic function. The estimated ejection   fraction is 65%  · Normal LV diastolic function.  · No wall motion abnormalities.  · Normal right ventricular  systolic function.  · Normal central venous pressure (3 mm Hg).            Procedures:   * No surgery found *        Discharge Medication List as of 12/14/2020 12:49 PM      START taking these medications    Details   pulse oximeter (PULSE OXIMETER) device by Apply Externally route 2 (two) times a day. Use twice daily at 8 AM and 3 PM and record the value in [employer] as directed., Starting Mon 12/14/2020, Normal      dextromethorphan-guaifenesin  mg (MUCINEX DM)  mg per 12 hr tablet Take 1 tablet by mouth 2 (two) times daily. for 10 days, Starting Mon 12/14/2020, Until Thu 12/24/2020, OTC         CONTINUE these medications which have CHANGED    Details   albuterol (PROVENTIL HFA) 90 mcg/actuation inhaler Inhale 2 puffs into the lungs every 6 (six) hours as needed for Wheezing or Shortness of Breath. Rescue, Starting Mon 12/14/2020, Until Tue 12/14/2021, Normal         CONTINUE these medications which have NOT CHANGED    Details   amLODIPine (NORVASC) 5 MG tablet Take 1 tablet (5 mg total) by mouth once daily., Starting Thu 7/16/2020, Normal      atorvastatin (LIPITOR) 40 MG tablet Take 1 tablet (40 mg total) by mouth once daily., Starting Thu 7/16/2020, Normal      estradioL (ESTRACE) 1 MG tablet Take 1 tablet (1 mg total) by mouth once daily., Starting Mon 11/30/2020, Until Tue 11/30/2021, Normal      glimepiride (AMARYL) 4 MG tablet Take 1 tablet (4 mg total) by mouth before breakfast., Starting Thu 7/16/2020, Until Fri 7/16/2021, Normal      hydroCHLOROthiazide (HYDRODIURIL) 25 MG tablet TAKE 1 TABLET(25 MG) BY MOUTH EVERY DAY, Normal      losartan (COZAAR) 25 MG tablet Take 1 tablet (25 mg total) by mouth once daily., Starting Thu 11/12/2020, Until Fri 11/12/2021, Normal      metFORMIN (GLUCOPHAGE-XR) 500 MG XR 24hr tablet 2 tabs PO BID WM, Normal      naproxen (NAPROSYN) 500 MG tablet Take 1 tablet (500 mg total) by mouth 2 (two) times daily with meals., Starting Wed 12/2/2020, Normal       prasterone, dhea, (INTRAROSA) 6.5 mg Inst Place 6.5 mg vaginally every evening., Starting Mon 11/30/2020, Until Wed 12/30/2020, Normal               Discharge Diet:diabetic diet: 2000 calorie with Normal Fluid intake of 1500 - 2000 mL per day    Activity: activity as tolerated    Discharge Condition: Good    Disposition: Home or Self Care    Follow up:    Follow-up Information     Kinga Reina NP On 12/23/2020.    Specialty: Family Medicine  Why: hospital follow up @ 9:00am (IN SAME OFFICE WITH DR VENTURA)  Contact information:  [address] [facility] [address]  [phone]                   Tests pending at the time of discharge: none       Time spent  on the discharge of the patient including review of hospital course with the patient. reviewing discharge medications and arranging follow-up care: > 30 mins    Discharge examination: Patient was seen and examined on the date of discharge and determined to be suitable for discharge.      Daisy Moore MD  [facility]

## 2020-12-29 ENCOUNTER — OFFICE VISIT (OUTPATIENT)
Dept: INTERNAL MEDICINE | Facility: CLINIC | Age: 55
End: 2020-12-29
Payer: COMMERCIAL

## 2020-12-29 VITALS
HEIGHT: 62 IN | DIASTOLIC BLOOD PRESSURE: 80 MMHG | SYSTOLIC BLOOD PRESSURE: 122 MMHG | RESPIRATION RATE: 16 BRPM | HEART RATE: 75 BPM | TEMPERATURE: 97 F | WEIGHT: 187.81 LBS | OXYGEN SATURATION: 98 % | BODY MASS INDEX: 34.56 KG/M2

## 2020-12-29 DIAGNOSIS — E66.9 OBESITY (BMI 30-39.9): ICD-10-CM

## 2020-12-29 DIAGNOSIS — E78.5 HYPERLIPIDEMIA, UNSPECIFIED HYPERLIPIDEMIA TYPE: ICD-10-CM

## 2020-12-29 DIAGNOSIS — I10 HTN (HYPERTENSION), BENIGN: ICD-10-CM

## 2020-12-29 DIAGNOSIS — G47.33 OSA (OBSTRUCTIVE SLEEP APNEA): ICD-10-CM

## 2020-12-29 DIAGNOSIS — U07.1 PNEUMONIA DUE TO COVID-19 VIRUS: Primary | ICD-10-CM

## 2020-12-29 DIAGNOSIS — E11.9 TYPE 2 DIABETES MELLITUS WITHOUT COMPLICATION, WITHOUT LONG-TERM CURRENT USE OF INSULIN: ICD-10-CM

## 2020-12-29 DIAGNOSIS — J12.82 PNEUMONIA DUE TO COVID-19 VIRUS: Primary | ICD-10-CM

## 2020-12-29 PROBLEM — Z20.822 SUSPECTED COVID-19 VIRUS INFECTION: Status: RESOLVED | Noted: 2020-12-12 | Resolved: 2020-12-29

## 2020-12-29 PROCEDURE — 99999 PR PBB SHADOW E&M-EST. PATIENT-LVL III: CPT | Mod: PBBFAC,,, | Performed by: INTERNAL MEDICINE

## 2020-12-29 PROCEDURE — 99215 PR OFFICE/OUTPT VISIT, EST, LEVL V, 40-54 MIN: ICD-10-PCS | Mod: S$GLB,,, | Performed by: INTERNAL MEDICINE

## 2020-12-29 PROCEDURE — 99215 OFFICE O/P EST HI 40 MIN: CPT | Mod: S$GLB,,, | Performed by: INTERNAL MEDICINE

## 2020-12-29 PROCEDURE — 99999 PR PBB SHADOW E&M-EST. PATIENT-LVL III: ICD-10-PCS | Mod: PBBFAC,,, | Performed by: INTERNAL MEDICINE

## 2020-12-29 NOTE — PROGRESS NOTES
"Subjective:       Patient ID: Grecia Boyd is a 55 y.o. female.    Chief Complaint: Hospital Follow Up    HPI   Pt with HTN, T2DM, HLD, Obesity, CHIDI is here for hospital f/u from 12/11/20-12/14/20 for CV 19 PNA. Per records, "Patient tested positive for COVID on 12/7/20 at a drive thru center and has been isolating herself at home. She presented to ED with worsening dry and difficulty catching her breath during coughing spell. She also reports fever, chills, myalgia, diarrhea, loss of taste and smell. She has been taking tylenol and mucinex at home for fever and cough.      She was febrile to 102.9 in ED and CXR showed Patchy increased interstitial opacities. She was tachypneic with RR in low 30s and hypoxic to 92% on room air which improved to 96% on 2 liter supplemental oxygen.     Hospital Course:     Grecia Boyd was admitted to Hospital Medicine for treatment of suspected COVID-19 viral infection and was treated with supportive care following a comprehensive physical, radiographic, and lab evaluation tailored to the current standard of care for COVID19."    Since discharge pt has been feeling much better. Still with mild dry cough but no fevers/chills, SOB, fatigue, etc.  Review of Systems   Constitutional: Negative for activity change, appetite change, chills, diaphoresis, fatigue, fever and unexpected weight change.   HENT: Negative for nasal congestion, mouth sores, postnasal drip, rhinorrhea, sinus pressure/congestion, sneezing, sore throat, trouble swallowing and voice change.    Eyes: Negative for pain, discharge and visual disturbance.   Respiratory: Positive for cough. Negative for shortness of breath and wheezing.    Cardiovascular: Negative for chest pain, palpitations and leg swelling.   Gastrointestinal: Negative for abdominal pain, blood in stool, constipation, diarrhea, nausea and vomiting.   Endocrine: Negative for cold intolerance and heat intolerance.   Genitourinary: Negative for " difficulty urinating, dysuria, frequency, hematuria and urgency.   Musculoskeletal: Negative for arthralgias and myalgias.   Integumentary:  Negative for rash and wound.   Allergic/Immunologic: Negative for environmental allergies and food allergies.   Neurological: Negative for dizziness, tremors, seizures, syncope, weakness, light-headedness and headaches.   Hematological: Negative for adenopathy. Does not bruise/bleed easily.   Psychiatric/Behavioral: Negative for confusion and sleep disturbance. The patient is not nervous/anxious.          Objective:      Physical Exam  Vitals signs and nursing note reviewed.   Constitutional:       General: She is not in acute distress.     Appearance: She is well-developed. She is not diaphoretic.   HENT:      Head: Normocephalic and atraumatic.      Right Ear: Tympanic membrane, ear canal and external ear normal.      Left Ear: Tympanic membrane, ear canal and external ear normal.      Nose: Nose normal.      Mouth/Throat:      Pharynx: No oropharyngeal exudate.   Eyes:      General: No scleral icterus.        Right eye: No discharge.         Left eye: No discharge.      Conjunctiva/sclera: Conjunctivae normal.      Pupils: Pupils are equal, round, and reactive to light.   Neck:      Musculoskeletal: Neck supple.      Thyroid: No thyromegaly.      Vascular: No JVD.   Cardiovascular:      Rate and Rhythm: Normal rate and regular rhythm.      Heart sounds: Normal heart sounds. No murmur.   Pulmonary:      Effort: Pulmonary effort is normal. No respiratory distress.      Breath sounds: Normal breath sounds. No wheezing or rales.   Chest:      Chest wall: No tenderness.   Abdominal:      General: Bowel sounds are normal. There is no distension.      Palpations: Abdomen is soft.      Tenderness: There is no abdominal tenderness. There is no guarding.   Lymphadenopathy:      Cervical: No cervical adenopathy.   Skin:     General: Skin is warm and dry.      Coloration: Skin is not  pale.      Findings: No rash.   Neurological:      Mental Status: She is alert and oriented to person, place, and time.   Psychiatric:         Judgment: Judgment normal.         Assessment:       1. Pneumonia due to COVID-19 virus    2. HTN (hypertension), benign    3. Type 2 diabetes mellitus without complication, without long-term current use of insulin    4. Hyperlipidemia, unspecified hyperlipidemia type    5. Obesity (BMI 30-39.9)    6. CHIDI (obstructive sleep apnea)        Plan:    CV 19 PNA- stable       HTN- stable on Norvasc/HCTZ/Losartan       T2DM- last HA1C of 6.9(10/20)<--7.1(8/20)<--6.8(10/19)<--9.2(5/19)<--6.7(1/19)       Stable on Metformin/Amaryl, pt unable to afford Jardiance      HLD- continue Lipitor 40 mg daily      Obesity- pt advised on proper diet/exercise for weight loss      CHIDI- stable on CPAP     Over 1/2 of 40 minute visit spent reviewing pt's medical records, education/discussion of pt's medical conditions and medical management

## 2021-01-14 ENCOUNTER — TELEPHONE (OUTPATIENT)
Dept: SPORTS MEDICINE | Facility: CLINIC | Age: 56
End: 2021-01-14

## 2021-01-14 DIAGNOSIS — M25.461 EFFUSION OF RIGHT KNEE: Primary | ICD-10-CM

## 2021-01-14 DIAGNOSIS — M25.469 EFFUSION, UNSPECIFIED KNEE: ICD-10-CM

## 2021-01-22 ENCOUNTER — TELEPHONE (OUTPATIENT)
Dept: SPORTS MEDICINE | Facility: CLINIC | Age: 56
End: 2021-01-22

## 2021-01-22 ENCOUNTER — HOSPITAL ENCOUNTER (OUTPATIENT)
Dept: RADIOLOGY | Facility: HOSPITAL | Age: 56
Discharge: HOME | End: 2021-01-22
Attending: STUDENT IN AN ORGANIZED HEALTH CARE EDUCATION/TRAINING PROGRAM
Payer: COMMERCIAL

## 2021-01-22 DIAGNOSIS — M25.461 EFFUSION OF RIGHT KNEE: ICD-10-CM

## 2021-01-22 PROCEDURE — 73721 MRI JNT OF LWR EXTRE W/O DYE: CPT | Mod: TC,RT

## 2021-01-22 PROCEDURE — 73721 MRI JNT OF LWR EXTRE W/O DYE: CPT | Mod: 26,RT,, | Performed by: RADIOLOGY

## 2021-01-22 PROCEDURE — 73721 MRI KNEE WITHOUT CONTRAST RIGHT: ICD-10-PCS | Mod: 26,RT,, | Performed by: RADIOLOGY

## 2021-01-27 ENCOUNTER — OFFICE VISIT (OUTPATIENT)
Dept: SPORTS MEDICINE | Facility: CLINIC | Age: 56
End: 2021-01-27
Payer: COMMERCIAL

## 2021-01-27 VITALS
SYSTOLIC BLOOD PRESSURE: 108 MMHG | DIASTOLIC BLOOD PRESSURE: 68 MMHG | WEIGHT: 192.69 LBS | HEIGHT: 62 IN | BODY MASS INDEX: 35.46 KG/M2

## 2021-01-27 DIAGNOSIS — M25.461 EFFUSION OF RIGHT KNEE: Primary | ICD-10-CM

## 2021-01-27 DIAGNOSIS — M25.561 ACUTE PAIN OF RIGHT KNEE: ICD-10-CM

## 2021-01-27 DIAGNOSIS — S83.241D TEAR OF MEDIAL MENISCUS OF RIGHT KNEE, CURRENT, UNSPECIFIED TEAR TYPE, SUBSEQUENT ENCOUNTER: ICD-10-CM

## 2021-01-27 PROCEDURE — 99999 PR PBB SHADOW E&M-EST. PATIENT-LVL III: ICD-10-PCS | Mod: PBBFAC,,, | Performed by: STUDENT IN AN ORGANIZED HEALTH CARE EDUCATION/TRAINING PROGRAM

## 2021-01-27 PROCEDURE — 99999 PR PBB SHADOW E&M-EST. PATIENT-LVL III: CPT | Mod: PBBFAC,,, | Performed by: STUDENT IN AN ORGANIZED HEALTH CARE EDUCATION/TRAINING PROGRAM

## 2021-01-27 PROCEDURE — 99214 OFFICE O/P EST MOD 30 MIN: CPT | Mod: S$GLB,,, | Performed by: STUDENT IN AN ORGANIZED HEALTH CARE EDUCATION/TRAINING PROGRAM

## 2021-01-27 PROCEDURE — 99214 PR OFFICE/OUTPT VISIT, EST, LEVL IV, 30-39 MIN: ICD-10-PCS | Mod: S$GLB,,, | Performed by: STUDENT IN AN ORGANIZED HEALTH CARE EDUCATION/TRAINING PROGRAM

## 2021-01-28 DIAGNOSIS — E11.9 TYPE 2 DIABETES MELLITUS WITHOUT COMPLICATION: ICD-10-CM

## 2021-02-05 ENCOUNTER — TELEPHONE (OUTPATIENT)
Dept: SPORTS MEDICINE | Facility: CLINIC | Age: 56
End: 2021-02-05

## 2021-02-08 ENCOUNTER — PATIENT OUTREACH (OUTPATIENT)
Dept: ADMINISTRATIVE | Facility: OTHER | Age: 56
End: 2021-02-08

## 2021-02-09 ENCOUNTER — TELEPHONE (OUTPATIENT)
Dept: INTERNAL MEDICINE | Facility: CLINIC | Age: 56
End: 2021-02-09

## 2021-02-09 ENCOUNTER — OFFICE VISIT (OUTPATIENT)
Dept: SPORTS MEDICINE | Facility: CLINIC | Age: 56
End: 2021-02-09
Payer: COMMERCIAL

## 2021-02-09 VITALS
SYSTOLIC BLOOD PRESSURE: 138 MMHG | HEIGHT: 62 IN | DIASTOLIC BLOOD PRESSURE: 76 MMHG | HEART RATE: 70 BPM | BODY MASS INDEX: 35.33 KG/M2 | WEIGHT: 192 LBS

## 2021-02-09 VITALS — BODY MASS INDEX: 35.33 KG/M2 | WEIGHT: 192 LBS | HEIGHT: 62 IN

## 2021-02-09 DIAGNOSIS — M23.203 OLD TEAR OF MEDIAL MENISCUS OF RIGHT KNEE, UNSPECIFIED TEAR TYPE: Primary | ICD-10-CM

## 2021-02-09 DIAGNOSIS — M25.561 CHRONIC PAIN OF RIGHT KNEE: ICD-10-CM

## 2021-02-09 DIAGNOSIS — M23.41 LOOSE BODY OF RIGHT KNEE: ICD-10-CM

## 2021-02-09 DIAGNOSIS — M23.321 DEGENERATIVE TEAR OF POSTERIOR HORN OF MEDIAL MENISCUS OF RIGHT KNEE: Primary | ICD-10-CM

## 2021-02-09 DIAGNOSIS — R94.31 ABNORMAL EKG: Primary | ICD-10-CM

## 2021-02-09 DIAGNOSIS — G89.29 CHRONIC PAIN OF RIGHT KNEE: ICD-10-CM

## 2021-02-09 DIAGNOSIS — Z01.818 PRE-OP EXAM: ICD-10-CM

## 2021-02-09 DIAGNOSIS — M94.261 CHONDROMALACIA OF RIGHT KNEE: ICD-10-CM

## 2021-02-09 PROCEDURE — 99499 NO LOS: ICD-10-PCS | Mod: S$GLB,,, | Performed by: PHYSICIAN ASSISTANT

## 2021-02-09 PROCEDURE — 99999 PR PBB SHADOW E&M-EST. PATIENT-LVL II: CPT | Mod: PBBFAC,,, | Performed by: ORTHOPAEDIC SURGERY

## 2021-02-09 PROCEDURE — 99499 UNLISTED E&M SERVICE: CPT | Mod: S$GLB,,, | Performed by: PHYSICIAN ASSISTANT

## 2021-02-09 PROCEDURE — 99214 PR OFFICE/OUTPT VISIT, EST, LEVL IV, 30-39 MIN: ICD-10-PCS | Mod: S$GLB,,, | Performed by: ORTHOPAEDIC SURGERY

## 2021-02-09 PROCEDURE — 99214 OFFICE O/P EST MOD 30 MIN: CPT | Mod: S$GLB,,, | Performed by: ORTHOPAEDIC SURGERY

## 2021-02-09 PROCEDURE — 99999 PR PBB SHADOW E&M-EST. PATIENT-LVL III: ICD-10-PCS | Mod: PBBFAC,,, | Performed by: PHYSICIAN ASSISTANT

## 2021-02-09 PROCEDURE — 99999 PR PBB SHADOW E&M-EST. PATIENT-LVL II: ICD-10-PCS | Mod: PBBFAC,,, | Performed by: ORTHOPAEDIC SURGERY

## 2021-02-09 PROCEDURE — 99999 PR PBB SHADOW E&M-EST. PATIENT-LVL III: CPT | Mod: PBBFAC,,, | Performed by: PHYSICIAN ASSISTANT

## 2021-02-09 RX ORDER — ASPIRIN 81 MG/1
81 TABLET ORAL 2 TIMES DAILY
Qty: 28 TABLET | Refills: 0 | Status: SHIPPED | OUTPATIENT
Start: 2021-02-09 | End: 2021-07-19

## 2021-02-09 RX ORDER — OXYCODONE HYDROCHLORIDE 5 MG/1
5 TABLET ORAL EVERY 6 HOURS PRN
Qty: 28 TABLET | Refills: 0 | Status: SHIPPED | OUTPATIENT
Start: 2021-02-09 | End: 2021-02-19

## 2021-02-09 RX ORDER — MUPIROCIN 20 MG/G
OINTMENT TOPICAL
Status: CANCELLED | OUTPATIENT
Start: 2021-02-09

## 2021-02-09 RX ORDER — ONDANSETRON 4 MG/1
4 TABLET, FILM COATED ORAL EVERY 8 HOURS PRN
Qty: 21 TABLET | Refills: 0 | Status: SHIPPED | OUTPATIENT
Start: 2021-02-09 | End: 2021-02-19

## 2021-02-09 RX ORDER — CEFAZOLIN SODIUM 2 G/50ML
2 SOLUTION INTRAVENOUS
Status: CANCELLED | OUTPATIENT
Start: 2021-02-09

## 2021-02-09 RX ORDER — SODIUM CHLORIDE 9 MG/ML
INJECTION, SOLUTION INTRAVENOUS CONTINUOUS
Status: CANCELLED | OUTPATIENT
Start: 2021-02-09

## 2021-02-10 ENCOUNTER — LAB VISIT (OUTPATIENT)
Dept: LAB | Facility: HOSPITAL | Age: 56
End: 2021-02-10
Attending: INTERNAL MEDICINE
Payer: COMMERCIAL

## 2021-02-10 ENCOUNTER — OFFICE VISIT (OUTPATIENT)
Dept: INTERNAL MEDICINE | Facility: CLINIC | Age: 56
End: 2021-02-10
Payer: COMMERCIAL

## 2021-02-10 VITALS
OXYGEN SATURATION: 95 % | DIASTOLIC BLOOD PRESSURE: 82 MMHG | RESPIRATION RATE: 16 BRPM | TEMPERATURE: 97 F | WEIGHT: 192 LBS | HEIGHT: 62 IN | HEART RATE: 78 BPM | SYSTOLIC BLOOD PRESSURE: 124 MMHG | BODY MASS INDEX: 35.33 KG/M2

## 2021-02-10 DIAGNOSIS — Z01.818 PRE-OP EXAM: Primary | ICD-10-CM

## 2021-02-10 DIAGNOSIS — E78.00 PURE HYPERCHOLESTEROLEMIA: ICD-10-CM

## 2021-02-10 DIAGNOSIS — M23.321 DEGENERATIVE TEAR OF POSTERIOR HORN OF MEDIAL MENISCUS OF RIGHT KNEE: ICD-10-CM

## 2021-02-10 DIAGNOSIS — G47.33 OSA (OBSTRUCTIVE SLEEP APNEA): ICD-10-CM

## 2021-02-10 DIAGNOSIS — E11.9 TYPE 2 DIABETES MELLITUS WITHOUT COMPLICATION, WITHOUT LONG-TERM CURRENT USE OF INSULIN: ICD-10-CM

## 2021-02-10 DIAGNOSIS — Z01.818 PRE-OP EXAM: ICD-10-CM

## 2021-02-10 DIAGNOSIS — E66.9 OBESITY (BMI 30-39.9): ICD-10-CM

## 2021-02-10 DIAGNOSIS — I10 HTN (HYPERTENSION), BENIGN: ICD-10-CM

## 2021-02-10 LAB
ANION GAP SERPL CALC-SCNC: 13 MMOL/L (ref 8–16)
BASOPHILS # BLD AUTO: 0.06 K/UL (ref 0–0.2)
BASOPHILS NFR BLD: 0.6 % (ref 0–1.9)
BUN SERPL-MCNC: 8 MG/DL (ref 6–20)
CALCIUM SERPL-MCNC: 10 MG/DL (ref 8.7–10.5)
CHLORIDE SERPL-SCNC: 98 MMOL/L (ref 95–110)
CO2 SERPL-SCNC: 29 MMOL/L (ref 23–29)
CREAT SERPL-MCNC: 0.7 MG/DL (ref 0.5–1.4)
DIFFERENTIAL METHOD: ABNORMAL
EOSINOPHIL # BLD AUTO: 0.2 K/UL (ref 0–0.5)
EOSINOPHIL NFR BLD: 1.6 % (ref 0–8)
ERYTHROCYTE [DISTWIDTH] IN BLOOD BY AUTOMATED COUNT: 15.5 % (ref 11.5–14.5)
EST. GFR  (AFRICAN AMERICAN): >60 ML/MIN/1.73 M^2
EST. GFR  (NON AFRICAN AMERICAN): >60 ML/MIN/1.73 M^2
GLUCOSE SERPL-MCNC: 173 MG/DL (ref 70–110)
HCT VFR BLD AUTO: 40.4 % (ref 37–48.5)
HGB BLD-MCNC: 12.1 G/DL (ref 12–16)
IMM GRANULOCYTES # BLD AUTO: 0.03 K/UL (ref 0–0.04)
IMM GRANULOCYTES NFR BLD AUTO: 0.3 % (ref 0–0.5)
LYMPHOCYTES # BLD AUTO: 3.5 K/UL (ref 1–4.8)
LYMPHOCYTES NFR BLD: 35.4 % (ref 18–48)
MCH RBC QN AUTO: 26 PG (ref 27–31)
MCHC RBC AUTO-ENTMCNC: 30 G/DL (ref 32–36)
MCV RBC AUTO: 87 FL (ref 82–98)
MONOCYTES # BLD AUTO: 0.7 K/UL (ref 0.3–1)
MONOCYTES NFR BLD: 7.1 % (ref 4–15)
NEUTROPHILS # BLD AUTO: 5.4 K/UL (ref 1.8–7.7)
NEUTROPHILS NFR BLD: 55 % (ref 38–73)
NRBC BLD-RTO: 0 /100 WBC
PLATELET # BLD AUTO: 428 K/UL (ref 150–350)
PMV BLD AUTO: 8.9 FL (ref 9.2–12.9)
POTASSIUM SERPL-SCNC: 4.1 MMOL/L (ref 3.5–5.1)
RBC # BLD AUTO: 4.65 M/UL (ref 4–5.4)
SODIUM SERPL-SCNC: 140 MMOL/L (ref 136–145)
WBC # BLD AUTO: 9.74 K/UL (ref 3.9–12.7)

## 2021-02-10 PROCEDURE — 93005 ELECTROCARDIOGRAM TRACING: CPT | Mod: S$GLB,,, | Performed by: INTERNAL MEDICINE

## 2021-02-10 PROCEDURE — 93010 ELECTROCARDIOGRAM REPORT: CPT | Mod: S$GLB,,, | Performed by: INTERNAL MEDICINE

## 2021-02-10 PROCEDURE — 99999 PR PBB SHADOW E&M-EST. PATIENT-LVL IV: ICD-10-PCS | Mod: PBBFAC,,, | Performed by: INTERNAL MEDICINE

## 2021-02-10 PROCEDURE — 80048 BASIC METABOLIC PNL TOTAL CA: CPT

## 2021-02-10 PROCEDURE — 36415 COLL VENOUS BLD VENIPUNCTURE: CPT | Mod: PO

## 2021-02-10 PROCEDURE — 93005 EKG 12-LEAD: ICD-10-PCS | Mod: S$GLB,,, | Performed by: INTERNAL MEDICINE

## 2021-02-10 PROCEDURE — 99214 PR OFFICE/OUTPT VISIT, EST, LEVL IV, 30-39 MIN: ICD-10-PCS | Mod: S$GLB,,, | Performed by: INTERNAL MEDICINE

## 2021-02-10 PROCEDURE — 99999 PR PBB SHADOW E&M-EST. PATIENT-LVL IV: CPT | Mod: PBBFAC,,, | Performed by: INTERNAL MEDICINE

## 2021-02-10 PROCEDURE — 93010 EKG 12-LEAD: ICD-10-PCS | Mod: S$GLB,,, | Performed by: INTERNAL MEDICINE

## 2021-02-10 PROCEDURE — 99214 OFFICE O/P EST MOD 30 MIN: CPT | Mod: S$GLB,,, | Performed by: INTERNAL MEDICINE

## 2021-02-10 PROCEDURE — 85025 COMPLETE CBC W/AUTO DIFF WBC: CPT

## 2021-02-11 ENCOUNTER — OFFICE VISIT (OUTPATIENT)
Dept: CARDIOLOGY | Facility: CLINIC | Age: 56
End: 2021-02-11
Payer: COMMERCIAL

## 2021-02-11 ENCOUNTER — TELEPHONE (OUTPATIENT)
Dept: SPORTS MEDICINE | Facility: CLINIC | Age: 56
End: 2021-02-11

## 2021-02-11 ENCOUNTER — ANESTHESIA EVENT (OUTPATIENT)
Dept: SURGERY | Facility: HOSPITAL | Age: 56
End: 2021-02-11
Payer: COMMERCIAL

## 2021-02-11 VITALS
HEIGHT: 62 IN | BODY MASS INDEX: 35.25 KG/M2 | SYSTOLIC BLOOD PRESSURE: 120 MMHG | DIASTOLIC BLOOD PRESSURE: 74 MMHG | WEIGHT: 191.56 LBS | HEART RATE: 76 BPM

## 2021-02-11 DIAGNOSIS — I10 ESSENTIAL HYPERTENSION: Chronic | ICD-10-CM

## 2021-02-11 DIAGNOSIS — E11.9 TYPE 2 DIABETES MELLITUS WITHOUT COMPLICATION, WITHOUT LONG-TERM CURRENT USE OF INSULIN: ICD-10-CM

## 2021-02-11 DIAGNOSIS — E78.00 PURE HYPERCHOLESTEROLEMIA: Chronic | ICD-10-CM

## 2021-02-11 DIAGNOSIS — R94.31 ABNORMAL EKG: Primary | ICD-10-CM

## 2021-02-11 DIAGNOSIS — Z01.818 PRE-OP EXAM: ICD-10-CM

## 2021-02-11 PROCEDURE — 99203 PR OFFICE/OUTPT VISIT, NEW, LEVL III, 30-44 MIN: ICD-10-PCS | Mod: S$GLB,,, | Performed by: INTERNAL MEDICINE

## 2021-02-11 PROCEDURE — 99999 PR PBB SHADOW E&M-EST. PATIENT-LVL IV: ICD-10-PCS | Mod: PBBFAC,,, | Performed by: INTERNAL MEDICINE

## 2021-02-11 PROCEDURE — 99999 PR PBB SHADOW E&M-EST. PATIENT-LVL IV: CPT | Mod: PBBFAC,,, | Performed by: INTERNAL MEDICINE

## 2021-02-11 PROCEDURE — 99203 OFFICE O/P NEW LOW 30 MIN: CPT | Mod: S$GLB,,, | Performed by: INTERNAL MEDICINE

## 2021-02-12 ENCOUNTER — HOSPITAL ENCOUNTER (OUTPATIENT)
Facility: HOSPITAL | Age: 56
Discharge: HOME | End: 2021-02-12
Attending: ORTHOPAEDIC SURGERY | Admitting: ORTHOPAEDIC SURGERY
Payer: COMMERCIAL

## 2021-02-12 ENCOUNTER — ANESTHESIA (OUTPATIENT)
Dept: SURGERY | Facility: HOSPITAL | Age: 56
End: 2021-02-12
Payer: COMMERCIAL

## 2021-02-12 VITALS
OXYGEN SATURATION: 98 % | HEART RATE: 68 BPM | DIASTOLIC BLOOD PRESSURE: 74 MMHG | BODY MASS INDEX: 35.12 KG/M2 | SYSTOLIC BLOOD PRESSURE: 163 MMHG | WEIGHT: 192 LBS | RESPIRATION RATE: 12 BRPM | TEMPERATURE: 98 F

## 2021-02-12 DIAGNOSIS — M23.321 DEGENERATIVE TEAR OF POSTERIOR HORN OF MEDIAL MENISCUS OF RIGHT KNEE: ICD-10-CM

## 2021-02-12 LAB
POCT GLUCOSE: 160 MG/DL (ref 70–110)
POCT GLUCOSE: 170 MG/DL (ref 70–110)

## 2021-02-12 PROCEDURE — 71000039 HC RECOVERY, EACH ADD'L HOUR: Performed by: ORTHOPAEDIC SURGERY

## 2021-02-12 PROCEDURE — 29881 PR KNEE SCOPE SINGLE MENISECECTOMY: ICD-10-PCS | Mod: RT,,, | Performed by: ORTHOPAEDIC SURGERY

## 2021-02-12 PROCEDURE — 99900035 HC TECH TIME PER 15 MIN (STAT)

## 2021-02-12 PROCEDURE — 63600175 PHARM REV CODE 636 W HCPCS: Performed by: ANESTHESIOLOGY

## 2021-02-12 PROCEDURE — 27201423 OPTIME MED/SURG SUP & DEVICES STERILE SUPPLY: Performed by: ORTHOPAEDIC SURGERY

## 2021-02-12 PROCEDURE — 25000003 PHARM REV CODE 250: Performed by: NURSE ANESTHETIST, CERTIFIED REGISTERED

## 2021-02-12 PROCEDURE — 29881 ARTHRS KNE SRG MNISECTMY M/L: CPT | Mod: RT,,, | Performed by: ORTHOPAEDIC SURGERY

## 2021-02-12 PROCEDURE — 63600175 PHARM REV CODE 636 W HCPCS: Performed by: NURSE ANESTHETIST, CERTIFIED REGISTERED

## 2021-02-12 PROCEDURE — 27200750 HC INSULATED NEEDLE/ STIMUPLEX: Performed by: ANESTHESIOLOGY

## 2021-02-12 PROCEDURE — 29873 ARTHRS KNEE SURG W/LAT RLS: CPT | Mod: 51,RT,, | Performed by: ORTHOPAEDIC SURGERY

## 2021-02-12 PROCEDURE — D9220A PRA ANESTHESIA: ICD-10-PCS | Mod: ,,, | Performed by: ANESTHESIOLOGY

## 2021-02-12 PROCEDURE — 94761 N-INVAS EAR/PLS OXIMETRY MLT: CPT

## 2021-02-12 PROCEDURE — 63600175 PHARM REV CODE 636 W HCPCS: Performed by: PHYSICIAN ASSISTANT

## 2021-02-12 PROCEDURE — 71000033 HC RECOVERY, INTIAL HOUR: Performed by: ORTHOPAEDIC SURGERY

## 2021-02-12 PROCEDURE — 36000711: Performed by: ORTHOPAEDIC SURGERY

## 2021-02-12 PROCEDURE — 36000710: Performed by: ORTHOPAEDIC SURGERY

## 2021-02-12 PROCEDURE — 27200651 HC AIRWAY, LMA: Performed by: NURSE ANESTHETIST, CERTIFIED REGISTERED

## 2021-02-12 PROCEDURE — 25000003 PHARM REV CODE 250: Performed by: ANESTHESIOLOGY

## 2021-02-12 PROCEDURE — D9220A PRA ANESTHESIA: Mod: ,,, | Performed by: ANESTHESIOLOGY

## 2021-02-12 PROCEDURE — 76942 PR U/S GUIDANCE FOR NEEDLE GUIDANCE: ICD-10-PCS | Mod: 26,,, | Performed by: ANESTHESIOLOGY

## 2021-02-12 PROCEDURE — 37000009 HC ANESTHESIA EA ADD 15 MINS: Performed by: ORTHOPAEDIC SURGERY

## 2021-02-12 PROCEDURE — 25000003 PHARM REV CODE 250: Performed by: PHYSICIAN ASSISTANT

## 2021-02-12 PROCEDURE — 25000003 PHARM REV CODE 250: Performed by: ORTHOPAEDIC SURGERY

## 2021-02-12 PROCEDURE — 76942 ECHO GUIDE FOR BIOPSY: CPT | Performed by: STUDENT IN AN ORGANIZED HEALTH CARE EDUCATION/TRAINING PROGRAM

## 2021-02-12 PROCEDURE — 71000015 HC POSTOP RECOV 1ST HR: Performed by: ORTHOPAEDIC SURGERY

## 2021-02-12 PROCEDURE — 64447 NJX AA&/STRD FEMORAL NRV IMG: CPT | Mod: 59,RT,, | Performed by: ANESTHESIOLOGY

## 2021-02-12 PROCEDURE — 82962 GLUCOSE BLOOD TEST: CPT | Performed by: ORTHOPAEDIC SURGERY

## 2021-02-12 PROCEDURE — 64447 NJX AA&/STRD FEMORAL NRV IMG: CPT | Performed by: STUDENT IN AN ORGANIZED HEALTH CARE EDUCATION/TRAINING PROGRAM

## 2021-02-12 PROCEDURE — 76942 ECHO GUIDE FOR BIOPSY: CPT | Mod: 26,,, | Performed by: ANESTHESIOLOGY

## 2021-02-12 PROCEDURE — 63600175 PHARM REV CODE 636 W HCPCS: Performed by: ORTHOPAEDIC SURGERY

## 2021-02-12 PROCEDURE — 37000008 HC ANESTHESIA 1ST 15 MINUTES: Performed by: ORTHOPAEDIC SURGERY

## 2021-02-12 PROCEDURE — 64447 PR NERVE BLOCK INJ, ANES/STEROID, FEMORAL, INCL IMAG GUIDANCE: ICD-10-PCS | Mod: 59,RT,, | Performed by: ANESTHESIOLOGY

## 2021-02-12 PROCEDURE — 25000003 PHARM REV CODE 250: Performed by: STUDENT IN AN ORGANIZED HEALTH CARE EDUCATION/TRAINING PROGRAM

## 2021-02-12 PROCEDURE — 29873 PR KNEE SCOPE, W/LATERAL RELEASE: ICD-10-PCS | Mod: 51,RT,, | Performed by: ORTHOPAEDIC SURGERY

## 2021-02-12 RX ORDER — ACETAMINOPHEN 500 MG
1000 TABLET ORAL ONCE
Status: COMPLETED | OUTPATIENT
Start: 2021-02-12 | End: 2021-02-12

## 2021-02-12 RX ORDER — HYDROMORPHONE HYDROCHLORIDE 1 MG/ML
0.2 INJECTION, SOLUTION INTRAMUSCULAR; INTRAVENOUS; SUBCUTANEOUS EVERY 5 MIN PRN
Status: DISCONTINUED | OUTPATIENT
Start: 2021-02-12 | End: 2021-02-12 | Stop reason: HOSPADM

## 2021-02-12 RX ORDER — EPINEPHRINE 1 MG/ML
INJECTION, SOLUTION INTRACARDIAC; INTRAMUSCULAR; INTRAVENOUS; SUBCUTANEOUS
Status: DISCONTINUED | OUTPATIENT
Start: 2021-02-12 | End: 2021-02-12 | Stop reason: HOSPADM

## 2021-02-12 RX ORDER — MUPIROCIN 20 MG/G
OINTMENT TOPICAL
Status: DISCONTINUED | OUTPATIENT
Start: 2021-02-12 | End: 2021-02-12 | Stop reason: HOSPADM

## 2021-02-12 RX ORDER — FAMOTIDINE 10 MG/ML
INJECTION INTRAVENOUS
Status: DISCONTINUED | OUTPATIENT
Start: 2021-02-12 | End: 2021-02-12

## 2021-02-12 RX ORDER — FENTANYL CITRATE 50 UG/ML
INJECTION, SOLUTION INTRAMUSCULAR; INTRAVENOUS
Status: DISCONTINUED | OUTPATIENT
Start: 2021-02-12 | End: 2021-02-12

## 2021-02-12 RX ORDER — CELECOXIB 200 MG/1
400 CAPSULE ORAL ONCE
Status: COMPLETED | OUTPATIENT
Start: 2021-02-12 | End: 2021-02-12

## 2021-02-12 RX ORDER — SODIUM CHLORIDE 9 MG/ML
INJECTION, SOLUTION INTRAVENOUS CONTINUOUS
Status: DISCONTINUED | OUTPATIENT
Start: 2021-02-12 | End: 2021-02-12 | Stop reason: HOSPADM

## 2021-02-12 RX ORDER — METHOCARBAMOL 500 MG/1
1000 TABLET, FILM COATED ORAL ONCE
Status: COMPLETED | OUTPATIENT
Start: 2021-02-12 | End: 2021-02-12

## 2021-02-12 RX ORDER — OXYCODONE HYDROCHLORIDE 5 MG/1
5 TABLET ORAL ONCE
Status: COMPLETED | OUTPATIENT
Start: 2021-02-12 | End: 2021-02-12

## 2021-02-12 RX ORDER — DEXMEDETOMIDINE HYDROCHLORIDE 100 UG/ML
INJECTION, SOLUTION INTRAVENOUS
Status: DISCONTINUED | OUTPATIENT
Start: 2021-02-12 | End: 2021-02-12

## 2021-02-12 RX ORDER — ROPIVACAINE/EPI/CLONIDINE/KET 2.46-0.005
SYRINGE (ML) INJECTION
Status: DISCONTINUED | OUTPATIENT
Start: 2021-02-12 | End: 2021-02-12 | Stop reason: HOSPADM

## 2021-02-12 RX ORDER — IPRATROPIUM BROMIDE AND ALBUTEROL SULFATE 2.5; .5 MG/3ML; MG/3ML
3 SOLUTION RESPIRATORY (INHALATION) EVERY 6 HOURS PRN
Status: DISCONTINUED | OUTPATIENT
Start: 2021-02-12 | End: 2021-02-12 | Stop reason: HOSPADM

## 2021-02-12 RX ORDER — CEFAZOLIN SODIUM 1 G/3ML
2 INJECTION, POWDER, FOR SOLUTION INTRAMUSCULAR; INTRAVENOUS
Status: DISCONTINUED | OUTPATIENT
Start: 2021-02-12 | End: 2021-02-12 | Stop reason: HOSPADM

## 2021-02-12 RX ORDER — LIDOCAINE HYDROCHLORIDE 20 MG/ML
INJECTION INTRAVENOUS
Status: DISCONTINUED | OUTPATIENT
Start: 2021-02-12 | End: 2021-02-12

## 2021-02-12 RX ORDER — OXYCODONE HYDROCHLORIDE 5 MG/1
5 TABLET ORAL
Status: DISCONTINUED | OUTPATIENT
Start: 2021-02-12 | End: 2021-02-12 | Stop reason: HOSPADM

## 2021-02-12 RX ORDER — PROPOFOL 10 MG/ML
VIAL (ML) INTRAVENOUS
Status: DISCONTINUED | OUTPATIENT
Start: 2021-02-12 | End: 2021-02-12

## 2021-02-12 RX ORDER — MIDAZOLAM HYDROCHLORIDE 1 MG/ML
INJECTION, SOLUTION INTRAMUSCULAR; INTRAVENOUS
Status: DISCONTINUED | OUTPATIENT
Start: 2021-02-12 | End: 2021-02-12

## 2021-02-12 RX ORDER — ONDANSETRON 2 MG/ML
INJECTION INTRAMUSCULAR; INTRAVENOUS
Status: DISCONTINUED | OUTPATIENT
Start: 2021-02-12 | End: 2021-02-12

## 2021-02-12 RX ORDER — HALOPERIDOL 5 MG/ML
0.5 INJECTION INTRAMUSCULAR EVERY 10 MIN PRN
Status: DISCONTINUED | OUTPATIENT
Start: 2021-02-12 | End: 2021-02-12 | Stop reason: HOSPADM

## 2021-02-12 RX ADMIN — PROPOFOL 50 MG: 10 INJECTION, EMULSION INTRAVENOUS at 07:02

## 2021-02-12 RX ADMIN — HYDROMORPHONE HYDROCHLORIDE 0.2 MG: 1 INJECTION, SOLUTION INTRAMUSCULAR; INTRAVENOUS; SUBCUTANEOUS at 09:02

## 2021-02-12 RX ADMIN — OXYCODONE 5 MG: 5 TABLET ORAL at 10:02

## 2021-02-12 RX ADMIN — HYDROMORPHONE HYDROCHLORIDE 0.2 MG: 1 INJECTION, SOLUTION INTRAMUSCULAR; INTRAVENOUS; SUBCUTANEOUS at 08:02

## 2021-02-12 RX ADMIN — GLYCOPYRROLATE 0.2 MCG: 0.2 INJECTION, SOLUTION INTRAMUSCULAR; INTRAVITREAL at 06:02

## 2021-02-12 RX ADMIN — OXYCODONE 5 MG: 5 TABLET ORAL at 08:02

## 2021-02-12 RX ADMIN — CELECOXIB 400 MG: 200 CAPSULE ORAL at 05:02

## 2021-02-12 RX ADMIN — LIDOCAINE HYDROCHLORIDE 75 MG: 20 INJECTION, SOLUTION INTRAVENOUS at 07:02

## 2021-02-12 RX ADMIN — DEXMEDETOMIDINE HYDROCHLORIDE 4 MCG: 100 INJECTION, SOLUTION, CONCENTRATE INTRAVENOUS at 07:02

## 2021-02-12 RX ADMIN — FENTANYL CITRATE 50 MCG: 50 INJECTION, SOLUTION INTRAMUSCULAR; INTRAVENOUS at 07:02

## 2021-02-12 RX ADMIN — MIDAZOLAM HYDROCHLORIDE 2 MG: 1 INJECTION, SOLUTION INTRAMUSCULAR; INTRAVENOUS at 06:02

## 2021-02-12 RX ADMIN — METHOCARBAMOL 1000 MG: 500 TABLET ORAL at 09:02

## 2021-02-12 RX ADMIN — ONDANSETRON 4 MG: 2 INJECTION, SOLUTION INTRAMUSCULAR; INTRAVENOUS at 07:02

## 2021-02-12 RX ADMIN — PROPOFOL 200 MG: 10 INJECTION, EMULSION INTRAVENOUS at 07:02

## 2021-02-12 RX ADMIN — FAMOTIDINE 20 MG: 10 INJECTION, SOLUTION INTRAVENOUS at 06:02

## 2021-02-12 RX ADMIN — MUPIROCIN: 20 OINTMENT TOPICAL at 05:02

## 2021-02-12 RX ADMIN — CEFAZOLIN 2 G: 330 INJECTION, POWDER, FOR SOLUTION INTRAMUSCULAR; INTRAVENOUS at 07:02

## 2021-02-12 RX ADMIN — SODIUM CHLORIDE: 0.9 INJECTION, SOLUTION INTRAVENOUS at 06:02

## 2021-02-12 RX ADMIN — DEXMEDETOMIDINE HYDROCHLORIDE 8 MCG: 100 INJECTION, SOLUTION, CONCENTRATE INTRAVENOUS at 07:02

## 2021-02-12 RX ADMIN — ACETAMINOPHEN 1000 MG: 500 TABLET ORAL at 05:02

## 2021-02-13 ENCOUNTER — NURSE TRIAGE (OUTPATIENT)
Dept: ADMINISTRATIVE | Facility: CLINIC | Age: 56
End: 2021-02-13

## 2021-02-15 ENCOUNTER — CLINICAL SUPPORT (OUTPATIENT)
Dept: REHABILITATION | Facility: HOSPITAL | Age: 56
End: 2021-02-15
Payer: COMMERCIAL

## 2021-02-15 ENCOUNTER — TELEPHONE (OUTPATIENT)
Dept: SPORTS MEDICINE | Facility: CLINIC | Age: 56
End: 2021-02-15

## 2021-02-15 DIAGNOSIS — M23.321 DEGENERATIVE TEAR OF POSTERIOR HORN OF MEDIAL MENISCUS OF RIGHT KNEE: ICD-10-CM

## 2021-02-15 PROCEDURE — 97140 MANUAL THERAPY 1/> REGIONS: CPT

## 2021-02-15 PROCEDURE — 97110 THERAPEUTIC EXERCISES: CPT

## 2021-02-15 PROCEDURE — 97161 PT EVAL LOW COMPLEX 20 MIN: CPT

## 2021-02-18 ENCOUNTER — TELEPHONE (OUTPATIENT)
Dept: SPORTS MEDICINE | Facility: CLINIC | Age: 56
End: 2021-02-18

## 2021-02-19 ENCOUNTER — TELEPHONE (OUTPATIENT)
Dept: SPORTS MEDICINE | Facility: CLINIC | Age: 56
End: 2021-02-19

## 2021-02-20 ENCOUNTER — TELEPHONE (OUTPATIENT)
Dept: REHABILITATION | Facility: OTHER | Age: 56
End: 2021-02-20

## 2021-02-22 ENCOUNTER — OFFICE VISIT (OUTPATIENT)
Dept: SPORTS MEDICINE | Facility: CLINIC | Age: 56
End: 2021-02-22
Payer: COMMERCIAL

## 2021-02-22 VITALS
WEIGHT: 187.38 LBS | HEART RATE: 87 BPM | HEIGHT: 62 IN | BODY MASS INDEX: 34.48 KG/M2 | SYSTOLIC BLOOD PRESSURE: 108 MMHG | DIASTOLIC BLOOD PRESSURE: 67 MMHG

## 2021-02-22 DIAGNOSIS — M94.261 CHONDROMALACIA OF RIGHT KNEE: ICD-10-CM

## 2021-02-22 DIAGNOSIS — M23.41 LOOSE BODY OF RIGHT KNEE: ICD-10-CM

## 2021-02-22 DIAGNOSIS — M23.203 OLD TEAR OF MEDIAL MENISCUS OF RIGHT KNEE, UNSPECIFIED TEAR TYPE: ICD-10-CM

## 2021-02-22 DIAGNOSIS — Z09 SURGERY FOLLOW-UP EXAMINATION: Primary | ICD-10-CM

## 2021-02-22 PROCEDURE — 99999 PR PBB SHADOW E&M-EST. PATIENT-LVL III: ICD-10-PCS | Mod: PBBFAC,,, | Performed by: PHYSICIAN ASSISTANT

## 2021-02-22 PROCEDURE — 99999 PR PBB SHADOW E&M-EST. PATIENT-LVL III: CPT | Mod: PBBFAC,,, | Performed by: PHYSICIAN ASSISTANT

## 2021-02-22 PROCEDURE — 99024 POSTOP FOLLOW-UP VISIT: CPT | Mod: S$GLB,,, | Performed by: PHYSICIAN ASSISTANT

## 2021-02-22 PROCEDURE — 99024 PR POST-OP FOLLOW-UP VISIT: ICD-10-PCS | Mod: S$GLB,,, | Performed by: PHYSICIAN ASSISTANT

## 2021-02-23 ENCOUNTER — TELEPHONE (OUTPATIENT)
Dept: SPORTS MEDICINE | Facility: CLINIC | Age: 56
End: 2021-02-23

## 2021-02-24 ENCOUNTER — TELEPHONE (OUTPATIENT)
Dept: SPORTS MEDICINE | Facility: CLINIC | Age: 56
End: 2021-02-24

## 2021-02-25 ENCOUNTER — TELEPHONE (OUTPATIENT)
Dept: SPORTS MEDICINE | Facility: CLINIC | Age: 56
End: 2021-02-25

## 2021-02-26 ENCOUNTER — PATIENT MESSAGE (OUTPATIENT)
Dept: SPORTS MEDICINE | Facility: CLINIC | Age: 56
End: 2021-02-26

## 2021-02-26 ENCOUNTER — TELEPHONE (OUTPATIENT)
Dept: SPORTS MEDICINE | Facility: CLINIC | Age: 56
End: 2021-02-26

## 2021-03-05 ENCOUNTER — CLINICAL SUPPORT (OUTPATIENT)
Dept: REHABILITATION | Facility: HOSPITAL | Age: 56
End: 2021-03-05
Payer: COMMERCIAL

## 2021-03-05 DIAGNOSIS — G89.29 CHRONIC PAIN OF RIGHT KNEE: ICD-10-CM

## 2021-03-05 DIAGNOSIS — M23.321 DEGENERATIVE TEAR OF POSTERIOR HORN OF MEDIAL MENISCUS OF RIGHT KNEE: ICD-10-CM

## 2021-03-05 DIAGNOSIS — M25.561 CHRONIC PAIN OF RIGHT KNEE: ICD-10-CM

## 2021-03-05 PROCEDURE — 97140 MANUAL THERAPY 1/> REGIONS: CPT

## 2021-03-05 PROCEDURE — 97110 THERAPEUTIC EXERCISES: CPT

## 2021-03-25 ENCOUNTER — PATIENT MESSAGE (OUTPATIENT)
Dept: REHABILITATION | Facility: OTHER | Age: 56
End: 2021-03-25

## 2021-03-30 ENCOUNTER — OFFICE VISIT (OUTPATIENT)
Dept: SPORTS MEDICINE | Facility: CLINIC | Age: 56
End: 2021-03-30
Payer: COMMERCIAL

## 2021-03-30 VITALS
SYSTOLIC BLOOD PRESSURE: 111 MMHG | HEIGHT: 62 IN | BODY MASS INDEX: 33.86 KG/M2 | HEART RATE: 66 BPM | WEIGHT: 184 LBS | DIASTOLIC BLOOD PRESSURE: 70 MMHG

## 2021-03-30 DIAGNOSIS — Z09 SURGERY FOLLOW-UP EXAMINATION: Primary | ICD-10-CM

## 2021-03-30 DIAGNOSIS — M25.461 EFFUSION OF RIGHT KNEE: ICD-10-CM

## 2021-03-30 PROCEDURE — 20610 DRAIN/INJ JOINT/BURSA W/O US: CPT | Mod: 58,RT,S$GLB, | Performed by: ORTHOPAEDIC SURGERY

## 2021-03-30 PROCEDURE — 20610 LARGE JOINT ASPIRATION/INJECTION: R KNEE: ICD-10-PCS | Mod: 58,RT,S$GLB, | Performed by: ORTHOPAEDIC SURGERY

## 2021-03-30 PROCEDURE — 99999 PR PBB SHADOW E&M-EST. PATIENT-LVL III: ICD-10-PCS | Mod: PBBFAC,,, | Performed by: ORTHOPAEDIC SURGERY

## 2021-03-30 PROCEDURE — 99024 POSTOP FOLLOW-UP VISIT: CPT | Mod: S$GLB,,, | Performed by: ORTHOPAEDIC SURGERY

## 2021-03-30 PROCEDURE — 99024 PR POST-OP FOLLOW-UP VISIT: ICD-10-PCS | Mod: S$GLB,,, | Performed by: ORTHOPAEDIC SURGERY

## 2021-03-30 PROCEDURE — 99999 PR PBB SHADOW E&M-EST. PATIENT-LVL III: CPT | Mod: PBBFAC,,, | Performed by: ORTHOPAEDIC SURGERY

## 2021-04-05 ENCOUNTER — PATIENT MESSAGE (OUTPATIENT)
Dept: ADMINISTRATIVE | Facility: HOSPITAL | Age: 56
End: 2021-04-05

## 2021-04-29 ENCOUNTER — PATIENT OUTREACH (OUTPATIENT)
Dept: ADMINISTRATIVE | Facility: OTHER | Age: 56
End: 2021-04-29

## 2021-05-26 ENCOUNTER — PATIENT MESSAGE (OUTPATIENT)
Dept: SPORTS MEDICINE | Facility: CLINIC | Age: 56
End: 2021-05-26

## 2021-05-26 ENCOUNTER — TELEPHONE (OUTPATIENT)
Dept: SPORTS MEDICINE | Facility: CLINIC | Age: 56
End: 2021-05-26

## 2021-05-27 ENCOUNTER — TELEPHONE (OUTPATIENT)
Dept: SPORTS MEDICINE | Facility: CLINIC | Age: 56
End: 2021-05-27

## 2021-05-30 ENCOUNTER — PATIENT OUTREACH (OUTPATIENT)
Dept: ADMINISTRATIVE | Facility: OTHER | Age: 56
End: 2021-05-30

## 2021-05-31 ENCOUNTER — TELEPHONE (OUTPATIENT)
Dept: SPORTS MEDICINE | Facility: CLINIC | Age: 56
End: 2021-05-31

## 2021-06-01 ENCOUNTER — OFFICE VISIT (OUTPATIENT)
Dept: SPORTS MEDICINE | Facility: CLINIC | Age: 56
End: 2021-06-01
Payer: COMMERCIAL

## 2021-06-01 VITALS
HEART RATE: 75 BPM | BODY MASS INDEX: 35.88 KG/M2 | DIASTOLIC BLOOD PRESSURE: 74 MMHG | WEIGHT: 195 LBS | HEIGHT: 62 IN | SYSTOLIC BLOOD PRESSURE: 117 MMHG

## 2021-06-01 DIAGNOSIS — M25.461 EFFUSION OF RIGHT KNEE: Primary | ICD-10-CM

## 2021-06-01 DIAGNOSIS — M94.261 CHONDROMALACIA OF RIGHT KNEE: ICD-10-CM

## 2021-06-01 PROCEDURE — 20610 PR DRAIN/INJECT LARGE JOINT/BURSA: ICD-10-PCS | Mod: RT,S$GLB,, | Performed by: PHYSICIAN ASSISTANT

## 2021-06-01 PROCEDURE — 99999 PR PBB SHADOW E&M-EST. PATIENT-LVL III: ICD-10-PCS | Mod: PBBFAC,,, | Performed by: PHYSICIAN ASSISTANT

## 2021-06-01 PROCEDURE — 20610 DRAIN/INJ JOINT/BURSA W/O US: CPT | Mod: RT,S$GLB,, | Performed by: PHYSICIAN ASSISTANT

## 2021-06-01 PROCEDURE — 99999 PR PBB SHADOW E&M-EST. PATIENT-LVL III: CPT | Mod: PBBFAC,,, | Performed by: PHYSICIAN ASSISTANT

## 2021-06-01 PROCEDURE — 99213 PR OFFICE/OUTPT VISIT, EST, LEVL III, 20-29 MIN: ICD-10-PCS | Mod: 25,S$GLB,, | Performed by: PHYSICIAN ASSISTANT

## 2021-06-01 PROCEDURE — 99213 OFFICE O/P EST LOW 20 MIN: CPT | Mod: 25,S$GLB,, | Performed by: PHYSICIAN ASSISTANT

## 2021-06-01 RX ORDER — TRIAMCINOLONE ACETONIDE 40 MG/ML
40 INJECTION, SUSPENSION INTRA-ARTICULAR; INTRAMUSCULAR
Status: COMPLETED | OUTPATIENT
Start: 2021-06-01 | End: 2021-06-01

## 2021-06-01 RX ADMIN — TRIAMCINOLONE ACETONIDE 40 MG: 40 INJECTION, SUSPENSION INTRA-ARTICULAR; INTRAMUSCULAR at 11:06

## 2021-07-06 ENCOUNTER — PATIENT MESSAGE (OUTPATIENT)
Dept: ADMINISTRATIVE | Facility: HOSPITAL | Age: 56
End: 2021-07-06

## 2021-07-19 ENCOUNTER — OFFICE VISIT (OUTPATIENT)
Dept: INTERNAL MEDICINE | Facility: CLINIC | Age: 56
End: 2021-07-19
Payer: COMMERCIAL

## 2021-07-19 ENCOUNTER — LAB VISIT (OUTPATIENT)
Dept: LAB | Facility: HOSPITAL | Age: 56
End: 2021-07-19
Attending: INTERNAL MEDICINE
Payer: COMMERCIAL

## 2021-07-19 VITALS
RESPIRATION RATE: 16 BRPM | HEIGHT: 62 IN | BODY MASS INDEX: 36.18 KG/M2 | SYSTOLIC BLOOD PRESSURE: 120 MMHG | OXYGEN SATURATION: 97 % | WEIGHT: 196.63 LBS | HEART RATE: 70 BPM | TEMPERATURE: 98 F | DIASTOLIC BLOOD PRESSURE: 80 MMHG

## 2021-07-19 DIAGNOSIS — E78.00 PURE HYPERCHOLESTEROLEMIA: ICD-10-CM

## 2021-07-19 DIAGNOSIS — I10 HTN (HYPERTENSION), BENIGN: ICD-10-CM

## 2021-07-19 DIAGNOSIS — E11.9 TYPE 2 DIABETES MELLITUS WITHOUT COMPLICATION: ICD-10-CM

## 2021-07-19 DIAGNOSIS — E11.9 TYPE 2 DIABETES MELLITUS WITHOUT COMPLICATION, WITHOUT LONG-TERM CURRENT USE OF INSULIN: ICD-10-CM

## 2021-07-19 DIAGNOSIS — G47.33 OSA (OBSTRUCTIVE SLEEP APNEA): ICD-10-CM

## 2021-07-19 DIAGNOSIS — E66.9 OBESITY (BMI 30-39.9): ICD-10-CM

## 2021-07-19 DIAGNOSIS — Z00.00 ANNUAL PHYSICAL EXAM: Primary | ICD-10-CM

## 2021-07-19 LAB
ESTIMATED AVG GLUCOSE: 148 MG/DL (ref 68–131)
HBA1C MFR BLD: 6.8 % (ref 4–5.6)

## 2021-07-19 PROCEDURE — 99999 PR PBB SHADOW E&M-EST. PATIENT-LVL III: CPT | Mod: PBBFAC,,, | Performed by: INTERNAL MEDICINE

## 2021-07-19 PROCEDURE — 83036 HEMOGLOBIN GLYCOSYLATED A1C: CPT | Performed by: INTERNAL MEDICINE

## 2021-07-19 PROCEDURE — 99999 PR PBB SHADOW E&M-EST. PATIENT-LVL III: ICD-10-PCS | Mod: PBBFAC,,, | Performed by: INTERNAL MEDICINE

## 2021-07-19 PROCEDURE — 36415 COLL VENOUS BLD VENIPUNCTURE: CPT | Mod: PO | Performed by: INTERNAL MEDICINE

## 2021-07-19 PROCEDURE — 99396 PREV VISIT EST AGE 40-64: CPT | Mod: S$GLB,,, | Performed by: INTERNAL MEDICINE

## 2021-07-19 PROCEDURE — 90670 PCV13 VACCINE IM: CPT | Mod: S$GLB,,, | Performed by: INTERNAL MEDICINE

## 2021-07-19 PROCEDURE — 86580 POCT TB SKIN TEST: ICD-10-PCS | Mod: S$GLB,,, | Performed by: INTERNAL MEDICINE

## 2021-07-19 PROCEDURE — 99396 PR PREVENTIVE VISIT,EST,40-64: ICD-10-PCS | Mod: S$GLB,,, | Performed by: INTERNAL MEDICINE

## 2021-07-19 PROCEDURE — 86580 TB INTRADERMAL TEST: CPT | Mod: S$GLB,,, | Performed by: INTERNAL MEDICINE

## 2021-07-19 PROCEDURE — 90471 IMMUNIZATION ADMIN: CPT | Mod: S$GLB,,, | Performed by: INTERNAL MEDICINE

## 2021-07-19 PROCEDURE — 90471 PNEUMOCOCCAL CONJUGATE VACCINE 13-VALENT LESS THAN 5YO & GREATER THAN: ICD-10-PCS | Mod: S$GLB,,, | Performed by: INTERNAL MEDICINE

## 2021-07-19 PROCEDURE — 90670 PNEUMOCOCCAL CONJUGATE VACCINE 13-VALENT LESS THAN 5YO & GREATER THAN: ICD-10-PCS | Mod: S$GLB,,, | Performed by: INTERNAL MEDICINE

## 2021-07-20 DIAGNOSIS — E78.5 HYPERLIPIDEMIA, UNSPECIFIED HYPERLIPIDEMIA TYPE: ICD-10-CM

## 2021-07-20 DIAGNOSIS — E11.9 TYPE 2 DIABETES MELLITUS WITHOUT COMPLICATION, WITH LONG-TERM CURRENT USE OF INSULIN: ICD-10-CM

## 2021-07-20 DIAGNOSIS — I10 HTN (HYPERTENSION), BENIGN: Primary | ICD-10-CM

## 2021-07-20 DIAGNOSIS — Z79.4 TYPE 2 DIABETES MELLITUS WITHOUT COMPLICATION, WITH LONG-TERM CURRENT USE OF INSULIN: ICD-10-CM

## 2021-07-20 DIAGNOSIS — G47.33 OSA (OBSTRUCTIVE SLEEP APNEA): ICD-10-CM

## 2021-07-20 DIAGNOSIS — E66.9 OBESITY (BMI 30-39.9): ICD-10-CM

## 2021-07-21 ENCOUNTER — CLINICAL SUPPORT (OUTPATIENT)
Dept: INTERNAL MEDICINE | Facility: CLINIC | Age: 56
End: 2021-07-21
Payer: COMMERCIAL

## 2021-07-21 DIAGNOSIS — Z11.1 TUBERCULOSIS SCREENING: Primary | ICD-10-CM

## 2021-07-21 RX ORDER — GLIMEPIRIDE 4 MG/1
4 TABLET ORAL
Qty: 90 TABLET | Refills: 1 | Status: SHIPPED | OUTPATIENT
Start: 2021-07-21 | End: 2022-02-14

## 2021-07-21 RX ORDER — AMLODIPINE BESYLATE 5 MG/1
TABLET ORAL
Qty: 90 TABLET | Refills: 3 | Status: SHIPPED | OUTPATIENT
Start: 2021-07-21 | End: 2022-09-11

## 2021-07-27 ENCOUNTER — LAB VISIT (OUTPATIENT)
Dept: LAB | Facility: HOSPITAL | Age: 56
End: 2021-07-27
Payer: COMMERCIAL

## 2021-07-27 DIAGNOSIS — E78.00 PURE HYPERCHOLESTEROLEMIA: ICD-10-CM

## 2021-07-27 DIAGNOSIS — E66.9 OBESITY (BMI 30-39.9): ICD-10-CM

## 2021-07-27 DIAGNOSIS — G47.33 OSA (OBSTRUCTIVE SLEEP APNEA): ICD-10-CM

## 2021-07-27 DIAGNOSIS — E11.9 TYPE 2 DIABETES MELLITUS WITHOUT COMPLICATION, WITHOUT LONG-TERM CURRENT USE OF INSULIN: ICD-10-CM

## 2021-07-27 DIAGNOSIS — I10 HTN (HYPERTENSION), BENIGN: ICD-10-CM

## 2021-07-27 DIAGNOSIS — Z00.00 ANNUAL PHYSICAL EXAM: ICD-10-CM

## 2021-07-27 LAB
ALBUMIN SERPL BCP-MCNC: 4 G/DL (ref 3.5–5.2)
ALP SERPL-CCNC: 77 U/L (ref 55–135)
ALT SERPL W/O P-5'-P-CCNC: 13 U/L (ref 10–44)
ANION GAP SERPL CALC-SCNC: 10 MMOL/L (ref 8–16)
AST SERPL-CCNC: 16 U/L (ref 10–40)
BASOPHILS # BLD AUTO: 0.04 K/UL (ref 0–0.2)
BASOPHILS NFR BLD: 0.5 % (ref 0–1.9)
BILIRUB SERPL-MCNC: 0.7 MG/DL (ref 0.1–1)
BUN SERPL-MCNC: 8 MG/DL (ref 6–20)
CALCIUM SERPL-MCNC: 10.3 MG/DL (ref 8.7–10.5)
CHLORIDE SERPL-SCNC: 99 MMOL/L (ref 95–110)
CHOLEST SERPL-MCNC: 145 MG/DL (ref 120–199)
CHOLEST/HDLC SERPL: 2.9 {RATIO} (ref 2–5)
CO2 SERPL-SCNC: 31 MMOL/L (ref 23–29)
CREAT SERPL-MCNC: 0.8 MG/DL (ref 0.5–1.4)
DIFFERENTIAL METHOD: ABNORMAL
EOSINOPHIL # BLD AUTO: 0.1 K/UL (ref 0–0.5)
EOSINOPHIL NFR BLD: 1.5 % (ref 0–8)
ERYTHROCYTE [DISTWIDTH] IN BLOOD BY AUTOMATED COUNT: 14.5 % (ref 11.5–14.5)
EST. GFR  (AFRICAN AMERICAN): >60 ML/MIN/1.73 M^2
EST. GFR  (NON AFRICAN AMERICAN): >60 ML/MIN/1.73 M^2
ESTIMATED AVG GLUCOSE: 157 MG/DL (ref 68–131)
GLUCOSE SERPL-MCNC: 173 MG/DL (ref 70–110)
HBA1C MFR BLD: 7.1 % (ref 4–5.6)
HCT VFR BLD AUTO: 39.3 % (ref 37–48.5)
HDLC SERPL-MCNC: 50 MG/DL (ref 40–75)
HDLC SERPL: 34.5 % (ref 20–50)
HGB BLD-MCNC: 12.3 G/DL (ref 12–16)
IMM GRANULOCYTES # BLD AUTO: 0.03 K/UL (ref 0–0.04)
IMM GRANULOCYTES NFR BLD AUTO: 0.4 % (ref 0–0.5)
LDLC SERPL CALC-MCNC: 68.2 MG/DL (ref 63–159)
LYMPHOCYTES # BLD AUTO: 3.7 K/UL (ref 1–4.8)
LYMPHOCYTES NFR BLD: 43.9 % (ref 18–48)
MCH RBC QN AUTO: 26.5 PG (ref 27–31)
MCHC RBC AUTO-ENTMCNC: 31.3 G/DL (ref 32–36)
MCV RBC AUTO: 85 FL (ref 82–98)
MONOCYTES # BLD AUTO: 0.5 K/UL (ref 0.3–1)
MONOCYTES NFR BLD: 5.9 % (ref 4–15)
NEUTROPHILS # BLD AUTO: 4.1 K/UL (ref 1.8–7.7)
NEUTROPHILS NFR BLD: 47.8 % (ref 38–73)
NONHDLC SERPL-MCNC: 95 MG/DL
NRBC BLD-RTO: 0 /100 WBC
PLATELET # BLD AUTO: 423 K/UL (ref 150–450)
PMV BLD AUTO: 8.9 FL (ref 9.2–12.9)
POTASSIUM SERPL-SCNC: 3.8 MMOL/L (ref 3.5–5.1)
PROT SERPL-MCNC: 7.5 G/DL (ref 6–8.4)
RBC # BLD AUTO: 4.64 M/UL (ref 4–5.4)
SODIUM SERPL-SCNC: 140 MMOL/L (ref 136–145)
TRIGL SERPL-MCNC: 134 MG/DL (ref 30–150)
TSH SERPL DL<=0.005 MIU/L-ACNC: 1.23 UIU/ML (ref 0.4–4)
WBC # BLD AUTO: 8.52 K/UL (ref 3.9–12.7)

## 2021-07-27 PROCEDURE — 36415 COLL VENOUS BLD VENIPUNCTURE: CPT | Mod: PN | Performed by: INTERNAL MEDICINE

## 2021-07-27 PROCEDURE — 83036 HEMOGLOBIN GLYCOSYLATED A1C: CPT | Performed by: INTERNAL MEDICINE

## 2021-07-27 PROCEDURE — 80061 LIPID PANEL: CPT | Performed by: INTERNAL MEDICINE

## 2021-07-27 PROCEDURE — 80053 COMPREHEN METABOLIC PANEL: CPT | Performed by: INTERNAL MEDICINE

## 2021-07-27 PROCEDURE — 84443 ASSAY THYROID STIM HORMONE: CPT | Performed by: INTERNAL MEDICINE

## 2021-07-27 PROCEDURE — 85025 COMPLETE CBC W/AUTO DIFF WBC: CPT | Performed by: INTERNAL MEDICINE

## 2021-07-28 ENCOUNTER — TELEPHONE (OUTPATIENT)
Dept: INTERNAL MEDICINE | Facility: CLINIC | Age: 56
End: 2021-07-28

## 2021-07-28 ENCOUNTER — LAB VISIT (OUTPATIENT)
Dept: LAB | Facility: HOSPITAL | Age: 56
End: 2021-07-28
Attending: INTERNAL MEDICINE
Payer: COMMERCIAL

## 2021-07-28 DIAGNOSIS — G47.33 OSA (OBSTRUCTIVE SLEEP APNEA): ICD-10-CM

## 2021-07-28 DIAGNOSIS — I10 HTN (HYPERTENSION), BENIGN: ICD-10-CM

## 2021-07-28 DIAGNOSIS — E66.9 OBESITY (BMI 30-39.9): ICD-10-CM

## 2021-07-28 DIAGNOSIS — E11.9 TYPE 2 DIABETES MELLITUS WITHOUT COMPLICATION, WITHOUT LONG-TERM CURRENT USE OF INSULIN: ICD-10-CM

## 2021-07-28 DIAGNOSIS — E78.00 PURE HYPERCHOLESTEROLEMIA: ICD-10-CM

## 2021-07-28 DIAGNOSIS — Z00.00 ANNUAL PHYSICAL EXAM: ICD-10-CM

## 2021-07-28 LAB
ALBUMIN/CREAT UR: 12.1 UG/MG (ref 0–30)
BILIRUB UR QL STRIP: NEGATIVE
CLARITY UR REFRACT.AUTO: CLEAR
COLOR UR AUTO: NORMAL
CREAT UR-MCNC: 58 MG/DL (ref 15–325)
GLUCOSE UR QL STRIP: NEGATIVE
HGB UR QL STRIP: NEGATIVE
KETONES UR QL STRIP: NEGATIVE
LEUKOCYTE ESTERASE UR QL STRIP: NEGATIVE
MICROALBUMIN UR DL<=1MG/L-MCNC: 7 UG/ML
NITRITE UR QL STRIP: NEGATIVE
PH UR STRIP: 7 [PH] (ref 5–8)
PROT UR QL STRIP: NEGATIVE
SP GR UR STRIP: 1.01 (ref 1–1.03)
URN SPEC COLLECT METH UR: NORMAL

## 2021-07-28 PROCEDURE — 82570 ASSAY OF URINE CREATININE: CPT | Performed by: INTERNAL MEDICINE

## 2021-07-28 PROCEDURE — 82043 UR ALBUMIN QUANTITATIVE: CPT | Performed by: INTERNAL MEDICINE

## 2021-07-28 PROCEDURE — 81003 URINALYSIS AUTO W/O SCOPE: CPT | Performed by: INTERNAL MEDICINE

## 2021-07-29 ENCOUNTER — PATIENT MESSAGE (OUTPATIENT)
Dept: INTERNAL MEDICINE | Facility: CLINIC | Age: 56
End: 2021-07-29

## 2021-08-02 DIAGNOSIS — Z79.4 TYPE 2 DIABETES MELLITUS WITHOUT COMPLICATION, WITH LONG-TERM CURRENT USE OF INSULIN: ICD-10-CM

## 2021-08-02 DIAGNOSIS — I10 HTN (HYPERTENSION), BENIGN: ICD-10-CM

## 2021-08-02 DIAGNOSIS — E78.5 HYPERLIPIDEMIA, UNSPECIFIED HYPERLIPIDEMIA TYPE: ICD-10-CM

## 2021-08-02 DIAGNOSIS — E11.9 TYPE 2 DIABETES MELLITUS WITHOUT COMPLICATION, WITH LONG-TERM CURRENT USE OF INSULIN: ICD-10-CM

## 2021-08-02 DIAGNOSIS — E66.9 OBESITY (BMI 30-39.9): ICD-10-CM

## 2021-08-02 DIAGNOSIS — G47.33 OSA (OBSTRUCTIVE SLEEP APNEA): ICD-10-CM

## 2021-08-04 RX ORDER — ATORVASTATIN CALCIUM 40 MG/1
TABLET, FILM COATED ORAL
Qty: 90 TABLET | Refills: 3 | Status: SHIPPED | OUTPATIENT
Start: 2021-08-04 | End: 2022-09-11

## 2021-08-09 DIAGNOSIS — I10 HTN (HYPERTENSION), BENIGN: ICD-10-CM

## 2021-08-09 DIAGNOSIS — Z79.4 TYPE 2 DIABETES MELLITUS WITHOUT COMPLICATION, WITH LONG-TERM CURRENT USE OF INSULIN: ICD-10-CM

## 2021-08-09 DIAGNOSIS — G47.33 OSA (OBSTRUCTIVE SLEEP APNEA): ICD-10-CM

## 2021-08-09 DIAGNOSIS — E78.5 HYPERLIPIDEMIA, UNSPECIFIED HYPERLIPIDEMIA TYPE: ICD-10-CM

## 2021-08-09 DIAGNOSIS — E11.9 TYPE 2 DIABETES MELLITUS WITHOUT COMPLICATION, WITH LONG-TERM CURRENT USE OF INSULIN: ICD-10-CM

## 2021-08-09 DIAGNOSIS — E66.9 OBESITY (BMI 30-39.9): ICD-10-CM

## 2021-08-11 RX ORDER — METFORMIN HYDROCHLORIDE 500 MG/1
1000 TABLET, EXTENDED RELEASE ORAL 2 TIMES DAILY WITH MEALS
Qty: 360 TABLET | Refills: 0 | Status: SHIPPED | OUTPATIENT
Start: 2021-08-11 | End: 2021-08-26 | Stop reason: SDUPTHER

## 2021-08-16 ENCOUNTER — PATIENT OUTREACH (OUTPATIENT)
Dept: ADMINISTRATIVE | Facility: HOSPITAL | Age: 56
End: 2021-08-16

## 2021-08-16 ENCOUNTER — PATIENT MESSAGE (OUTPATIENT)
Dept: ADMINISTRATIVE | Facility: HOSPITAL | Age: 56
End: 2021-08-16

## 2021-08-26 DIAGNOSIS — E11.9 TYPE 2 DIABETES MELLITUS WITHOUT COMPLICATION, WITH LONG-TERM CURRENT USE OF INSULIN: ICD-10-CM

## 2021-08-26 DIAGNOSIS — I10 HTN (HYPERTENSION), BENIGN: ICD-10-CM

## 2021-08-26 DIAGNOSIS — G47.33 OSA (OBSTRUCTIVE SLEEP APNEA): ICD-10-CM

## 2021-08-26 DIAGNOSIS — E66.9 OBESITY (BMI 30-39.9): ICD-10-CM

## 2021-08-26 DIAGNOSIS — Z79.4 TYPE 2 DIABETES MELLITUS WITHOUT COMPLICATION, WITH LONG-TERM CURRENT USE OF INSULIN: ICD-10-CM

## 2021-08-26 DIAGNOSIS — E78.5 HYPERLIPIDEMIA, UNSPECIFIED HYPERLIPIDEMIA TYPE: ICD-10-CM

## 2021-08-27 ENCOUNTER — TELEPHONE (OUTPATIENT)
Dept: INTERNAL MEDICINE | Facility: CLINIC | Age: 56
End: 2021-08-27

## 2021-08-27 RX ORDER — SULFAMETHOXAZOLE AND TRIMETHOPRIM 800; 160 MG/1; MG/1
1 TABLET ORAL 2 TIMES DAILY
Qty: 14 TABLET | Refills: 0 | Status: SHIPPED | OUTPATIENT
Start: 2021-08-27 | End: 2021-09-03

## 2021-08-27 RX ORDER — METFORMIN HYDROCHLORIDE 500 MG/1
1000 TABLET, EXTENDED RELEASE ORAL 2 TIMES DAILY WITH MEALS
Qty: 360 TABLET | Refills: 1 | Status: SHIPPED | OUTPATIENT
Start: 2021-08-27 | End: 2022-07-11

## 2021-08-27 RX ORDER — HYDROCHLOROTHIAZIDE 25 MG/1
TABLET ORAL
Qty: 90 TABLET | Refills: 3 | Status: SHIPPED | OUTPATIENT
Start: 2021-08-27 | End: 2021-12-31

## 2021-10-12 ENCOUNTER — PATIENT OUTREACH (OUTPATIENT)
Dept: ADMINISTRATIVE | Facility: OTHER | Age: 56
End: 2021-10-12

## 2021-10-13 ENCOUNTER — OFFICE VISIT (OUTPATIENT)
Dept: SPORTS MEDICINE | Facility: CLINIC | Age: 56
End: 2021-10-13
Payer: COMMERCIAL

## 2021-10-13 VITALS
HEIGHT: 62 IN | DIASTOLIC BLOOD PRESSURE: 77 MMHG | WEIGHT: 200.19 LBS | BODY MASS INDEX: 36.84 KG/M2 | HEART RATE: 69 BPM | SYSTOLIC BLOOD PRESSURE: 133 MMHG

## 2021-10-13 DIAGNOSIS — M25.461 EFFUSION OF RIGHT KNEE: ICD-10-CM

## 2021-10-13 DIAGNOSIS — M17.11 PRIMARY OSTEOARTHRITIS OF RIGHT KNEE: Primary | ICD-10-CM

## 2021-10-13 PROCEDURE — 20610 PR DRAIN/INJECT LARGE JOINT/BURSA: ICD-10-PCS | Mod: RT,S$GLB,, | Performed by: PHYSICIAN ASSISTANT

## 2021-10-13 PROCEDURE — 20610 DRAIN/INJ JOINT/BURSA W/O US: CPT | Mod: RT,S$GLB,, | Performed by: PHYSICIAN ASSISTANT

## 2021-10-13 PROCEDURE — 99999 PR PBB SHADOW E&M-EST. PATIENT-LVL III: CPT | Mod: PBBFAC,,, | Performed by: PHYSICIAN ASSISTANT

## 2021-10-13 PROCEDURE — 99213 OFFICE O/P EST LOW 20 MIN: CPT | Mod: 25,S$GLB,, | Performed by: PHYSICIAN ASSISTANT

## 2021-10-13 PROCEDURE — 99999 PR PBB SHADOW E&M-EST. PATIENT-LVL III: ICD-10-PCS | Mod: PBBFAC,,, | Performed by: PHYSICIAN ASSISTANT

## 2021-10-13 PROCEDURE — 99213 PR OFFICE/OUTPT VISIT, EST, LEVL III, 20-29 MIN: ICD-10-PCS | Mod: 25,S$GLB,, | Performed by: PHYSICIAN ASSISTANT

## 2021-10-13 RX ORDER — TRIAMCINOLONE ACETONIDE 40 MG/ML
40 INJECTION, SUSPENSION INTRA-ARTICULAR; INTRAMUSCULAR
Status: COMPLETED | OUTPATIENT
Start: 2021-10-13 | End: 2021-10-13

## 2021-10-13 RX ADMIN — TRIAMCINOLONE ACETONIDE 40 MG: 40 INJECTION, SUSPENSION INTRA-ARTICULAR; INTRAMUSCULAR at 10:10

## 2021-11-03 ENCOUNTER — IMMUNIZATION (OUTPATIENT)
Dept: INTERNAL MEDICINE | Facility: CLINIC | Age: 56
End: 2021-11-03
Payer: COMMERCIAL

## 2021-11-03 DIAGNOSIS — Z23 NEED FOR VACCINATION: Primary | ICD-10-CM

## 2021-11-03 PROCEDURE — 0004A COVID-19, MRNA, LNP-S, PF, 30 MCG/0.3 ML DOSE VACCINE: CPT | Mod: PBBFAC | Performed by: INTERNAL MEDICINE

## 2021-12-22 ENCOUNTER — TELEPHONE (OUTPATIENT)
Dept: INTERNAL MEDICINE | Facility: CLINIC | Age: 56
End: 2021-12-22
Payer: COMMERCIAL

## 2021-12-22 DIAGNOSIS — Z12.31 ENCOUNTER FOR SCREENING MAMMOGRAM FOR BREAST CANCER: Primary | ICD-10-CM

## 2021-12-23 DIAGNOSIS — E78.5 HYPERLIPIDEMIA, UNSPECIFIED HYPERLIPIDEMIA TYPE: ICD-10-CM

## 2021-12-23 DIAGNOSIS — I10 HTN (HYPERTENSION), BENIGN: ICD-10-CM

## 2021-12-23 DIAGNOSIS — G47.33 OSA (OBSTRUCTIVE SLEEP APNEA): ICD-10-CM

## 2021-12-23 DIAGNOSIS — E11.9 TYPE 2 DIABETES MELLITUS WITHOUT COMPLICATION, WITH LONG-TERM CURRENT USE OF INSULIN: ICD-10-CM

## 2021-12-23 DIAGNOSIS — Z79.4 TYPE 2 DIABETES MELLITUS WITHOUT COMPLICATION, WITH LONG-TERM CURRENT USE OF INSULIN: ICD-10-CM

## 2021-12-23 DIAGNOSIS — E66.9 OBESITY (BMI 30-39.9): ICD-10-CM

## 2021-12-23 NOTE — TELEPHONE ENCOUNTER
Care Due:                  Date            Visit Type   Department     Provider  --------------------------------------------------------------------------------                                             [facility]  Last Visit: 07-      None         MEDICINE       Scott Tripathi  Next Visit: None Scheduled  None         None Found                                                            Last  Test          Frequency    Reason                     Performed    Due Date  --------------------------------------------------------------------------------    HBA1C.......  6 months...  glimepiride, metFORMIN...  07- 01-    Powered by [employer]. Reference number: [id number].   12/23/2021 3:34:58 AM CST

## 2021-12-31 RX ORDER — HYDROCHLOROTHIAZIDE 25 MG/1
TABLET ORAL
Qty: 90 TABLET | Refills: 1 | Status: SHIPPED | OUTPATIENT
Start: 2021-12-31 | End: 2022-06-13

## 2021-12-31 NOTE — TELEPHONE ENCOUNTER
Refill Routing Note   Medication(s) are not appropriate for processing by [facility] for the following reason(s):      - Drug-Disease Interaction (hydroCHLOROthiazide and Hypokalemia)    [facility] action(s):  Defer Medication-related problems identified:   Requires labs  Drug-disease interaction     Medication Therapy Plan: DDI; Needs labs (A1-c)  --->Care Gap information included in message below if applicable.   Medication reconciliation completed: No   Automatic Epic Generated Protocol Data:        Requested Prescriptions   Pending Prescriptions Disp Refills    hydroCHLOROthiazide (HYDRODIURIL) 25 MG tablet [Pharmacy Med Name: HYDROCHLOROTHIAZIDE 25MG TABLETS] 90 tablet 1     Sig: TAKE 1 TABLET(25 MG) BY MOUTH EVERY DAY       Cardiovascular: Diuretics - Thiazide Passed - 12/23/2021  3:34 AM        Passed - Patient is at least 18 years old        Passed - Last BP in normal range within 360 days.     BP Readings from Last 3 Encounters:   10/13/21 133/77   07/19/21 120/80   06/01/21 117/74              Passed - Office visit in past 12 months.     Recent Visits  Date Type Provider Dept   07/19/21 Office Visit Scott Tripathi DO [facility] Internal Medicine   02/10/21 Office Visit Scott Tripathi DO [facility] Internal Medicine   12/29/20 Office Visit Scott Tripathi DO [facility] Internal Medicine   07/16/20 Office Visit Scott Tripathi DO [facility] Internal Medicine   Showing recent visits within past 720 days and meeting all other requirements  Future Appointments  No visits were found meeting these conditions.  Showing future appointments within next 150 days and meeting all other requirements      Future Appointments              In 1 week [facility] [id number] [facility] - Mammography, [facility]    In 1 week LAB, [facility], [place]    In 2 weeks DO Lilian Falk [facility] - Internal Medicine, [place]                Passed - Cr is 1.4 or below and within 360 days     Lab  Results   Component Value Date    CREATININE 0.8 07/27/2021    CREATININE 0.7 02/10/2021    CREATININE 1.0 12/14/2020              Passed - K in normal range and within 360 days     Potassium   Date Value Ref Range Status   07/27/2021 3.8 3.5 - 5.1 mmol/L Final   02/10/2021 4.1 3.5 - 5.1 mmol/L Final   12/14/2020 3.6 3.5 - 5.1 mmol/L Final              Passed - Na is between 130 and 148 and within 360 days     Sodium   Date Value Ref Range Status   07/27/2021 140 136 - 145 mmol/L Final   02/10/2021 140 136 - 145 mmol/L Final   12/14/2020 139 136 - 145 mmol/L Final              Passed - eGFR within 360 days     Lab Results   Component Value Date    EGFRNONAA >60.0 07/27/2021    EGFRNONAA >60.0 02/10/2021    EGFRNONAA >60.0 12/14/2020                      Appointments  past 12m or future 3m with PCP    Date Provider   Last Visit   7/19/2021 Scott Tripathi DO   Next Visit   1/19/2022 Scott Tripathi,    ED visits in past 90 days: 0        Note composed:1:29 PM 12/31/2021

## 2022-01-14 ENCOUNTER — LAB VISIT (OUTPATIENT)
Dept: LAB | Facility: HOSPITAL | Age: 57
End: 2022-01-14
Attending: INTERNAL MEDICINE
Payer: COMMERCIAL

## 2022-01-14 DIAGNOSIS — E11.9 TYPE 2 DIABETES MELLITUS WITHOUT COMPLICATION, WITH LONG-TERM CURRENT USE OF INSULIN: ICD-10-CM

## 2022-01-14 DIAGNOSIS — E78.5 HYPERLIPIDEMIA, UNSPECIFIED HYPERLIPIDEMIA TYPE: ICD-10-CM

## 2022-01-14 DIAGNOSIS — I10 HTN (HYPERTENSION), BENIGN: ICD-10-CM

## 2022-01-14 DIAGNOSIS — Z79.4 TYPE 2 DIABETES MELLITUS WITHOUT COMPLICATION, WITH LONG-TERM CURRENT USE OF INSULIN: ICD-10-CM

## 2022-01-14 LAB
ALBUMIN SERPL BCP-MCNC: 4.2 G/DL (ref 3.5–5.2)
ALP SERPL-CCNC: 76 U/L (ref 55–135)
ALT SERPL W/O P-5'-P-CCNC: 13 U/L (ref 10–44)
ANION GAP SERPL CALC-SCNC: 11 MMOL/L (ref 8–16)
AST SERPL-CCNC: 15 U/L (ref 10–40)
BASOPHILS # BLD AUTO: 0.06 K/UL (ref 0–0.2)
BASOPHILS NFR BLD: 0.7 % (ref 0–1.9)
BILIRUB SERPL-MCNC: 0.6 MG/DL (ref 0.1–1)
BUN SERPL-MCNC: 8 MG/DL (ref 6–20)
CALCIUM SERPL-MCNC: 10.1 MG/DL (ref 8.7–10.5)
CHLORIDE SERPL-SCNC: 102 MMOL/L (ref 95–110)
CO2 SERPL-SCNC: 27 MMOL/L (ref 23–29)
CREAT SERPL-MCNC: 0.7 MG/DL (ref 0.5–1.4)
DIFFERENTIAL METHOD: ABNORMAL
EOSINOPHIL # BLD AUTO: 0.1 K/UL (ref 0–0.5)
EOSINOPHIL NFR BLD: 1.2 % (ref 0–8)
ERYTHROCYTE [DISTWIDTH] IN BLOOD BY AUTOMATED COUNT: 14.2 % (ref 11.5–14.5)
EST. GFR  (AFRICAN AMERICAN): >60 ML/MIN/1.73 M^2
EST. GFR  (NON AFRICAN AMERICAN): >60 ML/MIN/1.73 M^2
ESTIMATED AVG GLUCOSE: 160 MG/DL (ref 68–131)
GLUCOSE SERPL-MCNC: 127 MG/DL (ref 70–110)
HBA1C MFR BLD: 7.2 % (ref 4–5.6)
HCT VFR BLD AUTO: 40.5 % (ref 37–48.5)
HGB BLD-MCNC: 12.8 G/DL (ref 12–16)
IMM GRANULOCYTES # BLD AUTO: 0.03 K/UL (ref 0–0.04)
IMM GRANULOCYTES NFR BLD AUTO: 0.4 % (ref 0–0.5)
LYMPHOCYTES # BLD AUTO: 3.3 K/UL (ref 1–4.8)
LYMPHOCYTES NFR BLD: 38.7 % (ref 18–48)
MCH RBC QN AUTO: 26.8 PG (ref 27–31)
MCHC RBC AUTO-ENTMCNC: 31.6 G/DL (ref 32–36)
MCV RBC AUTO: 85 FL (ref 82–98)
MONOCYTES # BLD AUTO: 0.6 K/UL (ref 0.3–1)
MONOCYTES NFR BLD: 6.9 % (ref 4–15)
NEUTROPHILS # BLD AUTO: 4.5 K/UL (ref 1.8–7.7)
NEUTROPHILS NFR BLD: 52.1 % (ref 38–73)
NRBC BLD-RTO: 0 /100 WBC
PLATELET # BLD AUTO: 399 K/UL (ref 150–450)
PMV BLD AUTO: 9.1 FL (ref 9.2–12.9)
POTASSIUM SERPL-SCNC: 4.5 MMOL/L (ref 3.5–5.1)
PROT SERPL-MCNC: 7.9 G/DL (ref 6–8.4)
RBC # BLD AUTO: 4.78 M/UL (ref 4–5.4)
SODIUM SERPL-SCNC: 140 MMOL/L (ref 136–145)
WBC # BLD AUTO: 8.56 K/UL (ref 3.9–12.7)

## 2022-01-14 PROCEDURE — 36415 COLL VENOUS BLD VENIPUNCTURE: CPT | Mod: PN | Performed by: INTERNAL MEDICINE

## 2022-01-14 PROCEDURE — 85025 COMPLETE CBC W/AUTO DIFF WBC: CPT | Performed by: INTERNAL MEDICINE

## 2022-01-14 PROCEDURE — 80053 COMPREHEN METABOLIC PANEL: CPT | Performed by: INTERNAL MEDICINE

## 2022-01-14 PROCEDURE — 83036 HEMOGLOBIN GLYCOSYLATED A1C: CPT | Performed by: INTERNAL MEDICINE

## 2022-01-18 ENCOUNTER — PATIENT MESSAGE (OUTPATIENT)
Dept: ADMINISTRATIVE | Facility: HOSPITAL | Age: 57
End: 2022-01-18
Payer: COMMERCIAL

## 2022-01-19 ENCOUNTER — OFFICE VISIT (OUTPATIENT)
Dept: INTERNAL MEDICINE | Facility: CLINIC | Age: 57
End: 2022-01-19
Payer: COMMERCIAL

## 2022-01-19 VITALS
SYSTOLIC BLOOD PRESSURE: 118 MMHG | HEIGHT: 62 IN | BODY MASS INDEX: 36.63 KG/M2 | HEART RATE: 67 BPM | RESPIRATION RATE: 16 BRPM | OXYGEN SATURATION: 99 % | WEIGHT: 199.06 LBS | DIASTOLIC BLOOD PRESSURE: 78 MMHG

## 2022-01-19 DIAGNOSIS — I15.2 HYPERTENSION ASSOCIATED WITH TYPE 2 DIABETES MELLITUS: ICD-10-CM

## 2022-01-19 DIAGNOSIS — E78.5 HYPERLIPIDEMIA ASSOCIATED WITH TYPE 2 DIABETES MELLITUS: ICD-10-CM

## 2022-01-19 DIAGNOSIS — E11.59 HYPERTENSION ASSOCIATED WITH TYPE 2 DIABETES MELLITUS: ICD-10-CM

## 2022-01-19 DIAGNOSIS — E11.69 HYPERLIPIDEMIA ASSOCIATED WITH TYPE 2 DIABETES MELLITUS: ICD-10-CM

## 2022-01-19 DIAGNOSIS — E66.2 OBESITY HYPOVENTILATION SYNDROME: ICD-10-CM

## 2022-01-19 DIAGNOSIS — E66.01 SEVERE OBESITY (BMI 35.0-39.9) WITH COMORBIDITY: Primary | ICD-10-CM

## 2022-01-19 DIAGNOSIS — E11.9 TYPE 2 DIABETES MELLITUS WITHOUT COMPLICATION, WITHOUT LONG-TERM CURRENT USE OF INSULIN: ICD-10-CM

## 2022-01-19 PROBLEM — E66.9 OBESITY (BMI 30-39.9): Status: RESOLVED | Noted: 2018-08-22 | Resolved: 2022-01-19

## 2022-01-19 PROBLEM — I10 HTN (HYPERTENSION), BENIGN: Status: RESOLVED | Noted: 2018-07-16 | Resolved: 2022-01-19

## 2022-01-19 PROCEDURE — 90471 IMMUNIZATION ADMIN: CPT | Mod: S$GLB,,, | Performed by: INTERNAL MEDICINE

## 2022-01-19 PROCEDURE — 3078F PR MOST RECENT DIASTOLIC BLOOD PRESSURE < 80 MM HG: ICD-10-PCS | Mod: CPTII,S$GLB,, | Performed by: INTERNAL MEDICINE

## 2022-01-19 PROCEDURE — 3008F PR BODY MASS INDEX (BMI) DOCUMENTED: ICD-10-PCS | Mod: CPTII,S$GLB,, | Performed by: INTERNAL MEDICINE

## 2022-01-19 PROCEDURE — 99999 PR PBB SHADOW E&M-EST. PATIENT-LVL IV: CPT | Mod: PBBFAC,,, | Performed by: INTERNAL MEDICINE

## 2022-01-19 PROCEDURE — 1160F RVW MEDS BY RX/DR IN RCRD: CPT | Mod: CPTII,S$GLB,, | Performed by: INTERNAL MEDICINE

## 2022-01-19 PROCEDURE — 3074F SYST BP LT 130 MM HG: CPT | Mod: CPTII,S$GLB,, | Performed by: INTERNAL MEDICINE

## 2022-01-19 PROCEDURE — 3008F BODY MASS INDEX DOCD: CPT | Mod: CPTII,S$GLB,, | Performed by: INTERNAL MEDICINE

## 2022-01-19 PROCEDURE — 99214 OFFICE O/P EST MOD 30 MIN: CPT | Mod: 25,S$GLB,, | Performed by: INTERNAL MEDICINE

## 2022-01-19 PROCEDURE — 99999 PR PBB SHADOW E&M-EST. PATIENT-LVL IV: ICD-10-PCS | Mod: PBBFAC,,, | Performed by: INTERNAL MEDICINE

## 2022-01-19 PROCEDURE — 90471 FLU VACCINE (QUAD) GREATER THAN OR EQUAL TO 3YO PRESERVATIVE FREE IM: ICD-10-PCS | Mod: S$GLB,,, | Performed by: INTERNAL MEDICINE

## 2022-01-19 PROCEDURE — 3078F DIAST BP <80 MM HG: CPT | Mod: CPTII,S$GLB,, | Performed by: INTERNAL MEDICINE

## 2022-01-19 PROCEDURE — 3051F PR MOST RECENT HEMOGLOBIN A1C LEVEL 7.0 - < 8.0%: ICD-10-PCS | Mod: CPTII,S$GLB,, | Performed by: INTERNAL MEDICINE

## 2022-01-19 PROCEDURE — 1160F PR REVIEW ALL MEDS BY PRESCRIBER/CLIN PHARMACIST DOCUMENTED: ICD-10-PCS | Mod: CPTII,S$GLB,, | Performed by: INTERNAL MEDICINE

## 2022-01-19 PROCEDURE — 1159F MED LIST DOCD IN RCRD: CPT | Mod: CPTII,S$GLB,, | Performed by: INTERNAL MEDICINE

## 2022-01-19 PROCEDURE — 99214 PR OFFICE/OUTPT VISIT, EST, LEVL IV, 30-39 MIN: ICD-10-PCS | Mod: 25,S$GLB,, | Performed by: INTERNAL MEDICINE

## 2022-01-19 PROCEDURE — 90686 IIV4 VACC NO PRSV 0.5 ML IM: CPT | Mod: S$GLB,,, | Performed by: INTERNAL MEDICINE

## 2022-01-19 PROCEDURE — 3051F HG A1C>EQUAL 7.0%<8.0%: CPT | Mod: CPTII,S$GLB,, | Performed by: INTERNAL MEDICINE

## 2022-01-19 PROCEDURE — 3074F PR MOST RECENT SYSTOLIC BLOOD PRESSURE < 130 MM HG: ICD-10-PCS | Mod: CPTII,S$GLB,, | Performed by: INTERNAL MEDICINE

## 2022-01-19 PROCEDURE — 90686 FLU VACCINE (QUAD) GREATER THAN OR EQUAL TO 3YO PRESERVATIVE FREE IM: ICD-10-PCS | Mod: S$GLB,,, | Performed by: INTERNAL MEDICINE

## 2022-01-19 PROCEDURE — 1159F PR MEDICATION LIST DOCUMENTED IN MEDICAL RECORD: ICD-10-PCS | Mod: CPTII,S$GLB,, | Performed by: INTERNAL MEDICINE

## 2022-01-19 RX ORDER — NAPROXEN 500 MG/1
TABLET ORAL
COMMUNITY
Start: 2021-11-17 | End: 2022-07-14

## 2022-01-19 NOTE — PROGRESS NOTES
Subjective:       Patient ID: Grecia Boyd is a 56 y.o. female.    Chief Complaint: Follow-up (6 month follow-up, labs 1/13/22)    HPI   Pt with HTN, T2DM, HLD, Obesity, CHIDI is here for 6 month f/u to review labs.   Review of Systems   Constitutional: Negative for activity change, appetite change, chills, diaphoresis, fatigue, fever and unexpected weight change.   HENT: Negative for nasal congestion, mouth sores, postnasal drip, rhinorrhea, sinus pressure/congestion, sneezing, sore throat, trouble swallowing and voice change.    Eyes: Negative for pain, discharge and visual disturbance.   Respiratory: Negative for cough, shortness of breath and wheezing.    Cardiovascular: Negative for chest pain, palpitations and leg swelling.   Gastrointestinal: Negative for abdominal pain, blood in stool, constipation, diarrhea, nausea and vomiting.   Endocrine: Negative for cold intolerance and heat intolerance.   Genitourinary: Negative for difficulty urinating, dysuria, frequency, hematuria and urgency.   Musculoskeletal: Negative for arthralgias and myalgias.   Integumentary:  Negative for rash and wound.   Allergic/Immunologic: Negative for environmental allergies and food allergies.   Neurological: Negative for dizziness, tremors, seizures, syncope, weakness, light-headedness and headaches.   Hematological: Negative for adenopathy. Does not bruise/bleed easily.   Psychiatric/Behavioral: Negative for confusion and sleep disturbance. The patient is not nervous/anxious.          Objective:      Physical Exam  Vitals and nursing note reviewed.   Constitutional:       General: She is not in acute distress.     Appearance: She is well-developed and well-nourished. She is not diaphoretic.   HENT:      Head: Normocephalic and atraumatic.      Right Ear: External ear normal.      Left Ear: External ear normal.      Nose: Nose normal.      Mouth/Throat:      Mouth: Oropharynx is clear and moist.      Pharynx: No oropharyngeal  exudate.   Eyes:      General: No scleral icterus.        Right eye: No discharge.         Left eye: No discharge.      Extraocular Movements: EOM normal.      Conjunctiva/sclera: Conjunctivae normal.      Pupils: Pupils are equal, round, and reactive to light.   Neck:      Thyroid: No thyromegaly.      Vascular: No JVD.   Cardiovascular:      Rate and Rhythm: Normal rate and regular rhythm.      Pulses: Intact distal pulses.      Heart sounds: Normal heart sounds. No murmur heard.      Pulmonary:      Effort: Pulmonary effort is normal. No respiratory distress.      Breath sounds: Normal breath sounds. No wheezing or rales.   Chest:      Chest wall: No tenderness.   Abdominal:      General: Bowel sounds are normal. There is no distension.      Palpations: Abdomen is soft.      Tenderness: There is no abdominal tenderness. There is no guarding.   Musculoskeletal:         General: No edema.      Cervical back: Neck supple.   Lymphadenopathy:      Cervical: No cervical adenopathy.   Skin:     General: Skin is warm and dry.      Coloration: Skin is not pale.      Findings: No rash.   Neurological:      Mental Status: She is alert and oriented to person, place, and time.   Psychiatric:         Mood and Affect: Mood and affect normal.         Judgment: Judgment normal.         Assessment:       Problem List Items Addressed This Visit        Cardiac/Vascular    Hyperlipidemia associated with type 2 diabetes mellitus    Relevant Medications    semaglutide (OZEMPIC) 0.25 mg or 0.5 mg(2 mg/1.5 mL) pen injector       Endocrine    Type 2 diabetes mellitus without complication, without long-term current use of insulin    Relevant Medications    semaglutide (OZEMPIC) 0.25 mg or 0.5 mg(2 mg/1.5 mL) pen injector    Severe obesity (BMI 35.0-39.9) with comorbidity - Primary       Other    Obesity hypoventilation syndrome    Hypertension associated with type 2 diabetes mellitus    Relevant Medications    semaglutide (OZEMPIC) 0.25 mg  or 0.5 mg(2 mg/1.5 mL) pen injector          Plan:    HTN- stable on Norvasc/HCTZ/Losartan       T2DM- last HA1C of 7.2(1/22)<--7.1(7/21)<--6.9(10/20)<--7.1(8/20)<--6.8(10/19)<--9.2(5/19)<--6.7(1/19)      -continue Metformin/Amaryl, pt unable to afford Jardiance      -add Ozempic 0.5 mg sq qwk      HLD- continue Lipitor 40 mg daily      Obesity- pt advised on proper diet/exercise for weight loss      CHIDI- stable on CPAP     F/u in 6 months for annual exam

## 2022-01-19 NOTE — PROGRESS NOTES
"Chief Complaint  Chief Complaint   Patient presents with    Follow-up     6 month follow-up, labs 1/13/22       HPI  Grecia Boyd is a 56 y.o. female with multiple chronic medical conditions most significant for DM2, HLD, HTN, and obesity that presents for follow up. Patient reports since her last visit six months ago she has had increased stress due to her moth becoming ill and passing away. The patient reports after her mother passed in September she has had intermittent depressed mood and episodes of "just getting down". She states she is doing better now. She has a strong support system, spends lots of time with her grandchildren, and attends [employer] regularly. Due to the increased stress she has had difficulty maintaining a healthy diet, but has plans to get back on track with this. She states she is walking constantly while at work. She has been monitoring her blood pressure which she states has been stable.     PAST MEDICAL HISTORY:  Past Medical History:   Diagnosis Date    Diabetes mellitus, type 2     Hyperlipidemia     Hypertension     Obesity        PAST SURGICAL HISTORY:  Past Surgical History:   Procedure Laterality Date    ADENOIDECTOMY      BREAST BIOPSY Right     @ age 18    CHONDROPLASTY OF KNEE Right 2/12/2021    Procedure: CHONDROPLASTY, KNEE;  Surgeon: ZULEIKA Barahona MD;  Location: [facility] OR;  Service: Orthopedics;  Laterality: Right;    HYSTERECTOMY      2009    KNEE ARTHROSCOPY W/ MENISCECTOMY Right 2/12/2021    Procedure: ARTHROSCOPY, KNEE, WITH MEDIAL MENISCECTOMY LATERAL RELEASE;  Surgeon: ZULEIKA Barahoan MD;  Location: [facility] OR;  Service: Orthopedics;  Laterality: Right;  pericapsular injection: 30cc ropivacaine/epi/clonidine/ketorolac injection     KNEE SURGERY      SYNOVECTOMY OF KNEE Right 2/12/2021    Procedure: SYNOVECTOMY, KNEE;  Surgeon: ZULEIKA Barahona MD;  Location: [facility] OR;  Service: Orthopedics;  Laterality: Right;    THYROIDECTOMY, PARTIAL      TONSILLECTOMY  "        SOCIAL HISTORY:  Social History     Socioeconomic History    Marital status:     Number of children: 2   Tobacco Use    Smoking status: Never Smoker    Smokeless tobacco: Never Used   Substance and Sexual Activity    Alcohol use: Yes     Comment: rare    Drug use: No    Sexual activity: Yes     Partners: Male       FAMILY HISTORY:  Family History   Problem Relation Age of Onset    Diabetes Mother     Heart disease Mother     Hypertension Mother     Hyperlipidemia Mother     Stroke Mother     Cataracts Mother     Cancer Paternal Aunt         Lung    Diabetes Paternal Grandmother     Breast cancer Paternal Aunt     Macular degeneration Paternal Aunt     Breast cancer Cousin     Colon cancer Neg Hx     Ovarian cancer Neg Hx        ALLERGIES AND MEDICATIONS: updated and reviewed.  Review of patient's allergies indicates:  No Known Allergies  Current Outpatient Medications   Medication Sig Dispense Refill    amLODIPine (NORVASC) 5 MG tablet TAKE 1 TABLET(5 MG) BY MOUTH EVERY DAY 90 tablet 3    atorvastatin (LIPITOR) 40 MG tablet TAKE 1 TABLET(40 MG) BY MOUTH EVERY DAY 90 tablet 3    glimepiride (AMARYL) 4 MG tablet Take 1 tablet (4 mg total) by mouth before breakfast. 90 tablet 1    hydroCHLOROthiazide (HYDRODIURIL) 25 MG tablet TAKE 1 TABLET(25 MG) BY MOUTH EVERY DAY 90 tablet 1    losartan (COZAAR) 25 MG tablet TAKE 1 TABLET(25 MG) BY MOUTH EVERY DAY 90 tablet 3    metFORMIN (GLUCOPHAGE-XR) 500 MG ER 24hr tablet Take 2 tablets (1,000 mg total) by mouth 2 (two) times daily with meals. 360 tablet 1    naproxen (NAPROSYN) 500 MG tablet SMARTSI Tablet(s) By Mouth Every 12 Hours PRN      semaglutide (OZEMPIC) 0.25 mg or 0.5 mg(2 mg/1.5 mL) pen injector Inject 0.5 mg into the skin every 7 days. 4 pen 5     No current facility-administered medications for this visit.         ROS  Review of Systems   Constitutional: Negative for activity change, appetite change and unexpected weight  "change.        Positive for weight gain   Respiratory: Negative for cough and shortness of breath.    Cardiovascular: Negative for chest pain.   Skin: Negative for wound.   Psychiatric/Behavioral: Negative for self-injury and suicidal ideas. The patient is not nervous/anxious.    All other systems reviewed and are negative.          Physical Exam  Vitals:    01/19/22 0815   BP: 118/78   BP Location: Left arm   Patient Position: Sitting   BP Method: Large (Manual)   Pulse: 67   Resp: 16   SpO2: 99%   Weight: 90.3 kg (199 lb 1.2 oz)   Height: 5' 2" (1.575 m)    Body mass index is 36.41 kg/m².  Weight: 90.3 kg (199 lb 1.2 oz)   Height: 5' 2" (157.5 cm)   Physical Exam  Vitals and nursing note reviewed.   Constitutional:       Appearance: Normal appearance. She is obese.   HENT:      Head: Normocephalic and atraumatic.   Cardiovascular:      Rate and Rhythm: Normal rate and regular rhythm.      Pulses: Normal pulses.      Heart sounds: Normal heart sounds.   Pulmonary:      Effort: Pulmonary effort is normal.      Breath sounds: Normal breath sounds.   Skin:     General: Skin is warm and dry.   Neurological:      General: No focal deficit present.      Mental Status: She is alert and oriented to person, place, and time.           Health Maintenance       Date Due Completion Date    Hepatitis C Screening Never done ---    HIV Screening Never done ---    TETANUS VACCINE Never done ---    Shingles Vaccine (1 of 2) Never done ---    Eye Exam 11/21/2019 11/21/2018    Foot Exam 10/23/2020 10/23/2019    Influenza Vaccine (1) 09/01/2021 11/12/2020    Mammogram 11/30/2021 11/30/2020    Hemoglobin A1c 04/14/2022 1/14/2022    Lipid Panel 07/27/2022 7/27/2021    Diabetes Urine Screening 07/28/2022 7/28/2021    Low Dose Statin 10/13/2022 10/13/2021    Colorectal Cancer Screening 01/01/2027 1/1/2017 (Done)    Override on 1/1/2017: Done    Pneumococcal Vaccines (Age 0-64) (2 of 2 - PPSV23) 08/22/2031 7/19/2021            Assessment " and Plan:  Grecia Boyd is a 56 year old female with DM2, HLD, HTN, and obesity who presents for 6 month follow up. Patient reports decreased adherence to a low-carbohydrate diet due to increased stress after her mother's death. Patient has had some weight gain. Patient was offered mental health services for further assistance with stress and grief, but patient declined at this time. Patient has strong support system. Laboratory studies were reviewed with the patient and significant for increased HbA1c to 7.2% and increased fasting glucose of 127 (decreased from 170 6 months prior). Patient was educated on the importance of diet and exercise habits and has plans to work on these. Discussed diabetic medications with the patient and agreed to initiate Ozempic for glycemic control and weight loss. Patient has had stable blood pressures. Patient will continue prescribed antihypertensive and statin medications. Patient is due for influenza immunization and agrees to up date this today.     Severe obesity (BMI 35.0-39.9) with comorbidity    Type 2 diabetes mellitus without complication, without long-term current use of insulin  -     semaglutide (OZEMPIC) 0.25 mg or 0.5 mg(2 mg/1.5 mL) pen injector; Inject 0.5 mg into the skin every 7 days.  Dispense: 4 pen; Refill: 5    Obesity hypoventilation syndrome    Hypertension associated with type 2 diabetes mellitus    Hyperlipidemia associated with type 2 diabetes mellitus    Other orders  -     Influenza - Quadrivalent (PF)      Signed By:  Wendi Roberts, MS4

## 2022-01-21 ENCOUNTER — HOSPITAL ENCOUNTER (OUTPATIENT)
Dept: RADIOLOGY | Facility: HOSPITAL | Age: 57
Discharge: HOME | End: 2022-01-21
Attending: INTERNAL MEDICINE
Payer: COMMERCIAL

## 2022-01-21 VITALS — BODY MASS INDEX: 36.21 KG/M2 | WEIGHT: 198 LBS

## 2022-01-21 PROCEDURE — 77063 BREAST TOMOSYNTHESIS BI: CPT | Mod: TC,PN

## 2022-01-21 PROCEDURE — 77067 SCR MAMMO BI INCL CAD: CPT | Mod: TC,PN

## 2022-01-21 PROCEDURE — 77067 MAMMO DIGITAL SCREENING BILAT WITH TOMO: ICD-10-PCS | Mod: 26,,, | Performed by: RADIOLOGY

## 2022-01-21 PROCEDURE — 77067 SCR MAMMO BI INCL CAD: CPT | Mod: 26,,, | Performed by: RADIOLOGY

## 2022-01-21 PROCEDURE — 77063 MAMMO DIGITAL SCREENING BILAT WITH TOMO: ICD-10-PCS | Mod: 26,,, | Performed by: RADIOLOGY

## 2022-01-21 PROCEDURE — 77063 BREAST TOMOSYNTHESIS BI: CPT | Mod: 26,,, | Performed by: RADIOLOGY

## 2022-03-22 ENCOUNTER — PATIENT OUTREACH (OUTPATIENT)
Dept: ADMINISTRATIVE | Facility: OTHER | Age: 57
End: 2022-03-22
Payer: COMMERCIAL

## 2022-03-22 DIAGNOSIS — E11.9 TYPE 2 DIABETES MELLITUS WITHOUT COMPLICATION, UNSPECIFIED WHETHER LONG TERM INSULIN USE: Primary | ICD-10-CM

## 2022-03-23 ENCOUNTER — OFFICE VISIT (OUTPATIENT)
Dept: SPORTS MEDICINE | Facility: CLINIC | Age: 57
End: 2022-03-23
Payer: COMMERCIAL

## 2022-03-23 VITALS
BODY MASS INDEX: 35.83 KG/M2 | HEART RATE: 74 BPM | WEIGHT: 194.69 LBS | SYSTOLIC BLOOD PRESSURE: 112 MMHG | DIASTOLIC BLOOD PRESSURE: 67 MMHG | HEIGHT: 62 IN

## 2022-03-23 DIAGNOSIS — M17.11 PRIMARY OSTEOARTHRITIS OF RIGHT KNEE: Primary | ICD-10-CM

## 2022-03-23 PROCEDURE — 4010F PR ACE/ARB THEARPY RXD/TAKEN: ICD-10-PCS | Mod: CPTII,S$GLB,, | Performed by: PHYSICIAN ASSISTANT

## 2022-03-23 PROCEDURE — 3008F PR BODY MASS INDEX (BMI) DOCUMENTED: ICD-10-PCS | Mod: CPTII,S$GLB,, | Performed by: PHYSICIAN ASSISTANT

## 2022-03-23 PROCEDURE — 1159F MED LIST DOCD IN RCRD: CPT | Mod: CPTII,S$GLB,, | Performed by: PHYSICIAN ASSISTANT

## 2022-03-23 PROCEDURE — 1160F PR REVIEW ALL MEDS BY PRESCRIBER/CLIN PHARMACIST DOCUMENTED: ICD-10-PCS | Mod: CPTII,S$GLB,, | Performed by: PHYSICIAN ASSISTANT

## 2022-03-23 PROCEDURE — 3078F PR MOST RECENT DIASTOLIC BLOOD PRESSURE < 80 MM HG: ICD-10-PCS | Mod: CPTII,S$GLB,, | Performed by: PHYSICIAN ASSISTANT

## 2022-03-23 PROCEDURE — 3051F HG A1C>EQUAL 7.0%<8.0%: CPT | Mod: CPTII,S$GLB,, | Performed by: PHYSICIAN ASSISTANT

## 2022-03-23 PROCEDURE — 3051F PR MOST RECENT HEMOGLOBIN A1C LEVEL 7.0 - < 8.0%: ICD-10-PCS | Mod: CPTII,S$GLB,, | Performed by: PHYSICIAN ASSISTANT

## 2022-03-23 PROCEDURE — 20610 DRAIN/INJ JOINT/BURSA W/O US: CPT | Mod: RT,S$GLB,, | Performed by: PHYSICIAN ASSISTANT

## 2022-03-23 PROCEDURE — 99213 OFFICE O/P EST LOW 20 MIN: CPT | Mod: 25,S$GLB,, | Performed by: PHYSICIAN ASSISTANT

## 2022-03-23 PROCEDURE — 4010F ACE/ARB THERAPY RXD/TAKEN: CPT | Mod: CPTII,S$GLB,, | Performed by: PHYSICIAN ASSISTANT

## 2022-03-23 PROCEDURE — 20610 PR DRAIN/INJECT LARGE JOINT/BURSA: ICD-10-PCS | Mod: RT,S$GLB,, | Performed by: PHYSICIAN ASSISTANT

## 2022-03-23 PROCEDURE — 1159F PR MEDICATION LIST DOCUMENTED IN MEDICAL RECORD: ICD-10-PCS | Mod: CPTII,S$GLB,, | Performed by: PHYSICIAN ASSISTANT

## 2022-03-23 PROCEDURE — 3008F BODY MASS INDEX DOCD: CPT | Mod: CPTII,S$GLB,, | Performed by: PHYSICIAN ASSISTANT

## 2022-03-23 PROCEDURE — 3074F SYST BP LT 130 MM HG: CPT | Mod: CPTII,S$GLB,, | Performed by: PHYSICIAN ASSISTANT

## 2022-03-23 PROCEDURE — 3074F PR MOST RECENT SYSTOLIC BLOOD PRESSURE < 130 MM HG: ICD-10-PCS | Mod: CPTII,S$GLB,, | Performed by: PHYSICIAN ASSISTANT

## 2022-03-23 PROCEDURE — 3078F DIAST BP <80 MM HG: CPT | Mod: CPTII,S$GLB,, | Performed by: PHYSICIAN ASSISTANT

## 2022-03-23 PROCEDURE — 99213 PR OFFICE/OUTPT VISIT, EST, LEVL III, 20-29 MIN: ICD-10-PCS | Mod: 25,S$GLB,, | Performed by: PHYSICIAN ASSISTANT

## 2022-03-23 PROCEDURE — 99999 PR PBB SHADOW E&M-EST. PATIENT-LVL III: ICD-10-PCS | Mod: PBBFAC,,, | Performed by: PHYSICIAN ASSISTANT

## 2022-03-23 PROCEDURE — 1160F RVW MEDS BY RX/DR IN RCRD: CPT | Mod: CPTII,S$GLB,, | Performed by: PHYSICIAN ASSISTANT

## 2022-03-23 PROCEDURE — 99999 PR PBB SHADOW E&M-EST. PATIENT-LVL III: CPT | Mod: PBBFAC,,, | Performed by: PHYSICIAN ASSISTANT

## 2022-03-23 RX ORDER — TRIAMCINOLONE ACETONIDE 40 MG/ML
40 INJECTION, SUSPENSION INTRA-ARTICULAR; INTRAMUSCULAR
Status: COMPLETED | OUTPATIENT
Start: 2022-03-23 | End: 2022-03-23

## 2022-03-23 RX ADMIN — TRIAMCINOLONE ACETONIDE 40 MG: 40 INJECTION, SUSPENSION INTRA-ARTICULAR; INTRAMUSCULAR at 02:03

## 2022-03-23 NOTE — PROGRESS NOTES
S:Grecia Boyd presents for post-operative evaluation.     DATE OF PROCEDURE: 2/12/2021   PROCEDURE PERFORMED:   Right  1. knee arthroscopic partial medial meniscectomy (CPT 41369)  2. knee arthroscopic 2-compartment synovectomy/extensive debridement (CPT 05269)         3. knee arthroscopic chondroplasty (CPT 31268)  4. knee arthroscopic lateral release/peripatellar release package (CPT 91820)     Grecia Boyd is here today for right knee pain and swelling.  She is over one year s/p the above procedure.  She states that her knee has only started bothering her over the last couple weeks or so.  She last had a CSI by me in 10/2021 which provided significant relief for 6 months.  She has started a new job that does not require her to be on her feet as much, which has helped.  She feels there is swelling present that is bothering her knee along with some popping sensations in the front of her knee as well.   Here today requesting repeat injection.    O: RLE: The incisions are well healed.  No signs of infection. No significant pain or unusual tenderness. She has no effusion present. ROM in clinic today, active extension about 3 degrees short of full terminal extension, passively can reach full extension.  Flexion to 120 actively.    A/P:   -Continue therapy exercises, focusing on regaining full terminal extension  -CSI right knee  -RTC as needed, if symptoms do not resolve with CSI, will place prior auth for viscosupplementation.  -Patient verbalized understanding    PROCEDURE:  The injection site was identified and the skin was prepared with a Chloraprep solution. The right knee joint was injected with 1 ml of Kenalog 40mg and 4 ml Ropivacaine under sterile technique. Grecia Boyd tolerated the procedure well, she was advised to rest the right knee today, ice and elevation. she did receive immediate relief of the pain in and about her right knee. she was told this would be short lived and is secondary to the  Ropivacaine. she may have an increase in her discomfort tonight followed by steady improvement over the next several days. It may take 1-3 weeks following the injection to get the full benefit of the medication.  I will see her back in 4-6 months. Sooner if she has any problems or concerns.    Grecia Boyd was advised to monitor her blood sugars closely over the next several days. The steroid may cause a rise in them. If her glucose levels rise to a point she is uncomfortable or she is unable to control them, she is to contact her PCP or go immediately to the emergency department.            .

## 2022-03-29 ENCOUNTER — PATIENT MESSAGE (OUTPATIENT)
Dept: RESEARCH | Facility: HOSPITAL | Age: 57
End: 2022-03-29
Payer: COMMERCIAL

## 2022-05-07 LAB
LEFT EYE DM RETINOPATHY: NEGATIVE
RIGHT EYE DM RETINOPATHY: NEGATIVE

## 2022-06-13 DIAGNOSIS — E66.9 OBESITY (BMI 30-39.9): ICD-10-CM

## 2022-06-13 DIAGNOSIS — I10 HTN (HYPERTENSION), BENIGN: ICD-10-CM

## 2022-06-13 DIAGNOSIS — E11.9 TYPE 2 DIABETES MELLITUS WITHOUT COMPLICATION, WITH LONG-TERM CURRENT USE OF INSULIN: ICD-10-CM

## 2022-06-13 DIAGNOSIS — E78.5 HYPERLIPIDEMIA, UNSPECIFIED HYPERLIPIDEMIA TYPE: ICD-10-CM

## 2022-06-13 DIAGNOSIS — G47.33 OSA (OBSTRUCTIVE SLEEP APNEA): ICD-10-CM

## 2022-06-13 DIAGNOSIS — Z79.4 TYPE 2 DIABETES MELLITUS WITHOUT COMPLICATION, WITH LONG-TERM CURRENT USE OF INSULIN: ICD-10-CM

## 2022-06-13 RX ORDER — HYDROCHLOROTHIAZIDE 25 MG/1
TABLET ORAL
Qty: 90 TABLET | Refills: 1 | Status: SHIPPED | OUTPATIENT
Start: 2022-06-13 | End: 2023-02-22

## 2022-06-13 NOTE — TELEPHONE ENCOUNTER
Care Due:                  Date            Visit Type   Department     Provider  --------------------------------------------------------------------------------                                EP -                              PRIMARY      [facility] INTERNAL  Last Visit: 01-      CARE ([facility])   [facility]       Scott Tripathi                               -                              PRIMARY      [facility] INTERNAL  Next Visit: 07-      [facility] ([facility])   [facility]       Scott Tripathi                                                            Last  Test          Frequency    Reason                     Performed    Due Date  --------------------------------------------------------------------------------    HBA1C.......  6 months...  glimepiride, metFORMIN,    01-   07-                             semaglutide..............    Lipid Panel.  12 months..  atorvastatin.............  07- 07-    [facility] Embedded Care Gaps. Reference number: [id number]. 6/13/2022   9:05:29 AM CDT

## 2022-06-14 NOTE — TELEPHONE ENCOUNTER
Refill Authorization Note   Grecia Boyd  is requesting a refill authorization.  Brief Assessment and Rationale for Refill:  Approve    -Medication-Related Problems Identified: Requires labs  Medication Therapy Plan:       Medication Reconciliation Completed: No   Comments:     Provider Staff:     Action is required for this patient.   Please see care gap opportunities below in Care Due Message.     Thanks!  [facility]     Appointments      Date Provider   Last Visit   1/19/2022 Scott Tripathi,    Next Visit   7/14/2022 Scott Tripathi,      Note composed:11:25 PM 06/13/2022           Note composed:11:25 PM 06/13/2022

## 2022-07-05 ENCOUNTER — TELEPHONE (OUTPATIENT)
Dept: INTERNAL MEDICINE | Facility: CLINIC | Age: 57
End: 2022-07-05
Payer: COMMERCIAL

## 2022-07-05 DIAGNOSIS — I15.2 HYPERTENSION ASSOCIATED WITH TYPE 2 DIABETES MELLITUS: ICD-10-CM

## 2022-07-05 DIAGNOSIS — E11.69 HYPERLIPIDEMIA ASSOCIATED WITH TYPE 2 DIABETES MELLITUS: Primary | ICD-10-CM

## 2022-07-05 DIAGNOSIS — E11.59 HYPERTENSION ASSOCIATED WITH TYPE 2 DIABETES MELLITUS: ICD-10-CM

## 2022-07-05 DIAGNOSIS — E78.5 HYPERLIPIDEMIA ASSOCIATED WITH TYPE 2 DIABETES MELLITUS: Primary | ICD-10-CM

## 2022-07-05 DIAGNOSIS — Z00.00 ANNUAL PHYSICAL EXAM: ICD-10-CM

## 2022-07-05 DIAGNOSIS — E11.9 TYPE 2 DIABETES MELLITUS WITHOUT COMPLICATION, WITHOUT LONG-TERM CURRENT USE OF INSULIN: ICD-10-CM

## 2022-07-05 NOTE — TELEPHONE ENCOUNTER
----- Message from Karina Littlejohn sent at 7/5/2022  9:21 AM CDT -----  Regarding: Lab Orders  Patient is scheduled for labs on 07/07/22 and is scheduled for an annual visit on 07/14/22 but orders are not in.  Please attach orders to the appointment.    Thanks   Karina

## 2022-07-07 ENCOUNTER — IMMUNIZATION (OUTPATIENT)
Dept: INTERNAL MEDICINE | Facility: CLINIC | Age: 57
End: 2022-07-07
Payer: COMMERCIAL

## 2022-07-07 ENCOUNTER — LAB VISIT (OUTPATIENT)
Dept: LAB | Facility: HOSPITAL | Age: 57
End: 2022-07-07
Attending: INTERNAL MEDICINE
Payer: COMMERCIAL

## 2022-07-07 DIAGNOSIS — E11.69 HYPERLIPIDEMIA ASSOCIATED WITH TYPE 2 DIABETES MELLITUS: ICD-10-CM

## 2022-07-07 DIAGNOSIS — E11.9 TYPE 2 DIABETES MELLITUS WITHOUT COMPLICATION, WITHOUT LONG-TERM CURRENT USE OF INSULIN: ICD-10-CM

## 2022-07-07 DIAGNOSIS — Z00.00 ANNUAL PHYSICAL EXAM: ICD-10-CM

## 2022-07-07 DIAGNOSIS — E78.5 HYPERLIPIDEMIA ASSOCIATED WITH TYPE 2 DIABETES MELLITUS: ICD-10-CM

## 2022-07-07 DIAGNOSIS — I15.2 HYPERTENSION ASSOCIATED WITH TYPE 2 DIABETES MELLITUS: ICD-10-CM

## 2022-07-07 DIAGNOSIS — E11.59 HYPERTENSION ASSOCIATED WITH TYPE 2 DIABETES MELLITUS: ICD-10-CM

## 2022-07-07 DIAGNOSIS — Z23 NEED FOR VACCINATION: Primary | ICD-10-CM

## 2022-07-07 LAB
ALBUMIN SERPL BCP-MCNC: 3.9 G/DL (ref 3.5–5.2)
ALP SERPL-CCNC: 81 U/L (ref 55–135)
ALT SERPL W/O P-5'-P-CCNC: 12 U/L (ref 10–44)
ANION GAP SERPL CALC-SCNC: 9 MMOL/L (ref 8–16)
AST SERPL-CCNC: 16 U/L (ref 10–40)
BASOPHILS # BLD AUTO: 0.06 K/UL (ref 0–0.2)
BASOPHILS NFR BLD: 0.7 % (ref 0–1.9)
BILIRUB SERPL-MCNC: 0.4 MG/DL (ref 0.1–1)
BUN SERPL-MCNC: 10 MG/DL (ref 6–20)
CALCIUM SERPL-MCNC: 10.4 MG/DL (ref 8.7–10.5)
CHLORIDE SERPL-SCNC: 98 MMOL/L (ref 95–110)
CHOLEST SERPL-MCNC: 135 MG/DL (ref 120–199)
CHOLEST/HDLC SERPL: 3.1 {RATIO} (ref 2–5)
CO2 SERPL-SCNC: 32 MMOL/L (ref 23–29)
CREAT SERPL-MCNC: 0.8 MG/DL (ref 0.5–1.4)
DIFFERENTIAL METHOD: ABNORMAL
EOSINOPHIL # BLD AUTO: 0.2 K/UL (ref 0–0.5)
EOSINOPHIL NFR BLD: 1.9 % (ref 0–8)
ERYTHROCYTE [DISTWIDTH] IN BLOOD BY AUTOMATED COUNT: 13.4 % (ref 11.5–14.5)
EST. GFR  (AFRICAN AMERICAN): >60 ML/MIN/1.73 M^2
EST. GFR  (NON AFRICAN AMERICAN): >60 ML/MIN/1.73 M^2
ESTIMATED AVG GLUCOSE: 140 MG/DL (ref 68–131)
GLUCOSE SERPL-MCNC: 132 MG/DL (ref 70–110)
HBA1C MFR BLD: 6.5 % (ref 4–5.6)
HCT VFR BLD AUTO: 38.8 % (ref 37–48.5)
HDLC SERPL-MCNC: 44 MG/DL (ref 40–75)
HDLC SERPL: 32.6 % (ref 20–50)
HGB BLD-MCNC: 11.8 G/DL (ref 12–16)
IMM GRANULOCYTES # BLD AUTO: 0.02 K/UL (ref 0–0.04)
IMM GRANULOCYTES NFR BLD AUTO: 0.2 % (ref 0–0.5)
LDLC SERPL CALC-MCNC: 72.6 MG/DL (ref 63–159)
LYMPHOCYTES # BLD AUTO: 3.8 K/UL (ref 1–4.8)
LYMPHOCYTES NFR BLD: 42.2 % (ref 18–48)
MCH RBC QN AUTO: 26.3 PG (ref 27–31)
MCHC RBC AUTO-ENTMCNC: 30.4 G/DL (ref 32–36)
MCV RBC AUTO: 86 FL (ref 82–98)
MONOCYTES # BLD AUTO: 0.9 K/UL (ref 0.3–1)
MONOCYTES NFR BLD: 10.1 % (ref 4–15)
NEUTROPHILS # BLD AUTO: 4.1 K/UL (ref 1.8–7.7)
NEUTROPHILS NFR BLD: 44.9 % (ref 38–73)
NONHDLC SERPL-MCNC: 91 MG/DL
NRBC BLD-RTO: 0 /100 WBC
PLATELET # BLD AUTO: 418 K/UL (ref 150–450)
PMV BLD AUTO: 8.6 FL (ref 9.2–12.9)
POTASSIUM SERPL-SCNC: 3.6 MMOL/L (ref 3.5–5.1)
PROT SERPL-MCNC: 7.6 G/DL (ref 6–8.4)
RBC # BLD AUTO: 4.49 M/UL (ref 4–5.4)
SODIUM SERPL-SCNC: 139 MMOL/L (ref 136–145)
TRIGL SERPL-MCNC: 92 MG/DL (ref 30–150)
TSH SERPL DL<=0.005 MIU/L-ACNC: 1.93 UIU/ML (ref 0.4–4)
WBC # BLD AUTO: 9.02 K/UL (ref 3.9–12.7)

## 2022-07-07 PROCEDURE — 80053 COMPREHEN METABOLIC PANEL: CPT | Performed by: INTERNAL MEDICINE

## 2022-07-07 PROCEDURE — 36415 COLL VENOUS BLD VENIPUNCTURE: CPT | Mod: PN | Performed by: INTERNAL MEDICINE

## 2022-07-07 PROCEDURE — 85025 COMPLETE CBC W/AUTO DIFF WBC: CPT | Performed by: INTERNAL MEDICINE

## 2022-07-07 PROCEDURE — 91305 COVID-19, MRNA, LNP-S, PF, 30 MCG/0.3 ML DOSE VACCINE (PFIZER): CPT | Mod: PBBFAC | Performed by: INTERNAL MEDICINE

## 2022-07-07 PROCEDURE — 80061 LIPID PANEL: CPT | Performed by: INTERNAL MEDICINE

## 2022-07-07 PROCEDURE — 84443 ASSAY THYROID STIM HORMONE: CPT | Performed by: INTERNAL MEDICINE

## 2022-07-07 PROCEDURE — 83036 HEMOGLOBIN GLYCOSYLATED A1C: CPT | Performed by: INTERNAL MEDICINE

## 2022-07-11 DIAGNOSIS — E78.5 HYPERLIPIDEMIA, UNSPECIFIED HYPERLIPIDEMIA TYPE: ICD-10-CM

## 2022-07-11 DIAGNOSIS — I10 HTN (HYPERTENSION), BENIGN: ICD-10-CM

## 2022-07-11 DIAGNOSIS — E11.9 TYPE 2 DIABETES MELLITUS WITHOUT COMPLICATION, WITH LONG-TERM CURRENT USE OF INSULIN: ICD-10-CM

## 2022-07-11 DIAGNOSIS — E66.9 OBESITY (BMI 30-39.9): ICD-10-CM

## 2022-07-11 DIAGNOSIS — Z79.4 TYPE 2 DIABETES MELLITUS WITHOUT COMPLICATION, WITH LONG-TERM CURRENT USE OF INSULIN: ICD-10-CM

## 2022-07-11 DIAGNOSIS — G47.33 OSA (OBSTRUCTIVE SLEEP APNEA): ICD-10-CM

## 2022-07-11 RX ORDER — METFORMIN HYDROCHLORIDE 500 MG/1
TABLET, EXTENDED RELEASE ORAL
Qty: 360 TABLET | Refills: 0 | Status: SHIPPED | OUTPATIENT
Start: 2022-07-11 | End: 2022-10-09

## 2022-07-11 NOTE — TELEPHONE ENCOUNTER
No new care gaps identified.  [facility] Embedded Care Gaps. Reference number: [id number]. 7/11/2022   3:33:54 AM PATRICIAT

## 2022-07-12 NOTE — TELEPHONE ENCOUNTER
Refill Decision Note   Grecia Oliver  is requesting a refill authorization.  Brief Assessment and Rationale for Refill:  Approve     Medication Therapy Plan:       Medication Reconciliation Completed: No   Comments:     No Care Gaps recommended.     Note composed:9:15 PM 07/11/2022

## 2022-07-14 ENCOUNTER — PATIENT OUTREACH (OUTPATIENT)
Dept: ADMINISTRATIVE | Facility: HOSPITAL | Age: 57
End: 2022-07-14
Payer: COMMERCIAL

## 2022-07-14 ENCOUNTER — OFFICE VISIT (OUTPATIENT)
Dept: INTERNAL MEDICINE | Facility: CLINIC | Age: 57
End: 2022-07-14
Payer: COMMERCIAL

## 2022-07-14 VITALS
HEIGHT: 62 IN | SYSTOLIC BLOOD PRESSURE: 112 MMHG | BODY MASS INDEX: 35.6 KG/M2 | HEART RATE: 67 BPM | OXYGEN SATURATION: 98 % | DIASTOLIC BLOOD PRESSURE: 82 MMHG | TEMPERATURE: 98 F | RESPIRATION RATE: 18 BRPM

## 2022-07-14 DIAGNOSIS — I15.2 HYPERTENSION ASSOCIATED WITH TYPE 2 DIABETES MELLITUS: ICD-10-CM

## 2022-07-14 DIAGNOSIS — Z00.00 ANNUAL PHYSICAL EXAM: Primary | ICD-10-CM

## 2022-07-14 DIAGNOSIS — E11.59 HYPERTENSION ASSOCIATED WITH TYPE 2 DIABETES MELLITUS: ICD-10-CM

## 2022-07-14 DIAGNOSIS — E11.9 TYPE 2 DIABETES MELLITUS WITHOUT COMPLICATION, WITHOUT LONG-TERM CURRENT USE OF INSULIN: ICD-10-CM

## 2022-07-14 DIAGNOSIS — E66.01 SEVERE OBESITY (BMI 35.0-39.9) WITH COMORBIDITY: ICD-10-CM

## 2022-07-14 DIAGNOSIS — E66.2 OBESITY HYPOVENTILATION SYNDROME: ICD-10-CM

## 2022-07-14 PROCEDURE — 4010F PR ACE/ARB THEARPY RXD/TAKEN: ICD-10-PCS | Mod: CPTII,S$GLB,, | Performed by: INTERNAL MEDICINE

## 2022-07-14 PROCEDURE — 3079F PR MOST RECENT DIASTOLIC BLOOD PRESSURE 80-89 MM HG: ICD-10-PCS | Mod: CPTII,S$GLB,, | Performed by: INTERNAL MEDICINE

## 2022-07-14 PROCEDURE — 99396 PREV VISIT EST AGE 40-64: CPT | Mod: S$GLB,,, | Performed by: INTERNAL MEDICINE

## 2022-07-14 PROCEDURE — 3066F PR DOCUMENTATION OF TREATMENT FOR NEPHROPATHY: ICD-10-PCS | Mod: CPTII,S$GLB,, | Performed by: INTERNAL MEDICINE

## 2022-07-14 PROCEDURE — 3044F HG A1C LEVEL LT 7.0%: CPT | Mod: CPTII,S$GLB,, | Performed by: INTERNAL MEDICINE

## 2022-07-14 PROCEDURE — 3061F NEG MICROALBUMINURIA REV: CPT | Mod: CPTII,S$GLB,, | Performed by: INTERNAL MEDICINE

## 2022-07-14 PROCEDURE — 3079F DIAST BP 80-89 MM HG: CPT | Mod: CPTII,S$GLB,, | Performed by: INTERNAL MEDICINE

## 2022-07-14 PROCEDURE — 99999 PR PBB SHADOW E&M-EST. PATIENT-LVL III: ICD-10-PCS | Mod: PBBFAC,,, | Performed by: INTERNAL MEDICINE

## 2022-07-14 PROCEDURE — 3066F NEPHROPATHY DOC TX: CPT | Mod: CPTII,S$GLB,, | Performed by: INTERNAL MEDICINE

## 2022-07-14 PROCEDURE — 3074F PR MOST RECENT SYSTOLIC BLOOD PRESSURE < 130 MM HG: ICD-10-PCS | Mod: CPTII,S$GLB,, | Performed by: INTERNAL MEDICINE

## 2022-07-14 PROCEDURE — 3008F PR BODY MASS INDEX (BMI) DOCUMENTED: ICD-10-PCS | Mod: CPTII,S$GLB,, | Performed by: INTERNAL MEDICINE

## 2022-07-14 PROCEDURE — 3044F PR MOST RECENT HEMOGLOBIN A1C LEVEL <7.0%: ICD-10-PCS | Mod: CPTII,S$GLB,, | Performed by: INTERNAL MEDICINE

## 2022-07-14 PROCEDURE — 3008F BODY MASS INDEX DOCD: CPT | Mod: CPTII,S$GLB,, | Performed by: INTERNAL MEDICINE

## 2022-07-14 PROCEDURE — 99396 PR PREVENTIVE VISIT,EST,40-64: ICD-10-PCS | Mod: S$GLB,,, | Performed by: INTERNAL MEDICINE

## 2022-07-14 PROCEDURE — 99999 PR PBB SHADOW E&M-EST. PATIENT-LVL III: CPT | Mod: PBBFAC,,, | Performed by: INTERNAL MEDICINE

## 2022-07-14 PROCEDURE — 1159F MED LIST DOCD IN RCRD: CPT | Mod: CPTII,S$GLB,, | Performed by: INTERNAL MEDICINE

## 2022-07-14 PROCEDURE — 4010F ACE/ARB THERAPY RXD/TAKEN: CPT | Mod: CPTII,S$GLB,, | Performed by: INTERNAL MEDICINE

## 2022-07-14 PROCEDURE — 3061F PR NEG MICROALBUMINURIA RESULT DOCUMENTED/REVIEW: ICD-10-PCS | Mod: CPTII,S$GLB,, | Performed by: INTERNAL MEDICINE

## 2022-07-14 PROCEDURE — 1159F PR MEDICATION LIST DOCUMENTED IN MEDICAL RECORD: ICD-10-PCS | Mod: CPTII,S$GLB,, | Performed by: INTERNAL MEDICINE

## 2022-07-14 PROCEDURE — 3074F SYST BP LT 130 MM HG: CPT | Mod: CPTII,S$GLB,, | Performed by: INTERNAL MEDICINE

## 2022-07-14 NOTE — PROGRESS NOTES
Subjective:       Patient ID: Grecia Boyd is a 56 y.o. female.    Chief Complaint: Annual Exam (Labs Done )    HPI   56 y.o. Female here for annual exam.       Vaccines: Influenza (2020); Tetanus (2013); Pneumovax (done); Prevnar (2021);   Eye exam: 11/18  Mammogram: 1/22  Gyn exam: declined  Colonoscopy: 2017- normal per pt     Exercise: walks daily  Diet: regular     Past Medical History:  No date: Diabetes mellitus, type 2  No date: Hyperlipidemia  No date: Hypertension  No date: Obesity  No date: CHIDI  Past Surgical History:  No date: ADENOIDECTOMY  No date: BREAST BIOPSY; Right      Comment:  @ age 18  2/12/2021: CHONDROPLASTY OF KNEE; Right      Comment:  Procedure: CHONDROPLASTY, KNEE;  Surgeon: ZULEIKA Barahona MD;  Location: [facility] OR;  Service: Orthopedics;                 Laterality: Right;  No date: HYSTERECTOMY      Comment:  2009 2/12/2021: KNEE ARTHROSCOPY W/ MENISCECTOMY; Right      Comment:  Procedure: ARTHROSCOPY, KNEE, WITH MEDIAL MENISCECTOMY                LATERAL RELEASE;  Surgeon: ZULEIKA Barahona MD;                 Location: [facility] OR;  Service: Orthopedics;  Laterality:                Right;  pericapsular injection: 30cc                ropivacaine/epi/clonidine/ketorolac injection   No date: KNEE SURGERY  2/12/2021: SYNOVECTOMY OF KNEE; Right      Comment:  Procedure: SYNOVECTOMY, KNEE;  Surgeon: ZULEIKA Barahona MD;  Location: [facility];  Service: Orthopedics;                 Laterality: Right;  No date: THYROIDECTOMY, PARTIAL  No date: TONSILLECTOMY  Social History    Socioeconomic History      Marital status:       Spouse name: Not on file      Number of children: 2      Years of education: Not on file      Highest education level: Not on file    Occupational History      Not on file    Tobacco Use      Smoking status: Never Smoker      Smokeless tobacco: Never Used    Substance and Sexual Activity      Alcohol use: Yes        Comment: rare      Drug  use: No      Sexual activity: Yes        Partners: Male    Other Topics      Concerns:        Not on file    Social History Narrative      Not on file     Social Determinants of Health  Financial Resource Strain:       Difficulty of Paying Living Expenses:   Food Insecurity:       Worried About Running Out of Food in the Last Year:       Ran Out of Food in the Last Year:   Transportation Needs:       Lack of Transportation (Medical):       Lack of Transportation (Non-Medical):   Physical Activity:       Days of Exercise per Week:       Minutes of Exercise per Session:   Stress:       Feeling of Stress :   Social Connections:       Frequency of Communication with Friends and Family:       Frequency of Social Gatherings with Friends and Family:       Attends Islam Services:       Active Member of Clubs or Organizations:       Attends Club or Organization Meetings:       Marital Status:   Review of patient's allergies indicates:  No Known Allergies  Review of Systems   Constitutional: Negative for activity change, appetite change, chills, diaphoresis, fatigue, fever and unexpected weight change.   HENT: Negative for nasal congestion, mouth sores, postnasal drip, rhinorrhea, sinus pressure/congestion, sneezing, sore throat, trouble swallowing and voice change.    Eyes: Negative for pain, discharge and visual disturbance.   Respiratory: Negative for cough, shortness of breath and wheezing.    Cardiovascular: Negative for chest pain, palpitations and leg swelling.   Gastrointestinal: Negative for abdominal pain, blood in stool, constipation, diarrhea, nausea and vomiting.   Endocrine: Negative for cold intolerance and heat intolerance.   Genitourinary: Negative for difficulty urinating, dysuria, frequency, hematuria and urgency.   Musculoskeletal: Negative for arthralgias and myalgias.   Integumentary:  Negative for rash and wound.   Allergic/Immunologic: Negative for environmental allergies and food allergies.    Neurological: Negative for dizziness, tremors, seizures, syncope, weakness, light-headedness and headaches.   Hematological: Negative for adenopathy. Does not bruise/bleed easily.   Psychiatric/Behavioral: Negative for confusion and sleep disturbance. The patient is not nervous/anxious.          Objective:      Physical Exam  Vitals and nursing note reviewed.   Constitutional:       General: She is not in acute distress.     Appearance: She is well-developed. She is not diaphoretic.   HENT:      Head: Normocephalic and atraumatic.      Right Ear: External ear normal.      Left Ear: External ear normal.      Nose: Nose normal.      Mouth/Throat:      Pharynx: No oropharyngeal exudate.   Eyes:      General: No scleral icterus.        Right eye: No discharge.         Left eye: No discharge.      Conjunctiva/sclera: Conjunctivae normal.      Pupils: Pupils are equal, round, and reactive to light.   Neck:      Thyroid: No thyromegaly.      Vascular: No JVD.   Cardiovascular:      Rate and Rhythm: Normal rate and regular rhythm.      Heart sounds: Normal heart sounds. No murmur heard.  Pulmonary:      Effort: Pulmonary effort is normal. No respiratory distress.      Breath sounds: Normal breath sounds. No wheezing or rales.   Chest:      Chest wall: No tenderness.   Abdominal:      General: Bowel sounds are normal. There is no distension.      Palpations: Abdomen is soft.      Tenderness: There is no abdominal tenderness. There is no guarding.   Musculoskeletal:      Cervical back: Neck supple.      Right lower leg: No edema.      Left lower leg: No edema.   Lymphadenopathy:      Cervical: No cervical adenopathy.   Skin:     General: Skin is warm and dry.      Coloration: Skin is not pale.      Findings: No rash.   Neurological:      Mental Status: She is alert and oriented to person, place, and time.   Psychiatric:         Judgment: Judgment normal.         Assessment:       Problem List Items Addressed This Visit         Endocrine    Type 2 diabetes mellitus without complication, without long-term current use of insulin    Relevant Medications    semaglutide (OZEMPIC) 1 mg/dose (4 mg/3 mL)    Severe obesity (BMI 35.0-39.9) with comorbidity    Hypertension associated with type 2 diabetes mellitus    Relevant Medications    semaglutide (OZEMPIC) 1 mg/dose (4 mg/3 mL)       Other    Obesity hypoventilation syndrome      Other Visit Diagnoses     Annual physical exam    -  Primary          Plan:    Blood work reviewed with pt      HTN- stable on Norvasc/HCTZ/Losartan       T2DM- last HA1C of 6.5(7/22)<--7.2(1/22)<--7.1(7/21)<--6.9(10/20)<--7.1(8/20)<--6.8(10/19)      -continue Metformin/Amaryl/Ozempic       -pt unable to afford Jardiance      HLD- continue Lipitor 40 mg daily      Obesity- pt advised on proper diet/exercise for weight loss      CHIDI- pt unable to tolerate CPAP      F/u in 6 months

## 2022-07-14 NOTE — LETTER
AUTHORIZATION FOR RELEASE OF   CONFIDENTIAL INFORMATION    Dear Dr. Lee,    We are seeing Grecia Boyd, date of birth 1965, in the clinic at [facility]. Scott Tripathi DO is the patient's PCP. Grecia Boyd has an outstanding lab/procedure at the time we reviewed her chart. In order to help keep her health information updated, she has authorized us to request the following medical record(s):        (  )  MAMMOGRAM                                      (  )  COLONOSCOPY      (  )  PAP SMEAR                                          (  )  OUTSIDE LAB RESULTS     (  )  DEXA SCAN                                          ( x )  EYE EXAM            (  )  FOOT EXAM                                          (  )  ENTIRE RECORD     (  )  OUTSIDE IMMUNIZATIONS                 (  )  _______________         Please fax records to [facility], Brian M Helmstetter, DO, [phone]     If you have any questions, please contact TY Fitzpatrick  at [phone]           Patient Name: Grecia Boyd  : 1965  Patient Phone #: [phone]

## 2022-07-15 ENCOUNTER — PATIENT OUTREACH (OUTPATIENT)
Dept: ADMINISTRATIVE | Facility: HOSPITAL | Age: 57
End: 2022-07-15
Payer: COMMERCIAL

## 2022-08-11 DIAGNOSIS — E78.5 HYPERLIPIDEMIA ASSOCIATED WITH TYPE 2 DIABETES MELLITUS: Primary | ICD-10-CM

## 2022-08-11 DIAGNOSIS — I15.2 HYPERTENSION ASSOCIATED WITH TYPE 2 DIABETES MELLITUS: ICD-10-CM

## 2022-08-11 DIAGNOSIS — E11.69 HYPERLIPIDEMIA ASSOCIATED WITH TYPE 2 DIABETES MELLITUS: Primary | ICD-10-CM

## 2022-08-11 DIAGNOSIS — E11.59 HYPERTENSION ASSOCIATED WITH TYPE 2 DIABETES MELLITUS: ICD-10-CM

## 2022-08-11 DIAGNOSIS — E11.9 TYPE 2 DIABETES MELLITUS WITHOUT COMPLICATION, WITHOUT LONG-TERM CURRENT USE OF INSULIN: ICD-10-CM

## 2022-09-23 ENCOUNTER — PATIENT MESSAGE (OUTPATIENT)
Dept: INTERNAL MEDICINE | Facility: CLINIC | Age: 57
End: 2022-09-23
Payer: COMMERCIAL

## 2022-09-23 DIAGNOSIS — E11.9 TYPE 2 DIABETES MELLITUS WITHOUT COMPLICATION, WITHOUT LONG-TERM CURRENT USE OF INSULIN: Primary | ICD-10-CM

## 2022-09-26 RX ORDER — INSULIN PUMP SYRINGE, 3 ML
EACH MISCELLANEOUS
Qty: 1 EACH | Refills: 1 | Status: SHIPPED | OUTPATIENT
Start: 2022-09-26 | End: 2023-09-26

## 2022-09-26 RX ORDER — LANCETS
EACH MISCELLANEOUS
Qty: 200 EACH | Refills: 11 | Status: SHIPPED | OUTPATIENT
Start: 2022-09-26

## 2022-10-09 DIAGNOSIS — Z79.4 TYPE 2 DIABETES MELLITUS WITHOUT COMPLICATION, WITH LONG-TERM CURRENT USE OF INSULIN: ICD-10-CM

## 2022-10-09 DIAGNOSIS — E66.9 OBESITY (BMI 30-39.9): ICD-10-CM

## 2022-10-09 DIAGNOSIS — G47.33 OSA (OBSTRUCTIVE SLEEP APNEA): ICD-10-CM

## 2022-10-09 DIAGNOSIS — I10 HTN (HYPERTENSION), BENIGN: ICD-10-CM

## 2022-10-09 DIAGNOSIS — E11.9 TYPE 2 DIABETES MELLITUS WITHOUT COMPLICATION, WITH LONG-TERM CURRENT USE OF INSULIN: ICD-10-CM

## 2022-10-09 DIAGNOSIS — E78.5 HYPERLIPIDEMIA, UNSPECIFIED HYPERLIPIDEMIA TYPE: ICD-10-CM

## 2022-10-09 RX ORDER — METFORMIN HYDROCHLORIDE 500 MG/1
TABLET, EXTENDED RELEASE ORAL
Qty: 360 TABLET | Refills: 0 | Status: SHIPPED | OUTPATIENT
Start: 2022-10-09 | End: 2023-05-18 | Stop reason: SDUPTHER

## 2022-10-09 NOTE — TELEPHONE ENCOUNTER
Refill Decision Note   Grecia Boyd  is requesting a refill authorization.  Brief Assessment and Rationale for Refill:  Approve    -Medication-Related Problems Identified: Requires labs  Medication Therapy Plan:       Medication Reconciliation Completed: No   Comments:     Provider Staff:     Action is required for this patient.   Please see care gap opportunities below in Care Due Message.     Thanks!  [facility]     Appointments      Date Provider   Last Visit   7/14/2022 Scott Tripathi,    Next Visit   1/18/2023 Scott Tripathi,      Note composed:4:07 PM 10/09/2022           Note composed:4:07 PM 10/09/2022

## 2022-10-09 NOTE — TELEPHONE ENCOUNTER
Care Due:                  Date            Visit Type   Department     Provider  --------------------------------------------------------------------------------                                EP -                              PRIMARY      [facility] INTERNAL  Last Visit: 07-      CARE ([facility])   [facility]       Scott Tripathi                              EP -                              PRIMARY      [facility] INTERNAL  Next Visit: 01-      CARE ([facility])   [facility]       Scott Tripathi                                                            Last  Test          Frequency    Reason                     Performed    Due Date  --------------------------------------------------------------------------------    HBA1C.......  6 months...  glimepiride, metFORMIN,    07- 01-                             semaglutide..............    [employer] Embedded Care Gaps. Reference number: [id number]. 10/09/2022   3:33:30 AM CDT

## 2022-11-17 ENCOUNTER — PATIENT MESSAGE (OUTPATIENT)
Dept: SPORTS MEDICINE | Facility: CLINIC | Age: 57
End: 2022-11-17
Payer: COMMERCIAL

## 2022-11-18 ENCOUNTER — TELEPHONE (OUTPATIENT)
Dept: SPORTS MEDICINE | Facility: CLINIC | Age: 57
End: 2022-11-18
Payer: COMMERCIAL

## 2022-11-18 NOTE — TELEPHONE ENCOUNTER
----- Message from Christi Muse sent at 11/18/2022  9:42 AM CST -----  Contact: pt  Pt calling in regards to a sooner appt         Confirmed patient's contact info below:  Contact Name: Grecia Boyd  Phone Number: [phone]

## 2022-11-18 NOTE — TELEPHONE ENCOUNTER
Returned call to patient.  Appointment with Dr. Irving was cancelled. Message was sent to Dr. Barahona staff for patient to be scheduled.

## 2022-11-22 ENCOUNTER — OFFICE VISIT (OUTPATIENT)
Dept: SPORTS MEDICINE | Facility: CLINIC | Age: 57
End: 2022-11-22
Payer: COMMERCIAL

## 2022-11-22 ENCOUNTER — HOSPITAL ENCOUNTER (OUTPATIENT)
Dept: RADIOLOGY | Facility: HOSPITAL | Age: 57
Discharge: HOME | End: 2022-11-22
Attending: ORTHOPAEDIC SURGERY
Payer: COMMERCIAL

## 2022-11-22 ENCOUNTER — TELEPHONE (OUTPATIENT)
Dept: SPORTS MEDICINE | Facility: CLINIC | Age: 57
End: 2022-11-22
Payer: COMMERCIAL

## 2022-11-22 VITALS
WEIGHT: 170.63 LBS | BODY MASS INDEX: 31.4 KG/M2 | SYSTOLIC BLOOD PRESSURE: 120 MMHG | DIASTOLIC BLOOD PRESSURE: 77 MMHG | HEART RATE: 73 BPM | HEIGHT: 62 IN

## 2022-11-22 DIAGNOSIS — M17.11 PRIMARY OSTEOARTHRITIS OF RIGHT KNEE: ICD-10-CM

## 2022-11-22 DIAGNOSIS — M17.11 PRIMARY OSTEOARTHRITIS OF RIGHT KNEE: Primary | ICD-10-CM

## 2022-11-22 PROCEDURE — 20610 LARGE JOINT ASPIRATION/INJECTION: R KNEE: ICD-10-PCS | Mod: RT,S$GLB,, | Performed by: ORTHOPAEDIC SURGERY

## 2022-11-22 PROCEDURE — 99213 OFFICE O/P EST LOW 20 MIN: CPT | Mod: 25,S$GLB,, | Performed by: ORTHOPAEDIC SURGERY

## 2022-11-22 PROCEDURE — 73562 X-RAY EXAM OF KNEE 3: CPT | Mod: 26,LT,, | Performed by: RADIOLOGY

## 2022-11-22 PROCEDURE — 3044F HG A1C LEVEL LT 7.0%: CPT | Mod: CPTII,S$GLB,, | Performed by: ORTHOPAEDIC SURGERY

## 2022-11-22 PROCEDURE — 4010F ACE/ARB THERAPY RXD/TAKEN: CPT | Mod: CPTII,S$GLB,, | Performed by: ORTHOPAEDIC SURGERY

## 2022-11-22 PROCEDURE — 99999 PR PBB SHADOW E&M-EST. PATIENT-LVL III: ICD-10-PCS | Mod: PBBFAC,,, | Performed by: ORTHOPAEDIC SURGERY

## 2022-11-22 PROCEDURE — 3078F DIAST BP <80 MM HG: CPT | Mod: CPTII,S$GLB,, | Performed by: ORTHOPAEDIC SURGERY

## 2022-11-22 PROCEDURE — 3074F SYST BP LT 130 MM HG: CPT | Mod: CPTII,S$GLB,, | Performed by: ORTHOPAEDIC SURGERY

## 2022-11-22 PROCEDURE — 3008F PR BODY MASS INDEX (BMI) DOCUMENTED: ICD-10-PCS | Mod: CPTII,S$GLB,, | Performed by: ORTHOPAEDIC SURGERY

## 2022-11-22 PROCEDURE — 3061F PR NEG MICROALBUMINURIA RESULT DOCUMENTED/REVIEW: ICD-10-PCS | Mod: CPTII,S$GLB,, | Performed by: ORTHOPAEDIC SURGERY

## 2022-11-22 PROCEDURE — 99999 PR PBB SHADOW E&M-EST. PATIENT-LVL III: CPT | Mod: PBBFAC,,, | Performed by: ORTHOPAEDIC SURGERY

## 2022-11-22 PROCEDURE — 3078F PR MOST RECENT DIASTOLIC BLOOD PRESSURE < 80 MM HG: ICD-10-PCS | Mod: CPTII,S$GLB,, | Performed by: ORTHOPAEDIC SURGERY

## 2022-11-22 PROCEDURE — 3066F NEPHROPATHY DOC TX: CPT | Mod: CPTII,S$GLB,, | Performed by: ORTHOPAEDIC SURGERY

## 2022-11-22 PROCEDURE — 73564 X-RAY EXAM KNEE 4 OR MORE: CPT | Mod: 26,RT,, | Performed by: RADIOLOGY

## 2022-11-22 PROCEDURE — 3074F PR MOST RECENT SYSTOLIC BLOOD PRESSURE < 130 MM HG: ICD-10-PCS | Mod: CPTII,S$GLB,, | Performed by: ORTHOPAEDIC SURGERY

## 2022-11-22 PROCEDURE — 73564 X-RAY EXAM KNEE 4 OR MORE: CPT | Mod: TC,RT

## 2022-11-22 PROCEDURE — 3066F PR DOCUMENTATION OF TREATMENT FOR NEPHROPATHY: ICD-10-PCS | Mod: CPTII,S$GLB,, | Performed by: ORTHOPAEDIC SURGERY

## 2022-11-22 PROCEDURE — 73564 XR KNEE ORTHO RIGHT WITH FLEXION: ICD-10-PCS | Mod: 26,RT,, | Performed by: RADIOLOGY

## 2022-11-22 PROCEDURE — 3061F NEG MICROALBUMINURIA REV: CPT | Mod: CPTII,S$GLB,, | Performed by: ORTHOPAEDIC SURGERY

## 2022-11-22 PROCEDURE — 4010F PR ACE/ARB THEARPY RXD/TAKEN: ICD-10-PCS | Mod: CPTII,S$GLB,, | Performed by: ORTHOPAEDIC SURGERY

## 2022-11-22 PROCEDURE — 3044F PR MOST RECENT HEMOGLOBIN A1C LEVEL <7.0%: ICD-10-PCS | Mod: CPTII,S$GLB,, | Performed by: ORTHOPAEDIC SURGERY

## 2022-11-22 PROCEDURE — 20610 DRAIN/INJ JOINT/BURSA W/O US: CPT | Mod: RT,S$GLB,, | Performed by: ORTHOPAEDIC SURGERY

## 2022-11-22 PROCEDURE — 73562 XR KNEE ORTHO RIGHT WITH FLEXION: ICD-10-PCS | Mod: 26,LT,, | Performed by: RADIOLOGY

## 2022-11-22 PROCEDURE — 99213 PR OFFICE/OUTPT VISIT, EST, LEVL III, 20-29 MIN: ICD-10-PCS | Mod: 25,S$GLB,, | Performed by: ORTHOPAEDIC SURGERY

## 2022-11-22 PROCEDURE — 3008F BODY MASS INDEX DOCD: CPT | Mod: CPTII,S$GLB,, | Performed by: ORTHOPAEDIC SURGERY

## 2022-11-22 RX ADMIN — TRIAMCINOLONE ACETONIDE 40 MG: 40 INJECTION, SUSPENSION INTRA-ARTICULAR; INTRAMUSCULAR at 08:11

## 2022-11-22 NOTE — PROGRESS NOTES
S:Grecia Boyd presents for post-operative evaluation.     DATE OF PROCEDURE: 2/12/2021   PROCEDURE PERFORMED:   Right  1. knee arthroscopic partial medial meniscectomy (CPT 09288)  2. knee arthroscopic 2-compartment synovectomy/extensive debridement (CPT 29232)         3. knee arthroscopic chondroplasty (CPT 63624)  4. knee arthroscopic lateral release/peripatellar release package (CPT 12025)     Grecia Boyd is here today for right knee pain and swelling.  She is over 1 year s/p the above procedure.  She states that her knee has only started bothering her over the last couple weeks or so.  She last had a CSI by Gaurang Garcia PA-C 3/23/2022 which provided significant relief for 5 months.  She reports she is on her feet a lot for work and notes warmth and swelling in her knee that she finds concerning. Here today requesting repeat injection.    Pain Score:   5    REVIEW OF SYSTEMS:   Constitution: Negative. Negative for chills, fever and night sweats.    Hematologic/Lymphatic: Negative for bleeding problem. Does not bruise/bleed easily.   Skin: Negative for dry skin, itching and rash.   Musculoskeletal: Negative for falls. Positive for right knee pain and  muscle weakness.     PAST MEDICAL HISTORY:   Past Medical History:   Diagnosis Date    Diabetes mellitus, type 2     Hyperlipidemia     Hypertension     Obesity     CHIDI (obstructive sleep apnea)        PAST SURGICAL HISTORY:   Past Surgical History:   Procedure Laterality Date    ADENOIDECTOMY      BREAST BIOPSY Right     @ age 18    CHONDROPLASTY OF KNEE Right 2/12/2021    Procedure: CHONDROPLASTY, KNEE;  Surgeon: ZULEIKA Barahona MD;  Location: [facility] OR;  Service: Orthopedics;  Laterality: Right;    HYSTERECTOMY      2009    KNEE ARTHROSCOPY W/ MENISCECTOMY Right 2/12/2021    Procedure: ARTHROSCOPY, KNEE, WITH MEDIAL MENISCECTOMY LATERAL RELEASE;  Surgeon: ZULEIKA Barahona MD;  Location: [facility] OR;  Service: Orthopedics;  Laterality: Right;   pericapsular injection: 30cc ropivacaine/epi/clonidine/ketorolac injection     KNEE SURGERY      SYNOVECTOMY OF KNEE Right 2/12/2021    Procedure: SYNOVECTOMY, KNEE;  Surgeon: ZULEIKA Barahona MD;  Location: [facility];  Service: Orthopedics;  Laterality: Right;    THYROIDECTOMY, PARTIAL      TONSILLECTOMY         FAMILY HISTORY:   Family History   Problem Relation Age of Onset    Diabetes Mother     Heart disease Mother     Hypertension Mother     Hyperlipidemia Mother     Stroke Mother     Cataracts Mother     Cancer Paternal Aunt         Lung    Diabetes Paternal Grandmother     Breast cancer Paternal Aunt     Macular degeneration Paternal Aunt     Breast cancer Cousin     Colon cancer Neg Hx     Ovarian cancer Neg Hx        SOCIAL HISTORY:   Social History     Socioeconomic History    Marital status:     Number of children: 2   Tobacco Use    Smoking status: Never    Smokeless tobacco: Never   Substance and Sexual Activity    Alcohol use: Yes     Comment: rare    Drug use: No    Sexual activity: Yes     Partners: Male       MEDICATIONS:     Current Outpatient Medications:     amLODIPine (NORVASC) 5 MG tablet, TAKE 1 TABLET(5 MG) BY MOUTH EVERY DAY, Disp: 90 tablet, Rfl: 3    atorvastatin (LIPITOR) 40 MG tablet, TAKE 1 TABLET(40 MG) BY MOUTH EVERY DAY, Disp: 90 tablet, Rfl: 3    blood sugar diagnostic Strp, Check blood sugars TID, Disp: 200 strip, Rfl: 11    blood-glucose meter (FREESTYLE SYSTEM KIT) kit, Use as instructed, Disp: 1 each, Rfl: 1    glimepiride (AMARYL) 4 MG tablet, TAKE 1 TABLET(4 MG) BY MOUTH BEFORE BREAKFAST, Disp: 90 tablet, Rfl: 1    hydroCHLOROthiazide (HYDRODIURIL) 25 MG tablet, TAKE 1 TABLET(25 MG) BY MOUTH EVERY DAY, Disp: 90 tablet, Rfl: 1    lancets (ONETOUCH ULTRASOFT LANCETS) Misc, Check blood sugars TID, Disp: 200 each, Rfl: 11    losartan (COZAAR) 25 MG tablet, TAKE 1 TABLET(25 MG) BY MOUTH EVERY DAY, Disp: 90 tablet, Rfl: 3    metFORMIN  "(GLUCOPHAGE-XR) 500 MG ER 24hr tablet, TAKE 2 TABLETS(1000 MG) BY MOUTH TWICE DAILY WITH MEALS, Disp: 360 tablet, Rfl: 0    semaglutide (OZEMPIC) 1 mg/dose (4 mg/3 mL), Inject 1 mg into the skin every 7 days., Disp: 1 pen, Rfl: 11    cyclobenzaprine (FLEXERIL) 5 MG tablet, Take 1 tablet (5 mg total) by mouth 3 (three) times daily as needed for Muscle spasms., Disp: 21 tablet, Rfl: 0    naproxen (NAPROSYN) 500 MG tablet, Take 1 tablet (500 mg total) by mouth 2 (two) times daily with meals. for 10 days, Disp: 20 tablet, Rfl: 0    ALLERGIES:   Review of patient's allergies indicates:  No Known Allergies     PHYSICAL EXAMINATION:  /77   Pulse 73   Ht 5' 2" (1.575 m)   Wt 77.4 kg (170 lb 9.6 oz)   BMI 31.20 kg/m²   General: Well-developed well-nourished 57 y.o. femalein no acute distress   Cardiovascular: Regular rhythm by palpation of distal pulse, normal color and temperature, no concerning varicosities on symptomatic side   Lungs: No labored breathing or wheezing appreciated   Neuro: Alert and oriented ×3   Psychiatric: well oriented to person, place and time, demonstrates normal mood and affect   Skin: No rashes, lesions or ulcers, normal temperature, turgor, and texture on involved extremity    Ortho/SPM Exam  O: RLE: The incisions are well healed.  No signs of infection. No significant pain or unusual tenderness. She has no effusion present. ROM in clinic today, active extension full, Flexion to 120 actively with pain.  Positive patellar crepitus and grind.    Imaging:  XR of right knee demonstrates changes consistent with Kellgren Tomas grade 2    IMAGING:    X-rays including standing, weight bearing AP and flexion bilateral knees, RIGHT knee lateral and sunrise views ordered and images reviewed by me show:    DJD.  Medial joint space narrowing.    ASSESSMENT:      ICD-10-CM ICD-9-CM   1. Primary osteoarthritis of right knee  M17.11 715.16       PLAN:     -Continue home exercises for range of " motion, low-impact cardio exercise, strengthening.  -CSI right knee  -RTC as needed, if symptoms do not resolve with CSI, will place prior auth for viscosupplementation.  -Patient verbalized understanding    Large Joint Aspiration/Injection: R knee    Date/Time: 11/22/2022 8:15 AM  Performed by: ZULEIKA Barahona MD  Authorized by: ZULEIKA Barahona MD     Consent Done?:  Yes (Verbal)  Indications:  Pain  Site marked: the procedure site was marked    Timeout: prior to procedure the correct patient, procedure, and site was verified    Prep: patient was prepped and draped in usual sterile fashion      Local anesthesia used?: Yes    Local anesthetic:  Co-phenylcaine spray (0.2% Naropin)  Anesthetic total (ml):  4      Details:  Needle Size:  22 G  Ultrasonic Guidance for needle placement?: No    Approach:  Lateral  Location:  Knee  Site:  R knee  Medications:  40 mg triamcinolone acetonide 40 mg/mL  Patient tolerance:  Patient tolerated the procedure well with no immediate complications                .

## 2022-11-22 NOTE — TELEPHONE ENCOUNTER
Called and spoke with patient to help her  her Euflexxa injections:  12/08/22 @4  12/15/22 @4  12/22/22 @4      ----- Message from Shira Montenegro MA sent at 11/22/2022  8:41 AM CST -----  Regarding: Injection Appt  Please call patient to schedule Euflexxa Inj appointments.     Shira Montenegro MA  Medical Assistant Lead  [facility]

## 2022-12-05 ENCOUNTER — OFFICE VISIT (OUTPATIENT)
Dept: URGENT CARE | Facility: CLINIC | Age: 57
End: 2022-12-05
Payer: COMMERCIAL

## 2022-12-05 VITALS
HEIGHT: 62 IN | RESPIRATION RATE: 18 BRPM | BODY MASS INDEX: 31.4 KG/M2 | OXYGEN SATURATION: 99 % | SYSTOLIC BLOOD PRESSURE: 133 MMHG | TEMPERATURE: 98 F | DIASTOLIC BLOOD PRESSURE: 65 MMHG | HEART RATE: 85 BPM | WEIGHT: 170.63 LBS

## 2022-12-05 DIAGNOSIS — M54.42 ACUTE BILATERAL LOW BACK PAIN WITH LEFT-SIDED SCIATICA: Primary | ICD-10-CM

## 2022-12-05 PROCEDURE — 96372 THER/PROPH/DIAG INJ SC/IM: CPT | Mod: S$GLB,,, | Performed by: PHYSICIAN ASSISTANT

## 2022-12-05 PROCEDURE — 3066F NEPHROPATHY DOC TX: CPT | Mod: CPTII,S$GLB,, | Performed by: PHYSICIAN ASSISTANT

## 2022-12-05 PROCEDURE — 1160F PR REVIEW ALL MEDS BY PRESCRIBER/CLIN PHARMACIST DOCUMENTED: ICD-10-PCS | Mod: CPTII,S$GLB,, | Performed by: PHYSICIAN ASSISTANT

## 2022-12-05 PROCEDURE — 3008F PR BODY MASS INDEX (BMI) DOCUMENTED: ICD-10-PCS | Mod: CPTII,S$GLB,, | Performed by: PHYSICIAN ASSISTANT

## 2022-12-05 PROCEDURE — 3075F PR MOST RECENT SYSTOLIC BLOOD PRESS GE 130-139MM HG: ICD-10-PCS | Mod: CPTII,S$GLB,, | Performed by: PHYSICIAN ASSISTANT

## 2022-12-05 PROCEDURE — 99214 OFFICE O/P EST MOD 30 MIN: CPT | Mod: 25,S$GLB,, | Performed by: PHYSICIAN ASSISTANT

## 2022-12-05 PROCEDURE — 3078F PR MOST RECENT DIASTOLIC BLOOD PRESSURE < 80 MM HG: ICD-10-PCS | Mod: CPTII,S$GLB,, | Performed by: PHYSICIAN ASSISTANT

## 2022-12-05 PROCEDURE — 3066F PR DOCUMENTATION OF TREATMENT FOR NEPHROPATHY: ICD-10-PCS | Mod: CPTII,S$GLB,, | Performed by: PHYSICIAN ASSISTANT

## 2022-12-05 PROCEDURE — 3008F BODY MASS INDEX DOCD: CPT | Mod: CPTII,S$GLB,, | Performed by: PHYSICIAN ASSISTANT

## 2022-12-05 PROCEDURE — 99214 PR OFFICE/OUTPT VISIT, EST, LEVL IV, 30-39 MIN: ICD-10-PCS | Mod: 25,S$GLB,, | Performed by: PHYSICIAN ASSISTANT

## 2022-12-05 PROCEDURE — 3044F PR MOST RECENT HEMOGLOBIN A1C LEVEL <7.0%: ICD-10-PCS | Mod: CPTII,S$GLB,, | Performed by: PHYSICIAN ASSISTANT

## 2022-12-05 PROCEDURE — 3078F DIAST BP <80 MM HG: CPT | Mod: CPTII,S$GLB,, | Performed by: PHYSICIAN ASSISTANT

## 2022-12-05 PROCEDURE — 1159F MED LIST DOCD IN RCRD: CPT | Mod: CPTII,S$GLB,, | Performed by: PHYSICIAN ASSISTANT

## 2022-12-05 PROCEDURE — 3075F SYST BP GE 130 - 139MM HG: CPT | Mod: CPTII,S$GLB,, | Performed by: PHYSICIAN ASSISTANT

## 2022-12-05 PROCEDURE — 1160F RVW MEDS BY RX/DR IN RCRD: CPT | Mod: CPTII,S$GLB,, | Performed by: PHYSICIAN ASSISTANT

## 2022-12-05 PROCEDURE — 3044F HG A1C LEVEL LT 7.0%: CPT | Mod: CPTII,S$GLB,, | Performed by: PHYSICIAN ASSISTANT

## 2022-12-05 PROCEDURE — 4010F ACE/ARB THERAPY RXD/TAKEN: CPT | Mod: CPTII,S$GLB,, | Performed by: PHYSICIAN ASSISTANT

## 2022-12-05 PROCEDURE — 96372 PR INJECTION,THERAP/PROPH/DIAG2ST, IM OR SUBCUT: ICD-10-PCS | Mod: S$GLB,,, | Performed by: PHYSICIAN ASSISTANT

## 2022-12-05 PROCEDURE — 1159F PR MEDICATION LIST DOCUMENTED IN MEDICAL RECORD: ICD-10-PCS | Mod: CPTII,S$GLB,, | Performed by: PHYSICIAN ASSISTANT

## 2022-12-05 PROCEDURE — 4010F PR ACE/ARB THEARPY RXD/TAKEN: ICD-10-PCS | Mod: CPTII,S$GLB,, | Performed by: PHYSICIAN ASSISTANT

## 2022-12-05 PROCEDURE — 3061F PR NEG MICROALBUMINURIA RESULT DOCUMENTED/REVIEW: ICD-10-PCS | Mod: CPTII,S$GLB,, | Performed by: PHYSICIAN ASSISTANT

## 2022-12-05 PROCEDURE — 3061F NEG MICROALBUMINURIA REV: CPT | Mod: CPTII,S$GLB,, | Performed by: PHYSICIAN ASSISTANT

## 2022-12-05 RX ORDER — NAPROXEN 500 MG/1
500 TABLET ORAL 2 TIMES DAILY WITH MEALS
Qty: 20 TABLET | Refills: 0 | Status: SHIPPED | OUTPATIENT
Start: 2022-12-05 | End: 2022-12-15

## 2022-12-05 RX ORDER — CYCLOBENZAPRINE HCL 5 MG
5 TABLET ORAL 3 TIMES DAILY PRN
Qty: 21 TABLET | Refills: 0 | Status: SHIPPED | OUTPATIENT
Start: 2022-12-05 | End: 2022-12-12

## 2022-12-05 RX ORDER — KETOROLAC TROMETHAMINE 30 MG/ML
30 INJECTION, SOLUTION INTRAMUSCULAR; INTRAVENOUS
Status: COMPLETED | OUTPATIENT
Start: 2022-12-05 | End: 2022-12-05

## 2022-12-05 RX ORDER — ACETAMINOPHEN 500 MG
1000 TABLET ORAL
Status: COMPLETED | OUTPATIENT
Start: 2022-12-05 | End: 2022-12-05

## 2022-12-05 RX ADMIN — KETOROLAC TROMETHAMINE 30 MG: 30 INJECTION, SOLUTION INTRAMUSCULAR; INTRAVENOUS at 07:12

## 2022-12-05 RX ADMIN — Medication 1000 MG: at 07:12

## 2022-12-06 NOTE — PATIENT INSTRUCTIONS
- Rest.    - Drink plenty of fluids.      - you received a shot of toradol in clinic today, so do not take naproxen until at least 8 hours from now.     - do not take Tylenol/acetaminophen until least 6-8 hours from now because you received 1000 mg in clinic today.    - take naproxen twice a day as prescribed with food to help with pain and inflammation.  Do not take over the counter NSAIDs (aleve, advil, ibuprofen) while taking this medication.  You can take Tylenol as directed along with this medication for pain relief.    -you can take flexeril (cyclobenzaprine)  as prescribed.  Initial dose is 5 mg, may increase to 10 mg if needed.  This is a muscle relaxer that can help with tension of the muscles to help reduce pain. This can also help with muscle spasm. This medicine may cause drowsiness.  Do not drive for operate heavy machinery while on this medication.     - Follow up with your PCP or specialty clinic as directed in the next 1-2 weeks if not improved or as needed.  You can call [phone] to schedule an appointment with the appropriate provider.    - Go to the ER or seek medical attention immediately if you develop new or worsening symptoms.     - You must understand that you have received an Urgent Care treatment only and that you may be released before all of your medical problems are known or treated.   - You, the patient, will arrange for follow up care as instructed.   - If your condition worsens or fails to improve we recommend that you receive another evaluation at the ER immediately or contact your PCP to discuss your concerns or return here.

## 2022-12-06 NOTE — PROGRESS NOTES
"Subjective:       Patient ID: Grecia Boyd is a 57 y.o. female.    Vitals:  height is 5' 2" (1.575 m) and weight is 77.4 kg (170 lb 10.2 oz). Her temporal temperature is 97.8 °F (36.6 °C). Her blood pressure is 133/65 and her pulse is 85. Her respiration is 18 and oxygen saturation is 99%.     Chief Complaint: Back Pain    Patient reports to the clinic with the complaint of lower back pain that she woke up with yesterday morning.  Denies any injury or trauma.  Pain is across low back, yesterday was radiating down left leg, but today is not radiating.  She denies any urinary symptoms.  No lower extremity paresthesias or weakness.  Patient states that pain and spasm is provoked by changing position, most notably sitting to standing, or with any ROM of trunk, bending, twisting, etc. no urinary symptoms.  No incontinence.  Patient took ibuprofen yesterday, has not taken anything today.     Back Pain  This is a new problem. The current episode started yesterday. The problem occurs constantly. The problem is unchanged. The pain is present in the lumbar spine. The quality of the pain is described as shooting. The pain does not radiate. The pain is at a severity of 6/10. The pain is moderate. The symptoms are aggravated by bending, position, standing, sitting and twisting. Stiffness is present All day. Pertinent negatives include no abdominal pain, bladder incontinence, bowel incontinence, chest pain, dysuria, fever, headaches, leg pain, numbness, paresis, paresthesias, pelvic pain, perianal numbness, tingling, weakness or weight loss. She has tried NSAIDs for the symptoms. The treatment provided no relief.     Constitution: Negative for chills, sweating, fatigue and fever.   HENT:  Negative for ear pain, congestion, postnasal drip and sore throat.    Neck: Negative for neck pain and neck stiffness.   Cardiovascular:  Negative for chest pain, leg swelling, palpitations and sob on exertion.   Eyes:  Negative for eye " discharge, eye itching and eye pain.   Respiratory:  Negative for cough, sputum production and shortness of breath.    Gastrointestinal:  Negative for abdominal pain, nausea, vomiting, diarrhea and bowel incontinence.   Genitourinary:  Negative for dysuria, frequency, urgency, flank pain, bladder incontinence, hematuria and pelvic pain.   Musculoskeletal:  Positive for pain and back pain. Negative for trauma.   Skin:  Negative for color change and rash.   Neurological:  Negative for headaches, disorientation, numbness, tingling and seizures.   Psychiatric/Behavioral:  Negative for disorientation.      Objective:      Physical Exam   Constitutional: She is oriented to person, place, and time. She appears well-developed. She is cooperative.  Non-toxic appearance. She does not appear ill. No distress.   HENT:   Head: Normocephalic and atraumatic.   Nose: Nose normal.   Mouth/Throat: Oropharynx is clear and moist and mucous membranes are normal.   Eyes: Conjunctivae and lids are normal.   Neck: Trachea normal and phonation normal. Neck supple.   Cardiovascular: Normal rate, regular rhythm, normal heart sounds and normal pulses.   Pulses:       Posterior tibial pulses are 2+ on the right side and 2+ on the left side.   Pulmonary/Chest: Effort normal and breath sounds normal. No accessory muscle usage or stridor. No apnea, no tachypnea and no bradypnea. She has no decreased breath sounds. She has no wheezes. She has no rhonchi. She has no rales.   Abdominal: Normal appearance and bowel sounds are normal. She exhibits no mass. Soft. There is no abdominal tenderness. There is no rebound, no guarding, no left CVA tenderness and no right CVA tenderness.      Comments: Soft, no ttp. No cva tenderness.    Musculoskeletal:         General: No deformity.        Back:       Comments: Bilat knee, hip, full Rom 5/5 str. Sensation bilat LE intact.    Neurological: She is alert and oriented to person, place, and time. She has normal  strength and normal reflexes. No sensory deficit.   Skin: Skin is warm, dry, intact and not diaphoretic.   Psychiatric: Her speech is normal and behavior is normal. Judgment and thought content normal.   Nursing note and vitals reviewed.      Assessment:       1. Acute bilateral low back pain with left-sided sciatica          Plan:          Pt has hx of sciatica, symptoms similar to prior flares. No recent injury or trauma. No red flag sx. No urinary symptoms.- Discussed ddx, home care, tx options, and given follow up precautions.pt expresses understanding, agrees with plan of care.     Acute bilateral low back pain with left-sided sciatica  -     ketorolac injection 30 mg  -     acetaminophen tablet 1,000 mg  -     naproxen (NAPROSYN) 500 MG tablet; Take 1 tablet (500 mg total) by mouth 2 (two) times daily with meals. for 10 days  Dispense: 20 tablet; Refill: 0  -     cyclobenzaprine (FLEXERIL) 5 MG tablet; Take 1 tablet (5 mg total) by mouth 3 (three) times daily as needed for Muscle spasms.  Dispense: 21 tablet; Refill: 0       Patient Instructions   - Rest.    - Drink plenty of fluids.      - you received a shot of toradol in clinic today, so do not take naproxen until at least 8 hours from now.     - do not take Tylenol/acetaminophen until least 6-8 hours from now because you received 1000 mg in clinic today.    - take naproxen twice a day as prescribed with food to help with pain and inflammation.  Do not take over the counter NSAIDs (aleve, advil, ibuprofen) while taking this medication.  You can take Tylenol as directed along with this medication for pain relief.    -you can take flexeril (cyclobenzaprine)  as prescribed.  Initial dose is 5 mg, may increase to 10 mg if needed.  This is a muscle relaxer that can help with tension of the muscles to help reduce pain. This can also help with muscle spasm. This medicine may cause drowsiness.  Do not drive for operate heavy machinery while on this medication.      - Follow up with your PCP or specialty clinic as directed in the next 1-2 weeks if not improved or as needed.  You can call [phone] to schedule an appointment with the appropriate provider.    - Go to the ER or seek medical attention immediately if you develop new or worsening symptoms.     - You must understand that you have received an Urgent Care treatment only and that you may be released before all of your medical problems are known or treated.   - You, the patient, will arrange for follow up care as instructed.   - If your condition worsens or fails to improve we recommend that you receive another evaluation at the ER immediately or contact your PCP to discuss your concerns or return here.

## 2022-12-07 RX ORDER — TRIAMCINOLONE ACETONIDE 40 MG/ML
40 INJECTION, SUSPENSION INTRA-ARTICULAR; INTRAMUSCULAR
Status: DISCONTINUED | OUTPATIENT
Start: 2022-11-22 | End: 2022-12-07 | Stop reason: HOSPADM

## 2022-12-08 ENCOUNTER — OFFICE VISIT (OUTPATIENT)
Dept: SPORTS MEDICINE | Facility: CLINIC | Age: 57
End: 2022-12-08
Payer: COMMERCIAL

## 2022-12-08 VITALS
WEIGHT: 172.63 LBS | DIASTOLIC BLOOD PRESSURE: 70 MMHG | BODY MASS INDEX: 31.77 KG/M2 | HEART RATE: 67 BPM | HEIGHT: 62 IN | SYSTOLIC BLOOD PRESSURE: 110 MMHG

## 2022-12-08 DIAGNOSIS — M17.11 PRIMARY OSTEOARTHRITIS OF RIGHT KNEE: Primary | ICD-10-CM

## 2022-12-08 PROCEDURE — 99999 PR PBB SHADOW E&M-EST. PATIENT-LVL IV: ICD-10-PCS | Mod: PBBFAC,,, | Performed by: PHYSICIAN ASSISTANT

## 2022-12-08 PROCEDURE — 3044F HG A1C LEVEL LT 7.0%: CPT | Mod: CPTII,S$GLB,, | Performed by: PHYSICIAN ASSISTANT

## 2022-12-08 PROCEDURE — 1159F PR MEDICATION LIST DOCUMENTED IN MEDICAL RECORD: ICD-10-PCS | Mod: CPTII,S$GLB,, | Performed by: PHYSICIAN ASSISTANT

## 2022-12-08 PROCEDURE — 3044F PR MOST RECENT HEMOGLOBIN A1C LEVEL <7.0%: ICD-10-PCS | Mod: CPTII,S$GLB,, | Performed by: PHYSICIAN ASSISTANT

## 2022-12-08 PROCEDURE — 99499 NO LOS: ICD-10-PCS | Mod: S$GLB,,, | Performed by: PHYSICIAN ASSISTANT

## 2022-12-08 PROCEDURE — 1160F RVW MEDS BY RX/DR IN RCRD: CPT | Mod: CPTII,S$GLB,, | Performed by: PHYSICIAN ASSISTANT

## 2022-12-08 PROCEDURE — 1160F PR REVIEW ALL MEDS BY PRESCRIBER/CLIN PHARMACIST DOCUMENTED: ICD-10-PCS | Mod: CPTII,S$GLB,, | Performed by: PHYSICIAN ASSISTANT

## 2022-12-08 PROCEDURE — 4010F PR ACE/ARB THEARPY RXD/TAKEN: ICD-10-PCS | Mod: CPTII,S$GLB,, | Performed by: PHYSICIAN ASSISTANT

## 2022-12-08 PROCEDURE — 20610 DRAIN/INJ JOINT/BURSA W/O US: CPT | Mod: RT,S$GLB,, | Performed by: PHYSICIAN ASSISTANT

## 2022-12-08 PROCEDURE — 1159F MED LIST DOCD IN RCRD: CPT | Mod: CPTII,S$GLB,, | Performed by: PHYSICIAN ASSISTANT

## 2022-12-08 PROCEDURE — 3074F SYST BP LT 130 MM HG: CPT | Mod: CPTII,S$GLB,, | Performed by: PHYSICIAN ASSISTANT

## 2022-12-08 PROCEDURE — 4010F ACE/ARB THERAPY RXD/TAKEN: CPT | Mod: CPTII,S$GLB,, | Performed by: PHYSICIAN ASSISTANT

## 2022-12-08 PROCEDURE — 3078F PR MOST RECENT DIASTOLIC BLOOD PRESSURE < 80 MM HG: ICD-10-PCS | Mod: CPTII,S$GLB,, | Performed by: PHYSICIAN ASSISTANT

## 2022-12-08 PROCEDURE — 3066F NEPHROPATHY DOC TX: CPT | Mod: CPTII,S$GLB,, | Performed by: PHYSICIAN ASSISTANT

## 2022-12-08 PROCEDURE — 3061F PR NEG MICROALBUMINURIA RESULT DOCUMENTED/REVIEW: ICD-10-PCS | Mod: CPTII,S$GLB,, | Performed by: PHYSICIAN ASSISTANT

## 2022-12-08 PROCEDURE — 20610 PR DRAIN/INJECT LARGE JOINT/BURSA: ICD-10-PCS | Mod: RT,S$GLB,, | Performed by: PHYSICIAN ASSISTANT

## 2022-12-08 PROCEDURE — 99999 PR PBB SHADOW E&M-EST. PATIENT-LVL IV: CPT | Mod: PBBFAC,,, | Performed by: PHYSICIAN ASSISTANT

## 2022-12-08 PROCEDURE — 3078F DIAST BP <80 MM HG: CPT | Mod: CPTII,S$GLB,, | Performed by: PHYSICIAN ASSISTANT

## 2022-12-08 PROCEDURE — 3066F PR DOCUMENTATION OF TREATMENT FOR NEPHROPATHY: ICD-10-PCS | Mod: CPTII,S$GLB,, | Performed by: PHYSICIAN ASSISTANT

## 2022-12-08 PROCEDURE — 3008F BODY MASS INDEX DOCD: CPT | Mod: CPTII,S$GLB,, | Performed by: PHYSICIAN ASSISTANT

## 2022-12-08 PROCEDURE — 99499 UNLISTED E&M SERVICE: CPT | Mod: S$GLB,,, | Performed by: PHYSICIAN ASSISTANT

## 2022-12-08 PROCEDURE — 3061F NEG MICROALBUMINURIA REV: CPT | Mod: CPTII,S$GLB,, | Performed by: PHYSICIAN ASSISTANT

## 2022-12-08 PROCEDURE — 3074F PR MOST RECENT SYSTOLIC BLOOD PRESSURE < 130 MM HG: ICD-10-PCS | Mod: CPTII,S$GLB,, | Performed by: PHYSICIAN ASSISTANT

## 2022-12-08 PROCEDURE — 3008F PR BODY MASS INDEX (BMI) DOCUMENTED: ICD-10-PCS | Mod: CPTII,S$GLB,, | Performed by: PHYSICIAN ASSISTANT

## 2022-12-08 NOTE — PROGRESS NOTES
Patient is here for follow up of right knee arthritis. Pt is requesting right knee Euflexxa #1.  [facility] Has failed other conservative modalities including NSAIDS, activity modification, weight loss.    The prior shot was tolerated well.    PHYSICAL EXAMINATION:     General: The patient is alert and oriented x 3. Mood is pleasant.   Observation of ears, eyes and nose reveals no gross abnormalities. No   labored breathing observed.     No signs of infection or adverse reaction to knee.    PROCEDURE NOTE:  Aspiration/Injection Procedure right knee    A time out was performed, including verification of patient ID, procedure, site and side, availability of information and equipment, review of safety issues, and agreement with consent, the procedure site was marked.    Aspiration:  After time out was performed, the patient was prepped aseptically with povidone-iodine swabsticks. 5cc's of normal joint fluid was aspirated from the Superolateral  aspect of the right Knee Joint using a 22g x 1.5 needle in sterile fashion without complication.     Injection:  Following aspiration, a diagnostic and therapeutic injection of 2cc Euflexxa was given under sterile technique in the supine position.     Grecia Boyd had no adverse reactions to the medication. Pain decreased. She was instructed to apply ice to the joint for 20 minutes and avoid strenuous activities for 24-36 hours following the injection. She was warned of possible blood sugar and/or blood pressure changes during that time. Following that time, she can resume regular activities.    RTC 1 week for 2nd injection.  All questions were answered, pt will contact us for questions or concerns in the interim.

## 2022-12-12 ENCOUNTER — TELEPHONE (OUTPATIENT)
Dept: SPORTS MEDICINE | Facility: CLINIC | Age: 57
End: 2022-12-12
Payer: COMMERCIAL

## 2022-12-12 NOTE — TELEPHONE ENCOUNTER
----- Message from Chari Stahl sent at 12/12/2022 11:49 AM CST -----  Regarding: call back  Contact: [phone]  Who Called: PT     Patient is calling to see if her appointment scheduled on 12/15/22 and would like to see if she can come in on 12/19/22. Please advice

## 2022-12-20 DIAGNOSIS — E78.5 HYPERLIPIDEMIA, UNSPECIFIED HYPERLIPIDEMIA TYPE: ICD-10-CM

## 2022-12-20 DIAGNOSIS — Z79.4 TYPE 2 DIABETES MELLITUS WITHOUT COMPLICATION, WITH LONG-TERM CURRENT USE OF INSULIN: ICD-10-CM

## 2022-12-20 DIAGNOSIS — I10 HTN (HYPERTENSION), BENIGN: ICD-10-CM

## 2022-12-20 DIAGNOSIS — G47.33 OSA (OBSTRUCTIVE SLEEP APNEA): ICD-10-CM

## 2022-12-20 DIAGNOSIS — E11.9 TYPE 2 DIABETES MELLITUS WITHOUT COMPLICATION, WITH LONG-TERM CURRENT USE OF INSULIN: ICD-10-CM

## 2022-12-20 DIAGNOSIS — E66.9 OBESITY (BMI 30-39.9): ICD-10-CM

## 2022-12-20 RX ORDER — GLIMEPIRIDE 4 MG/1
TABLET ORAL
Qty: 90 TABLET | Refills: 0 | Status: SHIPPED | OUTPATIENT
Start: 2022-12-20 | End: 2023-04-03

## 2022-12-20 NOTE — TELEPHONE ENCOUNTER
Refill Decision Note   Grecia Boyd  is requesting a refill authorization.  Brief Assessment and Rationale for Refill:  Approve    -Medication-Related Problems Identified: Requires labs  Medication Therapy Plan:  FOV;    Medication Reconciliation Completed: No   Comments:     Provider Staff:     Action is required for this patient.   Please see care gap opportunities below in Care Due Message.     Thanks!  [facility]     Appointments      Date Provider   Last Visit   7/14/2022 Scott Tripathi,    Next Visit   1/18/2023 Scott Tripathi,      Note composed:3:25 PM 12/20/2022           Note composed:3:25 PM 12/20/2022

## 2022-12-20 NOTE — TELEPHONE ENCOUNTER
Care Due:                  Date            Visit Type   Department     Provider  --------------------------------------------------------------------------------                                EP -                              PRIMARY      MET INTERNAL  Last Visit: 07-      CARE ([facility])   [facility]       Scott Tripathi                              EP -                              PRIMARY      [facility] INTERNAL  Next Visit: 01-      [facility] ([facility])   [facility]                                                            Last  Test          Frequency    Reason                     Performed    Due Date  --------------------------------------------------------------------------------    HBA1C.......  6 months...  glimepiride, metFORMIN,    07- 01-                             semaglutide..............    [employer] Embedded Care Gaps. Reference number: [id number]. 12/20/2022   8:04:22 AM CST

## 2022-12-29 ENCOUNTER — OFFICE VISIT (OUTPATIENT)
Dept: SPORTS MEDICINE | Facility: CLINIC | Age: 57
End: 2022-12-29
Payer: COMMERCIAL

## 2022-12-29 VITALS
SYSTOLIC BLOOD PRESSURE: 100 MMHG | BODY MASS INDEX: 31.1 KG/M2 | HEART RATE: 67 BPM | WEIGHT: 169 LBS | HEIGHT: 62 IN | DIASTOLIC BLOOD PRESSURE: 65 MMHG

## 2022-12-29 DIAGNOSIS — M17.11 PRIMARY OSTEOARTHRITIS OF RIGHT KNEE: Primary | ICD-10-CM

## 2022-12-29 PROCEDURE — 1160F RVW MEDS BY RX/DR IN RCRD: CPT | Mod: CPTII,S$GLB,, | Performed by: PHYSICIAN ASSISTANT

## 2022-12-29 PROCEDURE — 1159F PR MEDICATION LIST DOCUMENTED IN MEDICAL RECORD: ICD-10-PCS | Mod: CPTII,S$GLB,, | Performed by: PHYSICIAN ASSISTANT

## 2022-12-29 PROCEDURE — 3008F PR BODY MASS INDEX (BMI) DOCUMENTED: ICD-10-PCS | Mod: CPTII,S$GLB,, | Performed by: PHYSICIAN ASSISTANT

## 2022-12-29 PROCEDURE — 4010F PR ACE/ARB THEARPY RXD/TAKEN: ICD-10-PCS | Mod: CPTII,S$GLB,, | Performed by: PHYSICIAN ASSISTANT

## 2022-12-29 PROCEDURE — 3061F PR NEG MICROALBUMINURIA RESULT DOCUMENTED/REVIEW: ICD-10-PCS | Mod: CPTII,S$GLB,, | Performed by: PHYSICIAN ASSISTANT

## 2022-12-29 PROCEDURE — 1160F PR REVIEW ALL MEDS BY PRESCRIBER/CLIN PHARMACIST DOCUMENTED: ICD-10-PCS | Mod: CPTII,S$GLB,, | Performed by: PHYSICIAN ASSISTANT

## 2022-12-29 PROCEDURE — 99999 PR PBB SHADOW E&M-EST. PATIENT-LVL III: CPT | Mod: PBBFAC,,, | Performed by: PHYSICIAN ASSISTANT

## 2022-12-29 PROCEDURE — 99999 PR PBB SHADOW E&M-EST. PATIENT-LVL III: ICD-10-PCS | Mod: PBBFAC,,, | Performed by: PHYSICIAN ASSISTANT

## 2022-12-29 PROCEDURE — 20610 DRAIN/INJ JOINT/BURSA W/O US: CPT | Mod: RT,S$GLB,, | Performed by: PHYSICIAN ASSISTANT

## 2022-12-29 PROCEDURE — 3008F BODY MASS INDEX DOCD: CPT | Mod: CPTII,S$GLB,, | Performed by: PHYSICIAN ASSISTANT

## 2022-12-29 PROCEDURE — 3066F PR DOCUMENTATION OF TREATMENT FOR NEPHROPATHY: ICD-10-PCS | Mod: CPTII,S$GLB,, | Performed by: PHYSICIAN ASSISTANT

## 2022-12-29 PROCEDURE — 99499 UNLISTED E&M SERVICE: CPT | Mod: S$GLB,,, | Performed by: PHYSICIAN ASSISTANT

## 2022-12-29 PROCEDURE — 3061F NEG MICROALBUMINURIA REV: CPT | Mod: CPTII,S$GLB,, | Performed by: PHYSICIAN ASSISTANT

## 2022-12-29 PROCEDURE — 99499 NO LOS: ICD-10-PCS | Mod: S$GLB,,, | Performed by: PHYSICIAN ASSISTANT

## 2022-12-29 PROCEDURE — 20610 PR DRAIN/INJECT LARGE JOINT/BURSA: ICD-10-PCS | Mod: RT,S$GLB,, | Performed by: PHYSICIAN ASSISTANT

## 2022-12-29 PROCEDURE — 3074F SYST BP LT 130 MM HG: CPT | Mod: CPTII,S$GLB,, | Performed by: PHYSICIAN ASSISTANT

## 2022-12-29 PROCEDURE — 4010F ACE/ARB THERAPY RXD/TAKEN: CPT | Mod: CPTII,S$GLB,, | Performed by: PHYSICIAN ASSISTANT

## 2022-12-29 PROCEDURE — 3078F PR MOST RECENT DIASTOLIC BLOOD PRESSURE < 80 MM HG: ICD-10-PCS | Mod: CPTII,S$GLB,, | Performed by: PHYSICIAN ASSISTANT

## 2022-12-29 PROCEDURE — 1159F MED LIST DOCD IN RCRD: CPT | Mod: CPTII,S$GLB,, | Performed by: PHYSICIAN ASSISTANT

## 2022-12-29 PROCEDURE — 3044F PR MOST RECENT HEMOGLOBIN A1C LEVEL <7.0%: ICD-10-PCS | Mod: CPTII,S$GLB,, | Performed by: PHYSICIAN ASSISTANT

## 2022-12-29 PROCEDURE — 3078F DIAST BP <80 MM HG: CPT | Mod: CPTII,S$GLB,, | Performed by: PHYSICIAN ASSISTANT

## 2022-12-29 PROCEDURE — 3044F HG A1C LEVEL LT 7.0%: CPT | Mod: CPTII,S$GLB,, | Performed by: PHYSICIAN ASSISTANT

## 2022-12-29 PROCEDURE — 3074F PR MOST RECENT SYSTOLIC BLOOD PRESSURE < 130 MM HG: ICD-10-PCS | Mod: CPTII,S$GLB,, | Performed by: PHYSICIAN ASSISTANT

## 2022-12-29 PROCEDURE — 3066F NEPHROPATHY DOC TX: CPT | Mod: CPTII,S$GLB,, | Performed by: PHYSICIAN ASSISTANT

## 2023-01-05 ENCOUNTER — OFFICE VISIT (OUTPATIENT)
Dept: SPORTS MEDICINE | Facility: CLINIC | Age: 58
End: 2023-01-05
Payer: COMMERCIAL

## 2023-01-05 VITALS
HEIGHT: 62 IN | HEART RATE: 70 BPM | BODY MASS INDEX: 31.56 KG/M2 | SYSTOLIC BLOOD PRESSURE: 116 MMHG | DIASTOLIC BLOOD PRESSURE: 72 MMHG | WEIGHT: 171.5 LBS

## 2023-01-05 DIAGNOSIS — M17.11 PRIMARY OSTEOARTHRITIS OF RIGHT KNEE: Primary | ICD-10-CM

## 2023-01-05 PROCEDURE — 20610 PR DRAIN/INJECT LARGE JOINT/BURSA: ICD-10-PCS | Mod: RT,S$GLB,, | Performed by: PHYSICIAN ASSISTANT

## 2023-01-05 PROCEDURE — 99999 PR PBB SHADOW E&M-EST. PATIENT-LVL III: ICD-10-PCS | Mod: PBBFAC,,, | Performed by: PHYSICIAN ASSISTANT

## 2023-01-05 PROCEDURE — 1160F RVW MEDS BY RX/DR IN RCRD: CPT | Mod: CPTII,S$GLB,, | Performed by: PHYSICIAN ASSISTANT

## 2023-01-05 PROCEDURE — 99499 NO LOS: ICD-10-PCS | Mod: S$GLB,,, | Performed by: PHYSICIAN ASSISTANT

## 2023-01-05 PROCEDURE — 99999 PR PBB SHADOW E&M-EST. PATIENT-LVL III: CPT | Mod: PBBFAC,,, | Performed by: PHYSICIAN ASSISTANT

## 2023-01-05 PROCEDURE — 1159F MED LIST DOCD IN RCRD: CPT | Mod: CPTII,S$GLB,, | Performed by: PHYSICIAN ASSISTANT

## 2023-01-05 PROCEDURE — 3008F PR BODY MASS INDEX (BMI) DOCUMENTED: ICD-10-PCS | Mod: CPTII,S$GLB,, | Performed by: PHYSICIAN ASSISTANT

## 2023-01-05 PROCEDURE — 3074F SYST BP LT 130 MM HG: CPT | Mod: CPTII,S$GLB,, | Performed by: PHYSICIAN ASSISTANT

## 2023-01-05 PROCEDURE — 20610 DRAIN/INJ JOINT/BURSA W/O US: CPT | Mod: RT,S$GLB,, | Performed by: PHYSICIAN ASSISTANT

## 2023-01-05 PROCEDURE — 3078F PR MOST RECENT DIASTOLIC BLOOD PRESSURE < 80 MM HG: ICD-10-PCS | Mod: CPTII,S$GLB,, | Performed by: PHYSICIAN ASSISTANT

## 2023-01-05 PROCEDURE — 99499 UNLISTED E&M SERVICE: CPT | Mod: S$GLB,,, | Performed by: PHYSICIAN ASSISTANT

## 2023-01-05 PROCEDURE — 3078F DIAST BP <80 MM HG: CPT | Mod: CPTII,S$GLB,, | Performed by: PHYSICIAN ASSISTANT

## 2023-01-05 PROCEDURE — 1159F PR MEDICATION LIST DOCUMENTED IN MEDICAL RECORD: ICD-10-PCS | Mod: CPTII,S$GLB,, | Performed by: PHYSICIAN ASSISTANT

## 2023-01-05 PROCEDURE — 3074F PR MOST RECENT SYSTOLIC BLOOD PRESSURE < 130 MM HG: ICD-10-PCS | Mod: CPTII,S$GLB,, | Performed by: PHYSICIAN ASSISTANT

## 2023-01-05 PROCEDURE — 3008F BODY MASS INDEX DOCD: CPT | Mod: CPTII,S$GLB,, | Performed by: PHYSICIAN ASSISTANT

## 2023-01-05 PROCEDURE — 1160F PR REVIEW ALL MEDS BY PRESCRIBER/CLIN PHARMACIST DOCUMENTED: ICD-10-PCS | Mod: CPTII,S$GLB,, | Performed by: PHYSICIAN ASSISTANT

## 2023-01-05 NOTE — PROGRESS NOTES
Patient is here for follow up of right knee arthritis. Pt is requesting right knee Euflexxa #3.  [facility] reviewed per encounter record. Has failed other conservative modalities including NSAIDS, activity modification, weight loss.    The prior shot was tolerated well.    PHYSICAL EXAMINATION:     General: The patient is alert and oriented x 3. Mood is pleasant.   Observation of ears, eyes and nose reveals no gross abnormalities. No   labored breathing observed.     No signs of infection or adverse reaction to knee.    PROCEDURE NOTE:  Aspiration/Injection Procedure right knee    A time out was performed, including verification of patient ID, procedure, site and side, availability of information and equipment, review of safety issues, and agreement with consent, the procedure site was marked.    Aspiration:  After time out was performed, the patient was prepped aseptically with povidone-iodine swabsticks. 3cc's of normal joint fluid was aspirated from the Superolateral  aspect of the right Knee Joint using a 22g x 1.5 needle in sterile fashion without complication.     Injection:  Following aspiration, a diagnostic and therapeutic injection of 2cc Euflexxa was given under sterile technique in the supine position.     Grecia Boyd had no adverse reactions to the medication. Pain decreased. She was instructed to apply ice to the joint for 20 minutes and avoid strenuous activities for 24-36 hours following the injection. She was warned of possible blood sugar and/or blood pressure changes during that time. Following that time, she can resume regular activities.    RTC in 6 months with Elvia Erickson PA-C for possible repeat visco supplementation.  All questions were answered, pt will contact us for questions or concerns in the interim.

## 2023-01-17 ENCOUNTER — LAB VISIT (OUTPATIENT)
Dept: LAB | Facility: HOSPITAL | Age: 58
End: 2023-01-17
Attending: INTERNAL MEDICINE
Payer: COMMERCIAL

## 2023-01-17 DIAGNOSIS — E78.5 HYPERLIPIDEMIA ASSOCIATED WITH TYPE 2 DIABETES MELLITUS: ICD-10-CM

## 2023-01-17 DIAGNOSIS — I15.2 HYPERTENSION ASSOCIATED WITH TYPE 2 DIABETES MELLITUS: ICD-10-CM

## 2023-01-17 DIAGNOSIS — E11.9 TYPE 2 DIABETES MELLITUS WITHOUT COMPLICATION, WITHOUT LONG-TERM CURRENT USE OF INSULIN: ICD-10-CM

## 2023-01-17 DIAGNOSIS — E11.69 HYPERLIPIDEMIA ASSOCIATED WITH TYPE 2 DIABETES MELLITUS: ICD-10-CM

## 2023-01-17 DIAGNOSIS — E11.59 HYPERTENSION ASSOCIATED WITH TYPE 2 DIABETES MELLITUS: ICD-10-CM

## 2023-01-17 LAB
ALBUMIN SERPL BCP-MCNC: 4.2 G/DL (ref 3.5–5.2)
ALP SERPL-CCNC: 77 U/L (ref 55–135)
ALT SERPL W/O P-5'-P-CCNC: 8 U/L (ref 10–44)
ANION GAP SERPL CALC-SCNC: 12 MMOL/L (ref 8–16)
AST SERPL-CCNC: 13 U/L (ref 10–40)
BASOPHILS # BLD AUTO: 0.05 K/UL (ref 0–0.2)
BASOPHILS NFR BLD: 0.5 % (ref 0–1.9)
BILIRUB SERPL-MCNC: 0.7 MG/DL (ref 0.1–1)
BUN SERPL-MCNC: 11 MG/DL (ref 6–20)
CALCIUM SERPL-MCNC: 9.8 MG/DL (ref 8.7–10.5)
CHLORIDE SERPL-SCNC: 99 MMOL/L (ref 95–110)
CO2 SERPL-SCNC: 31 MMOL/L (ref 23–29)
CREAT SERPL-MCNC: 0.8 MG/DL (ref 0.5–1.4)
DIFFERENTIAL METHOD: ABNORMAL
EOSINOPHIL # BLD AUTO: 0.1 K/UL (ref 0–0.5)
EOSINOPHIL NFR BLD: 1.1 % (ref 0–8)
ERYTHROCYTE [DISTWIDTH] IN BLOOD BY AUTOMATED COUNT: 13.8 % (ref 11.5–14.5)
EST. GFR  (NO RACE VARIABLE): >60 ML/MIN/1.73 M^2
ESTIMATED AVG GLUCOSE: 108 MG/DL (ref 68–131)
GLUCOSE SERPL-MCNC: 109 MG/DL (ref 70–110)
HBA1C MFR BLD: 5.4 % (ref 4–5.6)
HCT VFR BLD AUTO: 37.4 % (ref 37–48.5)
HGB BLD-MCNC: 11.5 G/DL (ref 12–16)
IMM GRANULOCYTES # BLD AUTO: 0.03 K/UL (ref 0–0.04)
IMM GRANULOCYTES NFR BLD AUTO: 0.3 % (ref 0–0.5)
LYMPHOCYTES # BLD AUTO: 3.9 K/UL (ref 1–4.8)
LYMPHOCYTES NFR BLD: 37 % (ref 18–48)
MCH RBC QN AUTO: 26.2 PG (ref 27–31)
MCHC RBC AUTO-ENTMCNC: 30.7 G/DL (ref 32–36)
MCV RBC AUTO: 85 FL (ref 82–98)
MONOCYTES # BLD AUTO: 0.5 K/UL (ref 0.3–1)
MONOCYTES NFR BLD: 5.1 % (ref 4–15)
NEUTROPHILS # BLD AUTO: 5.9 K/UL (ref 1.8–7.7)
NEUTROPHILS NFR BLD: 56 % (ref 38–73)
NRBC BLD-RTO: 0 /100 WBC
PLATELET # BLD AUTO: 461 K/UL (ref 150–450)
PMV BLD AUTO: 8.7 FL (ref 9.2–12.9)
POTASSIUM SERPL-SCNC: 3.4 MMOL/L (ref 3.5–5.1)
PROT SERPL-MCNC: 7.7 G/DL (ref 6–8.4)
RBC # BLD AUTO: 4.39 M/UL (ref 4–5.4)
SODIUM SERPL-SCNC: 142 MMOL/L (ref 136–145)
WBC # BLD AUTO: 10.49 K/UL (ref 3.9–12.7)

## 2023-01-17 PROCEDURE — 85025 COMPLETE CBC W/AUTO DIFF WBC: CPT | Performed by: INTERNAL MEDICINE

## 2023-01-17 PROCEDURE — 36415 COLL VENOUS BLD VENIPUNCTURE: CPT | Mod: PN | Performed by: INTERNAL MEDICINE

## 2023-01-17 PROCEDURE — 83036 HEMOGLOBIN GLYCOSYLATED A1C: CPT | Performed by: INTERNAL MEDICINE

## 2023-01-17 PROCEDURE — 80053 COMPREHEN METABOLIC PANEL: CPT | Performed by: INTERNAL MEDICINE

## 2023-01-18 ENCOUNTER — OFFICE VISIT (OUTPATIENT)
Dept: INTERNAL MEDICINE | Facility: CLINIC | Age: 58
End: 2023-01-18
Payer: COMMERCIAL

## 2023-01-18 VITALS
DIASTOLIC BLOOD PRESSURE: 70 MMHG | HEIGHT: 62 IN | HEART RATE: 68 BPM | TEMPERATURE: 98 F | BODY MASS INDEX: 31.64 KG/M2 | WEIGHT: 171.94 LBS | OXYGEN SATURATION: 99 % | SYSTOLIC BLOOD PRESSURE: 112 MMHG | RESPIRATION RATE: 16 BRPM

## 2023-01-18 DIAGNOSIS — E11.59 HYPERTENSION ASSOCIATED WITH TYPE 2 DIABETES MELLITUS: ICD-10-CM

## 2023-01-18 DIAGNOSIS — E11.69 HYPERLIPIDEMIA ASSOCIATED WITH TYPE 2 DIABETES MELLITUS: Primary | ICD-10-CM

## 2023-01-18 DIAGNOSIS — I15.2 HYPERTENSION ASSOCIATED WITH TYPE 2 DIABETES MELLITUS: ICD-10-CM

## 2023-01-18 DIAGNOSIS — E11.9 TYPE 2 DIABETES MELLITUS WITHOUT COMPLICATION, WITHOUT LONG-TERM CURRENT USE OF INSULIN: ICD-10-CM

## 2023-01-18 DIAGNOSIS — E66.2 OBESITY HYPOVENTILATION SYNDROME: ICD-10-CM

## 2023-01-18 DIAGNOSIS — E78.5 HYPERLIPIDEMIA ASSOCIATED WITH TYPE 2 DIABETES MELLITUS: Primary | ICD-10-CM

## 2023-01-18 PROBLEM — E66.01 SEVERE OBESITY (BMI 35.0-39.9) WITH COMORBIDITY: Status: RESOLVED | Noted: 2022-01-19 | Resolved: 2023-01-18

## 2023-01-18 PROCEDURE — 99999 PR PBB SHADOW E&M-EST. PATIENT-LVL III: CPT | Mod: PBBFAC,,, | Performed by: INTERNAL MEDICINE

## 2023-01-18 PROCEDURE — 3074F SYST BP LT 130 MM HG: CPT | Mod: CPTII,S$GLB,, | Performed by: INTERNAL MEDICINE

## 2023-01-18 PROCEDURE — 3008F PR BODY MASS INDEX (BMI) DOCUMENTED: ICD-10-PCS | Mod: CPTII,S$GLB,, | Performed by: INTERNAL MEDICINE

## 2023-01-18 PROCEDURE — 3078F PR MOST RECENT DIASTOLIC BLOOD PRESSURE < 80 MM HG: ICD-10-PCS | Mod: CPTII,S$GLB,, | Performed by: INTERNAL MEDICINE

## 2023-01-18 PROCEDURE — 3008F BODY MASS INDEX DOCD: CPT | Mod: CPTII,S$GLB,, | Performed by: INTERNAL MEDICINE

## 2023-01-18 PROCEDURE — 3044F PR MOST RECENT HEMOGLOBIN A1C LEVEL <7.0%: ICD-10-PCS | Mod: CPTII,S$GLB,, | Performed by: INTERNAL MEDICINE

## 2023-01-18 PROCEDURE — 1159F MED LIST DOCD IN RCRD: CPT | Mod: CPTII,S$GLB,, | Performed by: INTERNAL MEDICINE

## 2023-01-18 PROCEDURE — 99999 PR PBB SHADOW E&M-EST. PATIENT-LVL III: ICD-10-PCS | Mod: PBBFAC,,, | Performed by: INTERNAL MEDICINE

## 2023-01-18 PROCEDURE — 99214 PR OFFICE/OUTPT VISIT, EST, LEVL IV, 30-39 MIN: ICD-10-PCS | Mod: S$GLB,,, | Performed by: INTERNAL MEDICINE

## 2023-01-18 PROCEDURE — 3044F HG A1C LEVEL LT 7.0%: CPT | Mod: CPTII,S$GLB,, | Performed by: INTERNAL MEDICINE

## 2023-01-18 PROCEDURE — 1159F PR MEDICATION LIST DOCUMENTED IN MEDICAL RECORD: ICD-10-PCS | Mod: CPTII,S$GLB,, | Performed by: INTERNAL MEDICINE

## 2023-01-18 PROCEDURE — 3074F PR MOST RECENT SYSTOLIC BLOOD PRESSURE < 130 MM HG: ICD-10-PCS | Mod: CPTII,S$GLB,, | Performed by: INTERNAL MEDICINE

## 2023-01-18 PROCEDURE — 3078F DIAST BP <80 MM HG: CPT | Mod: CPTII,S$GLB,, | Performed by: INTERNAL MEDICINE

## 2023-01-18 PROCEDURE — 99214 OFFICE O/P EST MOD 30 MIN: CPT | Mod: S$GLB,,, | Performed by: INTERNAL MEDICINE

## 2023-01-18 NOTE — PROGRESS NOTES
Subjective:       Patient ID: Grecia Boyd is a 57 y.o. female.    Chief Complaint: Follow-up    HPI  Pt with HTN, T2DM, HLD, Obesity, CHIDI is here for 6 month f/u to review labs. Diabetes is now controlled.   Review of Systems   Constitutional:  Negative for activity change, appetite change, chills, diaphoresis, fatigue, fever and unexpected weight change.   HENT:  Negative for postnasal drip, rhinorrhea, sinus pressure/congestion, sneezing, sore throat, trouble swallowing and voice change.    Respiratory:  Negative for cough, shortness of breath and wheezing.    Cardiovascular:  Negative for chest pain, palpitations and leg swelling.   Gastrointestinal:  Negative for abdominal pain, blood in stool, constipation, diarrhea, nausea and vomiting.   Genitourinary:  Negative for dysuria.   Musculoskeletal:  Negative for arthralgias and myalgias.   Integumentary:  Negative for rash and wound.   Allergic/Immunologic: Negative for environmental allergies and food allergies.   Hematological:  Negative for adenopathy. Does not bruise/bleed easily.       Objective:      Physical Exam  Constitutional:       General: She is not in acute distress.     Appearance: Normal appearance. She is well-developed. She is not diaphoretic.   HENT:      Head: Normocephalic and atraumatic.      Right Ear: External ear normal.      Left Ear: External ear normal.      Nose: Nose normal.      Mouth/Throat:      Pharynx: No oropharyngeal exudate.   Eyes:      General: No scleral icterus.        Right eye: No discharge.         Left eye: No discharge.      Conjunctiva/sclera: Conjunctivae normal.      Pupils: Pupils are equal, round, and reactive to light.   Neck:      Vascular: No JVD.   Cardiovascular:      Rate and Rhythm: Normal rate and regular rhythm.      Pulses: Normal pulses.      Heart sounds: Normal heart sounds.   Pulmonary:      Effort: Pulmonary effort is normal. No respiratory distress.      Breath sounds: Normal breath sounds. No  wheezing, rhonchi or rales.   Abdominal:      General: Bowel sounds are normal. There is no distension.      Palpations: Abdomen is soft.      Tenderness: There is no abdominal tenderness. There is no guarding or rebound.   Musculoskeletal:      Cervical back: Neck supple.      Right lower leg: No edema.      Left lower leg: No edema.   Lymphadenopathy:      Cervical: No cervical adenopathy.   Skin:     General: Skin is warm and dry.      Coloration: Skin is not pale.      Findings: No rash.   Neurological:      General: No focal deficit present.      Mental Status: She is alert and oriented to person, place, and time.      Gait: Gait normal.   Psychiatric:         Behavior: Behavior normal.         Thought Content: Thought content normal.       Assessment:       Problem List Items Addressed This Visit          Cardiac/Vascular    Hyperlipidemia associated with type 2 diabetes mellitus - Primary       Endocrine    Type 2 diabetes mellitus without complication, without long-term current use of insulin    Hypertension associated with type 2 diabetes mellitus       Other    Obesity hypoventilation syndrome       Plan:    HTN- stable on Norvasc/HCTZ/Losartan       T2DM- last HA1C of 6.5(7/22)<--7.2(1/22)<--7.1(7/21)<--6.9(10/20)<--7.1(8/20)<--6.8(10/19)      -continue Metformin/Amaryl/Ozempic       -pt unable to afford Jardiance      HLD- continue Lipitor 40 mg daily      Obesity- pt advised on proper diet/exercise for weight loss      CHIDI- pt unable to tolerate CPAP      F/u in 6 months for annual exam

## 2023-02-08 DIAGNOSIS — E11.69 HYPERLIPIDEMIA ASSOCIATED WITH TYPE 2 DIABETES MELLITUS: Primary | ICD-10-CM

## 2023-02-08 DIAGNOSIS — E11.59 HYPERTENSION ASSOCIATED WITH TYPE 2 DIABETES MELLITUS: ICD-10-CM

## 2023-02-08 DIAGNOSIS — E11.9 TYPE 2 DIABETES MELLITUS WITHOUT COMPLICATION, WITHOUT LONG-TERM CURRENT USE OF INSULIN: ICD-10-CM

## 2023-02-08 DIAGNOSIS — Z00.00 ANNUAL PHYSICAL EXAM: ICD-10-CM

## 2023-02-08 DIAGNOSIS — I15.2 HYPERTENSION ASSOCIATED WITH TYPE 2 DIABETES MELLITUS: ICD-10-CM

## 2023-02-08 DIAGNOSIS — E78.5 HYPERLIPIDEMIA ASSOCIATED WITH TYPE 2 DIABETES MELLITUS: Primary | ICD-10-CM

## 2023-02-13 ENCOUNTER — TELEPHONE (OUTPATIENT)
Dept: INTERNAL MEDICINE | Facility: CLINIC | Age: 58
End: 2023-02-13
Payer: COMMERCIAL

## 2023-02-13 DIAGNOSIS — Z12.31 ENCOUNTER FOR SCREENING MAMMOGRAM FOR BREAST CANCER: Primary | ICD-10-CM

## 2023-02-13 NOTE — TELEPHONE ENCOUNTER
----- Message from Beverly Tam sent at 2/13/2023  4:01 PM CST -----  Regarding: Orders  Contact: Pt [phone]  Patient called to request orders for mammo states she received reminder to schedule Please call to discuss

## 2023-02-16 ENCOUNTER — TELEPHONE (OUTPATIENT)
Dept: INTERNAL MEDICINE | Facility: CLINIC | Age: 58
End: 2023-02-16
Payer: COMMERCIAL

## 2023-02-16 DIAGNOSIS — Z12.31 ENCOUNTER FOR SCREENING MAMMOGRAM FOR BREAST CANCER: Primary | ICD-10-CM

## 2023-02-16 NOTE — TELEPHONE ENCOUNTER
----- Message from Lennie Shelley sent at 2/16/2023  3:43 PM CST -----  Contact: pt @ [phone]  Grecia Boyd calling regarding Orders (message) for #pt is calling to get mammo orders put in, asking for call back

## 2023-02-24 ENCOUNTER — HOSPITAL ENCOUNTER (OUTPATIENT)
Dept: RADIOLOGY | Facility: HOSPITAL | Age: 58
Discharge: HOME | End: 2023-02-24
Attending: INTERNAL MEDICINE
Payer: COMMERCIAL

## 2023-02-24 DIAGNOSIS — Z12.31 ENCOUNTER FOR SCREENING MAMMOGRAM FOR BREAST CANCER: ICD-10-CM

## 2023-02-24 PROCEDURE — 77067 SCR MAMMO BI INCL CAD: CPT | Mod: 26,,, | Performed by: RADIOLOGY

## 2023-02-24 PROCEDURE — 77067 SCR MAMMO BI INCL CAD: CPT | Mod: TC,PN

## 2023-02-24 PROCEDURE — 77067 MAMMO DIGITAL SCREENING BILAT WITH TOMO: ICD-10-PCS | Mod: 26,,, | Performed by: RADIOLOGY

## 2023-02-24 PROCEDURE — 77063 MAMMO DIGITAL SCREENING BILAT WITH TOMO: ICD-10-PCS | Mod: 26,,, | Performed by: RADIOLOGY

## 2023-02-24 PROCEDURE — 77063 BREAST TOMOSYNTHESIS BI: CPT | Mod: 26,,, | Performed by: RADIOLOGY

## 2023-04-03 DIAGNOSIS — E78.5 HYPERLIPIDEMIA, UNSPECIFIED HYPERLIPIDEMIA TYPE: ICD-10-CM

## 2023-04-03 DIAGNOSIS — G47.33 OSA (OBSTRUCTIVE SLEEP APNEA): ICD-10-CM

## 2023-04-03 DIAGNOSIS — E11.9 TYPE 2 DIABETES MELLITUS WITHOUT COMPLICATION, WITH LONG-TERM CURRENT USE OF INSULIN: ICD-10-CM

## 2023-04-03 DIAGNOSIS — Z79.4 TYPE 2 DIABETES MELLITUS WITHOUT COMPLICATION, WITH LONG-TERM CURRENT USE OF INSULIN: ICD-10-CM

## 2023-04-03 DIAGNOSIS — I10 HTN (HYPERTENSION), BENIGN: ICD-10-CM

## 2023-04-03 DIAGNOSIS — E66.9 OBESITY (BMI 30-39.9): ICD-10-CM

## 2023-04-03 RX ORDER — GLIMEPIRIDE 4 MG/1
4 TABLET ORAL
Qty: 90 TABLET | Refills: 1 | Status: SHIPPED | OUTPATIENT
Start: 2023-04-03 | End: 2023-05-18

## 2023-04-03 NOTE — TELEPHONE ENCOUNTER
Refill Decision Note   Grecia Oliver  is requesting a refill authorization.  Brief Assessment and Rationale for Refill:  Approve     Medication Therapy Plan:       Medication Reconciliation Completed: No    Comments:     No Care Gaps recommended.     Note composed:4:00 PM 04/03/2023             No

## 2023-05-08 ENCOUNTER — TELEPHONE (OUTPATIENT)
Dept: INTERNAL MEDICINE | Facility: CLINIC | Age: 58
End: 2023-05-08
Payer: COMMERCIAL

## 2023-05-08 NOTE — TELEPHONE ENCOUNTER
----- Message from Amna Sewell sent at 5/8/2023  7:24 AM CDT -----  Contact: YOVANY ARAUJO [[id number]]@ [phone]  Patient sugar level started being low on yesterday and she would like to know if she continue taking her medication or not?

## 2023-05-09 ENCOUNTER — PATIENT MESSAGE (OUTPATIENT)
Dept: INTERNAL MEDICINE | Facility: CLINIC | Age: 58
End: 2023-05-09
Payer: COMMERCIAL

## 2023-06-02 ENCOUNTER — NURSE TRIAGE (OUTPATIENT)
Dept: ADMINISTRATIVE | Facility: CLINIC | Age: 58
End: 2023-06-02
Payer: COMMERCIAL

## 2023-06-02 DIAGNOSIS — G47.33 OSA (OBSTRUCTIVE SLEEP APNEA): ICD-10-CM

## 2023-06-02 DIAGNOSIS — E78.5 HYPERLIPIDEMIA, UNSPECIFIED HYPERLIPIDEMIA TYPE: ICD-10-CM

## 2023-06-02 DIAGNOSIS — I10 HTN (HYPERTENSION), BENIGN: ICD-10-CM

## 2023-06-02 DIAGNOSIS — Z79.4 TYPE 2 DIABETES MELLITUS WITHOUT COMPLICATION, WITH LONG-TERM CURRENT USE OF INSULIN: ICD-10-CM

## 2023-06-02 DIAGNOSIS — E11.9 TYPE 2 DIABETES MELLITUS WITHOUT COMPLICATION, WITH LONG-TERM CURRENT USE OF INSULIN: ICD-10-CM

## 2023-06-02 DIAGNOSIS — E66.9 OBESITY (BMI 30-39.9): ICD-10-CM

## 2023-06-02 RX ORDER — METFORMIN HYDROCHLORIDE 500 MG/1
1000 TABLET, EXTENDED RELEASE ORAL DAILY
Qty: 180 TABLET | Refills: 1 | Status: SHIPPED | OUTPATIENT
Start: 2023-06-02 | End: 2023-07-12

## 2023-06-03 NOTE — TELEPHONE ENCOUNTER
Reason for Disposition   [1] Caller has medicine question about med NOT prescribed by PCP AND [2] triager unable to answer question (e.g., compatibility with other med, storage)    Additional Information   Negative: [1] Intentional drug overdose AND [2] suicidal thoughts or ideas   Negative: Drug overdose and triager unable to answer question   Negative: Caller requesting a renewal or refill of a medicine patient is currently taking   Negative: Caller requesting information unrelated to medicine   Negative: Caller requesting information about COVID-19 Vaccine   Negative: Caller requesting information about Emergency Contraception   Negative: Caller requesting information about Combined Birth Control Pills   Negative: Caller requesting information about Progestin Birth Control Pills   Negative: Caller requesting information about Post-Op pain or medicines   Negative: Caller requesting a prescription antibiotic (such as Penicillin) for Strep throat and has a positive culture result   Negative: Caller requesting a prescription anti-viral med (such as Tamiflu) and has influenza (flu) symptoms   Negative: Immunization reaction suspected   Negative: Rash while taking a medicine or within 3 days of stopping it   Negative: [1] Asthma and [2] having symptoms of asthma (cough, wheezing, etc.)   Negative: [1] Symptom of illness (e.g., headache, abdominal pain, earache, vomiting) AND [2] more than mild   Negative: Breastfeeding questions about mother's medicines and diet   Negative: MORE THAN A DOUBLE DOSE of a prescription or over-the-counter (OTC) drug   Negative: [1] DOUBLE DOSE (an extra dose or lesser amount) of prescription drug AND [2] any symptoms (e.g., dizziness, nausea, pain, sleepiness)   Negative: [1] DOUBLE DOSE (an extra dose or lesser amount) of over-the-counter (OTC) drug AND [2] any symptoms (e.g., dizziness, nausea, pain, sleepiness)   Negative: Took another person's prescription drug   Negative: [1] DOUBLE  DOSE (an extra dose or lesser amount) of prescription drug AND [2] NO symptoms  (Exception: A double dose of antibiotics.)   Negative: Diabetes drug error or overdose (e.g., took wrong type of insulin or took extra dose)   Negative: [1] Prescription not at pharmacy AND [2] was prescribed by PCP recently (Exception: Triager has access to EMR and prescription is recorded there. Go to Home Care and confirm for pharmacy.)   Negative: [1] Pharmacy calling with prescription question AND [2] triager unable to answer question   Negative: [1] Caller has URGENT medicine question about med that PCP or specialist prescribed AND [2] triager unable to answer question   Negative: Medicine patch causing local rash or itching    Protocols used: Medication Question Call-A-  pt put in rx for metformin and pharmacy states dr cancelled refill. due to see in July. Last dose yest am. Per MAR no pharmacy attached to rx written on 5/18/2023. LM on pharm VM with rx at 813pm.

## 2023-06-05 ENCOUNTER — TELEPHONE (OUTPATIENT)
Dept: SPORTS MEDICINE | Facility: CLINIC | Age: 58
End: 2023-06-05
Payer: COMMERCIAL

## 2023-06-05 NOTE — TELEPHONE ENCOUNTER
Called the patient in regards to appointment on 6/8 with Elvia Erickson PA-C,.    Purpose of call: to verify why the patient need to be seen. The patient can call the office at [phone] to discuss.

## 2023-06-07 ENCOUNTER — TELEPHONE (OUTPATIENT)
Dept: SPORTS MEDICINE | Facility: CLINIC | Age: 58
End: 2023-06-07
Payer: COMMERCIAL

## 2023-06-07 NOTE — TELEPHONE ENCOUNTER
Spoke to the patient in regards to appointment tomorrow with Elvia Erickson PA-C. While on the call I confirmed why the patient needed to be seen. The patient stated she was coming in to discuss gel injections. I confirmed if the patient had any new injuries, which she denied. I then informed the patient that no office visit is needed. I will inform Elvia of gel injection request. Once the order is placed, I will contact her for scheduling. I also informed the patient that she isn't due for gel injection until on or after 7/5.

## 2023-06-07 NOTE — TELEPHONE ENCOUNTER
----- Message from Malini Barbosa sent at 6/7/2023  3:09 PM CDT -----  Regarding: missed call  Contact: [phone]  Grecia Boyd calling regarding Patient Advice (message) for #returning a call to Jose Luis. She states her appt is for a f/u for a gel injection. Please call

## 2023-06-08 ENCOUNTER — TELEPHONE (OUTPATIENT)
Dept: SPORTS MEDICINE | Facility: CLINIC | Age: 58
End: 2023-06-08
Payer: COMMERCIAL

## 2023-06-08 DIAGNOSIS — M17.11 PRIMARY OSTEOARTHRITIS OF RIGHT KNEE: Primary | ICD-10-CM

## 2023-06-08 NOTE — TELEPHONE ENCOUNTER
Spoke to the patient in regards to gel injection appointment. While on the call I scheduled the patient for gel injection of the right knee. Date, time, and location was confirmed for all appointment before ending the call.

## 2023-06-08 NOTE — PROGRESS NOTES
We have discussed a variety of treatment options including medications, injections, physical therapy and other alternative treatments. I also explained the indications, risks and benefits of surgery.  Patient's pain is refractory HEP, conservative management, and NSAIDs. Pt would like to proceed with  visco-supplementation.    Medical Necessity for viscosupplementation use: After thorough evaluation of the patient, I have determined that viscosupplementation treatment is medically necessary. The patient has painful degenerative joint disease (DJD) of the knee(s) with failure of conservative treatments including lifestyle modifications and rehabilitation exercises. Oral analgesics including NSAIDs have not adequately controlled the patient's symptoms. 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     I made the decision to obtain old records of the patient including previous notes and imaging. I independently reviewed and interpreted lab results today as well as prior imaging.        1. Pre-authorization placed for right Euflexxa series injections.  2. Ice compress to the affected area 2-3x a day for 15-20 minutes as needed for pain management.  3. RTC to see Elvia Erickson PA-C for right Euflexxa series injections.    All of the patient's questions were answered and the patient will contact us if they have any questions or concerns in the interim.

## 2023-06-13 ENCOUNTER — TELEPHONE (OUTPATIENT)
Dept: SPORTS MEDICINE | Facility: CLINIC | Age: 58
End: 2023-06-13
Payer: COMMERCIAL

## 2023-06-13 NOTE — TELEPHONE ENCOUNTER
Called the patient to inform her that her gel injection has been approved by her insurance company. The 1st injection is scheduled on 7/6 at 11:00 am  with Elvia Erickson PA-C at the [facility].

## 2023-06-30 DIAGNOSIS — E11.9 TYPE 2 DIABETES MELLITUS WITHOUT COMPLICATION, WITHOUT LONG-TERM CURRENT USE OF INSULIN: ICD-10-CM

## 2023-06-30 NOTE — TELEPHONE ENCOUNTER
----- Message from Matilda Wagner sent at 6/30/2023  3:56 PM CDT -----  Contact: p. [phone]  Requesting an RX refill or new RX.  Is this a refill or new RX: refill  RX name and strength (semaglutide (OZEMPIC)   Is this a 30 day or 90 day RX:   Pharmacy name and phone # (  [id number] - [place] [address] [address]  Phone: [phone] Fax: [phone]    Patient State that she got a message from the pharmacy telling that the dose should be 3 mg. They only have 3mg.     Patient said she need it for Sunday and has be authorize by her insurance company.    Patient state she has side effect when she doesn't get it.     Please call and advise.

## 2023-06-30 NOTE — TELEPHONE ENCOUNTER
Care Due:                  Date            Visit Type   Department     Provider  --------------------------------------------------------------------------------                                EP -                              PRIMARY      MET INTERNAL  Last Visit: 01-      CARE ([facility])   MEDICINE       Scott Tripathi  Next Visit: None Scheduled  None         None Found                                                            Last  Test          Frequency    Reason                     Performed    Due Date  --------------------------------------------------------------------------------    HBA1C.......  6 months...  metFORMIN, semaglutide...  01- 07-    Lipid Panel.  12 months..  atorvastatin.............  07- 07-    [facility] Embedded Care Due Messages. Reference number: [id number].   6/30/2023 4:19:40 PM CDT

## 2023-07-07 ENCOUNTER — OFFICE VISIT (OUTPATIENT)
Dept: SPORTS MEDICINE | Facility: CLINIC | Age: 58
End: 2023-07-07
Payer: COMMERCIAL

## 2023-07-07 VITALS
SYSTOLIC BLOOD PRESSURE: 130 MMHG | HEIGHT: 62 IN | BODY MASS INDEX: 29.51 KG/M2 | WEIGHT: 160.38 LBS | DIASTOLIC BLOOD PRESSURE: 71 MMHG | HEART RATE: 62 BPM

## 2023-07-07 DIAGNOSIS — M17.11 PRIMARY OSTEOARTHRITIS OF RIGHT KNEE: Primary | ICD-10-CM

## 2023-07-07 PROCEDURE — 99499 UNLISTED E&M SERVICE: CPT | Mod: S$GLB,,, | Performed by: PHYSICIAN ASSISTANT

## 2023-07-07 PROCEDURE — 20610 DRAIN/INJ JOINT/BURSA W/O US: CPT | Mod: RT,S$GLB,, | Performed by: PHYSICIAN ASSISTANT

## 2023-07-07 PROCEDURE — 99999 PR PBB SHADOW E&M-EST. PATIENT-LVL III: CPT | Mod: PBBFAC,,, | Performed by: PHYSICIAN ASSISTANT

## 2023-07-07 PROCEDURE — 20610 PR DRAIN/INJECT LARGE JOINT/BURSA: ICD-10-PCS | Mod: RT,S$GLB,, | Performed by: PHYSICIAN ASSISTANT

## 2023-07-07 PROCEDURE — 99999 PR PBB SHADOW E&M-EST. PATIENT-LVL III: ICD-10-PCS | Mod: PBBFAC,,, | Performed by: PHYSICIAN ASSISTANT

## 2023-07-07 PROCEDURE — 99499 NO LOS: ICD-10-PCS | Mod: S$GLB,,, | Performed by: PHYSICIAN ASSISTANT

## 2023-07-07 NOTE — PROGRESS NOTES
Grecia Boyd is here for follow up of right knee arthritis. Pt is requesting Euflexxa series injections #1 of 3.  [facility] reviewed per encounter record. Has failed other conservative modalities including NSAIDS, activity modification, weight loss.    The prior shot was tolerated well.    PHYSICAL EXAMINATION:     General: The patient is alert and oriented x 3. Mood is pleasant.   Observation of ears, eyes and nose reveals no gross abnormalities. No   labored breathing observed.     No signs of infection or adverse reaction to knee.    Medical Necessity for viscosupplementation use: After thorough evaluation of the patient, I have determined that viscosupplementation treatment is medically necessary. The patient has painful degenerative joint disease (DJD) of the knee(s) with failure of conservative treatments including lifestyle modifications and rehabilitation exercises. Oral analgesics including NSAIDs have not adequately controlled the patient's symptoms. 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     Injection Procedure  A time out was performed, including verification of patient ID, procedure, site and side, availability of information and equipment, review of safety issues, and agreement with consent, the procedure site was marked.    After time out was performed, the patient was prepped aseptically with chloraprep. A diagnostic and therapeutic injection of 2cc Euflexxa was given under sterile technique using a 22g x 1.5 needle from the Superolateral  aspect of the right Knee Joint in the supine position.      Grecia Boyd had no adverse reactions to the medication. Pain decreased. She was instructed to apply ice to the joint for 20 minutes and avoid strenuous activities for 24-36 hours following the injection. She was warned of possible blood  sugar and/or blood pressure changes during that time. Following that time, she can resume regular activities.    She was reminded to call the clinic immediately for any adverse side effects as explained in clinic today.    RTC to see Anthony Cisse PA-C for Euflexxa series injections #2 of 3.    All of the patient's questions were answered and the patient will contact us if they have any questions or concerns in the interim.

## 2023-07-10 ENCOUNTER — LAB VISIT (OUTPATIENT)
Dept: LAB | Facility: HOSPITAL | Age: 58
End: 2023-07-10
Attending: INTERNAL MEDICINE
Payer: COMMERCIAL

## 2023-07-10 DIAGNOSIS — E11.9 TYPE 2 DIABETES MELLITUS WITHOUT COMPLICATION, WITHOUT LONG-TERM CURRENT USE OF INSULIN: ICD-10-CM

## 2023-07-10 DIAGNOSIS — I15.2 HYPERTENSION ASSOCIATED WITH TYPE 2 DIABETES MELLITUS: ICD-10-CM

## 2023-07-10 DIAGNOSIS — E11.59 HYPERTENSION ASSOCIATED WITH TYPE 2 DIABETES MELLITUS: ICD-10-CM

## 2023-07-10 DIAGNOSIS — E11.69 HYPERLIPIDEMIA ASSOCIATED WITH TYPE 2 DIABETES MELLITUS: ICD-10-CM

## 2023-07-10 DIAGNOSIS — Z00.00 ANNUAL PHYSICAL EXAM: ICD-10-CM

## 2023-07-10 DIAGNOSIS — E78.5 HYPERLIPIDEMIA ASSOCIATED WITH TYPE 2 DIABETES MELLITUS: ICD-10-CM

## 2023-07-10 LAB
ALBUMIN SERPL BCP-MCNC: 4 G/DL (ref 3.5–5.2)
ALP SERPL-CCNC: 72 U/L (ref 55–135)
ALT SERPL W/O P-5'-P-CCNC: 26 U/L (ref 10–44)
ANION GAP SERPL CALC-SCNC: 12 MMOL/L (ref 8–16)
AST SERPL-CCNC: 31 U/L (ref 10–40)
BASOPHILS # BLD AUTO: 0.03 K/UL (ref 0–0.2)
BASOPHILS NFR BLD: 0.3 % (ref 0–1.9)
BILIRUB SERPL-MCNC: 0.6 MG/DL (ref 0.1–1)
BUN SERPL-MCNC: 9 MG/DL (ref 6–20)
CALCIUM SERPL-MCNC: 9.6 MG/DL (ref 8.7–10.5)
CHLORIDE SERPL-SCNC: 102 MMOL/L (ref 95–110)
CHOLEST SERPL-MCNC: 127 MG/DL (ref 120–199)
CHOLEST/HDLC SERPL: 2.8 {RATIO} (ref 2–5)
CO2 SERPL-SCNC: 27 MMOL/L (ref 23–29)
CREAT SERPL-MCNC: 0.8 MG/DL (ref 0.5–1.4)
DIFFERENTIAL METHOD: ABNORMAL
EOSINOPHIL # BLD AUTO: 0.1 K/UL (ref 0–0.5)
EOSINOPHIL NFR BLD: 1.1 % (ref 0–8)
ERYTHROCYTE [DISTWIDTH] IN BLOOD BY AUTOMATED COUNT: 13.8 % (ref 11.5–14.5)
EST. GFR  (NO RACE VARIABLE): >60 ML/MIN/1.73 M^2
ESTIMATED AVG GLUCOSE: 111 MG/DL (ref 68–131)
GLUCOSE SERPL-MCNC: 86 MG/DL (ref 70–110)
HBA1C MFR BLD: 5.5 % (ref 4–5.6)
HCT VFR BLD AUTO: 38.7 % (ref 37–48.5)
HDLC SERPL-MCNC: 45 MG/DL (ref 40–75)
HDLC SERPL: 35.4 % (ref 20–50)
HGB BLD-MCNC: 12.1 G/DL (ref 12–16)
IMM GRANULOCYTES # BLD AUTO: 0.01 K/UL (ref 0–0.04)
IMM GRANULOCYTES NFR BLD AUTO: 0.1 % (ref 0–0.5)
LDLC SERPL CALC-MCNC: 72 MG/DL (ref 63–159)
LYMPHOCYTES # BLD AUTO: 4.5 K/UL (ref 1–4.8)
LYMPHOCYTES NFR BLD: 46.1 % (ref 18–48)
MCH RBC QN AUTO: 26.8 PG (ref 27–31)
MCHC RBC AUTO-ENTMCNC: 31.3 G/DL (ref 32–36)
MCV RBC AUTO: 86 FL (ref 82–98)
MONOCYTES # BLD AUTO: 0.6 K/UL (ref 0.3–1)
MONOCYTES NFR BLD: 6.5 % (ref 4–15)
NEUTROPHILS # BLD AUTO: 4.5 K/UL (ref 1.8–7.7)
NEUTROPHILS NFR BLD: 45.9 % (ref 38–73)
NONHDLC SERPL-MCNC: 82 MG/DL
NRBC BLD-RTO: 0 /100 WBC
PLATELET # BLD AUTO: 407 K/UL (ref 150–450)
PMV BLD AUTO: 8.5 FL (ref 9.2–12.9)
POTASSIUM SERPL-SCNC: 4 MMOL/L (ref 3.5–5.1)
PROT SERPL-MCNC: 7.3 G/DL (ref 6–8.4)
RBC # BLD AUTO: 4.51 M/UL (ref 4–5.4)
SODIUM SERPL-SCNC: 141 MMOL/L (ref 136–145)
TRIGL SERPL-MCNC: 50 MG/DL (ref 30–150)
TSH SERPL DL<=0.005 MIU/L-ACNC: 1.42 UIU/ML (ref 0.4–4)
WBC # BLD AUTO: 9.78 K/UL (ref 3.9–12.7)

## 2023-07-10 PROCEDURE — 80061 LIPID PANEL: CPT | Performed by: INTERNAL MEDICINE

## 2023-07-10 PROCEDURE — 83036 HEMOGLOBIN GLYCOSYLATED A1C: CPT | Performed by: INTERNAL MEDICINE

## 2023-07-10 PROCEDURE — 85025 COMPLETE CBC W/AUTO DIFF WBC: CPT | Performed by: INTERNAL MEDICINE

## 2023-07-10 PROCEDURE — 80053 COMPREHEN METABOLIC PANEL: CPT | Performed by: INTERNAL MEDICINE

## 2023-07-10 PROCEDURE — 36415 COLL VENOUS BLD VENIPUNCTURE: CPT | Mod: PN | Performed by: INTERNAL MEDICINE

## 2023-07-10 PROCEDURE — 84443 ASSAY THYROID STIM HORMONE: CPT | Performed by: INTERNAL MEDICINE

## 2023-07-11 ENCOUNTER — LAB VISIT (OUTPATIENT)
Dept: LAB | Facility: HOSPITAL | Age: 58
End: 2023-07-11
Attending: INTERNAL MEDICINE
Payer: COMMERCIAL

## 2023-07-11 DIAGNOSIS — E11.69 HYPERLIPIDEMIA ASSOCIATED WITH TYPE 2 DIABETES MELLITUS: ICD-10-CM

## 2023-07-11 DIAGNOSIS — E11.9 TYPE 2 DIABETES MELLITUS WITHOUT COMPLICATION, WITHOUT LONG-TERM CURRENT USE OF INSULIN: ICD-10-CM

## 2023-07-11 DIAGNOSIS — E78.5 HYPERLIPIDEMIA ASSOCIATED WITH TYPE 2 DIABETES MELLITUS: ICD-10-CM

## 2023-07-11 DIAGNOSIS — I15.2 HYPERTENSION ASSOCIATED WITH TYPE 2 DIABETES MELLITUS: ICD-10-CM

## 2023-07-11 DIAGNOSIS — E11.59 HYPERTENSION ASSOCIATED WITH TYPE 2 DIABETES MELLITUS: ICD-10-CM

## 2023-07-11 DIAGNOSIS — Z00.00 ANNUAL PHYSICAL EXAM: ICD-10-CM

## 2023-07-11 LAB
BILIRUB UR QL STRIP: NEGATIVE
CLARITY UR REFRACT.AUTO: CLEAR
COLOR UR AUTO: YELLOW
GLUCOSE UR QL STRIP: NEGATIVE
HGB UR QL STRIP: NEGATIVE
KETONES UR QL STRIP: NEGATIVE
LEUKOCYTE ESTERASE UR QL STRIP: NEGATIVE
NITRITE UR QL STRIP: NEGATIVE
PH UR STRIP: 6 [PH] (ref 5–8)
PROT UR QL STRIP: NEGATIVE
SP GR UR STRIP: 1.01 (ref 1–1.03)
URN SPEC COLLECT METH UR: NORMAL

## 2023-07-11 PROCEDURE — 81003 URINALYSIS AUTO W/O SCOPE: CPT | Performed by: INTERNAL MEDICINE

## 2023-07-12 ENCOUNTER — TELEPHONE (OUTPATIENT)
Dept: INTERNAL MEDICINE | Facility: CLINIC | Age: 58
End: 2023-07-12

## 2023-07-12 ENCOUNTER — OFFICE VISIT (OUTPATIENT)
Dept: INTERNAL MEDICINE | Facility: CLINIC | Age: 58
End: 2023-07-12
Payer: COMMERCIAL

## 2023-07-12 VITALS
RESPIRATION RATE: 18 BRPM | DIASTOLIC BLOOD PRESSURE: 82 MMHG | TEMPERATURE: 99 F | HEART RATE: 62 BPM | BODY MASS INDEX: 29.62 KG/M2 | WEIGHT: 160.94 LBS | SYSTOLIC BLOOD PRESSURE: 120 MMHG | OXYGEN SATURATION: 98 % | HEIGHT: 62 IN

## 2023-07-12 DIAGNOSIS — Z12.12 ENCOUNTER FOR COLORECTAL CANCER SCREENING: ICD-10-CM

## 2023-07-12 DIAGNOSIS — E11.9 TYPE 2 DIABETES MELLITUS WITHOUT COMPLICATION, WITHOUT LONG-TERM CURRENT USE OF INSULIN: ICD-10-CM

## 2023-07-12 DIAGNOSIS — E78.5 HYPERLIPIDEMIA ASSOCIATED WITH TYPE 2 DIABETES MELLITUS: ICD-10-CM

## 2023-07-12 DIAGNOSIS — E11.69 HYPERLIPIDEMIA ASSOCIATED WITH TYPE 2 DIABETES MELLITUS: ICD-10-CM

## 2023-07-12 DIAGNOSIS — Z12.11 ENCOUNTER FOR COLORECTAL CANCER SCREENING: ICD-10-CM

## 2023-07-12 DIAGNOSIS — I15.2 HYPERTENSION ASSOCIATED WITH TYPE 2 DIABETES MELLITUS: ICD-10-CM

## 2023-07-12 DIAGNOSIS — Z23 NEED FOR VACCINATION: ICD-10-CM

## 2023-07-12 DIAGNOSIS — Z00.00 ENCOUNTER FOR ANNUAL HEALTH EXAMINATION: Primary | ICD-10-CM

## 2023-07-12 DIAGNOSIS — E11.59 HYPERTENSION ASSOCIATED WITH TYPE 2 DIABETES MELLITUS: ICD-10-CM

## 2023-07-12 PROBLEM — U07.1 COVID-19 VIRUS INFECTION: Status: RESOLVED | Noted: 2020-12-12 | Resolved: 2023-07-12

## 2023-07-12 PROBLEM — E87.6 HYPOKALEMIA: Status: RESOLVED | Noted: 2020-12-12 | Resolved: 2023-07-12

## 2023-07-12 PROCEDURE — 90715 TDAP VACCINE 7 YRS/> IM: CPT | Mod: S$GLB,,, | Performed by: NURSE PRACTITIONER

## 2023-07-12 PROCEDURE — 4010F PR ACE/ARB THEARPY RXD/TAKEN: ICD-10-PCS | Mod: CPTII,S$GLB,, | Performed by: NURSE PRACTITIONER

## 2023-07-12 PROCEDURE — 90471 TDAP VACCINE GREATER THAN OR EQUAL TO 7YO IM: ICD-10-PCS | Mod: S$GLB,,, | Performed by: NURSE PRACTITIONER

## 2023-07-12 PROCEDURE — 99999 PR PBB SHADOW E&M-EST. PATIENT-LVL V: CPT | Mod: PBBFAC,,, | Performed by: NURSE PRACTITIONER

## 2023-07-12 PROCEDURE — 3074F SYST BP LT 130 MM HG: CPT | Mod: CPTII,S$GLB,, | Performed by: NURSE PRACTITIONER

## 2023-07-12 PROCEDURE — 99999 PR PBB SHADOW E&M-EST. PATIENT-LVL V: ICD-10-PCS | Mod: PBBFAC,,, | Performed by: NURSE PRACTITIONER

## 2023-07-12 PROCEDURE — 1159F MED LIST DOCD IN RCRD: CPT | Mod: CPTII,S$GLB,, | Performed by: NURSE PRACTITIONER

## 2023-07-12 PROCEDURE — 99396 PR PREVENTIVE VISIT,EST,40-64: ICD-10-PCS | Mod: 25,S$GLB,, | Performed by: NURSE PRACTITIONER

## 2023-07-12 PROCEDURE — 4010F ACE/ARB THERAPY RXD/TAKEN: CPT | Mod: CPTII,S$GLB,, | Performed by: NURSE PRACTITIONER

## 2023-07-12 PROCEDURE — 99396 PREV VISIT EST AGE 40-64: CPT | Mod: 25,S$GLB,, | Performed by: NURSE PRACTITIONER

## 2023-07-12 PROCEDURE — 1160F RVW MEDS BY RX/DR IN RCRD: CPT | Mod: CPTII,S$GLB,, | Performed by: NURSE PRACTITIONER

## 2023-07-12 PROCEDURE — 3008F BODY MASS INDEX DOCD: CPT | Mod: CPTII,S$GLB,, | Performed by: NURSE PRACTITIONER

## 2023-07-12 PROCEDURE — 3044F HG A1C LEVEL LT 7.0%: CPT | Mod: CPTII,S$GLB,, | Performed by: NURSE PRACTITIONER

## 2023-07-12 PROCEDURE — 3044F PR MOST RECENT HEMOGLOBIN A1C LEVEL <7.0%: ICD-10-PCS | Mod: CPTII,S$GLB,, | Performed by: NURSE PRACTITIONER

## 2023-07-12 PROCEDURE — 3079F DIAST BP 80-89 MM HG: CPT | Mod: CPTII,S$GLB,, | Performed by: NURSE PRACTITIONER

## 2023-07-12 PROCEDURE — 1160F PR REVIEW ALL MEDS BY PRESCRIBER/CLIN PHARMACIST DOCUMENTED: ICD-10-PCS | Mod: CPTII,S$GLB,, | Performed by: NURSE PRACTITIONER

## 2023-07-12 PROCEDURE — 3074F PR MOST RECENT SYSTOLIC BLOOD PRESSURE < 130 MM HG: ICD-10-PCS | Mod: CPTII,S$GLB,, | Performed by: NURSE PRACTITIONER

## 2023-07-12 PROCEDURE — 90715 TDAP VACCINE GREATER THAN OR EQUAL TO 7YO IM: ICD-10-PCS | Mod: S$GLB,,, | Performed by: NURSE PRACTITIONER

## 2023-07-12 PROCEDURE — 90471 IMMUNIZATION ADMIN: CPT | Mod: S$GLB,,, | Performed by: NURSE PRACTITIONER

## 2023-07-12 PROCEDURE — 3008F PR BODY MASS INDEX (BMI) DOCUMENTED: ICD-10-PCS | Mod: CPTII,S$GLB,, | Performed by: NURSE PRACTITIONER

## 2023-07-12 PROCEDURE — 1159F PR MEDICATION LIST DOCUMENTED IN MEDICAL RECORD: ICD-10-PCS | Mod: CPTII,S$GLB,, | Performed by: NURSE PRACTITIONER

## 2023-07-12 PROCEDURE — 3079F PR MOST RECENT DIASTOLIC BLOOD PRESSURE 80-89 MM HG: ICD-10-PCS | Mod: CPTII,S$GLB,, | Performed by: NURSE PRACTITIONER

## 2023-07-12 RX ORDER — METFORMIN HYDROCHLORIDE 500 MG/1
500 TABLET, EXTENDED RELEASE ORAL DAILY
Qty: 180 TABLET | Refills: 1
Start: 2023-07-12 | End: 2023-10-13 | Stop reason: SINTOL

## 2023-07-12 NOTE — MEDICAL/APP STUDENT
Subjective:       Patient ID: Grecia Boyd is a 57 y.o. female.    Chief Complaint: Annual Wellness Visit      Grecia Boyd is a 57 y.o. female who presents today for an annual wellness visit.     Per digital medicine records, blood sugars and BP have been well controlled at home. States that she has occasional low blood sugars- inquiring about decreasing dosage of metformin. States she occasionally has difficulty getting Ozempic due to shortage.    She is due for colonoscopy- referral sent.     Review of patient's allergies indicates:  No Known Allergies   Medication List with Changes/Refills   Current Medications    ATORVASTATIN (LIPITOR) 40 MG TABLET    TAKE 1 TABLET(40 MG) BY MOUTH EVERY DAY    BLOOD SUGAR DIAGNOSTIC STRP    Check blood sugars TID    BLOOD-GLUCOSE METER (FREESTYLE SYSTEM KIT) KIT    Use as instructed    HYDROCHLOROTHIAZIDE (HYDRODIURIL) 12.5 MG TAB    Take 1 tablet (12.5 mg total) by mouth once daily.    LANCETS (ONETOUCH ULTRASOFT LANCETS) MISC    Check blood sugars TID    LOSARTAN (COZAAR) 25 MG TABLET    TAKE 1 TABLET(25 MG) BY MOUTH EVERY DAY    METFORMIN (GLUCOPHAGE-XR) 500 MG ER 24HR TABLET    Take 2 tablets (1,000 mg total) by mouth once daily. With a meal    SEMAGLUTIDE (OZEMPIC) 1 MG/DOSE (4 MG/3 ML)    Inject 1 mg into the skin every 7 days.       Health Maintenance  Mammogram-  2/24/2023  Tetanus/Tdap- 7/12/23  Foot Exam- 7/12/23      Patient ambulates on her own without an assistive device.   Does her have issues with balance, falls or walking?  No    On average, how many days per week do you do moderate to strenuous exercise:  (like a brisk walk or jog; this does not include your job/work)  5-6   On average, how many minutes do you exercise at this level each day? 45  We encourage you to start exercising if you do not already, continue at your current level or increase your level of activity.     Do you eat fruits and vegetable every day?  Yes  Do you have any concerns in  "regards to access to food? No    Have you ever been screened for HIV? No  Would you like to be screened for HIV? Yes    Do you still have a menstrual cycle? No       If not,  Hysterectomy      Do you go to the eye doctor regularly? Yes      Do you wear contacts, glasses, reading glasses? Yes    Review of Systems   Eyes: Negative.    Cardiovascular: Negative.    Gastrointestinal:  Negative for abdominal pain, nausea and vomiting.   Neurological:  Negative for tingling and sensory change.    Review of Systems   Eyes: Negative.    Cardiovascular: Negative.    Gastrointestinal:  Negative for abdominal pain, nausea and vomiting.   Neurological:  Negative for tingling and sensory change.     Objective:     /82 (BP Location: Right arm, Patient Position: Sitting, BP Method: Medium (Manual))   Pulse 62   Temp 98.8 °F (37.1 °C) (Temporal)   Resp 18   Ht 5' 2" (1.575 m)   Wt 73 kg (160 lb 15 oz)   SpO2 98%   BMI 29.44 kg/m²     Physical Exam  Constitutional:       Appearance: Normal appearance.   Cardiovascular:      Rate and Rhythm: Normal rate and regular rhythm.      Pulses: Normal pulses.      Heart sounds: Normal heart sounds.   Pulmonary:      Effort: Pulmonary effort is normal.      Breath sounds: Normal breath sounds.   Feet:      Right foot:      Protective Sensation: 10 sites tested.  10 sites sensed.      Left foot:      Protective Sensation: 10 sites tested.  10 sites sensed.   Skin:     General: Skin is warm and dry.   Neurological:      General: No focal deficit present.      Mental Status: She is alert.   Psychiatric:         Mood and Affect: Mood normal.         Behavior: Behavior normal.         Thought Content: Thought content normal.         Judgment: Judgment normal.     BP Readings from Last 3 Encounters:   07/12/23 120/82   07/07/23 130/71   01/18/23 112/70     Wt Readings from Last 3 Encounters:   07/12/23 73 kg (160 lb 15 oz)   07/07/23 72.7 kg (160 lb 6.2 oz)   01/18/23 78 kg (171 lb 15.3 " oz)       Last Labs:  Glucose   Date Value Ref Range Status   07/10/2023 86 70 - 110 mg/dL Final   01/17/2023 109 70 - 110 mg/dL Final     BUN   Date Value Ref Range Status   07/10/2023 9 6 - 20 mg/dL Final   01/17/2023 11 6 - 20 mg/dL Final     Creatinine   Date Value Ref Range Status   07/10/2023 0.8 0.5 - 1.4 mg/dL Final   01/17/2023 0.8 0.5 - 1.4 mg/dL Final     Cholesterol   Date Value Ref Range Status   07/10/2023 127 120 - 199 mg/dL Final     Comment:     The National Cholesterol Education Program (NCEP) has set the  following guidelines (reference ranges) for Cholesterol:  Optimal.....................<200 mg/dL  Borderline High.............200-239 mg/dL  High........................> or = 240 mg/dL     07/07/2022 135 120 - 199 mg/dL Final     Comment:     The National Cholesterol Education Program (NCEP) has set the  following guidelines (reference ranges) for Cholesterol:  Optimal.....................<200 mg/dL  Borderline High.............200-239 mg/dL  High........................> or = 240 mg/dL       Hemoglobin A1C   Date Value Ref Range Status   07/10/2023 5.5 4.0 - 5.6 % Final     Comment:     ADA Screening Guidelines:  5.7-6.4%  Consistent with prediabetes  >or=6.5%  Consistent with diabetes    High levels of fetal hemoglobin interfere with the HbA1C  assay. Heterozygous hemoglobin variants (HbS, HgC, etc)do  not significantly interfere with this assay.   However, presence of multiple variants may affect accuracy.     01/17/2023 5.4 4.0 - 5.6 % Final     Comment:     ADA Screening Guidelines:  5.7-6.4%  Consistent with prediabetes  >or=6.5%  Consistent with diabetes    High levels of fetal hemoglobin interfere with the HbA1C  assay. Heterozygous hemoglobin variants (HbS, HgC, etc)do  not significantly interfere with this assay.   However, presence of multiple variants may affect accuracy.       Hemoglobin   Date Value Ref Range Status   07/10/2023 12.1 12.0 - 16.0 g/dL Final   01/17/2023 11.5 (L)  12.0 - 16.0 g/dL Final     Hematocrit   Date Value Ref Range Status   07/10/2023 38.7 37.0 - 48.5 % Final   01/17/2023 37.4 37.0 - 48.5 % Final     Vit D, 25-Hydroxy   Date Value Ref Range Status   12/12/2020 8 (L) 30 - 96 ng/mL Final     Comment:     Vitamin D deficiency.........<10 ng/mL                              Vitamin D insufficiency......10-29 ng/mL       Vitamin D sufficiency........> or equal to 30 ng/mL  Vitamin D toxicity............>100 ng/mL           Assessment and Plan:     1. Need for vaccination  - (In Office Administered) Tdap Vaccine       Encounter for wellness examination in adult  1. Patient Counseling:  --Nutrition: Discussed the importance of moderate caffeine intake. Adhering to a low sodium, low saturated fat/low cholesterol diet.   --Exercise: Discussed the importance of regular exercise. At least 30 minutes 5 days per week.   --Injury prevention: Discussed safety belts, smoke detector and smoking near bedding or upholstery. Encouraged patient to have fire extinguisher in home.  --Dental health: Discussed importance of regular tooth brushing, flossing, and dental visits.  --Immunizations reviewed.  --Discussed benefits of maintaining up to date health maintenance.  -- Encouraged good sleep hygiene.       2. Discussed the patient's BMI with her. Patient's BMI is 29.44             --General weight loss/lifestyle modification strategies discussed such as moderate caloric deficit, high quality food choices and referral to dietician as needed.

## 2023-07-12 NOTE — PROGRESS NOTES
Subjective:       Patient ID: Grecia Boyd is a 57 y.o. female.     Chief Complaint: Annual Wellness Visit     Grecia Boyd is a 57 y.o. female who presents today for an annual wellness visit.      Per digital medicine records, blood sugars and BP have been well controlled at home. States that she has occasional low blood sugars- inquiring about decreasing dosage of metformin. States she occasionally has difficulty getting Ozempic due to shortage.    She is due for colonoscopy- referral sent.      Review of patient's allergies indicates:  No Known Allergies        Medication List with Changes/Refills   Current Medications     ATORVASTATIN (LIPITOR) 40 MG TABLET    TAKE 1 TABLET(40 MG) BY MOUTH EVERY DAY     BLOOD SUGAR DIAGNOSTIC STRP    Check blood sugars TID     BLOOD-GLUCOSE METER (FREESTYLE SYSTEM KIT) KIT    Use as instructed     HYDROCHLOROTHIAZIDE (HYDRODIURIL) 12.5 MG TAB    Take 1 tablet (12.5 mg total) by mouth once daily.     LANCETS (ONETOUCH ULTRASOFT LANCETS) MISC    Check blood sugars TID     LOSARTAN (COZAAR) 25 MG TABLET    TAKE 1 TABLET(25 MG) BY MOUTH EVERY DAY     METFORMIN (GLUCOPHAGE-XR) 500 MG ER 24HR TABLET    Take 2 tablets (1,000 mg total) by mouth once daily. With a meal     SEMAGLUTIDE (OZEMPIC) 1 MG/DOSE (4 MG/3 ML)    Inject 1 mg into the skin every 7 days.         Health Maintenance  Mammogram-  2/24/2023  Tetanus/Tdap- 7/12/23  Flu Vaccine: 01/19/2022  Pneumonia 13 Vaccine: 07/19/2021  Pneumonia 23 Vaccine: 07/16/2020  Foot Exam- 7/12/23  Microalbumin: 07/07/2022  Shingles Vaccine: 09/16/2022; 07/22/2022        Patient ambulates on her own without an assistive device.   Does her have issues with balance, falls or walking?  No     On average, how many days per week do you do moderate to strenuous exercise:  (like a brisk walk or jog; this does not include your job/work)  5-6   On average, how many minutes do you exercise at this level each day? 45  We encourage you to start  "exercising if you do not already, continue at your current level or increase your level of activity.      Do you eat fruits and vegetable every day?  Yes     Have you ever been screened for HIV? No     Do you still have a menstrual cycle? No       If not,  Hysterectomy     Do you go to the eye doctor regularly? Yes     Do you wear contacts, glasses, reading glasses? Yes     Review of Systems   Eyes: Negative.    Cardiovascular: Negative.    Gastrointestinal:  Negative for abdominal pain, nausea and vomiting.   Neurological:  Negative for tingling and sensory change.       Objective:     /82 (BP Location: Right arm, Patient Position: Sitting, BP Method: Medium (Manual))   Pulse 62   Temp 98.8 °F (37.1 °C) (Temporal)   Resp 18   Ht 5' 2" (1.575 m)   Wt 73 kg (160 lb 15 oz)   SpO2 98%   BMI 29.44 kg/m²      Physical Exam  Constitutional:       Appearance: Normal appearance.   Cardiovascular:      Rate and Rhythm: Normal rate and regular rhythm.      Pulses: Normal pulses.      Heart sounds: Normal heart sounds.   Pulmonary:      Effort: Pulmonary effort is normal.      Breath sounds: Normal breath sounds.   Feet:      Right foot:      Protective Sensation: 10 sites tested.  10 sites sensed.      Left foot:      Protective Sensation: 10 sites tested.  10 sites sensed.   Skin:     General: Skin is warm and dry.   Neurological:      General: No focal deficit present.      Mental Status: She is alert.   Psychiatric:         Mood and Affect: Mood normal.         Behavior: Behavior normal.         Thought Content: Thought content normal.         Judgment: Judgment normal.          BP Readings from Last 3 Encounters:   07/12/23 120/82   07/07/23 130/71   01/18/23 112/70          Wt Readings from Last 3 Encounters:   07/12/23 73 kg (160 lb 15 oz)   07/07/23 72.7 kg (160 lb 6.2 oz)   01/18/23 78 kg (171 lb 15.3 oz)         Last Labs:        Glucose   Date Value Ref Range Status   07/10/2023 86 70 - 110 mg/dL Final "   01/17/2023 109 70 - 110 mg/dL Final            BUN   Date Value Ref Range Status   07/10/2023 9 6 - 20 mg/dL Final   01/17/2023 11 6 - 20 mg/dL Final            Creatinine   Date Value Ref Range Status   07/10/2023 0.8 0.5 - 1.4 mg/dL Final   01/17/2023 0.8 0.5 - 1.4 mg/dL Final              Cholesterol   Date Value Ref Range Status   07/10/2023 127 120 - 199 mg/dL Final       Comment:       The National Cholesterol Education Program (NCEP) has set the  following guidelines (reference ranges) for Cholesterol:  Optimal.....................<200 mg/dL  Borderline High.............200-239 mg/dL  High........................> or = 240 mg/dL      07/07/2022 135 120 - 199 mg/dL Final       Comment:       The National Cholesterol Education Program (NCEP) has set the  following guidelines (reference ranges) for Cholesterol:  Optimal.....................<200 mg/dL  Borderline High.............200-239 mg/dL  High........................> or = 240 mg/dL                 Hemoglobin A1C   Date Value Ref Range Status   07/10/2023 5.5 4.0 - 5.6 % Final       Comment:       ADA Screening Guidelines:  5.7-6.4%  Consistent with prediabetes  >or=6.5%  Consistent with diabetes     High levels of fetal hemoglobin interfere with the HbA1C  assay. Heterozygous hemoglobin variants (HbS, HgC, etc)do  not significantly interfere with this assay.   However, presence of multiple variants may affect accuracy.      01/17/2023 5.4 4.0 - 5.6 % Final       Comment:       ADA Screening Guidelines:  5.7-6.4%  Consistent with prediabetes  >or=6.5%  Consistent with diabetes     High levels of fetal hemoglobin interfere with the HbA1C  assay. Heterozygous hemoglobin variants (HbS, HgC, etc)do  not significantly interfere with this assay.   However, presence of multiple variants may affect accuracy.               Hemoglobin   Date Value Ref Range Status   07/10/2023 12.1 12.0 - 16.0 g/dL Final   01/17/2023 11.5 (L) 12.0 - 16.0 g/dL Final             Hematocrit   Date Value Ref Range Status   07/10/2023 38.7 37.0 - 48.5 % Final   01/17/2023 37.4 37.0 - 48.5 % Final              Vit D, 25-Hydroxy   Date Value Ref Range Status   12/12/2020 8 (L) 30 - 96 ng/mL Final       Comment:       Vitamin D deficiency.........<10 ng/mL                              Vitamin D insufficiency......10-29 ng/mL       Vitamin D sufficiency........> or equal to 30 ng/mL  Vitamin D toxicity............>100 ng/mL            Assessment and Plan:      I certify that I was present in the room directing the student in service delivery and guiding them using my skilled judgment. As the co-signing provider I have reviewed the students documentation and am responsible for the treatment, assessment, and plan.     1. Encounter for annual health examination    2. Hypertension associated with type 2 diabetes mellitus    Chronic, stable, continue Losartan and HCTZ daily.     3. Type 2 diabetes mellitus without complication, without long-term current use of insulin    Chronic, improved, continue Ozempic once per week. Decrease Metformin to 1 tablet daily in AM. Advised if supply issues with Ozempic she is to take 2 tablet of Metformin in the AM.      - metFORMIN (GLUCOPHAGE-XR) 500 MG ER 24hr tablet; Take 1 tablet (500 mg total) by mouth once daily. With a meal  Dispense: 180 tablet; Refill: 1    4. Hyperlipidemia associated with type 2 diabetes mellitus    Chronic, stable, continue current medication    5. Need for vaccination  - (In Office Administered) Tdap Vaccine    6. Encounter for colorectal cancer screening    - Ambulatory referral/consult to Endo Procedure ; Future

## 2023-07-13 ENCOUNTER — OFFICE VISIT (OUTPATIENT)
Dept: SPORTS MEDICINE | Facility: CLINIC | Age: 58
End: 2023-07-13
Payer: COMMERCIAL

## 2023-07-13 VITALS
SYSTOLIC BLOOD PRESSURE: 123 MMHG | BODY MASS INDEX: 29.62 KG/M2 | HEIGHT: 62 IN | HEART RATE: 62 BPM | DIASTOLIC BLOOD PRESSURE: 68 MMHG | WEIGHT: 160.94 LBS

## 2023-07-13 DIAGNOSIS — M17.11 PRIMARY OSTEOARTHRITIS OF RIGHT KNEE: Primary | ICD-10-CM

## 2023-07-13 PROCEDURE — 99499 NO LOS: ICD-10-PCS | Mod: S$GLB,,, | Performed by: PHYSICIAN ASSISTANT

## 2023-07-13 PROCEDURE — 20610 DRAIN/INJ JOINT/BURSA W/O US: CPT | Mod: RT,S$GLB,, | Performed by: PHYSICIAN ASSISTANT

## 2023-07-13 PROCEDURE — 99499 UNLISTED E&M SERVICE: CPT | Mod: S$GLB,,, | Performed by: PHYSICIAN ASSISTANT

## 2023-07-13 PROCEDURE — 99999 PR PBB SHADOW E&M-EST. PATIENT-LVL III: CPT | Mod: PBBFAC,,, | Performed by: PHYSICIAN ASSISTANT

## 2023-07-13 PROCEDURE — 20610 PR DRAIN/INJECT LARGE JOINT/BURSA: ICD-10-PCS | Mod: RT,S$GLB,, | Performed by: PHYSICIAN ASSISTANT

## 2023-07-13 PROCEDURE — 99999 PR PBB SHADOW E&M-EST. PATIENT-LVL III: ICD-10-PCS | Mod: PBBFAC,,, | Performed by: PHYSICIAN ASSISTANT

## 2023-07-13 NOTE — PROGRESS NOTES
Grecia Boyd is here for follow up of right knee arthritis. Pt is requesting Euflexxa series injections #2 of 3.  [facility] reviewed per encounter record. Has failed other conservative modalities including NSAIDS, activity modification, weight loss.    The prior shot was tolerated well.    PHYSICAL EXAMINATION:     General: The patient is alert and oriented x 3. Mood is pleasant.   Observation of ears, eyes and nose reveals no gross abnormalities. No   labored breathing observed.     No signs of infection or adverse reaction to knee.    Medical Necessity for viscosupplementation use: After thorough evaluation of the patient, I have determined that viscosupplementation treatment is medically necessary. The patient has painful degenerative joint disease (DJD) of the knee(s) with failure of conservative treatments including lifestyle modifications and rehabilitation exercises. Oral analgesics including NSAIDs have not adequately controlled the patient's symptoms. 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     Injection Procedure right knee  A time out was performed, including verification of patient ID, procedure, site and side, availability of information and equipment, review of safety issues, and agreement with consent, the procedure site was marked.    After time out was performed, the patient was prepped aseptically with chloraprep. A diagnostic and therapeutic injection of 2cc Euflexxa was given under sterile technique using a 22g x 1.5 needle from the Superolateral  aspect of the right Knee Joint in the supine position.      Grecia Boyd had no adverse reactions to the medication. Pain decreased. She was instructed to apply ice to the joint for 20 minutes and avoid strenuous activities for 24-36 hours following the injection. She was warned of  possible blood sugar and/or blood pressure changes during that time. Following that time, she can resume regular activities.    She was reminded to call the clinic immediately for any adverse side effects as explained in clinic today.    RTC to see Anthony Cisse PA-C for Euflexxa series injections #3 of 3.    All of the patient's questions were answered and the patient will contact us if they have any questions or concerns in the interim.

## 2023-07-19 ENCOUNTER — OFFICE VISIT (OUTPATIENT)
Dept: SPORTS MEDICINE | Facility: CLINIC | Age: 58
End: 2023-07-19
Payer: COMMERCIAL

## 2023-07-19 VITALS — HEIGHT: 62 IN | BODY MASS INDEX: 29.11 KG/M2 | WEIGHT: 158.19 LBS

## 2023-07-19 DIAGNOSIS — M17.11 PRIMARY OSTEOARTHRITIS OF RIGHT KNEE: Primary | ICD-10-CM

## 2023-07-19 PROCEDURE — 99499 NO LOS: ICD-10-PCS | Mod: S$GLB,,, | Performed by: PHYSICIAN ASSISTANT

## 2023-07-19 PROCEDURE — 99999 PR PBB SHADOW E&M-EST. PATIENT-LVL III: CPT | Mod: PBBFAC,,, | Performed by: PHYSICIAN ASSISTANT

## 2023-07-19 PROCEDURE — 99999 PR PBB SHADOW E&M-EST. PATIENT-LVL III: ICD-10-PCS | Mod: PBBFAC,,, | Performed by: PHYSICIAN ASSISTANT

## 2023-07-19 PROCEDURE — 99499 UNLISTED E&M SERVICE: CPT | Mod: S$GLB,,, | Performed by: PHYSICIAN ASSISTANT

## 2023-07-19 PROCEDURE — 20610 PR DRAIN/INJECT LARGE JOINT/BURSA: ICD-10-PCS | Mod: RT,S$GLB,, | Performed by: PHYSICIAN ASSISTANT

## 2023-07-19 PROCEDURE — 20610 DRAIN/INJ JOINT/BURSA W/O US: CPT | Mod: RT,S$GLB,, | Performed by: PHYSICIAN ASSISTANT

## 2023-07-19 NOTE — PROGRESS NOTES
Grecia Boyd is here for follow up of right knee arthritis. Pt is requesting Euflexxa series injections #3 of 3.  [facility] reviewed per encounter record. Has failed other conservative modalities including NSAIDS, activity modification, weight loss.    The prior shot was tolerated well.    PHYSICAL EXAMINATION:     General: The patient is alert and oriented x 3. Mood is pleasant.   Observation of ears, eyes and nose reveals no gross abnormalities. No   labored breathing observed.     No signs of infection or adverse reaction to knee.    Medical Necessity for viscosupplementation use: After thorough evaluation of the patient, I have determined that viscosupplementation treatment is medically necessary. The patient has painful degenerative joint disease (DJD) of the knee(s) with failure of conservative treatments including lifestyle modifications and rehabilitation exercises. Oral analgesics including NSAIDs have not adequately controlled the patient's symptoms. 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     Injection Procedure  A time out was performed, including verification of patient ID, procedure, site and side, availability of information and equipment, review of safety issues, and agreement with consent, the procedure site was marked.    After time out was performed, the patient was prepped aseptically with chloraprep. A diagnostic and therapeutic injection of 2cc Euflexxa was given under sterile technique using a 22g x 1.5 needle from the Superolateral  aspect of the right Knee Joint in the supine position.      Grecia Boyd had no adverse reactions to the medication. Pain decreased. She was instructed to apply ice to the joint for 20 minutes and avoid strenuous activities for 24-36 hours following the injection. She was warned of possible blood  sugar and/or blood pressure changes during that time. Following that time, she can resume regular activities.    She was reminded to call the clinic immediately for any adverse side effects as explained in clinic today.    RTC to see Elvia Erickson PA-C in 6 months for follow-up.    All of the patient's questions were answered and the patient will contact us if they have any questions or concerns in the interim.

## 2023-07-23 DIAGNOSIS — E11.9 TYPE 2 DIABETES MELLITUS WITHOUT COMPLICATION, WITHOUT LONG-TERM CURRENT USE OF INSULIN: ICD-10-CM

## 2023-07-23 RX ORDER — SEMAGLUTIDE 1.34 MG/ML
INJECTION, SOLUTION SUBCUTANEOUS
Qty: 9 ML | Refills: 1 | Status: SHIPPED | OUTPATIENT
Start: 2023-07-23

## 2023-07-23 NOTE — TELEPHONE ENCOUNTER
Refill Decision Note   Grecia Boyd  is requesting a refill authorization.  Brief Assessment and Rationale for Refill:  Approve     Medication Therapy Plan:         Comments:     Note composed:4:08 PM 07/23/2023

## 2023-08-21 ENCOUNTER — TELEPHONE (OUTPATIENT)
Dept: ENDOSCOPY | Facility: HOSPITAL | Age: 58
End: 2023-08-21

## 2023-08-21 VITALS — BODY MASS INDEX: 29.08 KG/M2 | HEIGHT: 62 IN | WEIGHT: 158 LBS

## 2023-08-21 DIAGNOSIS — Z12.12 ENCOUNTER FOR COLORECTAL CANCER SCREENING: Primary | ICD-10-CM

## 2023-08-21 DIAGNOSIS — Z12.11 ENCOUNTER FOR COLORECTAL CANCER SCREENING: Primary | ICD-10-CM

## 2023-08-21 RX ORDER — SODIUM, POTASSIUM,MAG SULFATES 17.5-3.13G
1 SOLUTION, RECONSTITUTED, ORAL ORAL DAILY
Qty: 1 KIT | Refills: 0 | Status: SHIPPED | OUTPATIENT
Start: 2023-08-21 | End: 2023-08-23

## 2023-08-21 NOTE — TELEPHONE ENCOUNTER
Attempted to contact patient for PAT appointment to schedule colonoscopy. Left voicemail message with endoscopy scheduling number [phone] to call back.

## 2023-08-21 NOTE — TELEPHONE ENCOUNTER
Pt called back to request a different date.  Rescheduled for [facility] on 10/16/23    Spoke to pt to schedule procedure(s) Colonoscopy       Physician to perform procedure(s) Dr. ADDIE Wiley  Date of Procedure (s) 10/16/23  Arrival Time 8:45 AM  Time of Procedure(s) 9:45 AM   Location of Procedure(s) [place] 2nd Floor  Type of Rx Prep sent to patient: Suprep  Instructions provided to patient via [facility]  Pt inst to hold Ozempic per protocol.    Patient was informed on the following information and verbalized understanding. Screening questionnaire reviewed with patient and complete. If procedure requires anesthesia, a responsible adult needs to be present to accompany the patient home, patient cannot drive after receiving anesthesia. Appointment details are tentative, especially check-in time. Patient will receive a prep-op call 4 days prior to confirm check-in time for procedure. If applicable the patient should contact their pharmacy to verify Rx for procedure prep is ready for pick-up. Patient was advised to call the scheduling department at [phone] if pharmacy states no Rx is available. Patient was advised to call the endoscopy scheduling department if any questions or concerns arise.      [facility] Scheduling Department

## 2023-08-21 NOTE — TELEPHONE ENCOUNTER
Spoke to pt to schedule procedure(s) Colonoscopy       Physician to perform procedure(s) Dr. JUANIS Doyle  Date of Procedure (s) 11/27/23  Arrival Time 6:45 AM  Time of Procedure(s) 7:45 AM   Location of Procedure(s) [place] 4th Floor  Type of Rx Prep sent to patient: Suprep  Instructions provided to patient via [facility]  Pt instructed to hold Ozempic per protocol.    Patient was informed on the following information and verbalized understanding. Screening questionnaire reviewed with patient and complete. If procedure requires anesthesia, a responsible adult needs to be present to accompany the patient home, patient cannot drive after receiving anesthesia. Appointment details are tentative, especially check-in time. Patient will receive a prep-op call 4 days prior to confirm check-in time for procedure. If applicable the patient should contact their pharmacy to verify Rx for procedure prep is ready for pick-up. Patient was advised to call the scheduling department at [phone] if pharmacy states no Rx is available. Patient was advised to call the endoscopy scheduling department if any questions or concerns arise.      [facility] Scheduling Department

## 2023-08-29 ENCOUNTER — NURSE TRIAGE (OUTPATIENT)
Dept: ADMINISTRATIVE | Facility: CLINIC | Age: 58
End: 2023-08-29
Payer: COMMERCIAL

## 2023-08-30 ENCOUNTER — TELEPHONE (OUTPATIENT)
Dept: INTERNAL MEDICINE | Facility: CLINIC | Age: 58
End: 2023-08-30
Payer: COMMERCIAL

## 2023-08-30 NOTE — TELEPHONE ENCOUNTER
----- Message from Loan Byers PharmD sent at 8/30/2023 10:40 AM CDT -----  Regarding: Ozempic PA  Good morning,    I am MsElvira Oliver's clinician for her Diabetes Digital Medicine program.  Her pharmacy states her Ozempic 1mg is requiring a prior authorization.    She has [facility] HMO [place].    I would appreciate your help in completing this PA. If there's anything I can do to help with the process, let me know!    Thanks for your time.    Loan Byers, Ana Rosa, [facility]  Clinical Pharmacist  [facility]  [phone]

## 2023-08-30 NOTE — TELEPHONE ENCOUNTER
Patient states she uses Ozempic weekly and was advised by her Pharmacy to contact her Insurance Provider regarding continued coverage of Ozempic. Patient contacted the [facility] Service to discuss why medication was not covered at this time.     Patient advised to contact her Insurance Carrier, [employer] to discuss coverage of her medications. Patient states understanding of care advice.      Reason for Disposition   General information question, no triage required and triager able to answer question    Additional Information   Negative: [1] Caller is not with the adult (patient) AND [2] reporting urgent symptoms   Negative: Lab result questions   Negative: Medication questions   Negative: Caller can't be reached by phone   Negative: Caller has already spoken to PCP or another triager   Negative: RN needs further essential information from caller in order to complete triage   Negative: Requesting regular office appointment   Negative: [1] Caller requesting NON-URGENT health information AND [2] PCP's office is the best resource   Negative: Health Information question, no triage required and triager able to answer question    Protocols used: Information Only Call - No Triage-A-

## 2023-09-12 DIAGNOSIS — I10 HTN (HYPERTENSION), BENIGN: ICD-10-CM

## 2023-09-12 DIAGNOSIS — E78.5 HYPERLIPIDEMIA, UNSPECIFIED HYPERLIPIDEMIA TYPE: ICD-10-CM

## 2023-09-12 DIAGNOSIS — E11.9 TYPE 2 DIABETES MELLITUS WITHOUT COMPLICATION, WITH LONG-TERM CURRENT USE OF INSULIN: ICD-10-CM

## 2023-09-12 DIAGNOSIS — Z79.4 TYPE 2 DIABETES MELLITUS WITHOUT COMPLICATION, WITH LONG-TERM CURRENT USE OF INSULIN: ICD-10-CM

## 2023-09-12 DIAGNOSIS — E66.9 OBESITY (BMI 30-39.9): ICD-10-CM

## 2023-09-12 DIAGNOSIS — G47.33 OSA (OBSTRUCTIVE SLEEP APNEA): ICD-10-CM

## 2023-09-12 RX ORDER — ATORVASTATIN CALCIUM 40 MG/1
TABLET, FILM COATED ORAL
Qty: 90 TABLET | Refills: 1 | Status: SHIPPED | OUTPATIENT
Start: 2023-09-12

## 2023-09-12 NOTE — TELEPHONE ENCOUNTER
Refill Decision Note   Grecia Boyd  is requesting a refill authorization.  Brief Assessment and Rationale for Refill:  Approve     Medication Therapy Plan:         Comments:     Note composed:6:39 AM 09/12/2023             Appointments     Last Visit   1/18/2023 Scott Tripathi, DO   Next Visit   Visit date not found Scott Tripathi, DO

## 2023-09-12 NOTE — TELEPHONE ENCOUNTER
No care due was identified.  [facility] Embedded Care Due Messages. Reference number: [id number].   9/12/2023 3:33:46 AM CDT   Detail Level: Generalized

## 2023-10-07 LAB
LEFT EYE DM RETINOPATHY: NEGATIVE
RIGHT EYE DM RETINOPATHY: NEGATIVE

## 2023-10-09 ENCOUNTER — TELEPHONE (OUTPATIENT)
Dept: ENDOSCOPY | Facility: HOSPITAL | Age: 58
End: 2023-10-09
Payer: COMMERCIAL

## 2023-10-09 NOTE — TELEPHONE ENCOUNTER
Attempted pre-call but there was no answer and voicemail box was full.  Instructions resent to portal.

## 2023-10-13 ENCOUNTER — ANESTHESIA EVENT (OUTPATIENT)
Dept: ENDOSCOPY | Facility: HOSPITAL | Age: 58
End: 2023-10-13
Payer: COMMERCIAL

## 2023-10-13 NOTE — ANESTHESIA PREPROCEDURE EVALUATION
10/13/2023  Grecia Boyd is a 58 y.o., female.  [facility]  Anesthesia Pre-Operative Evaluation       Patient Name: Grecia Boyd  YOB: 1965  MRN: [id number]  [facility]: [id number]      Code Status: Prior   Date of Procedure: 10/16/2023  Anesthesia: Choice Procedure: Procedure(s) (LRB):  COLONOSCOPY (N/A)  Pre-Operative Diagnosis: Encounter for colorectal cancer screening [Z12.11, Z12.12]  Proceduralist: Surgeon(s) and Role:     * Grace Wiley MD - Primary Nurse: (Unknown)      SUBJECTIVE:   Grecia Boyd is a 58 y.o. female who  has a past medical history of Diabetes mellitus, type 2, Hyperlipidemia, Hypertension, Obesity, and CHIDI (obstructive sleep apnea)..     she has a current medication list which includes the following long-term medication(s): atorvastatin, blood-glucose meter, hydrochlorothiazide, losartan, and metformin.     ALLERGIES:   Review of patient's allergies indicates:  No Known Allergies  LDA:          Lines/Drains/Airways     None                Anesthesia Evaluation      Airway   Dental      Pulmonary    Cardiovascular   (+) hypertension,     Neuro/Psych      GI/Hepatic/Renal      Endo/Other    (+) diabetes mellitus, arthritis  Abdominal                   MEDICATIONS:     Antibiotics (From admission, onward)    None        VTE Risk Mitigation (From admission, onward)    None            No current facility-administered medications for this encounter.     Current Outpatient Medications   Medication Sig Dispense Refill    atorvastatin (LIPITOR) 40 MG tablet TAKE 1 TABLET(40 MG) BY MOUTH EVERY DAY 90 tablet 1    blood sugar diagnostic Strp Check blood sugars  strip 11    blood-glucose meter (FREESTYLE SYSTEM KIT) kit Use as instructed 1 each 1    hydroCHLOROthiazide (HYDRODIURIL) 12.5 MG Tab TAKE 1 TABLET(12.5 MG) BY MOUTH EVERY DAY 90 tablet 1     lancets (ONETOUCH ULTRASOFT LANCETS) Misc Check blood sugars  each 11    losartan (COZAAR) 25 MG tablet TAKE 1 TABLET(25 MG) BY MOUTH EVERY DAY 90 tablet 3    metFORMIN (GLUCOPHAGE-XR) 500 MG ER 24hr tablet Take 1 tablet (500 mg total) by mouth once daily. With a meal 180 tablet 1    OZEMPIC 1 mg/dose (4 mg/3 mL) INJECT 1 MG UNDER THE SKIN ONCE A WEEK 9 mL 1          History:   There are no hospital problems to display for this patient.    Surgical History:    has a past surgical history that includes Thyroidectomy, partial; Adenoidectomy; Tonsillectomy; Breast biopsy (Right); Hysterectomy; Knee arthroscopy w/ meniscectomy (Right, 2/12/2021); Chondroplasty of knee (Right, 2/12/2021); Synovectomy of knee (Right, 2/12/2021); and Knee surgery.   Social History:    reports being sexually active and has had partner(s) who are male.  reports that she has never smoked. She has never used smokeless tobacco. She reports current alcohol use. She reports that she does not use drugs.     OBJECTIVE:     Vital Signs (Most Recent):    Vital Signs Range (Last 24H):          There is no height or weight on file to calculate BMI.   Wt Readings from Last 4 Encounters:   08/21/23 71.7 kg (158 lb)   07/19/23 71.7 kg (158 lb 2.9 oz)   07/13/23 73 kg (160 lb 15 oz)   07/12/23 73 kg (160 lb 15 oz)       Significant Labs:  Lab Results   Component Value Date    WBC 9.78 07/10/2023    HGB 12.1 07/10/2023    HCT 38.7 07/10/2023     07/10/2023     07/10/2023    K 4.0 07/10/2023     07/10/2023    CREATININE 0.8 07/10/2023    BUN 9 07/10/2023    CO2 27 07/10/2023    GLU 86 07/10/2023    CALCIUM 9.6 07/10/2023    MG 2.5 12/14/2020    PHOS 4.1 12/14/2020    ALKPHOS 72 07/10/2023    ALT 26 07/10/2023    AST 31 07/10/2023    ALBUMIN 4.0 07/10/2023    INR 1.0 12/12/2020    HGBA1C 5.5 07/10/2023     (H) 12/11/2020    TROPONINI <0.006 12/11/2020    BNP <10 12/12/2020     No LMP recorded. Patient is postmenopausal.  No  results found for this or any previous visit (from the past 72 hour(s)).    EKG:   Results for orders placed or performed in visit on 02/10/21   EKG 12-lead    Collection Time: 02/10/21  9:09 AM    Narrative    Test Reason : Z01.818    Vent. Rate : 066 BPM     Atrial Rate : 066 BPM     P-R Int : 180 ms          QRS Dur : 082 ms      QT Int : 364 ms       P-R-T Axes : 045 046 012 degrees     QTc Int : 381 ms    Normal sinus rhythm  Low voltage QRS  Nonspecific T wave abnormality  Abnormal ECG  When compared with ECG of 14-DEC-2020 06:37,  Nonspecific ST and/or T wave abnormalities  now present in the  inferolateral leads  Confirmed by Sirisha Mensah MD ([id number]) on 2/10/2021 2:16:42 PM    Referred By: Tiffanie VENTURA           Confirmed By:Sirisha Mensah MD       TTE:  Results for orders placed or performed during the hospital encounter of 05/17/19   Transthoracic echo (TTE) 2D with Color Flow   Result Value Ref Range    BSA 1.98 m2    TDI SEPTAL 0.10     LV LATERAL E/E' RATIO 8.91     LV SEPTAL E/E' RATIO 9.80     LA WIDTH 3.17 cm    TDI LATERAL 0.11     LVIDd 4.10 3.5 - 6.0 cm    IVS 0.83 0.6 - 1.1 cm    Posterior Wall 0.84 0.6 - 1.1 cm    LVIDs 2.50 2.1 - 4.0 cm    FS 39 28 - 44 %    LA volume 37.91 cm3    Sinus 3.12 cm    STJ 2.79 cm    Ascending aorta 3.20 cm    LV mass 103.08 g    LA size 3.06 cm    RVDD 2.62 cm    TAPSE 2.02 cm    RV S' 11.31 m/s    Left Ventricle Relative Wall Thickness 0.41 cm    E/A ratio 1.24     Mean e' 0.11     E wave deceleration time 187.68 msec    IVRT 0.11 msec    Pulm vein S/D ratio 0.94     LVOT diameter 1.80 cm    LVOT area 2.54 cm2    LVOT peak dimitry 0.793488112681063 m/s    LVOT peak VTI 18.56 cm    LVOT stroke volume 47.21 cm3    E/E' ratio 9.33     MV Peak E Dimitry 0.98 m/s    MV Peak A Dimitry 0.79 m/s    PV Peak S Dimitry 0.46 m/s    PV Peak D Dimitry 0.49 m/s    LV Systolic Volume 22.23 mL    LV Systolic Volume Index 11.7 mL/m2    LV Diastolic Volume 74.06 mL    LV Diastolic Volume Index 38.99  "mL/m2    LA Volume Index 20.0 mL/m2    LV Mass Index 54.3 g/m2    RA Major Axis 3.92 cm    Left Atrium Minor Axis 4.98 cm    Left Atrium Major Axis 4.27 cm    RA Width 3.62 cm    Right Atrial Pressure (from IVC) 3 mmHg    Narrative    · Normal left ventricular systolic function. The estimated ejection   fraction is 65%  · Normal LV diastolic function.  · No wall motion abnormalities.  · Normal right ventricular systolic function.  · Normal central venous pressure (3 mm Hg).        No results found for: "EF"   No results found for this or any previous visit.  INDY:  No results found for this or any previous visit.  Stress Test:  No results found for this or any previous visit.     LHC:  No results found for this or any previous visit.     PFT:  No results found for: "FEV1", "FVC", "ONB9ZJE", "TLC", "DLCO"     ASSESSMENT/PLAN:       Pre-op Assessment    I have reviewed the Patient Summary Reports.     I have reviewed the Nursing Notes. I have reviewed the NPO Status.   I have reviewed the Medications.     Review of Systems  Anesthesia Hx:  No problems with previous Anesthesia  Denies Family Hx of Anesthesia complications.   Denies Personal Hx of Anesthesia complications.   Hematology/Oncology:  Hematology Normal   Oncology Normal     EENT/Dental:EENT/Dental Normal   Cardiovascular:   Hypertension hyperlipidemia    Pulmonary:  Pulmonary Normal    Renal/:  Renal/ Normal     Hepatic/GI:  Hepatic/GI Normal    Musculoskeletal:   Arthritis     Neurological:  Neurology Normal    Endocrine:   Diabetes  Obesity / BMI > 30  Dermatological:  Skin Normal    Psych:  Psychiatric Normal              Anesthesia Plan  Type of Anesthesia, risks & benefits discussed:    Anesthesia Type: Gen Natural Airway  Intra-op Monitoring Plan: Standard ASA Monitors  Post Op Pain Control Plan: multimodal analgesia  Induction:  IV  Informed Consent: Informed consent signed with the Patient and all parties understand the risks and agree with " anesthesia plan.  All questions answered. Patient consented to blood products? No  ASA Score: 2  Day of Surgery Review of History & Physical: H&P Update referred to the surgeon/provider.    Ready For Surgery From Anesthesia Perspective.     .

## 2023-10-13 NOTE — H&P
Short Stay Endoscopy History and Physical    PCP - Scott Tripathi DO  Referring Physician - Netta Chirinos NP  [address] [facility] [place] [address]    Procedure - Colonoscopy  ASA - per anesthesia  Mallampati - per anesthesia  History of Anesthesia problems - no  Family history Anesthesia problems -  no   Plan of anesthesia - General    HPI  58 y.o. female  Reason for procedure:   Encounter for colorectal cancer screening [Z12.11, Z12.12]        ROS:  Constitutional: No fevers, chills, No weight loss  CV: No chest pain  Pulm: No cough, No shortness of breath  GI: see HPI    Medical History:  has a past medical history of Diabetes mellitus, type 2, Hyperlipidemia, Hypertension, Obesity, and CHIDI (obstructive sleep apnea).    Surgical History:  has a past surgical history that includes Thyroidectomy, partial; Adenoidectomy; Tonsillectomy; Breast biopsy (Right); Hysterectomy; Knee arthroscopy w/ meniscectomy (Right, 2/12/2021); Chondroplasty of knee (Right, 2/12/2021); Synovectomy of knee (Right, 2/12/2021); and Knee surgery.    Family History: family history includes Breast cancer in her cousin and paternal aunt; Cancer in her paternal aunt; Cataracts in her mother; Diabetes in her mother and paternal grandmother; Heart disease in her mother; Hyperlipidemia in her mother; Hypertension in her mother; Macular degeneration in her paternal aunt; Stroke in her mother..    Social History:  reports that she has never smoked. She has never used smokeless tobacco. She reports current alcohol use. She reports that she does not use drugs.    Review of patient's allergies indicates:  No Known Allergies    Medications:   Medications Prior to Admission   Medication Sig Dispense Refill Last Dose    atorvastatin (LIPITOR) 40 MG tablet TAKE 1 TABLET(40 MG) BY MOUTH EVERY DAY 90 tablet 1     blood sugar diagnostic Strp Check blood sugars  strip 11     blood-glucose meter (FREESTYLE SYSTEM KIT) kit  Use as instructed 1 each 1     hydroCHLOROthiazide (HYDRODIURIL) 12.5 MG Tab TAKE 1 TABLET(12.5 MG) BY MOUTH EVERY DAY 90 tablet 1     lancets (ONETOUCH ULTRASOFT LANCETS) Misc Check blood sugars  each 11     losartan (COZAAR) 25 MG tablet TAKE 1 TABLET(25 MG) BY MOUTH EVERY DAY 90 tablet 3     OZEMPIC 1 mg/dose (4 mg/3 mL) INJECT 1 MG UNDER THE SKIN ONCE A WEEK 9 mL 1        Physical Exam:    Vital Signs: There were no vitals filed for this visit.    General Appearance: Well appearing in no acute distress  Abdomen: Soft, non tender, non distended with normal bowel sounds, no masses    Labs:  Lab Results   Component Value Date    WBC 9.78 07/10/2023    HGB 12.1 07/10/2023    HCT 38.7 07/10/2023     07/10/2023    CHOL 127 07/10/2023    TRIG 50 07/10/2023    HDL 45 07/10/2023    ALT 26 07/10/2023    AST 31 07/10/2023     07/10/2023    K 4.0 07/10/2023     07/10/2023    CREATININE 0.8 07/10/2023    BUN 9 07/10/2023    CO2 27 07/10/2023    TSH 1.417 07/10/2023    INR 1.0 12/12/2020    HGBA1C 5.5 07/10/2023       I have explained the risks and benefits of this endoscopic procedure to the patient including but not limited to bleeding, inflammation, infection, perforation, and death.    Assessment/Plan:     CRC Screening     - Proceed with colonoscopy     Grace Wiley MD  Gastroenterology   [facility]

## 2023-10-16 ENCOUNTER — HOSPITAL ENCOUNTER (OUTPATIENT)
Facility: HOSPITAL | Age: 58
Discharge: HOME | End: 2023-10-16
Attending: STUDENT IN AN ORGANIZED HEALTH CARE EDUCATION/TRAINING PROGRAM | Admitting: STUDENT IN AN ORGANIZED HEALTH CARE EDUCATION/TRAINING PROGRAM
Payer: COMMERCIAL

## 2023-10-16 ENCOUNTER — ANESTHESIA (OUTPATIENT)
Dept: ENDOSCOPY | Facility: HOSPITAL | Age: 58
End: 2023-10-16
Payer: COMMERCIAL

## 2023-10-16 VITALS
RESPIRATION RATE: 16 BRPM | DIASTOLIC BLOOD PRESSURE: 68 MMHG | HEART RATE: 56 BPM | HEIGHT: 62 IN | TEMPERATURE: 98 F | OXYGEN SATURATION: 100 % | BODY MASS INDEX: 27.97 KG/M2 | WEIGHT: 152 LBS | SYSTOLIC BLOOD PRESSURE: 133 MMHG

## 2023-10-16 DIAGNOSIS — Z12.11 COLON CANCER SCREENING: Primary | ICD-10-CM

## 2023-10-16 LAB
GLUCOSE SERPL-MCNC: 81 MG/DL (ref 70–110)
POCT GLUCOSE: 81 MG/DL (ref 70–110)

## 2023-10-16 PROCEDURE — G0121 COLON CA SCRN NOT HI RSK IND: HCPCS | Performed by: STUDENT IN AN ORGANIZED HEALTH CARE EDUCATION/TRAINING PROGRAM

## 2023-10-16 PROCEDURE — D9220A PRA ANESTHESIA: Mod: ,,, | Performed by: NURSE ANESTHETIST, CERTIFIED REGISTERED

## 2023-10-16 PROCEDURE — 37000008 HC ANESTHESIA 1ST 15 MINUTES: Performed by: STUDENT IN AN ORGANIZED HEALTH CARE EDUCATION/TRAINING PROGRAM

## 2023-10-16 PROCEDURE — 25000003 PHARM REV CODE 250: Performed by: NURSE ANESTHETIST, CERTIFIED REGISTERED

## 2023-10-16 PROCEDURE — 99900035 HC TECH TIME PER 15 MIN (STAT)

## 2023-10-16 PROCEDURE — D9220A PRA ANESTHESIA: ICD-10-PCS | Mod: ,,, | Performed by: NURSE ANESTHETIST, CERTIFIED REGISTERED

## 2023-10-16 PROCEDURE — G0121 COLON CA SCRN NOT HI RSK IND: ICD-10-PCS | Mod: ,,, | Performed by: STUDENT IN AN ORGANIZED HEALTH CARE EDUCATION/TRAINING PROGRAM

## 2023-10-16 PROCEDURE — 94761 N-INVAS EAR/PLS OXIMETRY MLT: CPT

## 2023-10-16 PROCEDURE — 63600175 PHARM REV CODE 636 W HCPCS: Performed by: NURSE ANESTHETIST, CERTIFIED REGISTERED

## 2023-10-16 PROCEDURE — G0121 COLON CA SCRN NOT HI RSK IND: HCPCS | Mod: ,,, | Performed by: STUDENT IN AN ORGANIZED HEALTH CARE EDUCATION/TRAINING PROGRAM

## 2023-10-16 PROCEDURE — 37000009 HC ANESTHESIA EA ADD 15 MINS: Performed by: STUDENT IN AN ORGANIZED HEALTH CARE EDUCATION/TRAINING PROGRAM

## 2023-10-16 RX ORDER — LIDOCAINE HYDROCHLORIDE 20 MG/ML
INJECTION INTRAVENOUS
Status: DISCONTINUED | OUTPATIENT
Start: 2023-10-16 | End: 2023-10-16

## 2023-10-16 RX ORDER — PROPOFOL 10 MG/ML
VIAL (ML) INTRAVENOUS
Status: DISCONTINUED | OUTPATIENT
Start: 2023-10-16 | End: 2023-10-16

## 2023-10-16 RX ORDER — SODIUM CHLORIDE 9 MG/ML
INJECTION, SOLUTION INTRAVENOUS CONTINUOUS
Status: DISCONTINUED | OUTPATIENT
Start: 2023-10-16 | End: 2023-10-16 | Stop reason: HOSPADM

## 2023-10-16 RX ORDER — PROPOFOL 10 MG/ML
VIAL (ML) INTRAVENOUS CONTINUOUS PRN
Status: DISCONTINUED | OUTPATIENT
Start: 2023-10-16 | End: 2023-10-16

## 2023-10-16 RX ADMIN — SODIUM CHLORIDE: 0.9 INJECTION, SOLUTION INTRAVENOUS at 08:10

## 2023-10-16 RX ADMIN — PROPOFOL 125 MCG/KG/MIN: 10 INJECTION, EMULSION INTRAVENOUS at 08:10

## 2023-10-16 RX ADMIN — LIDOCAINE HYDROCHLORIDE 100 MG: 20 INJECTION INTRAVENOUS at 08:10

## 2023-10-16 RX ADMIN — PROPOFOL 50 MG: 10 INJECTION, EMULSION INTRAVENOUS at 08:10

## 2023-10-16 NOTE — PLAN OF CARE
Pt arrived to recovery dosc via stretcher per ENDO team. Bedside report received. Pt attached to bedside monitor. VSS. Pt sedated post procedure. Pt on room air; oxygen sats 98%. Pt IV access CDI and infusing. Warm blanket applied. Safety maintained.

## 2023-10-16 NOTE — TRANSFER OF CARE
"Anesthesia Transfer of Care Note    Patient: Grecia Boyd    Procedure(s) Performed: Procedure(s) (LRB):  COLONOSCOPY (N/A)    Patient location: PACU    Anesthesia Type: general    Transport from OR: Transported from OR on room air with adequate spontaneous ventilation    Post pain: adequate analgesia    Post assessment: no apparent anesthetic complications and tolerated procedure well    Post vital signs: stable    Level of consciousness: alert and awake    Nausea/Vomiting: no nausea/vomiting    Complications: none    Transfer of care protocol was followed      Last vitals:   Visit Vitals  BP (!) 142/66 (BP Location: Left arm, Patient Position: Lying)   Pulse 60   Temp 36.5 °C (97.7 °F) (Temporal)   Resp 14   Ht 5' 2" (1.575 m)   Wt 68.9 kg (152 lb)   SpO2 100%   Breastfeeding No   BMI 27.80 kg/m²     "

## 2023-10-16 NOTE — PROVATION PATIENT INSTRUCTIONS
Discharge Summary/Instructions after an Endoscopic Procedure  Patient Name: Grecia Boyd  Patient MRN: [id number]  Patient YOB: 1965 Monday, October 16, 2023  Grace Wiley MD  Dear patient,  As a result of recent federal legislation (The Federal Cures Act), you may   receive lab or pathology results from your procedure in your [facility]   account before your physician is able to contact you. Your physician or   their representative will relay the results to you with their   recommendations at their soonest availability.  Thank you,  RESTRICTIONS:  During your procedure today, you received medications for sedation.  These   medications may affect your judgment, balance and coordination.  Therefore,   for 24 hours, you have the following restrictions:   - DO NOT drive a car, operate machinery, make legal/financial decisions,   sign important papers or drink alcohol.    ACTIVITY:  Today: no heavy lifting, straining or running due to procedural   sedation/anesthesia.  The following day: return to full activity including work.  DIET:  Eat and drink normally unless instructed otherwise.     TREATMENT FOR COMMON SIDE EFFECTS:  - Mild abdominal pain, nausea, belching, bloating or excessive gas:  rest,   eat lightly and use a heating pad.  - Sore Throat: treat with throat lozenges and/or gargle with warm salt   water.  - Because air was used during the procedure, expelling large amounts of air   from your rectum or belching is normal.  - If a bowel prep was taken, you may not have a bowel movement for 1-3 days.    This is normal.  SYMPTOMS TO WATCH FOR AND REPORT TO YOUR PHYSICIAN:  1. Abdominal pain or bloating, other than gas cramps.  2. Chest pain.  3. Back pain.  4. Signs of infection such as: chills or fever occurring within 24 hours   after the procedure.  5. Rectal bleeding, which would show as bright red, maroon, or black stools.   (A tablespoon of blood from the rectum is not serious, especially  if   hemorrhoids are present.)  6. Vomiting.  7. Weakness or dizziness.  GO DIRECTLY TO THE NEAREST EMERGENCY ROOM IF YOU HAVE ANY OF THE FOLLOWING:      Difficulty breathing              Chills and/or fever over 101 F   Persistent vomiting and/or vomiting blood   Severe abdominal pain   Severe chest pain   Black, tarry stools   Bleeding- more than one tablespoon   Any other symptom or condition that you feel may need urgent attention  Your doctor recommends these additional instructions:  If any biopsies were taken, your doctors clinic will contact you in 1 to 2   weeks with any results.  - Discharge patient to home (ambulatory).   - Resume regular diet.   - Continue present medications.   - Repeat colonoscopy in 10 years for screening purposes.  For questions, problems or results please call your physician - Grace Wiley MD at Work:  [phone].  [facility], EMERGENCY ROOM PHONE NUMBER: [phone]  IF A COMPLICATION OR EMERGENCY SITUATION ARISES AND YOU ARE UNABLE TO REACH   YOUR PHYSICIAN - GO DIRECTLY TO THE EMERGENCY ROOM.  Grace Wiley MD  10/16/2023 9:16:03 AM  This report has been verified and signed electronically.  Dear patient,  As a result of recent federal legislation (The Federal Cures Act), you may   receive lab or pathology results from your procedure in your [facility]   account before your physician is able to contact you. Your physician or   their representative will relay the results to you with their   recommendations at their soonest availability.  Thank you,  PROVATION

## 2023-10-16 NOTE — ANESTHESIA POSTPROCEDURE EVALUATION
Anesthesia Post Evaluation    Patient: Grecia Boyd    Procedure(s) Performed: Procedure(s) (LRB):  COLONOSCOPY (N/A)    Final Anesthesia Type: general      Patient location during evaluation: GI PACU  Patient participation: Yes- Able to Participate  Level of consciousness: awake and alert, oriented and awake  Post-procedure vital signs: reviewed and stable  Pain management: adequate  Airway patency: patent  CHIDI mitigation strategies: Multimodal analgesia  PONV status at discharge: No PONV  Anesthetic complications: no      Cardiovascular status: blood pressure returned to baseline and hemodynamically stable  Respiratory status: unassisted and spontaneous ventilation  Hydration status: euvolemic  Follow-up not needed.          Vitals Value Taken Time   /68 10/16/23 0939   Temp 36.6 °C (97.9 °F) 10/16/23 0915   Pulse 56 10/16/23 0939   Resp 16 10/16/23 0939   SpO2 100 % 10/16/23 0939         Event Time   Out of Recovery 09:30:00         Pain/Bud Score: Bud Score: 10 (10/16/2023  9:30 AM)

## 2023-11-12 ENCOUNTER — NURSE TRIAGE (OUTPATIENT)
Dept: ADMINISTRATIVE | Facility: CLINIC | Age: 58
End: 2023-11-12
Payer: COMMERCIAL

## 2023-11-12 NOTE — TELEPHONE ENCOUNTER
OOC NT incoming call -  Pt reports elevated blood pressure. Pt did have mild headache last night. /88 about 30 min ago. BP over the last 2 dys range 153/123 - 140/89. Pt does report she took all BP meds this morning and also took a hydroCHLOROthiazide (HYDRODIURIL) 12.5 MG Tab this morning but med was discontinued about 1 month ago.  Hypertension protocol followed and pt advised  to see provider with in 2 weeks. Encounter routed to PCP  and digital med bp program.  Reason for Disposition   [1] Systolic BP  >= 130 OR Diastolic >= 80 AND [2] taking BP medications    Additional Information   Negative: Difficult to awaken or acting confused (e.g., disoriented, slurred speech)   Negative: SEVERE difficulty breathing (e.g., struggling for each breath, speaks in single words)   Negative: [1] Weakness of the face, arm or leg on one side of the body AND [2] new-onset   Negative: [1] Numbness (i.e., loss of sensation) of the face, arm or leg on one side of the body AND [2] new-onset   Negative: [1] Chest pain lasts > 5 minutes AND [2] history of heart disease (i.e., heart attack, bypass surgery, angina, angioplasty, CHF)   Negative: [1] Chest pain AND [2] took nitrogylcerin AND [3] pain was not relieved   Negative: Sounds like a life-threatening emergency to the triager   Negative: [1] Systolic BP  >= 160 OR Diastolic >= 100 AND [2] cardiac (e.g., breathing difficulty, chest pain) or neurologic symptoms (e.g., new-onset blurred or double vision, unsteady gait)   Negative: [1] Pregnant 20 or more weeks (or postpartum < 6 weeks) AND [2] new hand or face swelling   Negative: [1] Pregnant 20 or more weeks (or postpartum < 6 weeks) AND [2] Systolic BP >= 160 OR Diastolic >= 110   Negative: [1] Systolic BP  >= 200 OR Diastolic >= 120 AND [2] having NO cardiac or neurologic symptoms   Negative: [1] Pregnant 20 or more weeks (or postpartum < 6 weeks) AND [2] Systolic BP  >= 140 OR Diastolic >= 90   Negative: Systolic BP  >= 180  OR Diastolic >= 110   Negative: [1] Systolic BP  >= 180 OR Diastolic >= 110 AND [2] missed most recent dose of blood pressure medication   Negative: Ran out of BP medications   Negative: Systolic BP  >= 160 OR Diastolic >= 100   Negative: [1] Taking BP medications AND [2] feels is having side effects (e.g., impotence, cough, dizzy upon standing)   Negative: [1] Systolic BP  >= 130 OR Diastolic >= 80 AND [2] pregnant    Protocols used: Blood Pressure - High-A-AH

## 2023-11-20 ENCOUNTER — OFFICE VISIT (OUTPATIENT)
Dept: URGENT CARE | Facility: CLINIC | Age: 58
End: 2023-11-20
Payer: COMMERCIAL

## 2023-11-20 VITALS
HEART RATE: 65 BPM | SYSTOLIC BLOOD PRESSURE: 141 MMHG | DIASTOLIC BLOOD PRESSURE: 71 MMHG | RESPIRATION RATE: 18 BRPM | HEIGHT: 62 IN | TEMPERATURE: 98 F | BODY MASS INDEX: 27.97 KG/M2 | WEIGHT: 152 LBS | OXYGEN SATURATION: 100 %

## 2023-11-20 DIAGNOSIS — S69.91XA WRIST INJURY, RIGHT, INITIAL ENCOUNTER: Primary | ICD-10-CM

## 2023-11-20 PROCEDURE — 96372 THER/PROPH/DIAG INJ SC/IM: CPT | Mod: S$GLB,,, | Performed by: NURSE PRACTITIONER

## 2023-11-20 PROCEDURE — 99213 OFFICE O/P EST LOW 20 MIN: CPT | Mod: 25,S$GLB,, | Performed by: NURSE PRACTITIONER

## 2023-11-20 PROCEDURE — 73110 X-RAY EXAM OF WRIST: CPT | Mod: RT,S$GLB,, | Performed by: RADIOLOGY

## 2023-11-20 PROCEDURE — 96372 PR INJECTION,THERAP/PROPH/DIAG2ST, IM OR SUBCUT: ICD-10-PCS | Mod: S$GLB,,, | Performed by: NURSE PRACTITIONER

## 2023-11-20 PROCEDURE — 99213 PR OFFICE/OUTPT VISIT, EST, LEVL III, 20-29 MIN: ICD-10-PCS | Mod: 25,S$GLB,, | Performed by: NURSE PRACTITIONER

## 2023-11-20 PROCEDURE — 73110 XR WRIST COMPLETE 3 VIEWS RIGHT: ICD-10-PCS | Mod: RT,S$GLB,, | Performed by: RADIOLOGY

## 2023-11-20 RX ORDER — NAPROXEN 500 MG/1
TABLET ORAL
Qty: 60 TABLET | Refills: 0 | Status: SHIPPED | OUTPATIENT
Start: 2023-11-20 | End: 2023-11-28

## 2023-11-20 RX ORDER — KETOROLAC TROMETHAMINE 30 MG/ML
30 INJECTION, SOLUTION INTRAMUSCULAR; INTRAVENOUS
Status: COMPLETED | OUTPATIENT
Start: 2023-11-20 | End: 2023-11-20

## 2023-11-20 RX ADMIN — KETOROLAC TROMETHAMINE 30 MG: 30 INJECTION, SOLUTION INTRAMUSCULAR; INTRAVENOUS at 10:11

## 2023-11-20 NOTE — PROGRESS NOTES
"Subjective:      Patient ID: Grecia Boyd is a 58 y.o. female.    Vitals:  height is 5' 2" (1.575 m) and weight is 68.9 kg (152 lb). Her temperature is 98 °F (36.7 °C). Her blood pressure is 141/71 (abnormal) and her pulse is 65. Her respiration is 18 and oxygen saturation is 100%.     Chief Complaint: Wrist Pain    59 y/o female presents today with a complaint of right wrist pain secondary to a fall she sustained on yesterday while skating.  Presents with moderate edema and tenderness to right wrist with snuff box tenderness.  Reports history of right wrist injury > 5 years ago.       Wrist Pain   The pain is present in the right wrist. This is a new problem. The current episode started yesterday. There has been a history of trauma. The problem occurs constantly. The problem has been unchanged. The quality of the pain is described as aching. The pain is at a severity of 9/10. The pain is severe. Associated symptoms include an inability to bear weight, joint swelling and a limited range of motion. Pertinent negatives include no fever, itching, joint locking, numbness, stiffness or tingling. The symptoms are aggravated by activity. She has tried nothing for the symptoms. The treatment provided no relief. Family history includes gout. Family history does not include rheumatoid arthritis. Her past medical history is significant for diabetes. There is no history of gout, osteoarthritis or rheumatoid arthritis.       Constitution: Negative for chills, fever and generalized weakness.   Cardiovascular:  Negative for chest pain and palpitations.   Respiratory:  Negative for cough and shortness of breath.    Musculoskeletal:  Positive for pain, trauma, joint pain, joint swelling, abnormal ROM of joint and arthritis. Negative for gout and muscle cramps.        Right wrist   Skin:  Negative for rash, abrasion, erythema and bruising.   Neurological:  Negative for numbness.      Objective:     Physical Exam   Constitutional: " She is oriented to person, place, and time. She appears well-developed. She is cooperative.  Non-toxic appearance. She does not appear ill. No distress.   HENT:   Head: Normocephalic and atraumatic.   Ears:   Right Ear: Hearing and external ear normal.   Left Ear: Hearing and external ear normal.   Nose: Nose normal. No mucosal edema or nasal deformity. No epistaxis. Right sinus exhibits no maxillary sinus tenderness and no frontal sinus tenderness. Left sinus exhibits no maxillary sinus tenderness and no frontal sinus tenderness.   Mouth/Throat: Uvula is midline, oropharynx is clear and moist and mucous membranes are normal. No trismus in the jaw. Normal dentition. No uvula swelling.   Eyes: Conjunctivae and lids are normal.   Neck: Trachea normal and phonation normal. Neck supple.   Cardiovascular: Normal rate, regular rhythm, normal heart sounds and normal pulses.   Pulmonary/Chest: Effort normal and breath sounds normal.   Abdominal: Normal appearance and bowel sounds are normal. Soft.   Musculoskeletal:      Right wrist: She exhibits decreased range of motion, tenderness, bony tenderness and swelling. She exhibits no effusion, no crepitus, no deformity and no laceration.      Left wrist: Normal.        Hands:       Comments: Moderate tenderness to right snuffbox, right wrist dorsal aspect, mild edema, moderate tenderness to right dorsal wrist, negative ecchymoses, negative abrasion or laceration, painful range of motion   Neurological: She is alert and oriented to person, place, and time. She exhibits normal muscle tone.   Skin: Skin is warm, dry, intact and not diaphoretic. No erythema   Psychiatric: Her speech is normal and behavior is normal. Judgment and thought content normal.   Nursing note and vitals reviewed.      Assessment:     1. Wrist injury, right, initial encounter      Bobby Colon MD  [phone]  [phone] 11/20/2023 STAT     Narrative & Impression  EXAMINATION:  XR WRIST COMPLETE 3  VIEWS RIGHT     CLINICAL HISTORY:  Unspecified injury of right wrist, hand and finger(s), initial encounter     TECHNIQUE:  PA, lateral, and oblique views of the right wrist were performed.     COMPARISON:  None     FINDINGS:  Mild DJD.  No fracture or dislocation.  No bone destruction identified     Impression:     See above        Electronically signed by: Bobby Colon MD  Date:                                            11/20/2023  Time:                                           09:52  Plan:     Wrist injury, right, initial encounter  -     X-Ray Wrist Complete 3 views Right; Future; Expected date: 11/20/2023  -     WRIST BRACE FOR HOME USE  -     ketorolac injection 30 mg  -     naproxen (NAPROSYN) 500 MG tablet; Take with food.  Dispense: 60 tablet; Refill: 0      Rest, Ice, Compress, & Elevate for 24 -48 hours.  Wear the splint as directed.  Follow up with the orthopedist in 1 week if you continue with pain.   Sometimes it can take up to 1 week for stress fractures to show up on an X-ray, and may need reimaging or a CT or MRI of the affected area.       You must understand that you've received an Urgent Care treatment only and that you may be released before all your medical problems are known or treated. You, the patient, will arrange for follow up care as instructed.  Follow up with your PCP or specialty clinic as directed in the next 1-2 weeks if not improved or as needed.  You can call [phone] to schedule an appointment with the appropriate provider.  If your condition worsens we recommend that you receive another evaluation at the emergency room immediately or contact your primary medical clinics after hours call service to discuss your concerns.  Please return here or go to the Emergency Department for any concerns or worsening of condition.    If you were prescribed a narcotic or controlled medication, do not drive or operate heavy equipment or machinery while taking these medications.    Thank  you for choosing [facility]!    Our goal in the Urgent Care is to always provide outstanding medical care. You may receive a survey by mail or e-mail in the next week regarding your experience today. We would greatly appreciate you completing and returning the survey. Your feedback provides us with a way to recognize our staff who provide very good care, and it helps us learn how to improve when your experience was below our aspiration of excellence.      We appreciate you trusting us with your medical care. We hope you feel better soon. We will be happy to take care of you for all of your future medical needs.    Sincerely,    Francisco Barbosa DNP, FNP-C

## 2023-11-20 NOTE — PATIENT INSTRUCTIONS
START NAPROXEN ON TOMORROW AS PRESCRIBED, TAKE WITH FOOD.    Rest, Ice, Compress, & Elevate for 24 -48 hours.  Wear the splint as directed.  Follow up with the orthopedist in 1 week if you continue with pain.   Sometimes it can take up to 1 week for stress fractures to show up on an X-ray, and may need reimaging or a CT or MRI of the affected area.       You must understand that you've received an Urgent Care treatment only and that you may be released before all your medical problems are known or treated. You, the patient, will arrange for follow up care as instructed.  Follow up with your PCP or specialty clinic as directed in the next 1-2 weeks if not improved or as needed.  You can call [phone] to schedule an appointment with the appropriate provider.  If your condition worsens we recommend that you receive another evaluation at the emergency room immediately or contact your primary medical clinics after hours call service to discuss your concerns.  Please return here or go to the Emergency Department for any concerns or worsening of condition.    If you were prescribed a narcotic or controlled medication, do not drive or operate heavy equipment or machinery while taking these medications.    Thank you for choosing [facility]!    Our goal in the Urgent Care is to always provide outstanding medical care. You may receive a survey by mail or e-mail in the next week regarding your experience today. We would greatly appreciate you completing and returning the survey. Your feedback provides us with a way to recognize our staff who provide very good care, and it helps us learn how to improve when your experience was below our aspiration of excellence.      We appreciate you trusting us with your medical care. We hope you feel better soon. We will be happy to take care of you for all of your future medical needs.    Sincerely,    Francisco Barbosa DNP, FNP-C

## 2023-11-28 ENCOUNTER — OFFICE VISIT (OUTPATIENT)
Dept: INTERNAL MEDICINE | Facility: CLINIC | Age: 58
End: 2023-11-28
Payer: COMMERCIAL

## 2023-11-28 VITALS
OXYGEN SATURATION: 99 % | SYSTOLIC BLOOD PRESSURE: 138 MMHG | HEART RATE: 62 BPM | TEMPERATURE: 97 F | RESPIRATION RATE: 19 BRPM | WEIGHT: 169.31 LBS | BODY MASS INDEX: 31.16 KG/M2 | DIASTOLIC BLOOD PRESSURE: 92 MMHG | HEIGHT: 62 IN

## 2023-11-28 DIAGNOSIS — E11.59 HYPERTENSION ASSOCIATED WITH TYPE 2 DIABETES MELLITUS: Primary | ICD-10-CM

## 2023-11-28 DIAGNOSIS — E11.69 HYPERLIPIDEMIA ASSOCIATED WITH TYPE 2 DIABETES MELLITUS: ICD-10-CM

## 2023-11-28 DIAGNOSIS — Z00.00 ANNUAL PHYSICAL EXAM: ICD-10-CM

## 2023-11-28 DIAGNOSIS — E66.2 OBESITY HYPOVENTILATION SYNDROME: ICD-10-CM

## 2023-11-28 DIAGNOSIS — E11.9 TYPE 2 DIABETES MELLITUS WITHOUT COMPLICATION, WITHOUT LONG-TERM CURRENT USE OF INSULIN: ICD-10-CM

## 2023-11-28 DIAGNOSIS — E78.5 HYPERLIPIDEMIA ASSOCIATED WITH TYPE 2 DIABETES MELLITUS: ICD-10-CM

## 2023-11-28 DIAGNOSIS — I15.2 HYPERTENSION ASSOCIATED WITH TYPE 2 DIABETES MELLITUS: Primary | ICD-10-CM

## 2023-11-28 PROCEDURE — 90677 PNEUMOCOCCAL CONJUGATE VACCINE 20-VALENT: ICD-10-PCS | Mod: S$GLB,,, | Performed by: INTERNAL MEDICINE

## 2023-11-28 PROCEDURE — 3075F PR MOST RECENT SYSTOLIC BLOOD PRESS GE 130-139MM HG: ICD-10-PCS | Mod: CPTII,S$GLB,, | Performed by: INTERNAL MEDICINE

## 2023-11-28 PROCEDURE — 3044F HG A1C LEVEL LT 7.0%: CPT | Mod: CPTII,S$GLB,, | Performed by: INTERNAL MEDICINE

## 2023-11-28 PROCEDURE — 1159F PR MEDICATION LIST DOCUMENTED IN MEDICAL RECORD: ICD-10-PCS | Mod: CPTII,S$GLB,, | Performed by: INTERNAL MEDICINE

## 2023-11-28 PROCEDURE — 90471 IMMUNIZATION ADMIN: CPT | Mod: S$GLB,,, | Performed by: INTERNAL MEDICINE

## 2023-11-28 PROCEDURE — 3044F PR MOST RECENT HEMOGLOBIN A1C LEVEL <7.0%: ICD-10-PCS | Mod: CPTII,S$GLB,, | Performed by: INTERNAL MEDICINE

## 2023-11-28 PROCEDURE — 99396 PREV VISIT EST AGE 40-64: CPT | Mod: 25,S$GLB,, | Performed by: INTERNAL MEDICINE

## 2023-11-28 PROCEDURE — 3080F DIAST BP >= 90 MM HG: CPT | Mod: CPTII,S$GLB,, | Performed by: INTERNAL MEDICINE

## 2023-11-28 PROCEDURE — 90677 PCV20 VACCINE IM: CPT | Mod: S$GLB,,, | Performed by: INTERNAL MEDICINE

## 2023-11-28 PROCEDURE — 3008F BODY MASS INDEX DOCD: CPT | Mod: CPTII,S$GLB,, | Performed by: INTERNAL MEDICINE

## 2023-11-28 PROCEDURE — 3008F PR BODY MASS INDEX (BMI) DOCUMENTED: ICD-10-PCS | Mod: CPTII,S$GLB,, | Performed by: INTERNAL MEDICINE

## 2023-11-28 PROCEDURE — 90471 PNEUMOCOCCAL CONJUGATE VACCINE 20-VALENT: ICD-10-PCS | Mod: S$GLB,,, | Performed by: INTERNAL MEDICINE

## 2023-11-28 PROCEDURE — 4010F PR ACE/ARB THEARPY RXD/TAKEN: ICD-10-PCS | Mod: CPTII,S$GLB,, | Performed by: INTERNAL MEDICINE

## 2023-11-28 PROCEDURE — 99396 PR PREVENTIVE VISIT,EST,40-64: ICD-10-PCS | Mod: 25,S$GLB,, | Performed by: INTERNAL MEDICINE

## 2023-11-28 PROCEDURE — 1160F RVW MEDS BY RX/DR IN RCRD: CPT | Mod: CPTII,S$GLB,, | Performed by: INTERNAL MEDICINE

## 2023-11-28 PROCEDURE — 3075F SYST BP GE 130 - 139MM HG: CPT | Mod: CPTII,S$GLB,, | Performed by: INTERNAL MEDICINE

## 2023-11-28 PROCEDURE — 99999 PR PBB SHADOW E&M-EST. PATIENT-LVL III: ICD-10-PCS | Mod: PBBFAC,,, | Performed by: INTERNAL MEDICINE

## 2023-11-28 PROCEDURE — 4010F ACE/ARB THERAPY RXD/TAKEN: CPT | Mod: CPTII,S$GLB,, | Performed by: INTERNAL MEDICINE

## 2023-11-28 PROCEDURE — 3080F PR MOST RECENT DIASTOLIC BLOOD PRESSURE >= 90 MM HG: ICD-10-PCS | Mod: CPTII,S$GLB,, | Performed by: INTERNAL MEDICINE

## 2023-11-28 PROCEDURE — 1160F PR REVIEW ALL MEDS BY PRESCRIBER/CLIN PHARMACIST DOCUMENTED: ICD-10-PCS | Mod: CPTII,S$GLB,, | Performed by: INTERNAL MEDICINE

## 2023-11-28 PROCEDURE — 1159F MED LIST DOCD IN RCRD: CPT | Mod: CPTII,S$GLB,, | Performed by: INTERNAL MEDICINE

## 2023-11-28 PROCEDURE — 99999 PR PBB SHADOW E&M-EST. PATIENT-LVL III: CPT | Mod: PBBFAC,,, | Performed by: INTERNAL MEDICINE

## 2023-11-28 RX ORDER — VALSARTAN AND HYDROCHLOROTHIAZIDE 160; 12.5 MG/1; MG/1
1 TABLET, FILM COATED ORAL DAILY
Qty: 90 TABLET | Refills: 3 | Status: SHIPPED | OUTPATIENT
Start: 2023-11-28 | End: 2024-02-16

## 2023-11-28 RX ORDER — ALBUTEROL SULFATE 90 UG/1
2 AEROSOL, METERED RESPIRATORY (INHALATION) EVERY 6 HOURS PRN
Qty: 18 G | Refills: 2 | Status: SHIPPED | OUTPATIENT
Start: 2023-11-28 | End: 2024-11-27

## 2023-11-28 NOTE — PROGRESS NOTES
Subjective     Patient ID: Grecia Boyd is a 58 y.o. female.    Chief Complaint: Follow-up and Hypertension    HPI  58 y.o. Female here for annual exam.       Vaccines: Influenza (2023); Tetanus (2013); Pneumovax (current); Shingrix (2022)  Eye exam: 11/18  Mammogram: 2/23  Gyn exam: declined  Colonoscopy: 10/23     Exercise: walks daily  Diet: regular     Past Medical History:  No date: Diabetes mellitus, type 2  No date: Hyperlipidemia  No date: Hypertension  No date: Obesity  No date: CHIDI  Past Surgical History:  No date: ADENOIDECTOMY  No date: BREAST BIOPSY; Right      Comment:  @ age 18  2/12/2021: CHONDROPLASTY OF KNEE; Right      Comment:  Procedure: CHONDROPLASTY, KNEE;  Surgeon: ZULEIKA Barahona MD;  Location: [facility];  Service: Orthopedics;                 Laterality: Right;  No date: HYSTERECTOMY      Comment:  2009 2/12/2021: KNEE ARTHROSCOPY W/ MENISCECTOMY; Right      Comment:  Procedure: ARTHROSCOPY, KNEE, WITH MEDIAL MENISCECTOMY                LATERAL RELEASE;  Surgeon: ZULEIKA Barahona MD;                 Location: [facility];  Service: Orthopedics;  Laterality:                Right;  pericapsular injection: 30cc                ropivacaine/epi/clonidine/ketorolac injection   No date: KNEE SURGERY  2/12/2021: SYNOVECTOMY OF KNEE; Right      Comment:  Procedure: SYNOVECTOMY, KNEE;  Surgeon: ZULEIKA Barahona MD;  Location: [facility];  Service: Orthopedics;                 Laterality: Right;  No date: THYROIDECTOMY, PARTIAL  No date: TONSILLECTOMY  Social History    Socioeconomic History      Marital status:       Spouse name: Not on file      Number of children: 2      Years of education: Not on file      Highest education level: Not on file    Occupational History      Not on file    Tobacco Use      Smoking status: Never Smoker      Smokeless tobacco: Never Used    Substance and Sexual Activity      Alcohol use: Yes        Comment: rare      Drug use: No       Sexual activity: Yes        Partners: Male    Other Topics      Concerns:        Not on file    Social History Narrative      Not on file     Social Determinants of Health  Financial Resource Strain:       Difficulty of Paying Living Expenses:   Food Insecurity:       Worried About Running Out of Food in the Last Year:       Ran Out of Food in the Last Year:   Transportation Needs:       Lack of Transportation (Medical):       Lack of Transportation (Non-Medical):   Physical Activity:       Days of Exercise per Week:       Minutes of Exercise per Session:   Stress:       Feeling of Stress :   Social Connections:       Frequency of Communication with Friends and Family:       Frequency of Social Gatherings with Friends and Family:       Attends [employer] Services:       Active Member of Clubs or Organizations:       Attends Club or Organization Meetings:       Marital Status:   Review of patient's allergies indicates:  No Known Allergies  Review of Systems   Constitutional:  Negative for activity change, appetite change, chills, diaphoresis, fatigue, fever and unexpected weight change.   HENT:  Negative for nasal congestion, mouth sores, postnasal drip, rhinorrhea, sinus pressure/congestion, sneezing, sore throat, trouble swallowing and voice change.    Eyes:  Negative for pain, discharge and visual disturbance.   Respiratory:  Negative for cough, shortness of breath and wheezing.    Cardiovascular:  Negative for chest pain, palpitations and leg swelling.   Gastrointestinal:  Negative for abdominal pain, blood in stool, constipation, diarrhea, nausea and vomiting.   Endocrine: Negative for cold intolerance and heat intolerance.   Genitourinary:  Negative for difficulty urinating, dysuria, frequency, hematuria and urgency.   Musculoskeletal:  Negative for arthralgias and myalgias.   Integumentary:  Negative for rash and wound.   Allergic/Immunologic: Negative for environmental allergies and food allergies.    Neurological:  Negative for dizziness, tremors, seizures, syncope, weakness, light-headedness and headaches.   Hematological:  Negative for adenopathy. Does not bruise/bleed easily.   Psychiatric/Behavioral:  Negative for confusion and sleep disturbance. The patient is not nervous/anxious.           Objective     Physical Exam  Vitals and nursing note reviewed.   Constitutional:       General: She is not in acute distress.     Appearance: Normal appearance. She is well-developed. She is not diaphoretic.   HENT:      Head: Normocephalic and atraumatic.      Right Ear: External ear normal.      Left Ear: External ear normal.      Nose: Nose normal.      Mouth/Throat:      Pharynx: No oropharyngeal exudate.   Eyes:      General: No scleral icterus.        Right eye: No discharge.         Left eye: No discharge.      Conjunctiva/sclera: Conjunctivae normal.      Pupils: Pupils are equal, round, and reactive to light.   Neck:      Thyroid: No thyromegaly.      Vascular: No JVD.   Cardiovascular:      Rate and Rhythm: Normal rate and regular rhythm.      Pulses: Normal pulses.      Heart sounds: Normal heart sounds. No murmur heard.  Pulmonary:      Effort: Pulmonary effort is normal. No respiratory distress.      Breath sounds: Normal breath sounds. No wheezing, rhonchi or rales.   Chest:      Chest wall: No tenderness.   Abdominal:      General: Bowel sounds are normal. There is no distension.      Palpations: Abdomen is soft.      Tenderness: There is no abdominal tenderness. There is no guarding or rebound.   Musculoskeletal:      Cervical back: Neck supple.      Right lower leg: No edema.      Left lower leg: No edema.   Lymphadenopathy:      Cervical: No cervical adenopathy.   Skin:     General: Skin is warm and dry.      Coloration: Skin is not pale.      Findings: No rash.   Neurological:      General: No focal deficit present.      Mental Status: She is alert and oriented to person, place, and time.      Gait:  Gait normal.   Psychiatric:         Behavior: Behavior normal.         Thought Content: Thought content normal.         Judgment: Judgment normal.            Assessment and Plan     1. Hypertension associated with type 2 diabetes mellitus  -     valsartan-hydrochlorothiazide (DIOVAN-HCT) 160-12.5 mg per tablet; Take 1 tablet by mouth once daily.  Dispense: 90 tablet; Refill: 3    2. Hyperlipidemia associated with type 2 diabetes mellitus    3. Obesity hypoventilation syndrome    4. Type 2 diabetes mellitus without complication, without long-term current use of insulin    5. Annual physical exam  -     (In Office Administered) Pneumococcal Conjugate Vaccine (20 Valent) (IM) (Preferred)    Other orders  -     albuterol (PROAIR HFA) 90 mcg/actuation inhaler; Inhale 2 puffs into the lungs every 6 (six) hours as needed for Wheezing or Shortness of Breath. Rescue  Dispense: 18 g; Refill: 2       Blood work reviewed with pt     HTN- change Losartan to Diovan -12.5 mg qd      T2DM- last HA1C of 5.5(7/23)<--5.4(1/23)<--6.5(7/22)<--7.2(1/22)<--7.1(7/21)<--6.9(10/20)<--7.1(8/20)<--6.8(10/19)      -continue Ozempic        HLD- continue Lipitor 40 mg daily      Obesity- pt advised on proper diet/exercise for weight loss      CHIDI- pt unable to tolerate CPAP      F/u in 6 months

## 2023-11-29 ENCOUNTER — TELEPHONE (OUTPATIENT)
Dept: INTERNAL MEDICINE | Facility: CLINIC | Age: 58
End: 2023-11-29
Payer: COMMERCIAL

## 2023-11-29 DIAGNOSIS — I15.2 HYPERTENSION ASSOCIATED WITH TYPE 2 DIABETES MELLITUS: Primary | ICD-10-CM

## 2023-11-29 DIAGNOSIS — E11.69 HYPERLIPIDEMIA ASSOCIATED WITH TYPE 2 DIABETES MELLITUS: ICD-10-CM

## 2023-11-29 DIAGNOSIS — E78.5 HYPERLIPIDEMIA ASSOCIATED WITH TYPE 2 DIABETES MELLITUS: ICD-10-CM

## 2023-11-29 DIAGNOSIS — E11.59 HYPERTENSION ASSOCIATED WITH TYPE 2 DIABETES MELLITUS: Primary | ICD-10-CM

## 2024-01-02 ENCOUNTER — LAB VISIT (OUTPATIENT)
Dept: LAB | Facility: HOSPITAL | Age: 59
End: 2024-01-02
Attending: INTERNAL MEDICINE
Payer: COMMERCIAL

## 2024-01-02 DIAGNOSIS — E11.59 HYPERTENSION ASSOCIATED WITH TYPE 2 DIABETES MELLITUS: ICD-10-CM

## 2024-01-02 DIAGNOSIS — I15.2 HYPERTENSION ASSOCIATED WITH TYPE 2 DIABETES MELLITUS: ICD-10-CM

## 2024-01-02 DIAGNOSIS — E78.5 HYPERLIPIDEMIA ASSOCIATED WITH TYPE 2 DIABETES MELLITUS: ICD-10-CM

## 2024-01-02 DIAGNOSIS — E11.69 HYPERLIPIDEMIA ASSOCIATED WITH TYPE 2 DIABETES MELLITUS: ICD-10-CM

## 2024-01-02 LAB
ESTIMATED AVG GLUCOSE: 134 MG/DL (ref 68–131)
HBA1C MFR BLD: 6.3 % (ref 4–5.6)

## 2024-01-02 PROCEDURE — 83036 HEMOGLOBIN GLYCOSYLATED A1C: CPT | Performed by: INTERNAL MEDICINE

## 2024-01-02 PROCEDURE — 36415 COLL VENOUS BLD VENIPUNCTURE: CPT | Mod: PN | Performed by: INTERNAL MEDICINE

## 2024-01-05 ENCOUNTER — TELEPHONE (OUTPATIENT)
Dept: INTERNAL MEDICINE | Facility: CLINIC | Age: 59
End: 2024-01-05
Payer: COMMERCIAL

## 2024-01-05 NOTE — TELEPHONE ENCOUNTER
----- Message from Matilda Wagner sent at 1/4/2024  2:14 PM CST -----  Contact: [phone]  [email or web address]     Patient is calling for Medical Advice regarding: Leg pain and wants to know if can be neuropathy     How long has patient had these symptoms: 3 days     Pharmacy name and phone#:        [id number] [address] [address]  Phone: [phone] Fax: [phone]       Would like response via mychart:  Phone     Comments:

## 2024-01-31 ENCOUNTER — ON-DEMAND VIRTUAL (OUTPATIENT)
Dept: URGENT CARE | Facility: CLINIC | Age: 59
End: 2024-01-31
Payer: COMMERCIAL

## 2024-01-31 DIAGNOSIS — B97.89 ACUTE VIRAL SINUSITIS: Primary | ICD-10-CM

## 2024-01-31 DIAGNOSIS — J01.90 ACUTE VIRAL SINUSITIS: Primary | ICD-10-CM

## 2024-01-31 PROCEDURE — 99213 OFFICE O/P EST LOW 20 MIN: CPT | Mod: 95,S$GLB,, | Performed by: NURSE PRACTITIONER

## 2024-01-31 RX ORDER — FLUTICASONE PROPIONATE 50 MCG
2 SPRAY, SUSPENSION (ML) NASAL DAILY
Qty: 9.9 ML | Refills: 0 | Status: SHIPPED | OUTPATIENT
Start: 2024-01-31 | End: 2024-03-01

## 2024-01-31 NOTE — PROGRESS NOTES
Subjective:      Patient ID: Grecia Boyd is a 58 y.o. female.    Vitals:  vitals were not taken for this visit.     Chief Complaint: Sinus Problem      Visit Type: TELE AUDIOVISUAL    Present with the patient at the time of consultation: TELEMED PRESENT WITH PATIENT: None    Past Medical History:   Diagnosis Date    Diabetes mellitus, type 2     Hyperlipidemia     Hypertension     Obesity     CHIDI (obstructive sleep apnea)      Past Surgical History:   Procedure Laterality Date    ADENOIDECTOMY      BREAST BIOPSY Right     @ age 18    CHONDROPLASTY OF KNEE Right 2/12/2021    Procedure: CHONDROPLASTY, KNEE;  Surgeon: ZULEIKA Barahona MD;  Location: [facility] OR;  Service: Orthopedics;  Laterality: Right;    COLONOSCOPY N/A 10/16/2023    Procedure: COLONOSCOPY;  Surgeon: Grace Wiley MD;  Location: [facility] ENDOSCOPY;  Service: Endoscopy;  Laterality: N/A;  Referred by: DARLEEN Lozano Meds: Ozempic - pt inst to hold per protocol  Prep: Suprep  Route instructions sent: portal  SW  10/9- precall complete.  DBM    HYSTERECTOMY      2009    KNEE ARTHROSCOPY W/ MENISCECTOMY Right 2/12/2021    Procedure: ARTHROSCOPY, KNEE, WITH MEDIAL MENISCECTOMY LATERAL RELEASE;  Surgeon: ZULEIKA Barahona MD;  Location: [facility] OR;  Service: Orthopedics;  Laterality: Right;  pericapsular injection: 30cc ropivacaine/epi/clonidine/ketorolac injection     KNEE SURGERY      SYNOVECTOMY OF KNEE Right 2/12/2021    Procedure: SYNOVECTOMY, KNEE;  Surgeon: ZULEIKA Barahona MD;  Location: [facility] OR;  Service: Orthopedics;  Laterality: Right;    THYROIDECTOMY, PARTIAL      TONSILLECTOMY       Review of patient's allergies indicates:  No Known Allergies  Current Outpatient Medications on File Prior to Visit   Medication Sig Dispense Refill    albuterol (PROAIR HFA) 90 mcg/actuation inhaler Inhale 2 puffs into the lungs every 6 (six) hours as needed for Wheezing or Shortness of Breath. Rescue 18 g 2    atorvastatin (LIPITOR) 40 MG tablet TAKE 1  TABLET(40 MG) BY MOUTH EVERY DAY 90 tablet 1    blood sugar diagnostic Strp Check blood sugars  strip 11    blood-glucose meter (FREESTYLE SYSTEM KIT) kit Use as instructed 1 each 1    lancets (ONETOUCH ULTRASOFT LANCETS) Misc Check blood sugars  each 11    OZEMPIC 1 mg/dose (4 mg/3 mL) INJECT 1 MG UNDER THE SKIN ONCE A WEEK 9 mL 1    valsartan-hydrochlorothiazide (DIOVAN-HCT) 160-12.5 mg per tablet Take 1 tablet by mouth once daily. 90 tablet 3     No current facility-administered medications on file prior to visit.     Family History   Problem Relation Age of Onset    Diabetes Mother     Heart disease Mother     Hypertension Mother     Hyperlipidemia Mother     Stroke Mother     Cataracts Mother     Cancer Paternal Aunt         Lung    Diabetes Paternal Grandmother     Breast cancer Paternal Aunt     Macular degeneration Paternal Aunt     Breast cancer Cousin     Colon cancer Neg Hx     Ovarian cancer Neg Hx        Medications Ordered                [id number] [address] [address]    Telephone: [phone]   Fax: [phone]   Hours: Not open 24 hours                         E-Prescribed (1 of 1)              fluticasone propionate (FLONASE) 50 mcg/actuation nasal spray    Si sprays (100 mcg total) by Each Nostril route once daily.       Start: 24     Quantity: 9.9 mL Refills: 0                           Ohs Peq Odvv Intake    2024  8:24 AM CST - Filed by Patient   What is your current physical address in the event of a medical emergency? [address] [place] [address]   Are you able to take your vital signs? No   Please attach any relevant images or files          58 year old female calls in today with complaints of ear congestion, nose stopped up, post nasal drip for the past week. She is   taking mucinex. She has an appt with ENT on the .   She denies any sinus pain or  pressure. No isolated ear pain. No SOB Or chest discomfort. No fever or chills.   Hx of DM        ROS     Objective:   The physical exam was conducted virtually.  Physical Exam   Constitutional: She is oriented to person, place, and time. No distress.   HENT:   Head: Normocephalic and atraumatic.   Mouth/Throat: Oropharynx is clear and moist and mucous membranes are normal.   Eyes: Conjunctivae are normal. No scleral icterus.   Pulmonary/Chest: Effort normal. No respiratory distress.   Musculoskeletal: Normal range of motion.         General: Normal range of motion.   Neurological: She is alert and oriented to person, place, and time.   Skin: Skin is not diaphoretic.   Psychiatric: Her behavior is normal. Judgment and thought content normal.   Vitals reviewed.      Assessment:     1. Acute viral sinusitis        Plan:       Acute viral sinusitis  -     fluticasone propionate (FLONASE) 50 mcg/actuation nasal spray; 2 sprays (100 mcg total) by Each Nostril route once daily.  Dispense: 9.9 mL; Refill: 0      -recommend daily antihistamine and nasal spray, if symptoms persist or worsen follow up zain.     Thank you for choosing [facility]!    Our goal in the [facility] is to always provide outstanding medical care. You may receive a survey by mail or e-mail in the next week regarding your experience today. We would greatly appreciate you completing and returning the survey. Your feedback provides us with a way to recognize our staff who provide very good care, and it helps us learn how to improve when your experience was below our aspiration of excellence.         We appreciate you trusting us with your medical care. We hope you feel better soon. We will be happy to take care of you for all of your future medical needs.    You must understand that you've received an Urgent Care treatment only and that you may be released before all your medical problems are known or treated. You,  the patient, will arrange for follow up care as instructed.    Follow up with your PCP or specialty clinic as directed in the next 1-2 weeks if not improved or as needed.  You can call [phone] to schedule an appointment with the appropriate provider.    If your condition worsens we recommend that you receive another evaluation in person, with your primary care provider, urgent care or at the emergency room immediately or contact your primary medical clinics after hours call service to discuss your concerns.

## 2024-02-14 NOTE — PROGRESS NOTES
Subjective:       Patient ID: Grecia Boyd is a 58 y.o. female.    Chief Complaint: No chief complaint on file.      The patient is coming in for evaluation for possible hearing loss.  She feels that her hearing is diminished.  She has difficulty when there is background noise.  She is difficulty understanding what people say.  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  There is significant noise in that environment.  She has no significant recreational noise.  Family history is positive for hearing loss in her mother.    She does have seasonal allergies for which she uses fluticasone nasal spray.  She also has asthma for which she uses an albuterol HFA inhaler on an as-needed basis.  She has a history of sleep apnea but has lost significant weight.  She was using CPAP but no longer uses it.  She feels rested in the mornings when she wakes up.  She does dream infrequently when sleeping.          Review of Systems     Constitutional: Positive for fatigue.  Negative for appetite change, chills, fever and unexpected weight loss.      HENT: Positive for hearing loss, postnasal drip, ringing in the ears, runny nose, sinus pressure and stuffy nose.  Negative for ear discharge, ear infection, ear pain, facial swelling, mouth sores, nosebleeds, sinus infection, sore throat, tonsil infection, dental problems, trouble swallowing and voice change.      Eyes:  Negative for change in eyesight, eye drainage, eye itching and photophobia.     Respiratory:  Positive for sleep apnea and snoring. Negative for cough, shortness of breath and wheezing.      Cardiovascular:  Negative for chest pain, foot swelling, irregular heartbeat and swollen veins.     Gastrointestinal:  Negative for abdominal pain, acid reflux, constipation, diarrhea, heartburn and vomiting.     Genitourinary: Negative for difficulty urinating, sexual problems and frequent urination.     Musc: Negative for aching  joints, aching muscles, back pain and neck pain.     Skin: Negative for rash.     Allergy: Positive for seasonal allergies. Negative for food allergies.     Endocrine: Negative for cold intolerance and heat intolerance.      Neurological: Negative for dizziness, headaches, light-headedness, seizures and tremors.      Hematologic: Negative for bruises/bleeds easily and swollen glands.      Psychiatric: Negative for decreased concentration, depression, nervous/anxious and sleep disturbance.                Objective:      Physical Exam  Vitals and nursing note reviewed.   Constitutional:       General: She is awake.      Appearance: Normal appearance. She is well-developed, well-groomed and overweight.   HENT:      Head: Normocephalic.      Jaw: There is normal jaw occlusion.      Salivary Glands: Right salivary gland is not diffusely enlarged or tender. Left salivary gland is not diffusely enlarged or tender.      Right Ear: Ear canal and external ear normal. Decreased hearing noted. There is impacted cerumen. Tympanic membrane is retracted.      Left Ear: Ear canal and external ear normal. Decreased hearing noted. There is impacted cerumen. Tympanic membrane is retracted.      Nose: Septal deviation (to the right), mucosal edema (cyanotic, boggy inferior turbinates bilaterally) and rhinorrhea (clear mucus bilaterally) present. No nasal deformity. Rhinorrhea is clear.      Right Turbinates: Enlarged and pale.      Left Turbinates: Enlarged and pale.      Mouth/Throat:      Lips: No lesions.      Mouth: Mucous membranes are moist. No oral lesions.      Dentition: Abnormal dentition. No gum lesions.      Tongue: No lesions.      Palate: No mass and lesions.      Pharynx: Oropharynx is clear. Uvula midline.      Tonsils: 0 on the right. 0 on the left.   Eyes:      General: Lids are normal. Vision grossly intact.         Right eye: No discharge.         Left eye: No discharge.      Extraocular Movements: Extraocular  movements intact.      Conjunctiva/sclera: Conjunctivae normal.      Pupils: Pupils are equal, round, and reactive to light.   Neck:      Thyroid: No thyroid mass or thyromegaly.      Trachea: Trachea normal. No tracheal deviation.   Cardiovascular:      Rate and Rhythm: Normal rate and regular rhythm.      Pulses: Normal pulses.      Heart sounds: Normal heart sounds.   Pulmonary:      Effort: Pulmonary effort is normal.      Breath sounds: Normal breath sounds. No stridor. No decreased breath sounds, wheezing, rhonchi or rales.   Abdominal:      General: Bowel sounds are normal.      Palpations: Abdomen is soft.      Tenderness: There is no abdominal tenderness.   Musculoskeletal:      Cervical back: Neck supple. No muscular tenderness. Decreased range of motion.   Lymphadenopathy:      Head:      Right side of head: No submental, submandibular, preauricular, posterior auricular or occipital adenopathy.      Left side of head: No submental, submandibular, preauricular, posterior auricular or occipital adenopathy.      Cervical: No cervical adenopathy.   Skin:     General: Skin is warm and dry.      Findings: No petechiae or rash.      Nails: There is no clubbing.   Neurological:      Mental Status: She is alert and oriented to person, place, and time.      Cranial Nerves: No cranial nerve deficit.      Sensory: No sensory deficit.      Gait: Gait normal.   Psychiatric:         Speech: Speech normal.         Behavior: Behavior normal. Behavior is cooperative.         Thought Content: Thought content normal.         Judgment: Judgment normal.         Procedure-cerumen impactions removed from both ears using 8 Mongolian suction, irrigation and wire loop.  The patient tolerated procedure well was discharged post procedure.    Assessment:       1. Abnormal auditory perception of both ears    2. Tinnitus of both ears    3. Bilateral impacted cerumen    4. Family history of hearing loss    5. Allergic rhinitis, unspecified  seasonality, unspecified trigger    6. Obstructive sleep apnea    7. Nasal septal deviation        Plan:       I will schedule patient for a comprehensive auditory evaluation and telephone results to her unless in-person explanation is required.  I have advised that she discuss using CPAP again with the physician who direct her sleep apnea treatment.  I will recheck her in 1 year, or sooner on an as-needed basis.        ADDENDUM:  See results of audiogram performed   02/27/2024 below:     I personally reviewed the above audiogram: Pertinent findings:  Severe conductive hearing loss right ear with normal auditory discrimination at 90 dB speech level with masking on the right and normal auditory discrimination at 50 dB  speech level on the left and Type C tympanograms bilaterally.              DISCLAIMER: This note was prepared with [employer] voice recognition transcription software. Garbled syntax, mangled pronouns, and other bizarre constructions may be attributed to that software system. While efforts were made to correct any mistakes made by this voice recognition program, some errors and/or omissions may remain in the note that were missed when the note was originally created.

## 2024-02-15 ENCOUNTER — OFFICE VISIT (OUTPATIENT)
Dept: OTOLARYNGOLOGY | Facility: CLINIC | Age: 59
End: 2024-02-15
Payer: COMMERCIAL

## 2024-02-15 VITALS
OXYGEN SATURATION: 99 % | DIASTOLIC BLOOD PRESSURE: 63 MMHG | WEIGHT: 178.13 LBS | HEART RATE: 65 BPM | SYSTOLIC BLOOD PRESSURE: 125 MMHG | BODY MASS INDEX: 32.58 KG/M2

## 2024-02-15 DIAGNOSIS — Z82.2 FAMILY HISTORY OF HEARING LOSS: ICD-10-CM

## 2024-02-15 DIAGNOSIS — J30.9 ALLERGIC RHINITIS, UNSPECIFIED SEASONALITY, UNSPECIFIED TRIGGER: ICD-10-CM

## 2024-02-15 DIAGNOSIS — G47.33 OBSTRUCTIVE SLEEP APNEA: ICD-10-CM

## 2024-02-15 DIAGNOSIS — H93.293 ABNORMAL AUDITORY PERCEPTION OF BOTH EARS: Primary | ICD-10-CM

## 2024-02-15 DIAGNOSIS — H61.23 BILATERAL IMPACTED CERUMEN: ICD-10-CM

## 2024-02-15 DIAGNOSIS — J34.2 NASAL SEPTAL DEVIATION: ICD-10-CM

## 2024-02-15 DIAGNOSIS — H93.13 TINNITUS OF BOTH EARS: ICD-10-CM

## 2024-02-15 PROCEDURE — 4010F ACE/ARB THERAPY RXD/TAKEN: CPT | Mod: CPTII,S$GLB,, | Performed by: SPECIALIST

## 2024-02-15 PROCEDURE — 99999 PR PBB SHADOW E&M-EST. PATIENT-LVL III: CPT | Mod: PBBFAC,,, | Performed by: SPECIALIST

## 2024-02-15 PROCEDURE — 99204 OFFICE O/P NEW MOD 45 MIN: CPT | Mod: 25,S$GLB,, | Performed by: SPECIALIST

## 2024-02-15 PROCEDURE — 3074F SYST BP LT 130 MM HG: CPT | Mod: CPTII,S$GLB,, | Performed by: SPECIALIST

## 2024-02-15 PROCEDURE — 1160F RVW MEDS BY RX/DR IN RCRD: CPT | Mod: CPTII,S$GLB,, | Performed by: SPECIALIST

## 2024-02-15 PROCEDURE — 69209 REMOVE IMPACTED EAR WAX UNI: CPT | Mod: 50,S$GLB,, | Performed by: SPECIALIST

## 2024-02-15 PROCEDURE — 3078F DIAST BP <80 MM HG: CPT | Mod: CPTII,S$GLB,, | Performed by: SPECIALIST

## 2024-02-15 PROCEDURE — 1159F MED LIST DOCD IN RCRD: CPT | Mod: CPTII,S$GLB,, | Performed by: SPECIALIST

## 2024-02-15 PROCEDURE — 3044F HG A1C LEVEL LT 7.0%: CPT | Mod: CPTII,S$GLB,, | Performed by: SPECIALIST

## 2024-02-15 PROCEDURE — 3008F BODY MASS INDEX DOCD: CPT | Mod: CPTII,S$GLB,, | Performed by: SPECIALIST

## 2024-02-15 NOTE — PROCEDURES
"Ear Cerumen Removal    Date/Time: 2/15/2024 3:20 PM    Performed by: AMY Vila MD  Authorized by: AMY Vila MD    Time out: Immediately prior to procedure a "time out" was called to verify the correct patient, procedure, equipment, support staff and site/side marked as required.    Consent Done?:  Yes (Verbal)    Local anesthetic:  None  Location details:  Both ears  Procedure type comment:  Wwire loop, 8 Fr suction and irrigation  Cerumen  Removal Results:  Cerumen completely removed  Patient tolerance:  Patient tolerated the procedure well with no immediate complications    "

## 2024-02-27 ENCOUNTER — HOSPITAL ENCOUNTER (OUTPATIENT)
Dept: RADIOLOGY | Facility: OTHER | Age: 59
Discharge: HOME | End: 2024-02-27
Attending: INTERNAL MEDICINE
Payer: COMMERCIAL

## 2024-02-27 ENCOUNTER — CLINICAL SUPPORT (OUTPATIENT)
Dept: AUDIOLOGY | Facility: CLINIC | Age: 59
End: 2024-02-27
Payer: COMMERCIAL

## 2024-02-27 ENCOUNTER — TELEPHONE (OUTPATIENT)
Dept: OTOLARYNGOLOGY | Facility: CLINIC | Age: 59
End: 2024-02-27
Payer: COMMERCIAL

## 2024-02-27 ENCOUNTER — ON-DEMAND VIRTUAL (OUTPATIENT)
Dept: URGENT CARE | Facility: CLINIC | Age: 59
End: 2024-02-27
Payer: COMMERCIAL

## 2024-02-27 DIAGNOSIS — Z12.31 ENCOUNTER FOR SCREENING MAMMOGRAM FOR BREAST CANCER: ICD-10-CM

## 2024-02-27 DIAGNOSIS — Z82.2 FAMILY HISTORY OF HEARING LOSS: ICD-10-CM

## 2024-02-27 DIAGNOSIS — H90.11 CONDUCTIVE HEARING LOSS OF RIGHT EAR WITH UNRESTRICTED HEARING OF LEFT EAR: ICD-10-CM

## 2024-02-27 DIAGNOSIS — M79.602 PAIN OF LEFT UPPER EXTREMITY: Primary | ICD-10-CM

## 2024-02-27 DIAGNOSIS — H93.13 TINNITUS OF BOTH EARS: ICD-10-CM

## 2024-02-27 DIAGNOSIS — H69.91 ETD (EUSTACHIAN TUBE DYSFUNCTION), RIGHT: Primary | ICD-10-CM
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
## 2024-02-27 PROCEDURE — 99999 PR PBB SHADOW E&M-EST. PATIENT-LVL II: CPT | Mod: PBBFAC,,,

## 2024-02-27 PROCEDURE — 77067 SCR MAMMO BI INCL CAD: CPT | Mod: TC

## 2024-02-27 PROCEDURE — 77067 SCR MAMMO BI INCL CAD: CPT | Mod: 26,,, | Performed by: RADIOLOGY

## 2024-02-27 PROCEDURE — 99213 OFFICE O/P EST LOW 20 MIN: CPT | Mod: 95,,, | Performed by: NURSE PRACTITIONER

## 2024-02-27 PROCEDURE — 92557 COMPREHENSIVE HEARING TEST: CPT | Mod: S$GLB,,,

## 2024-02-27 PROCEDURE — 92567 TYMPANOMETRY: CPT | Mod: S$GLB,,,

## 2024-02-27 PROCEDURE — 77063 BREAST TOMOSYNTHESIS BI: CPT | Mod: 26,,, | Performed by: RADIOLOGY

## 2024-02-27 RX ORDER — IBUPROFEN 800 MG/1
800 TABLET ORAL 3 TIMES DAILY
Qty: 30 TABLET | Refills: 0 | OUTPATIENT
Start: 2024-02-27

## 2024-02-27 NOTE — Clinical Note
Good Afternoon, Please see Mrs. Boyd's audiogram from today, and let me know if there are any questions. Thanks!

## 2024-02-27 NOTE — PROGRESS NOTES
Subjective:      Patient ID: Grecia Boyd is a 58 y.o. female.    Vitals:  vitals were not taken for this visit.     Chief Complaint: Fall      Visit Type: TELE AUDIOVISUAL    Present with the patient at the time of consultation: TELEMED PRESENT WITH PATIENT: None    Past Medical History:   Diagnosis Date    Diabetes mellitus, type 2     Hyperlipidemia     Hypertension     Obesity     CHIDI (obstructive sleep apnea)      Past Surgical History:   Procedure Laterality Date    ADENOIDECTOMY      BREAST BIOPSY Right     @ age 18    CHONDROPLASTY OF KNEE Right 2/12/2021    Procedure: CHONDROPLASTY, KNEE;  Surgeon: ZULEIKA Barahona MD;  Location: [facility] OR;  Service: Orthopedics;  Laterality: Right;    COLONOSCOPY N/A 10/16/2023    Procedure: COLONOSCOPY;  Surgeon: Grace Wiley MD;  [facility];  Service: Endoscopy;  Laterality: N/A;  Referred by: DARLEEN Lozano Meds: Ozempic - pt inst to hold per protocol  Prep: Suprep  Route instructions sent: portal  SW  10/9- precall complete.  DBM    HYSTERECTOMY      2009    KNEE ARTHROSCOPY W/ MENISCECTOMY Right 2/12/2021    Procedure: ARTHROSCOPY, KNEE, WITH MEDIAL MENISCECTOMY LATERAL RELEASE;  Surgeon: ZULEIKA Barahona MD;  Location: [facility] OR;  Service: Orthopedics;  Laterality: Right;  pericapsular injection: 30cc ropivacaine/epi/clonidine/ketorolac injection     KNEE SURGERY      SYNOVECTOMY OF KNEE Right 2/12/2021    Procedure: SYNOVECTOMY, KNEE;  Surgeon: ZULEIKA Barahona MD;  Location: [facility] OR;  Service: Orthopedics;  Laterality: Right;    THYROIDECTOMY, PARTIAL      TONSILLECTOMY       Review of patient's allergies indicates:  No Known Allergies  Current Outpatient Medications on File Prior to Visit   Medication Sig Dispense Refill    albuterol (PROAIR HFA) 90 mcg/actuation inhaler Inhale 2 puffs into the lungs every 6 (six) hours as needed for Wheezing or Shortness of Breath. Rescue 18 g 2    atorvastatin (LIPITOR) 40 MG tablet TAKE 1 TABLET(40 MG)  BY MOUTH EVERY DAY 90 tablet 1    blood sugar diagnostic Strp Check blood sugars  strip 11    blood-glucose meter (FREESTYLE SYSTEM KIT) kit Use as instructed 1 each 1    fluticasone propionate (FLONASE) 50 mcg/actuation nasal spray 2 sprays (100 mcg total) by Each Nostril route once daily. 9.9 mL 0    lancets (ONETOUCH ULTRASOFT LANCETS) Misc Check blood sugars  each 11    OZEMPIC 1 mg/dose (4 mg/3 mL) INJECT 1 MG UNDER THE SKIN ONCE A WEEK 9 mL 1     No current facility-administered medications on file prior to visit.     Family History   Problem Relation Age of Onset    Diabetes Mother     Heart disease Mother     Hypertension Mother     Hyperlipidemia Mother     Stroke Mother     Cataracts Mother     Cancer Paternal Aunt         Lung    Diabetes Paternal Grandmother     Breast cancer Paternal Aunt     Macular degeneration Paternal Aunt     Breast cancer Cousin     Colon cancer Neg Hx     Ovarian cancer Neg Hx        Medications Ordered                [facility]pecified Pharmacy                         Printed (1 of 1)              ibuprofen (ADVIL,MOTRIN) 800 MG tablet    Sig: Take 1 tablet (800 mg total) by mouth 3 (three) times daily. Take with meals.       Start: 2/27/24     Quantity: 30 tablet Refills: 0                           Ohs Peq Odvv Intake    2/27/2024  3:54 PM CST - Filed by Patient   What is your current physical address in the event of a medical emergency? [address] [place] [address]   Are you able to take your vital signs? No   Please attach any relevant images or files          57 yo female with left arm pain after a fall today. She states she was on her way to a doctors appt. And fell. She states no swelling, abrasion. She states can move it but it hurts.     Fall        Constitution: Negative.   HENT: Negative.     Cardiovascular: Negative.    Respiratory: Negative.     Gastrointestinal: Negative.    Endocrine: negative.   Genitourinary: Negative.  Negative for frequency and urgency.    Musculoskeletal:  Positive for pain and joint pain. Negative for joint swelling.   Skin: Negative.    Allergic/Immunologic: Negative.    Neurological: Negative.    Hematologic/Lymphatic: Negative.    Psychiatric/Behavioral: Negative.          Objective:   The physical exam was conducted virtually.  Physical Exam   Constitutional: She is oriented to person, place, and time. She appears well-developed.   HENT:   Head: Normocephalic and atraumatic.   Ears:   Right Ear: Hearing, tympanic membrane and external ear normal.   Left Ear: Hearing, tympanic membrane and external ear normal.   Nose: Nose normal.   Mouth/Throat: Uvula is midline, oropharynx is clear and moist and mucous membranes are normal.   Eyes: Conjunctivae and EOM are normal. Pupils are equal, round, and reactive to light.   Neck: Neck supple.   Cardiovascular: Normal rate.   Pulmonary/Chest: Effort normal and breath sounds normal.   Musculoskeletal: Normal range of motion.         General: Normal range of motion.      Right elbow: Tenderness found.      Right upper arm: She exhibits tenderness.      Right forearm: She exhibits tenderness.   Neurological: She is alert and oriented to person, place, and time.   Skin: Skin is warm.   Psychiatric: Her behavior is normal. Thought content normal.   Nursing note and vitals reviewed.      Assessment:     1. Pain of left upper extremity    2. Fall, initial encounter        Plan:   Ice to area  Ibuprofen 800 mg every 8 hours as needed for pain  Follow up in person if symptoms persist    Follow up with your primary care provider if symptoms persist.  Go to the Emergency room for worsening of symptoms.       Pain of left upper extremity  -     ibuprofen (ADVIL,MOTRIN) 800 MG tablet; Take 1 tablet (800 mg total) by mouth 3 (three) times daily. Take with meals.  Dispense: 30 tablet; Refill: 0    Fall, initial encounter  -     ibuprofen (ADVIL,MOTRIN) 800 MG tablet; Take 1 tablet (800 mg total) by mouth 3 (three)  times daily. Take with meals.  Dispense: 30 tablet; Refill: 0

## 2024-02-27 NOTE — PROGRESS NOTES
History:  Grecia Boyd, a 58 y.o. female, was seen today for an audiologic evaluation. She reported muffled hearing in the right ear for a couple weeks, which persisted after cerumen management by ENT on 2/15/2024. She noted occasional fluctuations in hearing/aural pressure in either ear in the past. She also reported occasional dizziness which has not occurred recently, and which she has discussed with a physician. Patient reported family history of hearing loss (her mother).    Results:  Otoscopy revealed clear view of the tympanic membrane in the right ear, and clear view of the tympanic membrane in the left ear.  Tympanometry revealed Type C tympanogram in the right ear and Type A tympanogram with slight negative pressure in the left ear.   Pure tone audiometry revealed  severe rising to moderately severe conductive hearing loss in the right ear and normal hearing sensitivity in the left ear. Right ear air conduction and bone conduction thresholds were retested and verified with good reliability.  Speech reception thresholds were obtained at 65 dB in the right ear and 10 dB in the left ear.  Word recognition scores were 96% in the right ear and 100% in the left ear.    Discussion of Results:  Hearing sensitivity in the left ear is adequate for continued communication in quiet at this time, though patient may have difficulty communicating in complex listening situations and localizing sound sources due to right sided conductive hearing loss. Speech may sound muffled and hearing may fluctuate in the right ear in the presence of eustachian tube dysfunction. Patient may benefit from closer proximity to speakers, increased visual cues, and louder speaking voices. Continued ENT management of ETD is warranted.    Recommendations:  Otologic evaluation  Hearing protection in noise  Return for follow-up audiologic evaluation in conjunction with otologic plan of care

## 2024-03-05 ENCOUNTER — TELEPHONE (OUTPATIENT)
Dept: INTERNAL MEDICINE | Facility: CLINIC | Age: 59
End: 2024-03-05
Payer: COMMERCIAL

## 2024-03-09 DIAGNOSIS — H90.11 CONDUCTIVE HEARING LOSS OF RIGHT EAR WITH UNRESTRICTED HEARING OF LEFT EAR: Primary | ICD-10-CM

## 2024-03-09 DIAGNOSIS — H69.93 DYSFUNCTION OF BOTH EUSTACHIAN TUBES: ICD-10-CM

## 2024-03-16 ENCOUNTER — HOSPITAL ENCOUNTER (EMERGENCY)
Facility: OTHER | Age: 59
Discharge: HOME | End: 2024-03-16
Attending: EMERGENCY MEDICINE
Payer: COMMERCIAL

## 2024-03-16 VITALS
DIASTOLIC BLOOD PRESSURE: 82 MMHG | SYSTOLIC BLOOD PRESSURE: 162 MMHG | HEART RATE: 61 BPM | TEMPERATURE: 98 F | BODY MASS INDEX: 31.65 KG/M2 | OXYGEN SATURATION: 100 % | HEIGHT: 62 IN | WEIGHT: 172 LBS | RESPIRATION RATE: 18 BRPM

## 2024-03-16 DIAGNOSIS — M25.512 ACUTE PAIN OF LEFT SHOULDER: ICD-10-CM

## 2024-03-16 DIAGNOSIS — M25.522 LEFT ELBOW PAIN: ICD-10-CM

## 2024-03-16 DIAGNOSIS — S42.142A GLENOID FRACTURE OF SHOULDER, LEFT, CLOSED, INITIAL ENCOUNTER: Primary | ICD-10-CM
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
## 2024-03-16 DIAGNOSIS — S42.152A GLENOID FRACTURE OF SHOULDER, LEFT, CLOSED, INITIAL ENCOUNTER: Primary | ICD-10-CM

## 2024-03-16 PROCEDURE — 96372 THER/PROPH/DIAG INJ SC/IM: CPT

## 2024-03-16 PROCEDURE — 99284 EMERGENCY DEPT VISIT MOD MDM: CPT | Mod: 25

## 2024-03-16 PROCEDURE — 25000003 PHARM REV CODE 250

## 2024-03-16 PROCEDURE — 63600175 PHARM REV CODE 636 W HCPCS

## 2024-03-16 RX ORDER — HYDROCODONE BITARTRATE AND ACETAMINOPHEN 5; 325 MG/1; MG/1
1 TABLET ORAL EVERY 6 HOURS PRN
Qty: 9 TABLET | Refills: 0 | Status: SHIPPED | OUTPATIENT
Start: 2024-03-16 | End: 2024-03-19

## 2024-03-16 RX ORDER — LIDOCAINE 50 MG/G
1 PATCH TOPICAL DAILY
Qty: 7 PATCH | Refills: 0 | Status: SHIPPED | OUTPATIENT
Start: 2024-03-16 | End: 2024-03-23

## 2024-03-16 RX ORDER — IBUPROFEN 800 MG/1
800 TABLET ORAL EVERY 6 HOURS PRN
Qty: 20 TABLET | Refills: 0 | Status: SHIPPED | OUTPATIENT
Start: 2024-03-16

## 2024-03-16 RX ORDER — LIDOCAINE 50 MG/G
2 PATCH TOPICAL
Status: DISCONTINUED | OUTPATIENT
Start: 2024-03-16 | End: 2024-03-16 | Stop reason: HOSPADM

## 2024-03-16 RX ORDER — ORPHENADRINE CITRATE 100 MG/1
100 TABLET, EXTENDED RELEASE ORAL
Status: COMPLETED | OUTPATIENT
Start: 2024-03-16 | End: 2024-03-16

## 2024-03-16 RX ORDER — KETOROLAC TROMETHAMINE 30 MG/ML
10 INJECTION, SOLUTION INTRAMUSCULAR; INTRAVENOUS
Status: COMPLETED | OUTPATIENT
Start: 2024-03-16 | End: 2024-03-16

## 2024-03-16 RX ADMIN — ORPHENADRINE CITRATE 100 MG: 100 TABLET, EXTENDED RELEASE ORAL at 11:03

## 2024-03-16 RX ADMIN — KETOROLAC TROMETHAMINE 10 MG: 30 INJECTION, SOLUTION INTRAMUSCULAR; INTRAVENOUS at 11:03

## 2024-03-16 RX ADMIN — LIDOCAINE 2 PATCH: 50 PATCH CUTANEOUS at 11:03

## 2024-03-16 NOTE — DISCHARGE INSTRUCTIONS
Please keep your arm in a sling until you see orthopedics. Take ibuprofen 800 mg as needed for pain. Reserve Norco 5 for severe pain. You can apply lidocaine patches to your areas of pain. Follow up with orthopedics. Referral has been provided.

## 2024-03-16 NOTE — ED TRIAGE NOTES
Pt presents to the ER with complaints of pain in the LUE from the shoulder, radiating down into the upper, forearm, wrist, and hand after falling and landing on her left side when trying to catch the bus on2/27. Pt denies  hitting head with fall. Reports no relief with OTC Ibuprofen.

## 2024-03-16 NOTE — ED NOTES
LOC: The patient is awake, alert, and oriented to self, place, time, and situation. Pt is calm and cooperative. Affect is appropriate.  Speech is appropriate and clear.     APPEARANCE: Patient sitting on stretcher; appears uncomfortable but in no acute distress.  Patient is clean and well groomed.    SKIN: The skin is warm and dry; color consistent with ethnicity.  Patient has normal skin turgor and moist mucus membranes.  Skin intact; no breakdown or bruising noted.     MUSCULOSKELETAL: Patient moving rightt upper and bilateral lower extremities without difficulty but reporting pain in the LUE from the shoulder radiating down into the left wrist and hand; denies pain in the back.  Denies weakness.     RESPIRATORY: Airway is open and patent. Respirations spontaneous, even, easy, and non-labored.  Patient has a normal effort and rate.  No accessory muscle use noted. Denies cough.     CARDIAC: Pt is  hypertensive. Normal rate noted.  No peripheral edema noted. No complaints of chest pain.     ABDOMEN: Soft and non tender to palpation.  No distention noted. Pt denies abdominal pain; denies nausea, vomiting, diarrhea, or constipation.    NEUROLOGIC: Eyes open spontaneously.  Behavior appropriate to situation.  Follows commands; facial expression symmetrical.  Purposeful motor response noted; normal sensation in all extremities. Pt denies headache; denies lightheadedness or dizziness; denies visual disturbances; denies loss of balance; denies unilateral weakness.

## 2024-03-16 NOTE — ED PROVIDER NOTES
Encounter Date: 3/16/2024       History     Chief Complaint   Patient presents with    Arm Pain     L arm pain since falling on arm  a few weeks ago.      Grecia Boyd is a 58 y.o. female presenting to the emergency department for evaluation of HTN, HLD, T2DM presenting with left shoulder and left elbow pain s/p fall that occurred on 2/27/24. Reports pain radiates down to her left hand. Patient states that her right leg gave out on her and she wanted to avoid falling on the right knee due to previous injury so she fell onto her left side. She did not get evaluated in the ED right after the fall because the ER waiting room was too long. She denies head trauma or LOC. She denies history of injury or surgery to the left shoulder or left elbow. She is not on blood thinners. No pain relief with ibuprofen 400 mg, last taken yesterday. No other complaints at this time.       The history is provided by the patient.     Review of patient's allergies indicates:  No Known Allergies  Past Medical History:   Diagnosis Date    Diabetes mellitus, type 2     Hyperlipidemia     Hypertension     Obesity     CHIDI (obstructive sleep apnea)      Past Surgical History:   Procedure Laterality Date    ADENOIDECTOMY      BREAST BIOPSY Right     @ age 18    CHONDROPLASTY OF KNEE Right 2/12/2021    Procedure: CHONDROPLASTY, KNEE;  Surgeon: ZULEIKA Barahona MD;  Location: [facility] OR;  Service: Orthopedics;  Laterality: Right;    COLONOSCOPY N/A 10/16/2023    Procedure: COLONOSCOPY;  Surgeon: Grace Wiley MD;  Location: [facility] ENDOSCOPY;  Service: Endoscopy;  Laterality: N/A;  Referred by: JOAN Chirinos NP   Meds: Ozempic - pt inst to hold per protocol  Prep: Suprep  Route instructions sent: portal  SW  10/9- precall complete.  DBM    HYSTERECTOMY      2009    KNEE ARTHROSCOPY W/ MENISCECTOMY Right 2/12/2021    Procedure: ARTHROSCOPY, KNEE, WITH MEDIAL MENISCECTOMY LATERAL RELEASE;  Surgeon: ZULEIKA Barahona MD;  Location: [facility]  OR;  Service: Orthopedics;  Laterality: Right;  pericapsular injection: 30cc ropivacaine/epi/clonidine/ketorolac injection     KNEE SURGERY      SYNOVECTOMY OF KNEE Right 2/12/2021    Procedure: SYNOVECTOMY, KNEE;  Surgeon: ZULEIKA Barahona MD;  Location: [facility] OR;  Service: Orthopedics;  Laterality: Right;    THYROIDECTOMY, PARTIAL      TONSILLECTOMY       Family History   Problem Relation Age of Onset    Diabetes Mother     Heart disease Mother     Hypertension Mother     Hyperlipidemia Mother     Stroke Mother     Cataracts Mother     Cancer Paternal Aunt         Lung    Diabetes Paternal Grandmother     Breast cancer Paternal Aunt     Macular degeneration Paternal Aunt     Breast cancer Cousin     Colon cancer Neg Hx     Ovarian cancer Neg Hx      Social History     Tobacco Use    Smoking status: Never    Smokeless tobacco: Never   Substance Use Topics    Alcohol use: Yes     Comment: rare    Drug use: No     Review of Systems   Constitutional:  Negative for chills and fever.   HENT:  Negative for congestion, rhinorrhea and sore throat.    Respiratory:  Negative for cough and shortness of breath.    Cardiovascular:  Negative for chest pain.   Gastrointestinal:  Negative for abdominal pain, diarrhea, nausea and vomiting.   Genitourinary:  Negative for dysuria, frequency and urgency.   Musculoskeletal:  Negative for back pain.        Positive for left shoulder pain. Positive for left elbow pain.    Skin:  Negative for rash.   Neurological:  Negative for dizziness and headaches.   Psychiatric/Behavioral:  Negative for confusion.        Physical Exam     Initial Vitals [03/16/24 1039]   BP Pulse Resp Temp SpO2   (!) 188/92 66 16 98.1 °F (36.7 °C) 99 %      MAP       --         Physical Exam    Nursing note and vitals reviewed.  Constitutional: She appears well-developed and well-nourished. No distress.   HENT:   Head: Normocephalic and atraumatic.   Mouth/Throat: Oropharynx is clear and moist.    Eyes: Conjunctivae and EOM are normal.   Neck: Neck supple.   Normal range of motion.  Cardiovascular:            Pulses:       Radial pulses are 2+ on the left side.   Musculoskeletal:         General: No tenderness or edema.      Cervical back: Normal range of motion and neck supple.      Comments: Normal ROM of left hand, wrist and elbow. Limited ROM of left shoulder secondary to pain. Mild ttp of left anterior shoulder and left olecranon process. No crepitus or deformity. No overlying skin changes.      Neurological: She is alert and oriented to person, place, and time. She has normal strength.   Neurovascularly intact.   Skin: Skin is warm and dry. Capillary refill takes less than 2 seconds.   Psychiatric: She has a normal mood and affect. Her behavior is normal. Judgment and thought content normal.       ED Course   Procedures  Labs Reviewed - No data to display       Imaging Results              X-Ray Elbow Complete Left (Final result)  Result time 03/16/24 11:55:06      Final result by Roberto Mccall MD (03/16/24 11:55:06)                   Impression:      No acute displaced fracture.      Electronically signed by: Roberto Mccall MD  Date:    03/16/2024  Time:    11:55               Narrative:    EXAMINATION:  XR ELBOW COMPLETE 3 VIEW LEFT    CLINICAL HISTORY:  Pain in left elbow    TECHNIQUE:  AP, lateral, and oblique views of the left elbow were performed.    COMPARISON:  None.    FINDINGS:  Well corticated chronic appearing fragment involving or adjacent to the olecranon process.  No acute displaced fracture.  No dislocation.  Mild degenerative changes.  No sizeable joint effusion.  No unexpected radiopaque foreign body.                                       X-Ray Shoulder 2 or More Views Left (Final result)  Result time 03/16/24 11:53:07      Final result by Roberto Mccall MD (03/16/24 11:53:07)                   Impression:      Questionable nondisplaced fracture involving the glenoid.   Suggest correlation with mechanism of injury.      Electronically signed by: Roberto Mccall MD  Date:    03/16/2024  Time:    11:53               Narrative:    EXAMINATION:  XR SHOULDER COMPLETE 2 OR MORE VIEWS LEFT    CLINICAL HISTORY:  left shoulder pain;    TECHNIQUE:  Two or three views of the left shoulder were performed.    COMPARISON:  None.    FINDINGS:  There is a cortical defect involving the glenoid, best seen on the frontal external rotation view.  Subtle fracture would be difficult to exclude.  Chronic appearing calcification adjacent to the proximal humerus.  Otherwise, no acute displaced fracture.  No gross dislocation.  Degenerative changes in the acromioclavicular joint.  No unexpected radiopaque foreign body.                                       Medications   ketorolac injection 9.999 mg (9.999 mg Intramuscular Given 3/16/24 1127)   orphenadrine 12 hr tablet 100 mg (100 mg Oral Given 3/16/24 1127)     Medical Decision Making  Amount and/or Complexity of Data Reviewed  Radiology: ordered.    Risk  Prescription drug management.                          Medical Decision Making:   Initial Assessment:   Urgent evaluation of 58-year-old female presenting with left shoulder and left elbow pain status post fall that occurred on 02/27/2024.  No head trauma or loss of consciousness.  She is not on blood thinners.  On exam, she was well-appearing and nontoxic.  Hemodynamically stable.  Afebrile in the ED. exam notable for limited range of motion of the left shoulder secondary to pain.  Mild tenderness to palpation of left anterior shoulder left olecranon process.  Neurovascularly intact.  Plan for imaging.  Will give Toradol, Norflex, and lidocaine patch.  Differential Diagnosis:   Left elbow contusion, hematoma, strain, sprain, fracture, dislocation. Left shoulder contusion, left shoulder hematoma, left shoulder sprain, left shoulder strain, left shoulder fracture, left shoulder dislocation  Clinical  Tests:   Radiological Study: Ordered and Reviewed  ED Management:  On review of x-rays, there is questionable nondisplaced fracture involving the glenoid.  No acute fracture or dislocation of the left elbow.  No joint effusion.  I updated the patient on all results.  Patient was placed in a sling.  Discharged home with prescriptions for ibuprofen, Norco 5, and lidocaine patches.  Ambulatory referral to Orthopedics was provided for further evaluation.  Patient verbalized understanding and agreement with this plan of care. She was given specific return precautions. Advised to follow up with PCP as needed. All questions and concerns addressed. She is stable for discharge.     This note was created with [employer] Fluency Direct Dictation. Please excuse any spelling or grammatical errors.             Clinical Impression:  Final diagnoses:  [M25.522] Left elbow pain  [M25.512] Acute pain of left shoulder  [S42.142A, S42.152A] Glenoid fracture of shoulder, left, closed, initial encounter (Primary)  [W19.XXXA] Fall, initial encounter          ED Disposition Condition    Discharge Stable          ED Prescriptions       Medication Sig Dispense Start Date End Date Auth. Provider    ibuprofen (ADVIL,MOTRIN) 800 MG tablet Take 1 tablet (800 mg total) by mouth every 6 (six) hours as needed for Pain. 20 tablet 3/16/2024 -- Conrad West PA-C    HYDROcodone-acetaminophen (NORCO) 5-325 mg per tablet Take 1 tablet by mouth every 6 (six) hours as needed for Pain. 9 tablet 3/16/2024 3/19/2024 Conrad West PA-C    LIDOcaine (LIDODERM) 5 % Place 1 patch onto the skin once daily. Remove & Discard patch within 12 hours or as directed by MD for 7 days 7 patch 3/16/2024 3/23/2024 Conrad West PA-C          Follow-up Information    None          Conrad West PA-C  03/18/24 5547

## 2024-03-20 ENCOUNTER — HOSPITAL ENCOUNTER (OUTPATIENT)
Dept: RADIOLOGY | Facility: HOSPITAL | Age: 59
Discharge: HOME | End: 2024-03-20
Attending: PHYSICIAN ASSISTANT
Payer: COMMERCIAL

## 2024-03-20 ENCOUNTER — OFFICE VISIT (OUTPATIENT)
Dept: ORTHOPEDICS | Facility: CLINIC | Age: 59
End: 2024-03-20
Payer: COMMERCIAL

## 2024-03-20 VITALS
BODY MASS INDEX: 33.98 KG/M2 | HEIGHT: 62 IN | DIASTOLIC BLOOD PRESSURE: 82 MMHG | WEIGHT: 184.63 LBS | HEART RATE: 61 BPM | SYSTOLIC BLOOD PRESSURE: 141 MMHG

## 2024-03-20 DIAGNOSIS — S42.92XG: ICD-10-CM

## 2024-03-20 DIAGNOSIS — M25.512 ACUTE PAIN OF LEFT SHOULDER: ICD-10-CM

## 2024-03-20 DIAGNOSIS — S42.152A GLENOID FRACTURE OF SHOULDER, LEFT, CLOSED, INITIAL ENCOUNTER: ICD-10-CM

## 2024-03-20 DIAGNOSIS — S42.142A GLENOID FRACTURE OF SHOULDER, LEFT, CLOSED, INITIAL ENCOUNTER: ICD-10-CM

## 2024-03-20 DIAGNOSIS — S42.402G ELBOW FRACTURE, LEFT, WITH DELAYED HEALING, SUBSEQUENT ENCOUNTER: ICD-10-CM

## 2024-03-20 DIAGNOSIS — S52.022A OLECRANON FRACTURE, LEFT, CLOSED, INITIAL ENCOUNTER: Primary | ICD-10-CM

## 2024-03-20 PROCEDURE — 3079F DIAST BP 80-89 MM HG: CPT | Mod: CPTII,S$GLB,, | Performed by: PHYSICIAN ASSISTANT

## 2024-03-20 PROCEDURE — 1159F MED LIST DOCD IN RCRD: CPT | Mod: CPTII,S$GLB,, | Performed by: PHYSICIAN ASSISTANT

## 2024-03-20 PROCEDURE — 3008F BODY MASS INDEX DOCD: CPT | Mod: CPTII,S$GLB,, | Performed by: PHYSICIAN ASSISTANT

## 2024-03-20 PROCEDURE — 3077F SYST BP >= 140 MM HG: CPT | Mod: CPTII,S$GLB,, | Performed by: PHYSICIAN ASSISTANT

## 2024-03-20 PROCEDURE — 4010F ACE/ARB THERAPY RXD/TAKEN: CPT | Mod: CPTII,S$GLB,, | Performed by: PHYSICIAN ASSISTANT

## 2024-03-20 PROCEDURE — 99999 PR PBB SHADOW E&M-EST. PATIENT-LVL V: CPT | Mod: PBBFAC,,, | Performed by: PHYSICIAN ASSISTANT

## 2024-03-20 PROCEDURE — 73030 X-RAY EXAM OF SHOULDER: CPT | Mod: 26,LT,, | Performed by: RADIOLOGY

## 2024-03-20 PROCEDURE — 3044F HG A1C LEVEL LT 7.0%: CPT | Mod: CPTII,S$GLB,, | Performed by: PHYSICIAN ASSISTANT

## 2024-03-20 PROCEDURE — 73080 X-RAY EXAM OF ELBOW: CPT | Mod: TC,LT

## 2024-03-20 PROCEDURE — 1160F RVW MEDS BY RX/DR IN RCRD: CPT | Mod: CPTII,S$GLB,, | Performed by: PHYSICIAN ASSISTANT

## 2024-03-20 PROCEDURE — 73080 X-RAY EXAM OF ELBOW: CPT | Mod: 26,LT,, | Performed by: RADIOLOGY

## 2024-03-20 PROCEDURE — 73030 X-RAY EXAM OF SHOULDER: CPT | Mod: TC,LT

## 2024-03-20 PROCEDURE — 99203 OFFICE O/P NEW LOW 30 MIN: CPT | Mod: S$GLB,,, | Performed by: PHYSICIAN ASSISTANT

## 2024-03-20 NOTE — LETTER
March 20, 2024      [facility]  [address], [address]  [facility] [address]  Phone: [phone]       Patient: Grecia Boyd   YOB: 1965  Date of Visit: 03/20/2024    To Whom It May Concern:    Nanci Boyd  was at [facility] on 03/20/2024. The patient may return to work/school on 03/20/2024 with restrictions.  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  She is scheduled follow up appointment in 2 weeks for reassessment.  It is anticipated she will be on limited duty for a minimum of 6 weeks  If you have any questions or concerns, or if I can be of further assistance, please do not hesitate to contact me.    Sincerely,              Iglesia Palacios PA-C

## 2024-03-20 NOTE — PROGRESS NOTES
SUBJECTIVE:     Chief Complaint & History of Present Illness:  Grecia Boyd is a  New  patient 58 y.o. female who is seen here today with a complaint of    Chief Complaint   Patient presents with    Left Elbow - Injury, Pain    Left Shoulder - Pain, Injury    .  Patient is here today for continuing care treatment of fractures of both the left glenoid and elbow following a fall 02/27/2024.  Was seen in the emergency room 03/16/2024 found to have minimally displaced fracture of the left glenoid in a small avulsion fracture of the olecranon of the elbow.  Patient continues to struggle with soreness and pain in the area  On a scale of 1-10, with 10 being worst pain imaginable, he rates this pain as 5 on good days and 9 on bad days.  she describes the pain as tender and sore.    Review of patient's allergies indicates:  No Known Allergies      Current Outpatient Medications   Medication Sig Dispense Refill    albuterol (PROAIR HFA) 90 mcg/actuation inhaler Inhale 2 puffs into the lungs every 6 (six) hours as needed for Wheezing or Shortness of Breath. Rescue 18 g 2    atorvastatin (LIPITOR) 40 MG tablet TAKE 1 TABLET(40 MG) BY MOUTH EVERY DAY 90 tablet 1    blood sugar diagnostic Strp Check blood sugars  strip 11    ibuprofen (ADVIL,MOTRIN) 800 MG tablet Take 1 tablet (800 mg total) by mouth 3 (three) times daily. Take with meals. 30 tablet 0    ibuprofen (ADVIL,MOTRIN) 800 MG tablet Take 1 tablet (800 mg total) by mouth every 6 (six) hours as needed for Pain. 20 tablet 0    lancets (ONETOUCH ULTRASOFT LANCETS) Misc Check blood sugars  each 11    LIDOcaine (LIDODERM) 5 % Place 1 patch onto the skin once daily. Remove & Discard patch within 12 hours or as directed by MD for 7 days 7 patch 0    OZEMPIC 1 mg/dose (4 mg/3 mL) INJECT 1 MG UNDER THE SKIN ONCE A WEEK 9 mL 1    blood-glucose meter (FREESTYLE SYSTEM KIT) kit Use as instructed 1 each 1     No current facility-administered medications for this  visit.       Past Medical History:   Diagnosis Date    Diabetes mellitus, type 2     Hyperlipidemia     Hypertension     Obesity     CHIDI (obstructive sleep apnea)        Past Surgical History:   Procedure Laterality Date    ADENOIDECTOMY      BREAST BIOPSY Right     @ age 18    CHONDROPLASTY OF KNEE Right 2/12/2021    Procedure: CHONDROPLASTY, KNEE;  Surgeon: ZULEIKA Barahona MD;  Location: [facility] OR;  Service: Orthopedics;  Laterality: Right;    COLONOSCOPY N/A 10/16/2023    Procedure: COLONOSCOPY;  Surgeon: Grace Wiley MD;  Location: [facility] ENDOSCOPY;  Service: Endoscopy;  Laterality: N/A;  Referred by: JOAN Chirinos NP   Meds: Ozempic - pt inst to hold per protocol  Prep: Suprep  Route instructions sent: portal  SW  10/9- precall complete.  DBM    HYSTERECTOMY      2009    KNEE ARTHROSCOPY W/ MENISCECTOMY Right 2/12/2021    Procedure: ARTHROSCOPY, KNEE, WITH MEDIAL MENISCECTOMY LATERAL RELEASE;  Surgeon: ZULEIKA Barahona MD;  Location: [facility] OR;  Service: Orthopedics;  Laterality: Right;  pericapsular injection: 30cc ropivacaine/epi/clonidine/ketorolac injection     KNEE SURGERY      SYNOVECTOMY OF KNEE Right 2/12/2021    Procedure: SYNOVECTOMY, KNEE;  Surgeon: ZULEIKA Barahona MD;  Location: [facility] OR;  Service: Orthopedics;  Laterality: Right;    THYROIDECTOMY, PARTIAL      TONSILLECTOMY         Vital Signs (Most Recent)  Vitals:    03/20/24 0737   BP: (!) 141/82   Pulse: 61       Review of Systems:  ROS:  Constitutional: no fever or chills, obstructive sleep apnea  Eyes: no visual changes  ENT: no nasal congestion or sore throat, deviated nasal septum, tinnitus bilateral ears  Respiratory: no cough or shortness of breath, obesity hypoventilation syndrome  Cardiovascular: no chest pain or palpitations, primary hypertension  Gastrointestinal:  Hypertension  Genitourinary: no hematuria or dysuria  Integument/Breast: no rash or pruritis  Hematologic/Lymphatic: no easy bruising or lymphadenopathy  Musculoskeletal:  "no arthralgias or myalgias, tear of the right knee and subsequent arthroscopy  Neurological: no seizures or tremors  Behavioral/Psych: no auditory or visual hallucinations  Endocrine: no heat or cold intolerance, diabetes type 2      OBJECTIVE:     PHYSICAL EXAM:  Height: 5' 2" (157.5 cm) Weight: 83.7 kg (184 lb 10.2 oz), General Appearance: Well nourished, well developed, in no acute distress.  Neurological: Mood & affect are normal.  Shoulder exam:  left  Tenderness: biceps tendon, lateral acromial  ROM: forward flexion 100/100, extension 45/45,  abduction-glenohumeral 60/60, external rotation 50/50  Shoulder Strength:  Not tested secondary to fracture   positive for tenderness about the glenohumeral joint, positive for tenderness over the acromioclavicular joint, and positive for impingement sign    left  Elbow:   History of injury: fall on shoulder  Pain: Description: moderate  Night pain: yes and moderate  Rest pain: yes, mild, and moderate  Quality: sharp  Location: posterior  Mass/swelling:  None  Neurological complaints: none  olecranon tenderness, radial pulse normal  ROM: flexion arc 0-145  Strength:  Not tested secondary to fracture      RADIOGRAPHS:  X-rays of the shoulder taken Today films reviewed by me demonstrate displaced fracture of the anterior inferior glenoid unchanged from previous x-ray    X-rays of the elbow taken today films reviewed by me demonstrate avulsion fracture at the tip of the acromion of the insertion of the biceps unchanged from previous x-ray    ASSESSMENT/PLAN:       ICD-10-CM ICD-9-CM   1. Olecranon fracture, left, closed, initial encounter  S52.022A 813.01   2. Glenoid fracture of shoulder, left, closed, initial encounter  S42.142A 811.03    S42.152A    3. Acute pain of left shoulder  M25.512 719.41       Plan: We discussed with the patient at length all the different treatment options available for her shoulder and elbow including anti-inflammatories, acetaminophen, rest, " ice, physical therapy to include strengthening, range of motion exercise,  splinting,  occasional cortisone injections for temporary relief,  or possible surgical interventions.  Humeral support sling placed  Patient instructed to remove the lower half of the brace for gentle flexion-extension of the elbow 1-2 times per day without inducing pain  Removal of the upper strap 2 to 3 times a day for gentle pendulum exercises  Follow-up in 2 weeks with new x-rays sooner symptoms dictate  Note provided for work with no lifting no carrying no use of the left arm plan to duration of proximally 6 weeks

## 2024-04-03 ENCOUNTER — HOSPITAL ENCOUNTER (OUTPATIENT)
Dept: RADIOLOGY | Facility: HOSPITAL | Age: 59
Discharge: HOME | End: 2024-04-03
Attending: PHYSICIAN ASSISTANT
Payer: COMMERCIAL

## 2024-04-03 ENCOUNTER — OFFICE VISIT (OUTPATIENT)
Dept: ORTHOPEDICS | Facility: CLINIC | Age: 59
End: 2024-04-03
Payer: COMMERCIAL

## 2024-04-03 DIAGNOSIS — M25.522 LEFT ELBOW PAIN: ICD-10-CM

## 2024-04-03 DIAGNOSIS — S52.022D OLECRANON FRACTURE, LEFT, CLOSED, WITH ROUTINE HEALING, SUBSEQUENT ENCOUNTER: ICD-10-CM

## 2024-04-03 DIAGNOSIS — S42.152D CLOSED FRACTURE OF GLENOID CAVITY AND NECK OF LEFT SCAPULA WITH ROUTINE HEALING, SUBSEQUENT ENCOUNTER: Primary | ICD-10-CM

## 2024-04-03 DIAGNOSIS — S42.142D CLOSED FRACTURE OF GLENOID CAVITY AND NECK OF LEFT SCAPULA WITH ROUTINE HEALING, SUBSEQUENT ENCOUNTER: Primary | ICD-10-CM

## 2024-04-03 DIAGNOSIS — M25.512 LEFT SHOULDER PAIN, UNSPECIFIED CHRONICITY: ICD-10-CM

## 2024-04-03 PROCEDURE — 99999 PR PBB SHADOW E&M-EST. PATIENT-LVL III: CPT | Mod: PBBFAC,,, | Performed by: PHYSICIAN ASSISTANT

## 2024-04-03 PROCEDURE — 99214 OFFICE O/P EST MOD 30 MIN: CPT | Mod: S$GLB,,, | Performed by: PHYSICIAN ASSISTANT

## 2024-04-03 PROCEDURE — 3044F HG A1C LEVEL LT 7.0%: CPT | Mod: CPTII,S$GLB,, | Performed by: PHYSICIAN ASSISTANT

## 2024-04-03 PROCEDURE — 73030 X-RAY EXAM OF SHOULDER: CPT | Mod: TC,LT

## 2024-04-03 PROCEDURE — 1160F RVW MEDS BY RX/DR IN RCRD: CPT | Mod: CPTII,S$GLB,, | Performed by: PHYSICIAN ASSISTANT

## 2024-04-03 PROCEDURE — 73080 X-RAY EXAM OF ELBOW: CPT | Mod: 26,LT,, | Performed by: RADIOLOGY

## 2024-04-03 PROCEDURE — 4010F ACE/ARB THERAPY RXD/TAKEN: CPT | Mod: CPTII,S$GLB,, | Performed by: PHYSICIAN ASSISTANT

## 2024-04-03 PROCEDURE — 73030 X-RAY EXAM OF SHOULDER: CPT | Mod: 26,LT,, | Performed by: RADIOLOGY

## 2024-04-03 PROCEDURE — 1159F MED LIST DOCD IN RCRD: CPT | Mod: CPTII,S$GLB,, | Performed by: PHYSICIAN ASSISTANT

## 2024-04-03 PROCEDURE — 73080 X-RAY EXAM OF ELBOW: CPT | Mod: TC,LT

## 2024-04-03 NOTE — PROGRESS NOTES
SUBJECTIVE:     Chief Complaint & History of Present Illness:  Grecia Boyd is a  Established  patient 58 y.o. female who is seen here today with a complaint of    Chief Complaint   Patient presents with    Left Shoulder - Pain    Left Elbow - Pain    .  She is patient well-known to me was last seen treated the clinic for this condition 03/20/2024.  She continues to struggle with pain soreness in the shoulder difficulty with range of motion.  Her elbow has recovered nicely she is having little to no pain in or about the elbow and has regained full strength and range of motion.  She does state that her shoulder pain has increased to the point it is greater than at our previous visit and she has had a decrease in her range of motion secondary to pain.   On a scale of 1-10, with 10 being worst pain imaginable, he rates this pain as 5 on good days and 7 on bad days.  she describes the pain as tender and sore.    Review of patient's allergies indicates:  No Known Allergies      Current Outpatient Medications   Medication Sig Dispense Refill    albuterol (PROAIR HFA) 90 mcg/actuation inhaler Inhale 2 puffs into the lungs every 6 (six) hours as needed for Wheezing or Shortness of Breath. Rescue 18 g 2    atorvastatin (LIPITOR) 40 MG tablet TAKE 1 TABLET(40 MG) BY MOUTH EVERY DAY 90 tablet 1    blood sugar diagnostic Strp Check blood sugars  strip 11    ibuprofen (ADVIL,MOTRIN) 800 MG tablet Take 1 tablet (800 mg total) by mouth 3 (three) times daily. Take with meals. 30 tablet 0    ibuprofen (ADVIL,MOTRIN) 800 MG tablet Take 1 tablet (800 mg total) by mouth every 6 (six) hours as needed for Pain. 20 tablet 0    lancets (ONETOUCH ULTRASOFT LANCETS) Misc Check blood sugars  each 11    OZEMPIC 1 mg/dose (4 mg/3 mL) INJECT 1 MG UNDER THE SKIN ONCE A WEEK 9 mL 1    blood-glucose meter (FREESTYLE SYSTEM KIT) kit Use as instructed 1 each 1     No current facility-administered medications for this visit.        Past Medical History:   Diagnosis Date    Diabetes mellitus, type 2     Hyperlipidemia     Hypertension     Obesity     CHIDI (obstructive sleep apnea)        Past Surgical History:   Procedure Laterality Date    ADENOIDECTOMY      BREAST BIOPSY Right     @ age 18    CHONDROPLASTY OF KNEE Right 2/12/2021    Procedure: CHONDROPLASTY, KNEE;  Surgeon: ZULEIKA Barahona MD;  Location: [facility] OR;  Service: Orthopedics;  Laterality: Right;    COLONOSCOPY N/A 10/16/2023    Procedure: COLONOSCOPY;  Surgeon: Grace Wiley MD;  Location: [facility] ENDOSCOPY;  Service: Endoscopy;  Laterality: N/A;  Referred by: JOAN Chirinos NP   Meds: Ozempic - pt inst to hold per protocol  Prep: Suprep  Route instructions sent: portal  SW  10/9- precall complete.  DBM    HYSTERECTOMY      2009    KNEE ARTHROSCOPY W/ MENISCECTOMY Right 2/12/2021    Procedure: ARTHROSCOPY, KNEE, WITH MEDIAL MENISCECTOMY LATERAL RELEASE;  Surgeon: ZULEIKA Barahona MD;  Location: [facility] OR;  Service: Orthopedics;  Laterality: Right;  pericapsular injection: 30cc ropivacaine/epi/clonidine/ketorolac injection     KNEE SURGERY      SYNOVECTOMY OF KNEE Right 2/12/2021    Procedure: SYNOVECTOMY, KNEE;  Surgeon: ZULEIKA Barahona MD;  Location: [facility] OR;  Service: Orthopedics;  Laterality: Right;    THYROIDECTOMY, PARTIAL      TONSILLECTOMY         Vital Signs (Most Recent)  There were no vitals filed for this visit.    Review of Systems:  ROS:  Constitutional: no fever or chills, obstructive sleep apnea  Eyes: no visual changes  ENT: no nasal congestion or sore throat, deviated nasal septum, tinnitus bilateral ears  Respiratory: no cough or shortness of breath, obesity hypoventilation syndrome  Cardiovascular: no chest pain or palpitations, primary hypertension  Gastrointestinal:  Hypertension  Genitourinary: no hematuria or dysuria  Integument/Breast: no rash or pruritis  Hematologic/Lymphatic: no easy bruising or lymphadenopathy  Musculoskeletal: no arthralgias or  myalgias, tear of the right knee and subsequent arthroscopy  Neurological: no seizures or tremors  Behavioral/Psych: no auditory or visual hallucinations  Endocrine: no heat or cold intolerance, diabetes type 2      OBJECTIVE:     PHYSICAL EXAM:     , General Appearance: Well nourished, well developed, in no acute distress.  Neurological: Mood & affect are normal.  Shoulder exam:  left  Tenderness: biceps tendon, lateral acromial, posterior acromial, globally  ROM: forward flexion 80 / 80, extension 60 / 60, full abduction 70 / 70  Shoulder Strength: biceps 5/5, triceps 5/5, abduction 4/5, adduction 5/5  positive for tenderness about the glenohumeral joint, positive for tenderness over the acromioclavicular joint, and negative for impingement sign      left  Elbow:   History of injury: fall on shoulder  Pain: Description: mild  Night pain:none  Rest pain: none  Quality: sharp  Location: posterior  Mass/swelling:  None  Neurological complaints: none  olecranon tenderness, radial pulse normal  ROM: flexion arc 0-145  Strength:  5/5 flexion and extension          RADIOGRAPHS:  Repeat x-rays of the elbow today films reviewed by me demonstrate no evidence of fracture dislocation bone spur at the olecranon appears stable    X-rays of the shoulder taken today films reviewed by me demonstrate fracture of the glenoid remains unchanged with no change in angulation or rotation no new callus formation can be seen within the fracture site    ASSESSMENT/PLAN:       ICD-10-CM ICD-9-CM   1. Closed fracture of glenoid cavity and neck of left scapula with routine healing, subsequent encounter  S42.142D V54.11    S42.152D    2. Left shoulder pain, unspecified chronicity  M25.512 719.41   3. Left elbow pain  M25.522 719.42   4. Olecranon fracture, left, closed, with routine healing, subsequent encounter  S52.022D V54.12       Plan: We discussed with the patient at length all the different treatment options available for her left  shoulder including anti-inflammatories, acetaminophen, rest, ice, Physical therapy to include strengthening exercise, occasional cortisone injections for temporary relief, arthroscopic surgical repair, and finally shoulder arthroplasty.   In light of patient's continued pain and decreasing range of motion   We will consult to Sports Medicine for further evaluation treatment

## 2024-04-09 NOTE — PROGRESS NOTES
NEW PATIENT    HISTORY OF PRESENT ILLNESS   58 y.o. Female with a history of left shoulder pain who had an injury on 2/27/24 that occurred at home. She states that her leg gave out and when she was falling she caught herself with the left side. She did not go to the ED after her fall because the wait time was too long. She went to the ER on 3/16 24 when her symptoms had not improved. She was told she had a minimally displaced fracture of the left glenoid as well as an avulsion fracture of the left olecranon. She was referred to Ortho and saw Dr. James Palacios on 3/20/24. She was placed in a sling for support and given exercises to do at home on her own. She is no longer having any pain in her elbow but continues to struggle with left shoulder pain. She has a decreased ROM and pain with certain movements. She describes the pain as achy and sore but sometimes has sharp pains at night.    - mechanical symptoms, - instability    Is affecting ADLs.  Pain is 6/10 at it's worst.        PAST MEDICAL HISTORY    Past Medical History:   Diagnosis Date    Diabetes mellitus, type 2     Hyperlipidemia     Hypertension     Obesity     CHIDI (obstructive sleep apnea)        PAST SURGICAL HISTORY     Past Surgical History:   Procedure Laterality Date    ADENOIDECTOMY      BREAST BIOPSY Right     @ age 18    CHONDROPLASTY OF KNEE Right 2/12/2021    Procedure: CHONDROPLASTY, KNEE;  Surgeon: ZULEIKA Barahona MD;  Location: [facility] OR;  Service: Orthopedics;  Laterality: Right;    COLONOSCOPY N/A 10/16/2023    Procedure: COLONOSCOPY;  Surgeon: Grace Wiley MD;  Location: [facility] ENDOSCOPY;  Service: Endoscopy;  Laterality: N/A;  Referred by: JOAN Chirinos NP   Meds: Ozempic - pt inst to hold per protocol  Prep: Suprep  Route instructions sent: portal  SW  10/9- precall complete.  DBM    HYSTERECTOMY      2009    KNEE ARTHROSCOPY W/ MENISCECTOMY Right 2/12/2021    Procedure: ARTHROSCOPY, KNEE, WITH MEDIAL MENISCECTOMY LATERAL RELEASE;   Surgeon: ZULEIKA Barahona MD;  Location: [facility] OR;  Service: Orthopedics;  Laterality: Right;  pericapsular injection: 30cc ropivacaine/epi/clonidine/ketorolac injection     KNEE SURGERY      SYNOVECTOMY OF KNEE Right 2/12/2021    Procedure: SYNOVECTOMY, KNEE;  Surgeon: ZULEIKA Barahona MD;  Location: [facility] OR;  Service: Orthopedics;  Laterality: Right;    THYROIDECTOMY, PARTIAL      TONSILLECTOMY         FAMILY HISTORY    Family History   Problem Relation Age of Onset    Diabetes Mother     Heart disease Mother     Hypertension Mother     Hyperlipidemia Mother     Stroke Mother     Cataracts Mother     Cancer Paternal Aunt         Lung    Diabetes Paternal Grandmother     Breast cancer Paternal Aunt     Macular degeneration Paternal Aunt     Breast cancer Cousin     Colon cancer Neg Hx     Ovarian cancer Neg Hx        SOCIAL HISTORY    Social History     Socioeconomic History    Marital status:     Number of children: 2   Tobacco Use    Smoking status: Never    Smokeless tobacco: Never   Substance and Sexual Activity    Alcohol use: Yes     Comment: rare    Drug use: No    Sexual activity: Yes     Partners: Male     Social Determinants of Health     Financial Resource Strain: Low Risk  (2/15/2024)    Overall Financial Resource Strain (CARDIA)     Difficulty of Paying Living Expenses: Not hard at all   Food Insecurity: No Food Insecurity (2/15/2024)    Hunger Vital Sign     Worried About Running Out of Food in the Last Year: Never true     Ran Out of Food in the Last Year: Never true   Transportation Needs: No Transportation Needs (2/15/2024)    PRAPARE - Transportation     Lack of Transportation (Medical): No     Lack of Transportation (Non-Medical): No   Physical Activity: Unknown (2/15/2024)    Exercise Vital Sign     Days of Exercise per Week: 3 days   Stress: No Stress Concern Present (2/15/2024)    [place] [employer] [employer] - Occupational Stress Questionnaire     Feeling of Stress : Not  at all   Social Connections: Unknown (2/15/2024)    Social Connection and Isolation Panel [NHANES]     Frequency of Communication with Friends and Family: More than three times a week     Frequency of Social Gatherings with Friends and Family: More than three times a week     Active Member of Clubs or Organizations: No     Attends Club or Organization Meetings: Never     Marital Status:    Housing Stability: Low Risk  (2/15/2024)    Housing Stability Vital Sign     Unable to Pay for Housing in the Last Year: No     Number of Places Lived in the Last Year: 1     Unstable Housing in the Last Year: No       MEDICATIONS      Current Outpatient Medications:     albuterol (PROAIR HFA) 90 mcg/actuation inhaler, Inhale 2 puffs into the lungs every 6 (six) hours as needed for Wheezing or Shortness of Breath. Rescue, Disp: 18 g, Rfl: 2    atorvastatin (LIPITOR) 40 MG tablet, TAKE 1 TABLET(40 MG) BY MOUTH EVERY DAY, Disp: 90 tablet, Rfl: 1    blood sugar diagnostic Strp, Check blood sugars TID, Disp: 200 strip, Rfl: 11    ibuprofen (ADVIL,MOTRIN) 800 MG tablet, Take 1 tablet (800 mg total) by mouth 3 (three) times daily. Take with meals., Disp: 30 tablet, Rfl: 0    ibuprofen (ADVIL,MOTRIN) 800 MG tablet, Take 1 tablet (800 mg total) by mouth every 6 (six) hours as needed for Pain., Disp: 20 tablet, Rfl: 0    lancets (ONETOUCH ULTRASOFT LANCETS) Misc, Check blood sugars TID, Disp: 200 each, Rfl: 11    OZEMPIC 1 mg/dose (4 mg/3 mL), INJECT 1 MG UNDER THE SKIN ONCE A WEEK, Disp: 9 mL, Rfl: 1    blood-glucose meter (FREESTYLE SYSTEM KIT) kit, Use as instructed, Disp: 1 each, Rfl: 1    ALLERGIES     Review of patient's allergies indicates:  No Known Allergies      REVIEW OF SYSTEMS   Constitution: Negative. Negative for chills, fever and night sweats.   HENT: Negative for congestion and headaches.    Eyes: Negative for blurred vision, left vision loss and right vision loss.   Cardiovascular: Negative for chest pain and  "syncope.   Respiratory: Negative for cough and shortness of breath.    Endocrine: Negative for polydipsia, polyphagia and polyuria.   Hematologic/Lymphatic: Negative for bleeding problem. Does not bruise/bleed easily.   Skin: Negative for dry skin, itching and rash.   Musculoskeletal: Negative for falls. Positive for left shoulder pain.  Gastrointestinal: Negative for abdominal pain and bowel incontinence.   Genitourinary: Negative for bladder incontinence and nocturia.   Neurological: Negative for disturbances in coordination, loss of balance and seizures.   Psychiatric/Behavioral: Negative for depression. The patient does not have insomnia.    Allergic/Immunologic: Negative for hives and persistent infections.     PHYSICAL EXAMINATION    Vitals: BP (!) 146/76   Pulse 64   Ht 5' 2" (1.575 m)   Wt 83.7 kg (184 lb 8.4 oz)   BMI 33.75 kg/m²     General: The patient appears active and healthy with no apparent physical problems.  No disturbance of mood or affect is demonstrated. Alert and Oriented.    HEENT: Eyes normal, pupils equally round, nose normal.    Resp: Equal and symmetrical chest rises. No wheezing    CV: Regular rate    Neck: Supple; nonpainful range of motion.    Extremities: no cyanosis, clubbing, edema, or diffuse swelling.  Palpable pulses, good capillary refill of the digits.  No coolness, discoloration, edema or obvious varicosities.    Skin: no lesions noted.    Lymphatic: no detected adenopathy in the upper or lower extremities.    Neurologic: normal mental status, normal reflexes, normal gait and balance.  Patient is alert and oriented to person, place and time.  No flaccidity or spasticity is noted.  No motor or sensory deficits are noted.  Light touch is intact    Orthopaedic: SHOULDER EXAM - LEFT    Inspection:   Normal skin color and appearance with no scars.  No muscle atrophy noted.  No scapular winging.      Palpation: No tenderness of the acromioclavicular joint, lateral edge of the " acromion, biceps tendon, trapezius muscle or scapulothoracic bursa.      ROM:      PROM:     FE - 90°    Abd/ER -  60°  Abd/IR -  15°   Add/ER -  30°     AROM:    FE - 90°    Abd/ER -  6 0°  Abd/IR -  15°   Add/ER -  30°         Tests:     - [place], - Neer's, - Cross Arm Adduction, - [place], - [place], - Speed. - Belly Press,  - Jobes, - Lift Off    Stability: - sulcus, - apprehension, - relocation, - load and shift, - DLS      Motor:  Rotator cuff strength is 4/5 supraspinatus, 4/5 infraspinatus, 5/5 subscapularis. Biceps, triceps and deltoid strength is 5/5.      Neuro     Distally there are no paresthesias, and sensation is intact to light touch in the median, ulnar, and radial distributions.  Reflexes are 2/2 biceps, triceps and brachioradialis.       IMAGING    X-Ray Elbow Complete Left  Narrative: EXAMINATION:  XR ELBOW COMPLETE 3 VIEW LEFT    CLINICAL HISTORY:  Displaced fracture of glenoid cavity of scapula, left shoulder, initial encounter for closed fracture    TECHNIQUE:  AP, lateral, and oblique views of the left elbow were performed.    COMPARISON:  April 3, 2024    FINDINGS:  No fracture.  No malalignment.  No joint effusion.  As before, some minimal spurring about the elbow joint proper and tiny posteroinferior olecranon spur without obvious overlying soft tissue swelling.  No opaque soft tissue foreign body.  Elbow findings or unchanged to earlier exam.  Impression: As above    Electronically signed by: Morgan Powers  Date:    04/12/2024  Time:    11:29  X-Ray Shoulder 2 or More Views Left  Narrative: EXAMINATION:  XR SHOULDER COMPLETE 2 OR MORE VIEWS LEFT    CLINICAL HISTORY:  Displaced fracture of glenoid cavity of scapula, left shoulder, initial encounter for closed fracture    TECHNIQUE:  Two or three views of the left shoulder were performed.    COMPARISON:  04/03/2024    FINDINGS:  The inferior aspect of the glenoid again shows a lucent line and slight cortical irregularity consistent with a  recent fracture without significant displacement.  No new fracture detected.  Joint spaces are satisfactory.  Minor bony degenerative changes are noted.    Surgical clips are present in the lower neck.  Impression: Recent fracture glenoid of left scapula.    Electronically signed by: Corey Merlos MD  Date:    04/12/2024  Time:    11:28        IMPRESSION       ICD-10-CM ICD-9-CM   1. Closed displaced fracture of glenoid cavity of left scapula, initial encounter  S42.142A 811.03   2. Labral tear of shoulder, left, initial encounter  S43.432A 840.8   3. Traumatic complete tear of left rotator cuff, initial encounter  S46.012A 840.4       MEDICATIONS PRESCRIBED      None    RECOMMENDATIONS     MRI and CT scan of left shoulder ordered today  RTC after imaging completed       All questions were answered, pt will contact us for questions or concerns in the interim.

## 2024-04-12 ENCOUNTER — HOSPITAL ENCOUNTER (OUTPATIENT)
Dept: RADIOLOGY | Facility: HOSPITAL | Age: 59
Discharge: HOME | End: 2024-04-12
Attending: ORTHOPAEDIC SURGERY
Payer: COMMERCIAL

## 2024-04-12 ENCOUNTER — OFFICE VISIT (OUTPATIENT)
Dept: SPORTS MEDICINE | Facility: CLINIC | Age: 59
End: 2024-04-12
Payer: COMMERCIAL

## 2024-04-12 VITALS
DIASTOLIC BLOOD PRESSURE: 76 MMHG | HEIGHT: 62 IN | BODY MASS INDEX: 33.95 KG/M2 | SYSTOLIC BLOOD PRESSURE: 146 MMHG | WEIGHT: 184.5 LBS | HEART RATE: 64 BPM

## 2024-04-12 DIAGNOSIS — S46.012A TRAUMATIC COMPLETE TEAR OF LEFT ROTATOR CUFF, INITIAL ENCOUNTER: ICD-10-CM

## 2024-04-12 DIAGNOSIS — S42.142A CLOSED DISPLACED FRACTURE OF GLENOID CAVITY OF LEFT SCAPULA, INITIAL ENCOUNTER: Primary | ICD-10-CM

## 2024-04-12 DIAGNOSIS — S43.432A LABRAL TEAR OF SHOULDER, LEFT, INITIAL ENCOUNTER: ICD-10-CM

## 2024-04-12 DIAGNOSIS — S42.142A CLOSED DISPLACED FRACTURE OF GLENOID CAVITY OF LEFT SCAPULA, INITIAL ENCOUNTER: ICD-10-CM

## 2024-04-12 PROCEDURE — 1159F MED LIST DOCD IN RCRD: CPT | Mod: CPTII,S$GLB,, | Performed by: ORTHOPAEDIC SURGERY

## 2024-04-12 PROCEDURE — 3078F DIAST BP <80 MM HG: CPT | Mod: CPTII,S$GLB,, | Performed by: ORTHOPAEDIC SURGERY

## 2024-04-12 PROCEDURE — 73080 X-RAY EXAM OF ELBOW: CPT | Mod: 26,LT,, | Performed by: RADIOLOGY

## 2024-04-12 PROCEDURE — 73080 X-RAY EXAM OF ELBOW: CPT | Mod: TC,LT

## 2024-04-12 PROCEDURE — 99999 PR PBB SHADOW E&M-EST. PATIENT-LVL IV: CPT | Mod: PBBFAC,,, | Performed by: ORTHOPAEDIC SURGERY

## 2024-04-12 PROCEDURE — 99214 OFFICE O/P EST MOD 30 MIN: CPT | Mod: S$GLB,,, | Performed by: ORTHOPAEDIC SURGERY

## 2024-04-12 PROCEDURE — 73030 X-RAY EXAM OF SHOULDER: CPT | Mod: 26,LT,, | Performed by: RADIOLOGY

## 2024-04-12 PROCEDURE — 3077F SYST BP >= 140 MM HG: CPT | Mod: CPTII,S$GLB,, | Performed by: ORTHOPAEDIC SURGERY

## 2024-04-12 PROCEDURE — 4010F ACE/ARB THERAPY RXD/TAKEN: CPT | Mod: CPTII,S$GLB,, | Performed by: ORTHOPAEDIC SURGERY

## 2024-04-12 PROCEDURE — 3044F HG A1C LEVEL LT 7.0%: CPT | Mod: CPTII,S$GLB,, | Performed by: ORTHOPAEDIC SURGERY

## 2024-04-12 PROCEDURE — 3008F BODY MASS INDEX DOCD: CPT | Mod: CPTII,S$GLB,, | Performed by: ORTHOPAEDIC SURGERY

## 2024-04-12 PROCEDURE — 73030 X-RAY EXAM OF SHOULDER: CPT | Mod: TC,LT

## 2024-04-22 ENCOUNTER — PATIENT OUTREACH (OUTPATIENT)
Dept: ADMINISTRATIVE | Facility: HOSPITAL | Age: 59
End: 2024-04-22
Payer: COMMERCIAL

## 2024-04-22 VITALS — SYSTOLIC BLOOD PRESSURE: 137 MMHG | DIASTOLIC BLOOD PRESSURE: 81 MMHG

## 2024-05-02 ENCOUNTER — HOSPITAL ENCOUNTER (OUTPATIENT)
Dept: RADIOLOGY | Facility: HOSPITAL | Age: 59
Discharge: HOME | End: 2024-05-02
Attending: ORTHOPAEDIC SURGERY
Payer: COMMERCIAL

## 2024-05-02 DIAGNOSIS — S46.012A TRAUMATIC COMPLETE TEAR OF LEFT ROTATOR CUFF, INITIAL ENCOUNTER: ICD-10-CM

## 2024-05-02 DIAGNOSIS — S42.142A CLOSED DISPLACED FRACTURE OF GLENOID CAVITY OF LEFT SCAPULA, INITIAL ENCOUNTER: ICD-10-CM

## 2024-05-02 DIAGNOSIS — S43.432A LABRAL TEAR OF SHOULDER, LEFT, INITIAL ENCOUNTER: ICD-10-CM

## 2024-05-02 PROCEDURE — 73221 MRI JOINT UPR EXTREM W/O DYE: CPT | Mod: TC,LT

## 2024-05-02 PROCEDURE — 73200 CT UPPER EXTREMITY W/O DYE: CPT | Mod: TC,LT

## 2024-05-02 PROCEDURE — 73221 MRI JOINT UPR EXTREM W/O DYE: CPT | Mod: 26,LT,, | Performed by: RADIOLOGY

## 2024-05-02 PROCEDURE — 73200 CT UPPER EXTREMITY W/O DYE: CPT | Mod: 26,LT,, | Performed by: RADIOLOGY

## 2024-05-02 NOTE — PROGRESS NOTES
ESTABLISHED PATIENT    History 5/6/2024:  Grecia returns to clinic today to review MRI and CT results of the left shoulder.    History 4/12/2024:  58 y.o. Female with a history of left shoulder pain who had an injury on 2/27/24 that occurred at home. She states that her leg gave out and when she was falling she caught herself with the left side. She did not go to the ED after her fall because the wait time was too long. She went to the ER on 3/16 24 when her symptoms had not improved. She was told she had a minimally displaced fracture of the left glenoid as well as an avulsion fracture of the left olecranon. She was referred to Ortho and saw Dr. James Palacios on 3/20/24. She was placed in a sling for support and given exercises to do at home on her own. She is no longer having any pain in her elbow but continues to struggle with left shoulder pain. She has a decreased ROM and pain with certain movements. She describes the pain as achy and sore but sometimes has sharp pains at night.    - mechanical symptoms, - instability    Is affecting ADLs.  Pain is 6/10 at it's worst.      REVIEW OF SYSTEMS   Constitution: Negative. Negative for chills, fever and night sweats.   HENT: Negative for congestion and headaches.    Eyes: Negative for blurred vision, left vision loss and right vision loss.   Cardiovascular: Negative for chest pain and syncope.   Respiratory: Negative for cough and shortness of breath.    Endocrine: Negative for polydipsia, polyphagia and polyuria.   Hematologic/Lymphatic: Negative for bleeding problem. Does not bruise/bleed easily.   Skin: Negative for dry skin, itching and rash.   Musculoskeletal: Negative for falls. Positive for left shoulder pain.  Gastrointestinal: Negative for abdominal pain and bowel incontinence.   Genitourinary: Negative for bladder incontinence and nocturia.   Neurological: Negative for disturbances in coordination, loss of balance and seizures.   Psychiatric/Behavioral:  Negative for depression. The patient does not have insomnia.    Allergic/Immunologic: Negative for hives and persistent infections.     PHYSICAL EXAMINATION    Vitals: There were no vitals taken for this visit.    General: The patient appears active and healthy with no apparent physical problems.  No disturbance of mood or affect is demonstrated. Alert and Oriented.    HEENT: Eyes normal, pupils equally round, nose normal.    Resp: Equal and symmetrical chest rises. No wheezing    CV: Regular rate    Neck: Supple; nonpainful range of motion.    Extremities: no cyanosis, clubbing, edema, or diffuse swelling.  Palpable pulses, good capillary refill of the digits.  No coolness, discoloration, edema or obvious varicosities.    Skin: no lesions noted.    Lymphatic: no detected adenopathy in the upper or lower extremities.    Neurologic: normal mental status, normal reflexes, normal gait and balance.  Patient is alert and oriented to person, place and time.  No flaccidity or spasticity is noted.  No motor or sensory deficits are noted.  Light touch is intact    Orthopaedic: SHOULDER EXAM - LEFT    Inspection:   Normal skin color and appearance with no scars.  No muscle atrophy noted.  No scapular winging.      Palpation: No tenderness of the acromioclavicular joint, lateral edge of the acromion, biceps tendon, trapezius muscle or scapulothoracic bursa.      ROM:      PROM:     FE - 90°    Abd/ER -  60°  Abd/IR -  15°   Add/ER -  30°     AROM:    FE - 90°    Abd/ER -  6 0°  Abd/IR -  15°   Add/ER -  30°         Tests:     - [place], - Neer's, - Cross Arm Adduction, - [place], - [place], - Speed. - Belly Press,  - [place]es, - Lift Off    Stability: - sulcus, - apprehension, - relocation, - load and shift, - DLS      Motor:  Rotator cuff strength is 4/5 supraspinatus, 4/5 infraspinatus, 5/5 subscapularis. Biceps, triceps and deltoid strength is 5/5.      Neuro     Distally there are no paresthesias, and sensation is intact to  light touch in the median, ulnar, and radial distributions.  Reflexes are 2/2 biceps, triceps and brachioradialis.       IMAGING    Narrative & Impression  EXAMINATION:  MRI SHOULDER WITHOUT CONTRAST LEFT     CLINICAL HISTORY:  Shoulder trauma, instability or dislocation suspected, xray done;Shoulder trauma, rotator cuff tear suspected, xray done;     TECHNIQUE:  Routine MRI evaluation of the left shoulder performed without contrast.     COMPARISON:  Radiographs 04/12/2024, 04/03/2024; CT 05/02/2024.     FINDINGS:  Examination degraded by patient motion artifact.     ROTATOR CUFF: Mild supraspinatus tendinosis with articular surface fraying.  Mild infraspinatus tendinosis with a small (0.3 cm AP), partial-thickness (less than 50%) interstitial/concealed footprint tear.  Subscapularis and teres minor tendons are intact.  Muscle bulk is preserved.     LABRUM: Near circumferential abnormal morphology and signal intensity of the glenoid labrum.  Hypertrophied middle glenohumeral ligament with a hypoplastic superior labrum, possibly a [place] complex.  No paralabral cyst.     BICEPS: Mild thickening and increased signal intensity of the intra-articular biceps tendon.     BONES: Recent fracture of the anterior-inferior aspect of the glenoid with 2 mm of depression.  Degenerative bone productive changes about the medial humeral head/neck junction and glenoid rim.  No avascular necrosis.  No marrow infiltrative process.     AC JOINT: Mild AC joint arthrosis.  Flat morphology of the lateral acromion without undersurface spurring.     CARTILAGE: Partial-thickness chondral fissuring throughout the humeral head and glenoid.  Small focal area of subchondral cystic change at the lateral aspect of the superior humeral head.     MISCELLANEOUS: Joint effusion with synovitis.  Mild signal hyperintensity within the subacromial subdeltoid bursa.  No glenohumeral ligament avulsion.  No axillary lymphadenopathy.     Impression:     1.  Recent fracture of the anterior-inferior glenoid with 2 mm of depression.  2. Near circumferential labral fraying/tearing.  No paralabral cyst.  No glenohumeral ligament avulsion.  Possible [place] complex.  3. Infraspinatus tendinosis with a small, partial-thickness interstitial/concealed footprint tear.  4. Supraspinatus tendinosis with mild articular surface fraying.  5. Biceps tendinosis.  6. Mild glenohumeral osteoarthritis.  7. Joint effusion with synovitis.        Electronically signed by:Austin Fletcher MD  Date:                                            05/02/2024  Time:                                           19:55    Narrative & Impression  EXAMINATION:  CT SHOULDER WITHOUT CONTRAST LEFT     CLINICAL HISTORY:  Fracture, shoulder;  Displaced fracture of glenoid cavity of scapula, left shoulder, initial encounter for closed fracture     TECHNIQUE:  1.25 mm axial images were obtained through the left shoulder without the use of contrast.  Coronal and sagittal reformats were performed.     COMPARISON:  Radiographs 04/12/2024, 04/03/2024, MRI 05/02/2024.     FINDINGS:  Bones: Fracture of the anterior-inferior glenoid with 2 mm of depression and a 3 mm articular surface gap.  Subcortical lucency at the superior facet of the greater tuberosity, likely degenerative.  No suspicious lytic or blastic lesions.     Joints: Mild glenohumeral cartilage space narrowing with associated degenerative osteophyte formation about the glenoid rim and humeral head/neck junction.  Glenohumeral joint effusion.  Mild AC joint arthrosis.     Muscles: Normal bulk and attenuation.     Miscellaneous: Degenerative changes of the spine.  Postsurgical change of left thyroidectomy.  Mild coronary artery atherosclerosis.  Left lower lobe subsegmental band like atelectasis.  No axillary lymphadenopathy.  Subcutaneous soft tissues appear within normal limits.     Impression:     1. Fracture of the anterior-inferior glenoid with 2 mm of  depression.        Electronically signed by:Austin Fletcher MD  Date:                                            05/02/2024  Time:                                           20:23    IMPRESSION       ICD-10-CM ICD-9-CM   1. Closed displaced fracture of glenoid cavity of left scapula, initial encounter  S42.142A 811.03   2. Traumatic complete tear of left rotator cuff, initial encounter  S46.012A 840.4   3. Labral tear of shoulder, left, initial encounter  S43.432A 840.8         MEDICATIONS PRESCRIBED      None    RECOMMENDATIONS     Surgical versus non-surgical options discussed today; Left shoulder arthroscopic labral repair, extensive debridement, biceps tenodesis, possible rotator cuff repair and possible open reduction internal fixation of the glenoid  The patient elected to proceed with operative intervention after all risks, benefits, and alternatives were discussed with the patient.  The risks of surgery include bleeding, scar formation, injuries to nerves, arteries and veins, need for additional surgeries, blood clots, infections, inability to return to the same level of the performance and the risk of anesthesia.   RTC for pre-op with Yaondy Kelly PA-C  She understands the complexity of the injury.  I advised her that we will try and get the bone pieces back together.  There is also possibility she could require an open reduction and internal fixation if the stability can not be obtained arthroscopically.      All questions were answered, pt will contact us for questions or concerns in the interim.

## 2024-05-06 ENCOUNTER — OFFICE VISIT (OUTPATIENT)
Dept: SPORTS MEDICINE | Facility: CLINIC | Age: 59
End: 2024-05-06
Payer: COMMERCIAL

## 2024-05-06 VITALS
WEIGHT: 184.5 LBS | BODY MASS INDEX: 33.95 KG/M2 | DIASTOLIC BLOOD PRESSURE: 79 MMHG | HEART RATE: 71 BPM | HEIGHT: 62 IN | SYSTOLIC BLOOD PRESSURE: 134 MMHG

## 2024-05-06 DIAGNOSIS — S42.142A CLOSED DISPLACED FRACTURE OF GLENOID CAVITY OF LEFT SCAPULA, INITIAL ENCOUNTER: Primary | ICD-10-CM

## 2024-05-06 DIAGNOSIS — S43.432A LABRAL TEAR OF SHOULDER, LEFT, INITIAL ENCOUNTER: ICD-10-CM

## 2024-05-06 DIAGNOSIS — S46.012A TRAUMATIC COMPLETE TEAR OF LEFT ROTATOR CUFF, INITIAL ENCOUNTER: ICD-10-CM

## 2024-05-06 PROCEDURE — 99999 PR PBB SHADOW E&M-EST. PATIENT-LVL III: CPT | Mod: PBBFAC,,, | Performed by: ORTHOPAEDIC SURGERY

## 2024-05-06 PROCEDURE — 3075F SYST BP GE 130 - 139MM HG: CPT | Mod: CPTII,S$GLB,, | Performed by: ORTHOPAEDIC SURGERY

## 2024-05-06 PROCEDURE — 3008F BODY MASS INDEX DOCD: CPT | Mod: CPTII,S$GLB,, | Performed by: ORTHOPAEDIC SURGERY

## 2024-05-06 PROCEDURE — 3078F DIAST BP <80 MM HG: CPT | Mod: CPTII,S$GLB,, | Performed by: ORTHOPAEDIC SURGERY

## 2024-05-06 PROCEDURE — 1159F MED LIST DOCD IN RCRD: CPT | Mod: CPTII,S$GLB,, | Performed by: ORTHOPAEDIC SURGERY

## 2024-05-06 PROCEDURE — 4010F ACE/ARB THERAPY RXD/TAKEN: CPT | Mod: CPTII,S$GLB,, | Performed by: ORTHOPAEDIC SURGERY

## 2024-05-06 PROCEDURE — 99214 OFFICE O/P EST MOD 30 MIN: CPT | Mod: S$GLB,,, | Performed by: ORTHOPAEDIC SURGERY

## 2024-05-06 PROCEDURE — 3044F HG A1C LEVEL LT 7.0%: CPT | Mod: CPTII,S$GLB,, | Performed by: ORTHOPAEDIC SURGERY

## 2024-05-07 DIAGNOSIS — S42.142A CLOSED DISPLACED FRACTURE OF GLENOID CAVITY OF LEFT SCAPULA, INITIAL ENCOUNTER: ICD-10-CM

## 2024-05-07 DIAGNOSIS — S43.432A LABRAL TEAR OF SHOULDER, LEFT, INITIAL ENCOUNTER: Primary | ICD-10-CM

## 2024-05-07 DIAGNOSIS — S46.012A TRAUMATIC COMPLETE TEAR OF LEFT ROTATOR CUFF, INITIAL ENCOUNTER: ICD-10-CM

## 2024-05-08 ENCOUNTER — PATIENT MESSAGE (OUTPATIENT)
Dept: PREADMISSION TESTING | Facility: HOSPITAL | Age: 59
End: 2024-05-08
Payer: COMMERCIAL

## 2024-05-08 ENCOUNTER — TELEPHONE (OUTPATIENT)
Dept: INTERNAL MEDICINE | Facility: CLINIC | Age: 59
End: 2024-05-08
Payer: COMMERCIAL

## 2024-05-08 NOTE — ANESTHESIA PAT ROS NOTE
05/08/2024  Grecia Boyd is a 58 y.o., female.      Pre-op Assessment    I have reviewed the Patient Summary Reports.       I have reviewed the Medications.     Review of Systems  Anesthesia Hx:  No problems with previous Anesthesia   History of prior surgery of interest to airway management or planning:  Previous anesthesia: General, Nerve Block, MAC 2/12/2021  right Knee Arthroscopy, Meniscectomy, Chondroplasty with general anesthesia.  Procedure performed at an [facility].     for 2/12/2021  Right KLnee Arthroscopy, Meniscectomy, Chondroplasty.  Procedure performed at an [facility].  10/16/2023  Colonoscopy with MAC.  Procedure performed at an [facility]. Airway issues documented on chart review include mask, easy, laryngeal mask airway used       Denies Personal Hx of Anesthesia complications.                    Social:  Non-Smoker, Social Alcohol Use       Hematology/Oncology:  Hematology Normal   Oncology Normal                                   EENT/Dental:  chronic allergic rhinitis H/O T&A, Partial Thyroidectomy,  Nasal septal deviation, Tinnitus of both ears, hearing loss          Cardiovascular:  Exercise tolerance: good   Hypertension    Denies CAD.       Denies Angina.     hyperlipidemia  Denies [place]LEY.  ECG has been reviewed.                          Pulmonary:  Pneumonia  Denies COPD.    Denies Shortness of breath.  Sleep Apnea Not using C-PAP since she lost weight, Pneumonia due to COVID-19 virus in 2020,  Obesity hypoventilation syndrome            Education provided regarding risk of obstructive sleep apnea            Renal/:  Renal/ Normal  Denies Chronic Renal Disease.                Hepatic/GI:  Hepatic/GI Normal     Denies GERD. Denies Liver Disease.            Musculoskeletal:  Arthritis   Labral tear of shoulder, left,   Closed displaced fracture of glenoid cavity  of left scapula,   Traumatic complete tear of left rotator cuff,  minimally displaced fracture of the left glenoid, avulsion fracture of the left olecranon, Mild AC joint arthrosis,  H/O Degenerative tear of posterior horn of medial meniscus of right knee,   Right knee pain,  S/P Right Knee Arthroscopy w/ Meniscectomy, Chondroplasty, Synovectomy              OB/GYN/PEDS:  H/O Right Breast biopsy,  Hysterectomy           Neurological:  Neurology Normal   Denies CVA.    Denies Headaches. Denies Seizures.                                Endocrine:  Diabetes, well controlled, type 2 Denies Hypothyroidism.  Diet-controlled, HA1C = 6.3 on 1/2/2024      Obesity / BMI > 30  Psych:  Psychiatric Normal                   Past Medical History:   Diagnosis Date    Diabetes mellitus, type 2     Hyperlipidemia     Hypertension     Obesity     CHIDI (obstructive sleep apnea)      Past Surgical History:   Procedure Laterality Date    ADENOIDECTOMY      BREAST BIOPSY Right     @ age 18    CHONDROPLASTY OF KNEE Right 2/12/2021    Procedure: CHONDROPLASTY, KNEE;  Surgeon: ZULEIKA Barahona MD;  Location: [facility] OR;  Service: Orthopedics;  Laterality: Right;    COLONOSCOPY N/A 10/16/2023    Procedure: COLONOSCOPY;  Surgeon: Grace Wiley MD;  Location: [facility] ENDOSCOPY;  Service: Endoscopy;  Laterality: N/A;  Referred by: JOAN Chirinos NP  [facility] Meds: Ozempic - pt inst to hold per protocol  Prep: Suprep  Route instructions sent: portal  SW  10/9- precall complete.  DBM    HYSTERECTOMY      2009    KNEE ARTHROSCOPY W/ MENISCECTOMY Right 2/12/2021    Procedure: ARTHROSCOPY, KNEE, WITH MEDIAL MENISCECTOMY LATERAL RELEASE;  Surgeon: ZULEIKA Barahona MD;  Location: [facility] OR;  Service: Orthopedics;  Laterality: Right;  pericapsular injection: 30cc ropivacaine/epi/clonidine/ketorolac injection     KNEE SURGERY      SYNOVECTOMY OF KNEE Right 2/12/2021    Procedure: SYNOVECTOMY, KNEE;  Surgeon: ZULEIKA Barahona MD;  Location: [facility] OR;  Service:  Orthopedics;  Laterality: Right;    THYROIDECTOMY, PARTIAL      TONSILLECTOMY         Anesthesia Assessment: Preoperative EQUATION    Planned Procedure: Procedure(s) (LRB):  ARTHROSCOPY,SHOULDER,WITH CAPSULORRHAPHY (Left)  DEBRIDEMENT, SHOULDER, ARTHROSCOPIC (Left)  FIXATION, TENDON (Left)  Requested Anesthesia Type:General/Regional  Surgeon: Naif Ramirez MD  Service: Orthopedics  Known or anticipated Date of Surgery:5/16/2024    Surgeon notes: reviewed    Electronic QUestionnaire Assessment completed via nurse interview with patient.        Triage considerations:     The patient has no apparent active cardiac condition (No unstable coronary Syndrome such as severe unstable angina or recent [<1 month] myocardial infarction, decompensated CHF, severe valvular   disease or significant arrhythmia)    Previous anesthesia records:LMA General, Nerve block for post-op pain, Easy airway, and No problems    Last PCP note: within 1 month , within [facility]   Subspecialty notes: ENT    Other important co-morbidities: HLD, DM2, HTN, Obesity, and CHIDI       EKG 5/9/2024:  Vent. Rate : 068 BPM     Atrial Rate : 068 BPM      P-R Int : 178 ms          QRS Dur : 084 ms       QT Int : 364 ms       P-R-T Axes : 016 020 027 degrees      QTc Int : 387 ms   Normal sinus rhythm   Abnormal R wave progression   Cannot rule out Anterior infarct ,age undetermined   Abnormal ECG   When compared with ECG of 10-FEB-2021 09:09,   Nonspecific T wave abnormality no longer evident in Anterior leads   Confirmed by HOMA TORRES MD ([id number]) on 5/9/2024 10:17:47 AM       Echo 5/17/2019:  Summary  Normal left ventricular systolic function. The estimated ejection fraction is 65%  Normal LV diastolic function.  No wall motion abnormalities.  Normal right ventricular systolic function.  Normal central venous pressure (3 mm Hg).       Tests already available:  Results have been reviewed.             Instructions given. (See in Nurse's note)  Preop  medication instructions sent via portal message, acknowledged understanding.    Optimization:  Anesthesia Preop Clinic Assessment Not Indicated    Medical Opinion Indicated: Yes, PCP       Sub-specialist consult indicated:  No      Plan: Consultation:Patient's PCP for a statement of optimization             Patient  has previously scheduled Medical Appointment: 5/9/2024    Navigation:  Patient is cleared/ optimized by PCP for surgery and is considered a reasonable candidate for the planned procedure with no significantly increased cardiovascular risk.        Ht: 5'2  Wt: 83.7 kg (184 lb)  BMI: 33.75  Vaccinated

## 2024-05-08 NOTE — TELEPHONE ENCOUNTER
Pt scheduled for appt with Dr. Liu tomorrow. Provider booked out.  Appt r/s to tomorrow at 8 am.  Pt verbalized understanding

## 2024-05-09 ENCOUNTER — OFFICE VISIT (OUTPATIENT)
Dept: INTERNAL MEDICINE | Facility: CLINIC | Age: 59
End: 2024-05-09
Payer: COMMERCIAL

## 2024-05-09 ENCOUNTER — LAB VISIT (OUTPATIENT)
Dept: LAB | Facility: HOSPITAL | Age: 59
End: 2024-05-09
Attending: INTERNAL MEDICINE
Payer: COMMERCIAL

## 2024-05-09 VITALS
HEIGHT: 62 IN | BODY MASS INDEX: 33.47 KG/M2 | DIASTOLIC BLOOD PRESSURE: 88 MMHG | WEIGHT: 181.88 LBS | RESPIRATION RATE: 18 BRPM | TEMPERATURE: 97 F | SYSTOLIC BLOOD PRESSURE: 138 MMHG | HEART RATE: 87 BPM | OXYGEN SATURATION: 99 %

## 2024-05-09 DIAGNOSIS — Z01.818 PREOP EXAM FOR INTERNAL MEDICINE: ICD-10-CM

## 2024-05-09 DIAGNOSIS — M12.819 ROTATOR CUFF ARTHROPATHY, UNSPECIFIED LATERALITY: ICD-10-CM

## 2024-05-09 DIAGNOSIS — I15.2 HYPERTENSION ASSOCIATED WITH TYPE 2 DIABETES MELLITUS: ICD-10-CM

## 2024-05-09 DIAGNOSIS — Z01.818 PREOP EXAM FOR INTERNAL MEDICINE: Primary | ICD-10-CM

## 2024-05-09 DIAGNOSIS — E11.9 TYPE 2 DIABETES MELLITUS WITHOUT COMPLICATION, WITHOUT LONG-TERM CURRENT USE OF INSULIN: ICD-10-CM

## 2024-05-09 DIAGNOSIS — E11.59 HYPERTENSION ASSOCIATED WITH TYPE 2 DIABETES MELLITUS: ICD-10-CM

## 2024-05-09 LAB
ANION GAP SERPL CALC-SCNC: 4 MMOL/L (ref 8–16)
BASOPHILS # BLD AUTO: 0.04 K/UL (ref 0–0.2)
BASOPHILS NFR BLD: 0.5 % (ref 0–1.9)
BUN SERPL-MCNC: 11 MG/DL (ref 6–20)
CALCIUM SERPL-MCNC: 10 MG/DL (ref 8.7–10.5)
CHLORIDE SERPL-SCNC: 105 MMOL/L (ref 95–110)
CO2 SERPL-SCNC: 31 MMOL/L (ref 23–29)
CREAT SERPL-MCNC: 0.8 MG/DL (ref 0.5–1.4)
DIFFERENTIAL METHOD BLD: ABNORMAL
EOSINOPHIL # BLD AUTO: 0.1 K/UL (ref 0–0.5)
EOSINOPHIL NFR BLD: 1.2 % (ref 0–8)
ERYTHROCYTE [DISTWIDTH] IN BLOOD BY AUTOMATED COUNT: 13.6 % (ref 11.5–14.5)
EST. GFR  (NO RACE VARIABLE): >60 ML/MIN/1.73 M^2
ESTIMATED AVG GLUCOSE: 140 MG/DL (ref 68–131)
GLUCOSE SERPL-MCNC: 107 MG/DL (ref 70–110)
HBA1C MFR BLD: 6.5 % (ref 4–5.6)
HCT VFR BLD AUTO: 40.9 % (ref 37–48.5)
HGB BLD-MCNC: 12.6 G/DL (ref 12–16)
IMM GRANULOCYTES # BLD AUTO: 0.01 K/UL (ref 0–0.04)
IMM GRANULOCYTES NFR BLD AUTO: 0.1 % (ref 0–0.5)
LYMPHOCYTES # BLD AUTO: 3.1 K/UL (ref 1–4.8)
LYMPHOCYTES NFR BLD: 42.5 % (ref 18–48)
MCH RBC QN AUTO: 26.7 PG (ref 27–31)
MCHC RBC AUTO-ENTMCNC: 30.8 G/DL (ref 32–36)
MCV RBC AUTO: 87 FL (ref 82–98)
MONOCYTES # BLD AUTO: 0.5 K/UL (ref 0.3–1)
MONOCYTES NFR BLD: 6.8 % (ref 4–15)
NEUTROPHILS # BLD AUTO: 3.6 K/UL (ref 1.8–7.7)
NEUTROPHILS NFR BLD: 48.9 % (ref 38–73)
NRBC BLD-RTO: 0 /100 WBC
OHS QRS DURATION: 84 MS
OHS QTC CALCULATION: 387 MS
PLATELET # BLD AUTO: 434 K/UL (ref 150–450)
PMV BLD AUTO: 8.8 FL (ref 9.2–12.9)
POTASSIUM SERPL-SCNC: 4.8 MMOL/L (ref 3.5–5.1)
RBC # BLD AUTO: 4.72 M/UL (ref 4–5.4)
SODIUM SERPL-SCNC: 140 MMOL/L (ref 136–145)
WBC # BLD AUTO: 7.38 K/UL (ref 3.9–12.7)

## 2024-05-09 PROCEDURE — 1160F RVW MEDS BY RX/DR IN RCRD: CPT | Mod: CPTII,S$GLB,, | Performed by: INTERNAL MEDICINE

## 2024-05-09 PROCEDURE — 1159F MED LIST DOCD IN RCRD: CPT | Mod: CPTII,S$GLB,, | Performed by: INTERNAL MEDICINE

## 2024-05-09 PROCEDURE — 93005 ELECTROCARDIOGRAM TRACING: CPT | Mod: S$GLB,,, | Performed by: INTERNAL MEDICINE

## 2024-05-09 PROCEDURE — 99214 OFFICE O/P EST MOD 30 MIN: CPT | Mod: S$GLB,,, | Performed by: INTERNAL MEDICINE

## 2024-05-09 PROCEDURE — 93010 ELECTROCARDIOGRAM REPORT: CPT | Mod: S$GLB,,, | Performed by: INTERNAL MEDICINE

## 2024-05-09 PROCEDURE — 3044F HG A1C LEVEL LT 7.0%: CPT | Mod: CPTII,S$GLB,, | Performed by: INTERNAL MEDICINE

## 2024-05-09 PROCEDURE — 85025 COMPLETE CBC W/AUTO DIFF WBC: CPT | Performed by: INTERNAL MEDICINE

## 2024-05-09 PROCEDURE — 83036 HEMOGLOBIN GLYCOSYLATED A1C: CPT | Performed by: INTERNAL MEDICINE

## 2024-05-09 PROCEDURE — 99999 PR PBB SHADOW E&M-EST. PATIENT-LVL III: CPT | Mod: PBBFAC,,, | Performed by: INTERNAL MEDICINE

## 2024-05-09 PROCEDURE — 36415 COLL VENOUS BLD VENIPUNCTURE: CPT | Mod: PO | Performed by: INTERNAL MEDICINE

## 2024-05-09 PROCEDURE — 80048 BASIC METABOLIC PNL TOTAL CA: CPT | Performed by: INTERNAL MEDICINE

## 2024-05-09 PROCEDURE — 3075F SYST BP GE 130 - 139MM HG: CPT | Mod: CPTII,S$GLB,, | Performed by: INTERNAL MEDICINE

## 2024-05-09 PROCEDURE — 3079F DIAST BP 80-89 MM HG: CPT | Mod: CPTII,S$GLB,, | Performed by: INTERNAL MEDICINE

## 2024-05-09 PROCEDURE — 4010F ACE/ARB THERAPY RXD/TAKEN: CPT | Mod: CPTII,S$GLB,, | Performed by: INTERNAL MEDICINE

## 2024-05-09 PROCEDURE — 3008F BODY MASS INDEX DOCD: CPT | Mod: CPTII,S$GLB,, | Performed by: INTERNAL MEDICINE

## 2024-05-09 NOTE — PROGRESS NOTES
History of present illness:   [age] year lady in today for preoperative medical evaluation for planned rotator cuff surgery on the left shoulder scheduled for May 16, 2024 with .  The patient is generally healthy with a history of well controlled diabetes mellitus, hypertension and obesity.  She reports that all is doing well presently with no new particular concerns problems or complaints.  Taking all medications as directed.  She has had several previous surgeries with general anesthesia with no difficulties or complications.    Current medications:  Medications all noted and reviewed in our electronic medical record medication list.      Past medical history:  Hypertension  Type 2 diabetes mellitus.    Obesity.      Social history:  Nonsmoker has never smoked.  No alcohol excess.    Past surgical history:   Multiple see our electronic medical record history sections.  No complications previous surgery or anesthesia.      Review of systems:   General: no fever, chills, generalized body aches. No unexpected weight loss.  Eyes:  No visual disturbances.  HEENT:  No hoarseness, dysphagia, ear pain.  Respiratory:  No cough, no shortness of breath.  Cardiovascular: no chest pain, palpitations, cough, exertional limb pain. No edema.  GI: no nausea, vomiting.  No abdominal pain. No change in bowel habits.  No melena, no hematochezia.  : no dysuria. No change in the color or character of the urine. No urinary frequency.  Musculoskeletal: no joint pain or swelling.  Neurologic:  No focal neurological complaints.  No headaches.      Physical examination:  General:  Pleasant alert appropriately groomed lady no acute distress.    Vital signs:  All noted reviewed is normal.    Eyes: Sclerae white nonicteric.    HEENT: Normocephalic.  Neck supple no masses no thyromegaly.  No cervical adenopathy.    Lungs:  Clear to auscultation.    Cardiovascular: Regular rate rhythm.  No significant murmur.  Carotids full  bilaterally bruits.  No significant peripheral extremity edema.  Mental status:  Alert oriented affect mood all appropriate.      Impression:  [age] year lady in reasonably good health with several stable chronic medical conditions including type 2 diabetes mellitus, hypertension, obesity.    Her revised cardiovascular risk index score is 0.    Plan:   CBC, basic metabolic profile, glycohemoglobin.    ECG.  Pending results unremarkable she will be considered a reasonable candidate for the planned procedure with no significantly increased cardiovascular risk.

## 2024-05-10 ENCOUNTER — ANESTHESIA EVENT (OUTPATIENT)
Dept: SURGERY | Facility: HOSPITAL | Age: 59
End: 2024-05-10
Payer: COMMERCIAL

## 2024-05-13 DIAGNOSIS — S42.142A CLOSED DISPLACED FRACTURE OF GLENOID CAVITY OF LEFT SCAPULA, INITIAL ENCOUNTER: ICD-10-CM

## 2024-05-13 DIAGNOSIS — S46.012A TRAUMATIC COMPLETE TEAR OF LEFT ROTATOR CUFF, INITIAL ENCOUNTER: Primary | ICD-10-CM

## 2024-05-13 DIAGNOSIS — S43.432A LABRAL TEAR OF SHOULDER, LEFT, INITIAL ENCOUNTER: ICD-10-CM

## 2024-05-14 NOTE — PROGRESS NOTES
ESTABLISHED PATIENT    History 5/15/2024:  Grecia returns here today to complete her preoperative paperwork. Surgical intervention has been recommended and she is scheduled to undergo a left shoulder arthroscopic labral repair, extensive debridement, biceps tenodesis, possible rotator cuff repair and possible open reduction internal fixation of the glenoid on 5/16/2024.    History 5/6/2024:  Grecia returns to clinic today to review MRI and CT results of the left shoulder.    History 4/12/2024:  58 y.o. Female with a history of left shoulder pain who had an injury on 2/27/24 that occurred at home. She states that her leg gave out and when she was falling she caught herself with the left side. She did not go to the ED after her fall because the wait time was too long. She went to the ER on 3/16 24 when her symptoms had not improved. She was told she had a minimally displaced fracture of the left glenoid as well as an avulsion fracture of the left olecranon. She was referred to Ortho and saw Dr. James Palacios on 3/20/24. She was placed in a sling for support and given exercises to do at home on her own. She is no longer having any pain in her elbow but continues to struggle with left shoulder pain. She has a decreased ROM and pain with certain movements. She describes the pain as achy and sore but sometimes has sharp pains at night.    - mechanical symptoms, - instability    Is affecting ADLs.  Pain is 6/10 at it's worst.    PAST MEDICAL HISTORY    Past Medical History:   Diagnosis Date    Diabetes mellitus, type 2     Hyperlipidemia     Hypertension     Obesity     CHIDI (obstructive sleep apnea)        PAST SURGICAL HISTORY     Past Surgical History:   Procedure Laterality Date    ADENOIDECTOMY      BREAST BIOPSY Right     @ age 18    CHONDROPLASTY OF KNEE Right 2/12/2021    Procedure: CHONDROPLASTY, KNEE;  Surgeon: ZULEIKA Barahona MD;  Location: [facility];  Service: Orthopedics;  Laterality: Right;    COLONOSCOPY  N/A 10/16/2023    Procedure: COLONOSCOPY;  Surgeon: Grace Wiley MD;  [facility];  Service: Endoscopy;  Laterality: N/A;  Referred by: DARLEEN Lozano Meds: Ozempic - pt inst to hold per protocol  Prep: Suprep  Route instructions sent: portal  SW  10/9- precall complete.  DBM    HYSTERECTOMY      2009    KNEE ARTHROSCOPY W/ MENISCECTOMY Right 2/12/2021    Procedure: ARTHROSCOPY, KNEE, WITH MEDIAL MENISCECTOMY LATERAL RELEASE;  Surgeon: ZULEIKA Barahona MD;  Location: [facility] OR;  Service: Orthopedics;  Laterality: Right;  pericapsular injection: 30cc ropivacaine/epi/clonidine/ketorolac injection     KNEE SURGERY      SYNOVECTOMY OF KNEE Right 2/12/2021    Procedure: SYNOVECTOMY, KNEE;  Surgeon: ZULEIKA Barahona MD;  Location: [facility] OR;  Service: Orthopedics;  Laterality: Right;    THYROIDECTOMY, PARTIAL      TONSILLECTOMY         FAMILY HISTORY    Family History   Problem Relation Name Age of Onset    Diabetes Mother      Heart disease Mother      Hypertension Mother      Hyperlipidemia Mother      Stroke Mother      Cataracts Mother      Cancer Paternal Aunt          Lung    Diabetes Paternal Grandmother      Breast cancer Paternal Aunt      Macular degeneration Paternal Aunt      Breast cancer Cousin      Colon cancer Neg Hx      Ovarian cancer Neg Hx         SOCIAL HISTORY    Social History     Socioeconomic History    Marital status:     Number of children: 2   Tobacco Use    Smoking status: Never    Smokeless tobacco: Never   Substance and Sexual Activity    Alcohol use: Yes     Comment: rare    Drug use: No    Sexual activity: Yes     Partners: Male     Social Determinants of Health     Financial Resource Strain: Low Risk  (2/15/2024)    Overall Financial Resource Strain (CARDIA)     Difficulty of Paying Living Expenses: Not hard at all   Food Insecurity: No Food Insecurity (2/15/2024)    Hunger Vital Sign     Worried About Running Out of Food in the Last Year: Never true     Ran Out  of Food in the Last Year: Never true   Transportation Needs: No Transportation Needs (2/15/2024)    PRAPARE - Transportation     Lack of Transportation (Medical): No     Lack of Transportation (Non-Medical): No   Physical Activity: Unknown (2/15/2024)    Exercise Vital Sign     Days of Exercise per Week: 3 days   Stress: No Stress Concern Present (2/15/2024)    [employer] - Occupational Stress Questionnaire     Feeling of Stress : Not at all   Housing Stability: Low Risk  (2/15/2024)    Housing Stability Vital Sign     Unable to Pay for Housing in the Last Year: No     Number of Places Lived in the Last Year: 1     Unstable Housing in the Last Year: No       MEDICATIONS      Current Outpatient Medications:     albuterol (PROAIR HFA) 90 mcg/actuation inhaler, Inhale 2 puffs into the lungs every 6 (six) hours as needed for Wheezing or Shortness of Breath. Rescue, Disp: 18 g, Rfl: 2    atorvastatin (LIPITOR) 40 MG tablet, TAKE 1 TABLET(40 MG) BY MOUTH EVERY DAY (Patient not taking: Reported on 5/9/2024), Disp: 90 tablet, Rfl: 1    blood sugar diagnostic Strp, Check blood sugars TID, Disp: 200 strip, Rfl: 11    blood-glucose meter (FREESTYLE SYSTEM KIT) kit, Use as instructed, Disp: 1 each, Rfl: 1    ibuprofen (ADVIL,MOTRIN) 800 MG tablet, Take 1 tablet (800 mg total) by mouth 3 (three) times daily. Take with meals., Disp: 30 tablet, Rfl: 0    ibuprofen (ADVIL,MOTRIN) 800 MG tablet, Take 1 tablet (800 mg total) by mouth every 6 (six) hours as needed for Pain., Disp: 20 tablet, Rfl: 0    lancets (ONETOUCH ULTRASOFT LANCETS) Misc, Check blood sugars TID, Disp: 200 each, Rfl: 11    OZEMPIC 1 mg/dose (4 mg/3 mL), INJECT 1 MG UNDER THE SKIN ONCE A WEEK, Disp: 9 mL, Rfl: 1    valsartan-hydrochlorothiazide (DIOVAN-HCT) 160-12.5 mg per tablet, Take 1 tablet by mouth once daily., Disp: 90 tablet, Rfl: 1    ALLERGIES     Review of patient's allergies indicates:  No Known Allergies      REVIEW OF  SYSTEMS   Constitution: Negative. Negative for chills, fever and night sweats.   HENT: Negative for congestion and headaches.    Eyes: Negative for blurred vision, left vision loss and right vision loss.   Cardiovascular: Negative for chest pain and syncope.   Respiratory: Negative for cough and shortness of breath.    Endocrine: Negative for polydipsia, polyphagia and polyuria.   Hematologic/Lymphatic: Negative for bleeding problem. Does not bruise/bleed easily.   Skin: Negative for dry skin, itching and rash.   Musculoskeletal: Negative for falls. Positive for left shoulder pain.  Gastrointestinal: Negative for abdominal pain and bowel incontinence.   Genitourinary: Negative for bladder incontinence and nocturia.   Neurological: Negative for disturbances in coordination, loss of balance and seizures.   Psychiatric/Behavioral: Negative for depression. The patient does not have insomnia.    Allergic/Immunologic: Negative for hives and persistent infections.     PHYSICAL EXAMINATION    Vitals: There were no vitals taken for this visit.    General: The patient appears active and healthy with no apparent physical problems.  No disturbance of mood or affect is demonstrated. Alert and Oriented.    HEENT: Eyes normal, pupils equally round, nose normal.    Resp: Equal and symmetrical chest rises. No wheezing    CV: Regular rate    Neck: Supple; nonpainful range of motion.    Extremities: no cyanosis, clubbing, edema, or diffuse swelling.  Palpable pulses, good capillary refill of the digits.  No coolness, discoloration, edema or obvious varicosities.    Skin: no lesions noted.    Lymphatic: no detected adenopathy in the upper or lower extremities.    Neurologic: normal mental status, normal reflexes, normal gait and balance.  Patient is alert and oriented to person, place and time.  No flaccidity or spasticity is noted.  No motor or sensory deficits are noted.  Light touch is intact    Orthopaedic: SHOULDER EXAM -  LEFT    Inspection:   Normal skin color and appearance with no scars.  No muscle atrophy noted.  No scapular winging.      Palpation: No tenderness of the acromioclavicular joint, lateral edge of the acromion, biceps tendon, trapezius muscle or scapulothoracic bursa.      ROM:      PROM:     FE - 90°    Abd/ER -  60°  Abd/IR -  15°   Add/ER -  30°     AROM:    FE - 90°    Abd/ER -  6 0°  Abd/IR -  15°   Add/ER -  30°         Tests:     - [place], - Neer's, - Cross Arm Adduction, - [place], - [place], - Speed. - Belly Press,  - Jobes, - Lift Off    Stability: - sulcus, - apprehension, - relocation, - load and shift, - DLS      Motor:  Rotator cuff strength is 4/5 supraspinatus, 4/5 infraspinatus, 5/5 subscapularis. Biceps, triceps and deltoid strength is 5/5.      Neuro     Distally there are no paresthesias, and sensation is intact to light touch in the median, ulnar, and radial distributions.  Reflexes are 2/2 biceps, triceps and brachioradialis.       IMAGING    CT Shoulder Without Contrast Left  Narrative: EXAMINATION:  CT SHOULDER WITHOUT CONTRAST LEFT    CLINICAL HISTORY:  Fracture, shoulder;  Displaced fracture of glenoid cavity of scapula, left shoulder, initial encounter for closed fracture    TECHNIQUE:  1.25 mm axial images were obtained through the left shoulder without the use of contrast.  Coronal and sagittal reformats were performed.    COMPARISON:  Radiographs 04/12/2024, 04/03/2024, MRI 05/02/2024.    FINDINGS:  Bones: Fracture of the anterior-inferior glenoid with 2 mm of depression and a 3 mm articular surface gap.  Subcortical lucency at the superior facet of the greater tuberosity, likely degenerative.  No suspicious lytic or blastic lesions.    Joints: Mild glenohumeral cartilage space narrowing with associated degenerative osteophyte formation about the glenoid rim and humeral head/neck junction.  Glenohumeral joint effusion.  Mild AC joint arthrosis.    Muscles: Normal bulk and  attenuation.    Miscellaneous: Degenerative changes of the spine.  Postsurgical change of left thyroidectomy.  Mild coronary artery atherosclerosis.  Left lower lobe subsegmental band like atelectasis.  No axillary lymphadenopathy.  Subcutaneous soft tissues appear within normal limits.  Impression: 1. Fracture of the anterior-inferior glenoid with 2 mm of depression.    Electronically signed by: Austin Fletcher MD  Date:    05/02/2024  Time:    20:23  MRI Shoulder Without Contrast Left  Narrative: EXAMINATION:  MRI SHOULDER WITHOUT CONTRAST LEFT    CLINICAL HISTORY:  Shoulder trauma, instability or dislocation suspected, xray done;Shoulder trauma, rotator cuff tear suspected, xray done;    TECHNIQUE:  Routine MRI evaluation of the left shoulder performed without contrast.    COMPARISON:  Radiographs 04/12/2024, 04/03/2024; CT 05/02/2024.    FINDINGS:  Examination degraded by patient motion artifact.    ROTATOR CUFF: Mild supraspinatus tendinosis with articular surface fraying.  Mild infraspinatus tendinosis with a small (0.3 cm AP), partial-thickness (less than 50%) interstitial/concealed footprint tear.  Subscapularis and teres minor tendons are intact.  Muscle bulk is preserved.    LABRUM: Near circumferential abnormal morphology and signal intensity of the glenoid labrum.  Hypertrophied middle glenohumeral ligament with a hypoplastic superior labrum, possibly a Kwesi complex.  No paralabral cyst.    BICEPS: Mild thickening and increased signal intensity of the intra-articular biceps tendon.    BONES: Recent fracture of the anterior-inferior aspect of the glenoid with 2 mm of depression.  Degenerative bone productive changes about the medial humeral head/neck junction and glenoid rim.  No avascular necrosis.  No marrow infiltrative process.    AC JOINT: Mild AC joint arthrosis.  Flat morphology of the lateral acromion without undersurface spurring.    CARTILAGE: Partial-thickness chondral fissuring throughout  the humeral head and glenoid.  Small focal area of subchondral cystic change at the lateral aspect of the superior humeral head.    MISCELLANEOUS: Joint effusion with synovitis.  Mild signal hyperintensity within the subacromial subdeltoid bursa.  No glenohumeral ligament avulsion.  No axillary lymphadenopathy.  Impression: 1. Recent fracture of the anterior-inferior glenoid with 2 mm of depression.  2. Near circumferential labral fraying/tearing.  No paralabral cyst.  No glenohumeral ligament avulsion.  Possible Kwesi complex.  3. Infraspinatus tendinosis with a small, partial-thickness interstitial/concealed footprint tear.  4. Supraspinatus tendinosis with mild articular surface fraying.  5. Biceps tendinosis.  6. Mild glenohumeral osteoarthritis.  7. Joint effusion with synovitis.    Electronically signed by: Austin Fletcher MD  Date:    05/02/2024  Time:    19:55        IMPRESSION       ICD-10-CM ICD-9-CM   1. Traumatic complete tear of left rotator cuff, initial encounter  S46.012A 840.4   2. Labral tear of shoulder, left, initial encounter  S43.432A 840.8   3. Closed displaced fracture of glenoid cavity of left scapula, initial encounter  S42.142A 811.03         MEDICATIONS PRESCRIBED      Aspirin 81 mg  Norco 7.5/325    RECOMMENDATIONS     Left shoulder arthroscopic labral repair, possible rotator cuff repair, possible biceps tenodesis and possible open reduction internal fixation of the glenoid  The patient elected to proceed with operative intervention after all risks, benefits, and alternatives were discussed with the patient. The risks of surgery include bleeding, scarring, injuries to nerves, arteries and veins, need for additional surgeries, blood clots, infections, and the risk of anesthesia. I personally obtained informed consent from the patient and documented full history and physical, which has been placed on the chart.   Informed consent was obtained   Physical Therapy has been ordered - [facility]  [place]    All questions were answered, pt will contact us for questions or concerns in the interim.

## 2024-05-15 ENCOUNTER — PATIENT OUTREACH (OUTPATIENT)
Dept: ADMINISTRATIVE | Facility: HOSPITAL | Age: 59
End: 2024-05-15
Payer: COMMERCIAL

## 2024-05-15 ENCOUNTER — TELEPHONE (OUTPATIENT)
Dept: SPORTS MEDICINE | Facility: CLINIC | Age: 59
End: 2024-05-15
Payer: COMMERCIAL

## 2024-05-15 ENCOUNTER — OFFICE VISIT (OUTPATIENT)
Dept: SPORTS MEDICINE | Facility: CLINIC | Age: 59
End: 2024-05-15
Payer: COMMERCIAL

## 2024-05-15 VITALS
BODY MASS INDEX: 34.03 KG/M2 | HEART RATE: 70 BPM | DIASTOLIC BLOOD PRESSURE: 80 MMHG | HEIGHT: 62 IN | WEIGHT: 184.94 LBS | SYSTOLIC BLOOD PRESSURE: 129 MMHG

## 2024-05-15 DIAGNOSIS — S42.142A CLOSED DISPLACED FRACTURE OF GLENOID CAVITY OF LEFT SCAPULA, INITIAL ENCOUNTER: ICD-10-CM

## 2024-05-15 DIAGNOSIS — S46.012A TRAUMATIC COMPLETE TEAR OF LEFT ROTATOR CUFF, INITIAL ENCOUNTER: Primary | ICD-10-CM

## 2024-05-15 DIAGNOSIS — S43.432A LABRAL TEAR OF SHOULDER, LEFT, INITIAL ENCOUNTER: ICD-10-CM

## 2024-05-15 PROCEDURE — 99999 PR PBB SHADOW E&M-EST. PATIENT-LVL III: CPT | Mod: PBBFAC,,, | Performed by: ORTHOPAEDIC SURGERY

## 2024-05-15 PROCEDURE — 99499 UNLISTED E&M SERVICE: CPT | Mod: S$GLB,,, | Performed by: ORTHOPAEDIC SURGERY

## 2024-05-15 RX ORDER — CEFAZOLIN SODIUM 2 G/50ML
2 SOLUTION INTRAVENOUS
Status: CANCELLED | OUTPATIENT
Start: 2024-05-15

## 2024-05-15 RX ORDER — MUPIROCIN 20 MG/G
OINTMENT TOPICAL
Status: CANCELLED | OUTPATIENT
Start: 2024-05-15

## 2024-05-15 RX ORDER — HYDROCODONE BITARTRATE AND ACETAMINOPHEN 7.5; 325 MG/1; MG/1
1 TABLET ORAL EVERY 4 HOURS PRN
Qty: 20 TABLET | Refills: 0 | Status: SHIPPED | OUTPATIENT
Start: 2024-05-15

## 2024-05-15 NOTE — H&P
History 5/15/2024:  Grecia returns here today to complete her preoperative paperwork. Surgical intervention has been recommended and she is scheduled to undergo a left shoulder arthroscopic labral repair, extensive debridement, biceps tenodesis, possible rotator cuff repair and possible open reduction internal fixation of the glenoid on 5/16/2024.    History 5/6/2024:  Grecia returns to clinic today to review MRI and CT results of the left shoulder.    History 4/12/2024:  58 y.o. Female with a history of left shoulder pain who had an injury on 2/27/24 that occurred at home. She states that her leg gave out and when she was falling she caught herself with the left side. She did not go to the ED after her fall because the wait time was too long. She went to the ER on 3/16 24 when her symptoms had not improved. She was told she had a minimally displaced fracture of the left glenoid as well as an avulsion fracture of the left olecranon. She was referred to Ortho and saw Dr. James Palacios on 3/20/24. She was placed in a sling for support and given exercises to do at home on her own. She is no longer having any pain in her elbow but continues to struggle with left shoulder pain. She has a decreased ROM and pain with certain movements. She describes the pain as achy and sore but sometimes has sharp pains at night.    - mechanical symptoms, - instability    Is affecting ADLs.  Pain is 6/10 at it's worst.    PAST MEDICAL HISTORY    Past Medical History:   Diagnosis Date    Diabetes mellitus, type 2     Hyperlipidemia     Hypertension     Obesity     CHIDI (obstructive sleep apnea)        PAST SURGICAL HISTORY     Past Surgical History:   Procedure Laterality Date    ADENOIDECTOMY      BREAST BIOPSY Right     @ age 18    CHONDROPLASTY OF KNEE Right 2/12/2021    Procedure: CHONDROPLASTY, KNEE;  Surgeon: ZULEIKA Barahona MD;  Location: [facility];  Service: Orthopedics;  Laterality: Right;    COLONOSCOPY N/A 10/16/2023     Procedure: COLONOSCOPY;  Surgeon: Grace Wiley MD;  [facility];  Service: Endoscopy;  Laterality: N/A;  Referred by: JOAN Chirinos NP   Meds: Ozempic - pt inst to hold per protocol  Prep: Suprep  Route instructions sent: portal    10/9- precall complete.  DBM    HYSTERECTOMY      2009    KNEE ARTHROSCOPY W/ MENISCECTOMY Right 2/12/2021    Procedure: ARTHROSCOPY, KNEE, WITH MEDIAL MENISCECTOMY LATERAL RELEASE;  Surgeon: ZULEIKA Barahona MD;  Location: [facility] OR;  Service: Orthopedics;  Laterality: Right;  pericapsular injection: 30cc ropivacaine/epi/clonidine/ketorolac injection     KNEE SURGERY      SYNOVECTOMY OF KNEE Right 2/12/2021    Procedure: SYNOVECTOMY, KNEE;  Surgeon: ZULEIKA Barahona MD;  Location: [facility] OR;  Service: Orthopedics;  Laterality: Right;    THYROIDECTOMY, PARTIAL      TONSILLECTOMY         FAMILY HISTORY    Family History   Problem Relation Name Age of Onset    Diabetes Mother      Heart disease Mother      Hypertension Mother      Hyperlipidemia Mother      Stroke Mother      Cataracts Mother      Cancer Paternal Aunt          Lung    Diabetes Paternal Grandmother      Breast cancer Paternal Aunt      Macular degeneration Paternal Aunt      Breast cancer Cousin      Colon cancer Neg Hx      Ovarian cancer Neg Hx         SOCIAL HISTORY    Social History     Socioeconomic History    Marital status:     Number of children: 2   Tobacco Use    Smoking status: Never    Smokeless tobacco: Never   Substance and Sexual Activity    Alcohol use: Yes     Comment: rare    Drug use: No    Sexual activity: Yes     Partners: Male     Social Determinants of Health     Financial Resource Strain: Low Risk  (2/15/2024)    Overall Financial Resource Strain (CARDIA)     Difficulty of Paying Living Expenses: Not hard at all   Food Insecurity: No Food Insecurity (2/15/2024)    Hunger Vital Sign     Worried About Running Out of Food in the Last Year: Never true     Ran Out of Food in the  Last Year: Never true   Transportation Needs: No Transportation Needs (2/15/2024)    PRAPARE - Transportation     Lack of Transportation (Medical): No     Lack of Transportation (Non-Medical): No   Physical Activity: Unknown (2/15/2024)    Exercise Vital Sign     Days of Exercise per Week: 3 days   Stress: No Stress Concern Present (2/15/2024)    [employer] - Occupational Stress Questionnaire     Feeling of Stress : Not at all   Housing Stability: Low Risk  (2/15/2024)    Housing Stability Vital Sign     Unable to Pay for Housing in the Last Year: No     Number of Places Lived in the Last Year: 1     Unstable Housing in the Last Year: No       MEDICATIONS      Current Outpatient Medications:     albuterol (PROAIR HFA) 90 mcg/actuation inhaler, Inhale 2 puffs into the lungs every 6 (six) hours as needed for Wheezing or Shortness of Breath. Rescue, Disp: 18 g, Rfl: 2    atorvastatin (LIPITOR) 40 MG tablet, TAKE 1 TABLET(40 MG) BY MOUTH EVERY DAY (Patient not taking: Reported on 5/9/2024), Disp: 90 tablet, Rfl: 1    blood sugar diagnostic Strp, Check blood sugars TID, Disp: 200 strip, Rfl: 11    blood-glucose meter (FREESTYLE SYSTEM KIT) kit, Use as instructed, Disp: 1 each, Rfl: 1    ibuprofen (ADVIL,MOTRIN) 800 MG tablet, Take 1 tablet (800 mg total) by mouth 3 (three) times daily. Take with meals., Disp: 30 tablet, Rfl: 0    ibuprofen (ADVIL,MOTRIN) 800 MG tablet, Take 1 tablet (800 mg total) by mouth every 6 (six) hours as needed for Pain., Disp: 20 tablet, Rfl: 0    lancets (ONETOUCH ULTRASOFT LANCETS) Misc, Check blood sugars TID, Disp: 200 each, Rfl: 11    OZEMPIC 1 mg/dose (4 mg/3 mL), INJECT 1 MG UNDER THE SKIN ONCE A WEEK, Disp: 9 mL, Rfl: 1    valsartan-hydrochlorothiazide (DIOVAN-HCT) 160-12.5 mg per tablet, Take 1 tablet by mouth once daily., Disp: 90 tablet, Rfl: 1    ALLERGIES     Review of patient's allergies indicates:  No Known Allergies      REVIEW OF SYSTEMS    Constitution: Negative. Negative for chills, fever and night sweats.   HENT: Negative for congestion and headaches.    Eyes: Negative for blurred vision, left vision loss and right vision loss.   Cardiovascular: Negative for chest pain and syncope.   Respiratory: Negative for cough and shortness of breath.    Endocrine: Negative for polydipsia, polyphagia and polyuria.   Hematologic/Lymphatic: Negative for bleeding problem. Does not bruise/bleed easily.   Skin: Negative for dry skin, itching and rash.   Musculoskeletal: Negative for falls. Positive for left shoulder pain.  Gastrointestinal: Negative for abdominal pain and bowel incontinence.   Genitourinary: Negative for bladder incontinence and nocturia.   Neurological: Negative for disturbances in coordination, loss of balance and seizures.   Psychiatric/Behavioral: Negative for depression. The patient does not have insomnia.    Allergic/Immunologic: Negative for hives and persistent infections.     PHYSICAL EXAMINATION    Vitals: There were no vitals taken for this visit.    General: The patient appears active and healthy with no apparent physical problems.  No disturbance of mood or affect is demonstrated. Alert and Oriented.    HEENT: Eyes normal, pupils equally round, nose normal.    Resp: Equal and symmetrical chest rises. No wheezing    CV: Regular rate    Neck: Supple; nonpainful range of motion.    Extremities: no cyanosis, clubbing, edema, or diffuse swelling.  Palpable pulses, good capillary refill of the digits.  No coolness, discoloration, edema or obvious varicosities.    Skin: no lesions noted.    Lymphatic: no detected adenopathy in the upper or lower extremities.    Neurologic: normal mental status, normal reflexes, normal gait and balance.  Patient is alert and oriented to person, place and time.  No flaccidity or spasticity is noted.  No motor or sensory deficits are noted.  Light touch is intact    Orthopaedic: SHOULDER EXAM -  LEFT    Inspection:   Normal skin color and appearance with no scars.  No muscle atrophy noted.  No scapular winging.      Palpation: No tenderness of the acromioclavicular joint, lateral edge of the acromion, biceps tendon, trapezius muscle or scapulothoracic bursa.      ROM:      PROM:     FE - 90°    Abd/ER -  60°  Abd/IR -  15°   Add/ER -  30°     AROM:    FE - 90°    Abd/ER -  6 0°  Abd/IR -  15°   Add/ER -  30°         Tests:     - Troy, - Neer's, - Cross Arm Adduction, - Prentiss, - Yerguson, - Speed. - Belly Press,  - Jobes, - Lift Off    Stability: - sulcus, - apprehension, - relocation, - load and shift, - DLS      Motor:  Rotator cuff strength is 4/5 supraspinatus, 4/5 infraspinatus, 5/5 subscapularis. Biceps, triceps and deltoid strength is 5/5.      Neuro     Distally there are no paresthesias, and sensation is intact to light touch in the median, ulnar, and radial distributions.  Reflexes are 2/2 biceps, triceps and brachioradialis.       IMAGING    CT Shoulder Without Contrast Left  Narrative: EXAMINATION:  CT SHOULDER WITHOUT CONTRAST LEFT    CLINICAL HISTORY:  Fracture, shoulder;  Displaced fracture of glenoid cavity of scapula, left shoulder, initial encounter for closed fracture    TECHNIQUE:  1.25 mm axial images were obtained through the left shoulder without the use of contrast.  Coronal and sagittal reformats were performed.    COMPARISON:  Radiographs 04/12/2024, 04/03/2024, MRI 05/02/2024.    FINDINGS:  Bones: Fracture of the anterior-inferior glenoid with 2 mm of depression and a 3 mm articular surface gap.  Subcortical lucency at the superior facet of the greater tuberosity, likely degenerative.  No suspicious lytic or blastic lesions.    Joints: Mild glenohumeral cartilage space narrowing with associated degenerative osteophyte formation about the glenoid rim and humeral head/neck junction.  Glenohumeral joint effusion.  Mild AC joint arthrosis.    Muscles: Normal bulk and  attenuation.    Miscellaneous: Degenerative changes of the spine.  Postsurgical change of left thyroidectomy.  Mild coronary artery atherosclerosis.  Left lower lobe subsegmental band like atelectasis.  No axillary lymphadenopathy.  Subcutaneous soft tissues appear within normal limits.  Impression: 1. Fracture of the anterior-inferior glenoid with 2 mm of depression.    Electronically signed by: Austin Fletcher MD  Date:    05/02/2024  Time:    20:23  MRI Shoulder Without Contrast Left  Narrative: EXAMINATION:  MRI SHOULDER WITHOUT CONTRAST LEFT    CLINICAL HISTORY:  Shoulder trauma, instability or dislocation suspected, xray done;Shoulder trauma, rotator cuff tear suspected, xray done;    TECHNIQUE:  Routine MRI evaluation of the left shoulder performed without contrast.    COMPARISON:  Radiographs 04/12/2024, 04/03/2024; CT 05/02/2024.    FINDINGS:  Examination degraded by patient motion artifact.    ROTATOR CUFF: Mild supraspinatus tendinosis with articular surface fraying.  Mild infraspinatus tendinosis with a small (0.3 cm AP), partial-thickness (less than 50%) interstitial/concealed footprint tear.  Subscapularis and teres minor tendons are intact.  Muscle bulk is preserved.    LABRUM: Near circumferential abnormal morphology and signal intensity of the glenoid labrum.  Hypertrophied middle glenohumeral ligament with a hypoplastic superior labrum, possibly a Kwesi complex.  No paralabral cyst.    BICEPS: Mild thickening and increased signal intensity of the intra-articular biceps tendon.    BONES: Recent fracture of the anterior-inferior aspect of the glenoid with 2 mm of depression.  Degenerative bone productive changes about the medial humeral head/neck junction and glenoid rim.  No avascular necrosis.  No marrow infiltrative process.    AC JOINT: Mild AC joint arthrosis.  Flat morphology of the lateral acromion without undersurface spurring.    CARTILAGE: Partial-thickness chondral fissuring throughout  the humeral head and glenoid.  Small focal area of subchondral cystic change at the lateral aspect of the superior humeral head.    MISCELLANEOUS: Joint effusion with synovitis.  Mild signal hyperintensity within the subacromial subdeltoid bursa.  No glenohumeral ligament avulsion.  No axillary lymphadenopathy.  Impression: 1. Recent fracture of the anterior-inferior glenoid with 2 mm of depression.  2. Near circumferential labral fraying/tearing.  No paralabral cyst.  No glenohumeral ligament avulsion.  Possible Kwesi complex.  3. Infraspinatus tendinosis with a small, partial-thickness interstitial/concealed footprint tear.  4. Supraspinatus tendinosis with mild articular surface fraying.  5. Biceps tendinosis.  6. Mild glenohumeral osteoarthritis.  7. Joint effusion with synovitis.    Electronically signed by: Austin Fletcher MD  Date:    05/02/2024  Time:    19:55        IMPRESSION       ICD-10-CM ICD-9-CM   1. Traumatic complete tear of left rotator cuff, initial encounter  S46.012A 840.4   2. Labral tear of shoulder, left, initial encounter  S43.432A 840.8   3. Closed displaced fracture of glenoid cavity of left scapula, initial encounter  S42.142A 811.03         MEDICATIONS PRESCRIBED      Aspirin 81 mg  Norco 7.5/325    RECOMMENDATIONS     Left shoulder arthroscopic labral repair, possible rotator cuff repair, possible biceps tenodesis and possible open reduction internal fixation of the glenoid  The patient elected to proceed with operative intervention after all risks, benefits, and alternatives were discussed with the patient. The risks of surgery include bleeding, scarring, injuries to nerves, arteries and veins, need for additional surgeries, blood clots, infections, and the risk of anesthesia. I personally obtained informed consent from the patient and documented full history and physical, which has been placed on the chart.   Informed consent was obtained   Physical Therapy has been ordered - [facility]  [place]    All questions were answered, pt will contact us for questions or concerns in the interim.

## 2024-05-15 NOTE — LETTER
AUTHORIZATION FOR RELEASE OF   CONFIDENTIAL INFORMATION    Dear Dr. Lee,    We are seeing Grecia Boyd, date of birth 1965, in the clinic at [facility]. Scott Tripathi DO is the patient's PCP. Grecia Boyd has an outstanding lab/procedure at the time we reviewed her chart. In order to help keep her health information updated, she has authorized us to request the following medical record(s):        (  )  MAMMOGRAM                                      (  )  COLONOSCOPY      (  )  PAP SMEAR                                          (  )  OUTSIDE LAB RESULTS     (  )  DEXA SCAN                                          ( x )  EYE EXAM            (  )  FOOT EXAM                                          (  )  ENTIRE RECORD     (  )  OUTSIDE IMMUNIZATIONS                 (  )  _______________         Please fax records to Scott Tripathi DO, [phone]     If you have any questions, please contact TY Fitzpatrick at [phone].           Patient Name: Grecia Boyd  : 1965  Patient Phone #: [phone]

## 2024-05-15 NOTE — H&P (VIEW-ONLY)
History 5/15/2024:  Grecia returns here today to complete her preoperative paperwork. Surgical intervention has been recommended and she is scheduled to undergo a left shoulder arthroscopic labral repair, extensive debridement, biceps tenodesis, possible rotator cuff repair and possible open reduction internal fixation of the glenoid on 5/16/2024.    History 5/6/2024:  Grecia returns to clinic today to review MRI and CT results of the left shoulder.    History 4/12/2024:  58 y.o. Female with a history of left shoulder pain who had an injury on 2/27/24 that occurred at home. She states that her leg gave out and when she was falling she caught herself with the left side. She did not go to the ED after her fall because the wait time was too long. She went to the ER on 3/16 24 when her symptoms had not improved. She was told she had a minimally displaced fracture of the left glenoid as well as an avulsion fracture of the left olecranon. She was referred to Ortho and saw Dr. James Palacios on 3/20/24. She was placed in a sling for support and given exercises to do at home on her own. She is no longer having any pain in her elbow but continues to struggle with left shoulder pain. She has a decreased ROM and pain with certain movements. She describes the pain as achy and sore but sometimes has sharp pains at night.    - mechanical symptoms, - instability    Is affecting ADLs.  Pain is 6/10 at it's worst.    PAST MEDICAL HISTORY    Past Medical History:   Diagnosis Date    Diabetes mellitus, type 2     Hyperlipidemia     Hypertension     Obesity     CHIDI (obstructive sleep apnea)        PAST SURGICAL HISTORY     Past Surgical History:   Procedure Laterality Date    ADENOIDECTOMY      BREAST BIOPSY Right     @ age 18    CHONDROPLASTY OF KNEE Right 2/12/2021    Procedure: CHONDROPLASTY, KNEE;  Surgeon: ZULEIKA Barahona MD;  Location: [facility];  Service: Orthopedics;  Laterality: Right;    COLONOSCOPY N/A 10/16/2023     Procedure: COLONOSCOPY;  Surgeon: Grace Wiley MD;  Location: [facility];  Service: Endoscopy;  Laterality: N/A;  Referred by: JOAN Chirinos NP   Meds: Ozempic - pt inst to hold per protocol  Prep: Suprep  Route instructions sent: portal    10/9- precall complete.  DBM    HYSTERECTOMY      2009    KNEE ARTHROSCOPY W/ MENISCECTOMY Right 2/12/2021    Procedure: ARTHROSCOPY, KNEE, WITH MEDIAL MENISCECTOMY LATERAL RELEASE;  Surgeon: ZULEIKA Barahona MD;  Location: [facility] OR;  Service: Orthopedics;  Laterality: Right;  pericapsular injection: 30cc ropivacaine/epi/clonidine/ketorolac injection     KNEE SURGERY      SYNOVECTOMY OF KNEE Right 2/12/2021    Procedure: SYNOVECTOMY, KNEE;  Surgeon: ZULEIKA Barahona MD;  Location: [facility] OR;  Service: Orthopedics;  Laterality: Right;    THYROIDECTOMY, PARTIAL      TONSILLECTOMY         FAMILY HISTORY    Family History   Problem Relation Name Age of Onset    Diabetes Mother      Heart disease Mother      Hypertension Mother      Hyperlipidemia Mother      Stroke Mother      Cataracts Mother      Cancer Paternal Aunt          Lung    Diabetes Paternal Grandmother      Breast cancer Paternal Aunt      Macular degeneration Paternal Aunt      Breast cancer Cousin      Colon cancer Neg Hx      Ovarian cancer Neg Hx         SOCIAL HISTORY    Social History     Socioeconomic History    Marital status:     Number of children: 2   Tobacco Use    Smoking status: Never    Smokeless tobacco: Never   Substance and Sexual Activity    Alcohol use: Yes     Comment: rare    Drug use: No    Sexual activity: Yes     Partners: Male     Social Determinants of Health     Financial Resource Strain: Low Risk  (2/15/2024)    Overall Financial Resource Strain (CARDIA)     Difficulty of Paying Living Expenses: Not hard at all   Food Insecurity: No Food Insecurity (2/15/2024)    Hunger Vital Sign     Worried About Running Out of Food in the Last Year: Never true     Ran Out of Food in the  Last Year: Never true   Transportation Needs: No Transportation Needs (2/15/2024)    PRAPARE - Transportation     Lack of Transportation (Medical): No     Lack of Transportation (Non-Medical): No   Physical Activity: Unknown (2/15/2024)    Exercise Vital Sign     Days of Exercise per Week: 3 days   Stress: No Stress Concern Present (2/15/2024)    [employer] - Occupational Stress Questionnaire     Feeling of Stress : Not at all   Housing Stability: Low Risk  (2/15/2024)    Housing Stability Vital Sign     Unable to Pay for Housing in the Last Year: No     Number of Places Lived in the Last Year: 1     Unstable Housing in the Last Year: No       MEDICATIONS      Current Outpatient Medications:     albuterol (PROAIR HFA) 90 mcg/actuation inhaler, Inhale 2 puffs into the lungs every 6 (six) hours as needed for Wheezing or Shortness of Breath. Rescue, Disp: 18 g, Rfl: 2    atorvastatin (LIPITOR) 40 MG tablet, TAKE 1 TABLET(40 MG) BY MOUTH EVERY DAY (Patient not taking: Reported on 5/9/2024), Disp: 90 tablet, Rfl: 1    blood sugar diagnostic Strp, Check blood sugars TID, Disp: 200 strip, Rfl: 11    blood-glucose meter (FREESTYLE SYSTEM KIT) kit, Use as instructed, Disp: 1 each, Rfl: 1    ibuprofen (ADVIL,MOTRIN) 800 MG tablet, Take 1 tablet (800 mg total) by mouth 3 (three) times daily. Take with meals., Disp: 30 tablet, Rfl: 0    ibuprofen (ADVIL,MOTRIN) 800 MG tablet, Take 1 tablet (800 mg total) by mouth every 6 (six) hours as needed for Pain., Disp: 20 tablet, Rfl: 0    lancets (ONETOUCH ULTRASOFT LANCETS) Misc, Check blood sugars TID, Disp: 200 each, Rfl: 11    OZEMPIC 1 mg/dose (4 mg/3 mL), INJECT 1 MG UNDER THE SKIN ONCE A WEEK, Disp: 9 mL, Rfl: 1    valsartan-hydrochlorothiazide (DIOVAN-HCT) 160-12.5 mg per tablet, Take 1 tablet by mouth once daily., Disp: 90 tablet, Rfl: 1    ALLERGIES     Review of patient's allergies indicates:  No Known Allergies      REVIEW OF SYSTEMS    Constitution: Negative. Negative for chills, fever and night sweats.   HENT: Negative for congestion and headaches.    Eyes: Negative for blurred vision, left vision loss and right vision loss.   Cardiovascular: Negative for chest pain and syncope.   Respiratory: Negative for cough and shortness of breath.    Endocrine: Negative for polydipsia, polyphagia and polyuria.   Hematologic/Lymphatic: Negative for bleeding problem. Does not bruise/bleed easily.   Skin: Negative for dry skin, itching and rash.   Musculoskeletal: Negative for falls. Positive for left shoulder pain.  Gastrointestinal: Negative for abdominal pain and bowel incontinence.   Genitourinary: Negative for bladder incontinence and nocturia.   Neurological: Negative for disturbances in coordination, loss of balance and seizures.   Psychiatric/Behavioral: Negative for depression. The patient does not have insomnia.    Allergic/Immunologic: Negative for hives and persistent infections.     PHYSICAL EXAMINATION    Vitals: There were no vitals taken for this visit.    General: The patient appears active and healthy with no apparent physical problems.  No disturbance of mood or affect is demonstrated. Alert and Oriented.    HEENT: Eyes normal, pupils equally round, nose normal.    Resp: Equal and symmetrical chest rises. No wheezing    CV: Regular rate    Neck: Supple; nonpainful range of motion.    Extremities: no cyanosis, clubbing, edema, or diffuse swelling.  Palpable pulses, good capillary refill of the digits.  No coolness, discoloration, edema or obvious varicosities.    Skin: no lesions noted.    Lymphatic: no detected adenopathy in the upper or lower extremities.    Neurologic: normal mental status, normal reflexes, normal gait and balance.  Patient is alert and oriented to person, place and time.  No flaccidity or spasticity is noted.  No motor or sensory deficits are noted.  Light touch is intact    Orthopaedic: SHOULDER EXAM -  LEFT    Inspection:   Normal skin color and appearance with no scars.  No muscle atrophy noted.  No scapular winging.      Palpation: No tenderness of the acromioclavicular joint, lateral edge of the acromion, biceps tendon, trapezius muscle or scapulothoracic bursa.      ROM:      PROM:     FE - 90°    Abd/ER -  60°  Abd/IR -  15°   Add/ER -  30°     AROM:    FE - 90°    Abd/ER -  6 0°  Abd/IR -  15°   Add/ER -  30°         Tests:     - [place], - Neer's, - Cross Arm Adduction, - [place], - [place], - Speed. - Belly Press,  - Jobes, - Lift Off    Stability: - sulcus, - apprehension, - relocation, - load and shift, - DLS      Motor:  Rotator cuff strength is 4/5 supraspinatus, 4/5 infraspinatus, 5/5 subscapularis. Biceps, triceps and deltoid strength is 5/5.      Neuro     Distally there are no paresthesias, and sensation is intact to light touch in the median, ulnar, and radial distributions.  Reflexes are 2/2 biceps, triceps and brachioradialis.       IMAGING    CT Shoulder Without Contrast Left  Narrative: EXAMINATION:  CT SHOULDER WITHOUT CONTRAST LEFT    CLINICAL HISTORY:  Fracture, shoulder;  Displaced fracture of glenoid cavity of scapula, left shoulder, initial encounter for closed fracture    TECHNIQUE:  1.25 mm axial images were obtained through the left shoulder without the use of contrast.  Coronal and sagittal reformats were performed.    COMPARISON:  Radiographs 04/12/2024, 04/03/2024, MRI 05/02/2024.    FINDINGS:  Bones: Fracture of the anterior-inferior glenoid with 2 mm of depression and a 3 mm articular surface gap.  Subcortical lucency at the superior facet of the greater tuberosity, likely degenerative.  No suspicious lytic or blastic lesions.    Joints: Mild glenohumeral cartilage space narrowing with associated degenerative osteophyte formation about the glenoid rim and humeral head/neck junction.  Glenohumeral joint effusion.  Mild AC joint arthrosis.    Muscles: Normal bulk and  attenuation.    Miscellaneous: Degenerative changes of the spine.  Postsurgical change of left thyroidectomy.  Mild coronary artery atherosclerosis.  Left lower lobe subsegmental band like atelectasis.  No axillary lymphadenopathy.  Subcutaneous soft tissues appear within normal limits.  Impression: 1. Fracture of the anterior-inferior glenoid with 2 mm of depression.    Electronically signed by: Austin Fletcher MD  Date:    05/02/2024  Time:    20:23  MRI Shoulder Without Contrast Left  Narrative: EXAMINATION:  MRI SHOULDER WITHOUT CONTRAST LEFT    CLINICAL HISTORY:  Shoulder trauma, instability or dislocation suspected, xray done;Shoulder trauma, rotator cuff tear suspected, xray done;    TECHNIQUE:  Routine MRI evaluation of the left shoulder performed without contrast.    COMPARISON:  Radiographs 04/12/2024, 04/03/2024; CT 05/02/2024.    FINDINGS:  Examination degraded by patient motion artifact.    ROTATOR CUFF: Mild supraspinatus tendinosis with articular surface fraying.  Mild infraspinatus tendinosis with a small (0.3 cm AP), partial-thickness (less than 50%) interstitial/concealed footprint tear.  Subscapularis and teres minor tendons are intact.  Muscle bulk is preserved.    LABRUM: Near circumferential abnormal morphology and signal intensity of the glenoid labrum.  Hypertrophied middle glenohumeral ligament with a hypoplastic superior labrum, possibly a Kwesi complex.  No paralabral cyst.    BICEPS: Mild thickening and increased signal intensity of the intra-articular biceps tendon.    BONES: Recent fracture of the anterior-inferior aspect of the glenoid with 2 mm of depression.  Degenerative bone productive changes about the medial humeral head/neck junction and glenoid rim.  No avascular necrosis.  No marrow infiltrative process.    AC JOINT: Mild AC joint arthrosis.  Flat morphology of the lateral acromion without undersurface spurring.    CARTILAGE: Partial-thickness chondral fissuring throughout  the humeral head and glenoid.  Small focal area of subchondral cystic change at the lateral aspect of the superior humeral head.    MISCELLANEOUS: Joint effusion with synovitis.  Mild signal hyperintensity within the subacromial subdeltoid bursa.  No glenohumeral ligament avulsion.  No axillary lymphadenopathy.  Impression: 1. Recent fracture of the anterior-inferior glenoid with 2 mm of depression.  2. Near circumferential labral fraying/tearing.  No paralabral cyst.  No glenohumeral ligament avulsion.  Possible Kwesi complex.  3. Infraspinatus tendinosis with a small, partial-thickness interstitial/concealed footprint tear.  4. Supraspinatus tendinosis with mild articular surface fraying.  5. Biceps tendinosis.  6. Mild glenohumeral osteoarthritis.  7. Joint effusion with synovitis.    Electronically signed by: Austin Fletcher MD  Date:    05/02/2024  Time:    19:55        IMPRESSION       ICD-10-CM ICD-9-CM   1. Traumatic complete tear of left rotator cuff, initial encounter  S46.012A 840.4   2. Labral tear of shoulder, left, initial encounter  S43.432A 840.8   3. Closed displaced fracture of glenoid cavity of left scapula, initial encounter  S42.142A 811.03         MEDICATIONS PRESCRIBED      Aspirin 81 mg  Norco 7.5/325    RECOMMENDATIONS     Left shoulder arthroscopic labral repair, possible rotator cuff repair, possible biceps tenodesis and possible open reduction internal fixation of the glenoid  The patient elected to proceed with operative intervention after all risks, benefits, and alternatives were discussed with the patient. The risks of surgery include bleeding, scarring, injuries to nerves, arteries and veins, need for additional surgeries, blood clots, infections, and the risk of anesthesia. I personally obtained informed consent from the patient and documented full history and physical, which has been placed on the chart.   Informed consent was obtained   Physical Therapy has been ordered - [facility]    All questions were answered, pt will contact us for questions or concerns in the interim.

## 2024-05-16 ENCOUNTER — PATIENT OUTREACH (OUTPATIENT)
Dept: ADMINISTRATIVE | Facility: HOSPITAL | Age: 59
End: 2024-05-16
Payer: COMMERCIAL

## 2024-05-16 ENCOUNTER — HOSPITAL ENCOUNTER (OUTPATIENT)
Facility: HOSPITAL | Age: 59
Discharge: HOME | End: 2024-05-16
Attending: ORTHOPAEDIC SURGERY | Admitting: ORTHOPAEDIC SURGERY
Payer: COMMERCIAL

## 2024-05-16 ENCOUNTER — ANESTHESIA (OUTPATIENT)
Dept: SURGERY | Facility: HOSPITAL | Age: 59
End: 2024-05-16
Payer: COMMERCIAL

## 2024-05-16 VITALS
SYSTOLIC BLOOD PRESSURE: 137 MMHG | DIASTOLIC BLOOD PRESSURE: 57 MMHG | BODY MASS INDEX: 33.13 KG/M2 | HEIGHT: 62 IN | WEIGHT: 180 LBS | HEART RATE: 69 BPM | OXYGEN SATURATION: 96 % | RESPIRATION RATE: 18 BRPM | TEMPERATURE: 98 F

## 2024-05-16 DIAGNOSIS — S46.012A TRAUMATIC COMPLETE TEAR OF LEFT ROTATOR CUFF, INITIAL ENCOUNTER: ICD-10-CM

## 2024-05-16 DIAGNOSIS — S42.142A CLOSED DISPLACED FRACTURE OF GLENOID CAVITY OF LEFT SCAPULA, INITIAL ENCOUNTER: ICD-10-CM

## 2024-05-16 DIAGNOSIS — S43.432A LABRAL TEAR OF SHOULDER, LEFT, INITIAL ENCOUNTER: ICD-10-CM

## 2024-05-16 LAB
POCT GLUCOSE: 106 MG/DL (ref 70–110)
POCT GLUCOSE: 114 MG/DL (ref 70–110)
POCT GLUCOSE: 138 MG/DL (ref 70–110)

## 2024-05-16 PROCEDURE — 63600175 PHARM REV CODE 636 W HCPCS: Performed by: ANESTHESIOLOGY

## 2024-05-16 PROCEDURE — D9220A PRA ANESTHESIA: Mod: CRNA,,, | Performed by: NURSE ANESTHETIST, CERTIFIED REGISTERED

## 2024-05-16 PROCEDURE — 63600175 PHARM REV CODE 636 W HCPCS: Performed by: ORTHOPAEDIC SURGERY

## 2024-05-16 PROCEDURE — 25000003 PHARM REV CODE 250: Performed by: PHYSICIAN ASSISTANT

## 2024-05-16 PROCEDURE — 27201423 OPTIME MED/SURG SUP & DEVICES STERILE SUPPLY: Performed by: ORTHOPAEDIC SURGERY

## 2024-05-16 PROCEDURE — 71000033 HC RECOVERY, INTIAL HOUR: Performed by: ORTHOPAEDIC SURGERY

## 2024-05-16 PROCEDURE — 71000015 HC POSTOP RECOV 1ST HR: Performed by: ORTHOPAEDIC SURGERY

## 2024-05-16 PROCEDURE — 29823 SHO ARTHRS SRG XTNSV DBRDMT: CPT | Mod: 51,59,AS,LT | Performed by: PHYSICIAN ASSISTANT

## 2024-05-16 PROCEDURE — 37000008 HC ANESTHESIA 1ST 15 MINUTES: Performed by: ORTHOPAEDIC SURGERY

## 2024-05-16 PROCEDURE — 29806 SHO ARTHRS SRG CAPSULORRAPHY: CPT | Mod: LT,,, | Performed by: ORTHOPAEDIC SURGERY

## 2024-05-16 PROCEDURE — D9220A PRA ANESTHESIA: Mod: ANES,,, | Performed by: ANESTHESIOLOGY

## 2024-05-16 PROCEDURE — 63600175 PHARM REV CODE 636 W HCPCS: Performed by: PHYSICIAN ASSISTANT

## 2024-05-16 PROCEDURE — 99900035 HC TECH TIME PER 15 MIN (STAT)

## 2024-05-16 PROCEDURE — 36000710: Performed by: ORTHOPAEDIC SURGERY

## 2024-05-16 PROCEDURE — 29823 SHO ARTHRS SRG XTNSV DBRDMT: CPT | Mod: 59,51,LT, | Performed by: ORTHOPAEDIC SURGERY

## 2024-05-16 PROCEDURE — 63600175 PHARM REV CODE 636 W HCPCS: Performed by: NURSE ANESTHETIST, CERTIFIED REGISTERED

## 2024-05-16 PROCEDURE — C1713 ANCHOR/SCREW BN/BN,TIS/BN: HCPCS | Performed by: ORTHOPAEDIC SURGERY

## 2024-05-16 PROCEDURE — 64416 NJX AA&/STRD BRCH PL NFS IMG: CPT | Mod: 59,LT | Performed by: ANESTHESIOLOGY

## 2024-05-16 PROCEDURE — 29828 SHO ARTHRS SRG BICP TENODSIS: CPT | Mod: AS,LT,, | Performed by: PHYSICIAN ASSISTANT

## 2024-05-16 PROCEDURE — 25000003 PHARM REV CODE 250: Performed by: ORTHOPAEDIC SURGERY

## 2024-05-16 PROCEDURE — 94761 N-INVAS EAR/PLS OXIMETRY MLT: CPT

## 2024-05-16 PROCEDURE — 82962 GLUCOSE BLOOD TEST: CPT | Performed by: ORTHOPAEDIC SURGERY

## 2024-05-16 PROCEDURE — 36000711: Performed by: ORTHOPAEDIC SURGERY

## 2024-05-16 PROCEDURE — 71000039 HC RECOVERY, EACH ADD'L HOUR: Performed by: ORTHOPAEDIC SURGERY

## 2024-05-16 PROCEDURE — 29828 SHO ARTHRS SRG BICP TENODSIS: CPT | Mod: 51,LT,, | Performed by: ORTHOPAEDIC SURGERY

## 2024-05-16 PROCEDURE — 63600175 PHARM REV CODE 636 W HCPCS: Mod: JZ,JG | Performed by: ANESTHESIOLOGY

## 2024-05-16 PROCEDURE — 25000003 PHARM REV CODE 250: Performed by: NURSE ANESTHETIST, CERTIFIED REGISTERED

## 2024-05-16 PROCEDURE — 25000003 PHARM REV CODE 250: Performed by: ANESTHESIOLOGY

## 2024-05-16 PROCEDURE — 37000009 HC ANESTHESIA EA ADD 15 MINS: Performed by: ORTHOPAEDIC SURGERY

## 2024-05-16 DEVICE — BIO-COMP-SITE P-LCK 2.9X15.5MM
Type: IMPLANTABLE DEVICE | Site: SHOULDER | Status: FUNCTIONAL
Brand: ARTHREX®

## 2024-05-16 DEVICE — SELF BUNCHING KL 1.8 FIBERTAK, SHOULDER
Type: IMPLANTABLE DEVICE | Site: SHOULDER | Status: FUNCTIONAL
Brand: ARTHREX®

## 2024-05-16 DEVICE — SWVLK PEEK TENO 7MM W/FORK EYELET
Type: IMPLANTABLE DEVICE | Site: SHOULDER | Status: FUNCTIONAL
Brand: ARTHREX®

## 2024-05-16 RX ORDER — FAMOTIDINE 10 MG/ML
INJECTION INTRAVENOUS
Status: DISCONTINUED | OUTPATIENT
Start: 2024-05-16 | End: 2024-05-16

## 2024-05-16 RX ORDER — SODIUM CHLORIDE 0.9 % (FLUSH) 0.9 %
3 SYRINGE (ML) INJECTION
Status: DISCONTINUED | OUTPATIENT
Start: 2024-05-16 | End: 2024-05-16 | Stop reason: HOSPADM

## 2024-05-16 RX ORDER — LIDOCAINE HYDROCHLORIDE 20 MG/ML
INJECTION INTRAVENOUS
Status: DISCONTINUED | OUTPATIENT
Start: 2024-05-16 | End: 2024-05-16

## 2024-05-16 RX ORDER — BUPIVACAINE HYDROCHLORIDE 2.5 MG/ML
INJECTION, SOLUTION EPIDURAL; INFILTRATION; INTRACAUDAL
Status: COMPLETED | OUTPATIENT
Start: 2024-05-16 | End: 2024-05-16

## 2024-05-16 RX ORDER — ROPIVACAINE HYDROCHLORIDE 2 MG/ML
INJECTION, SOLUTION EPIDURAL; INFILTRATION; PERINEURAL CONTINUOUS
Status: DISCONTINUED | OUTPATIENT
Start: 2024-05-16 | End: 2024-05-16 | Stop reason: HOSPADM

## 2024-05-16 RX ORDER — MIDAZOLAM HYDROCHLORIDE 1 MG/ML
1 INJECTION, SOLUTION INTRAMUSCULAR; INTRAVENOUS
Status: DISCONTINUED | OUTPATIENT
Start: 2024-05-16 | End: 2024-05-16 | Stop reason: HOSPADM

## 2024-05-16 RX ORDER — METHOCARBAMOL 500 MG/1
1000 TABLET, FILM COATED ORAL ONCE AS NEEDED
Status: COMPLETED | OUTPATIENT
Start: 2024-05-16 | End: 2024-05-16

## 2024-05-16 RX ORDER — PROPOFOL 10 MG/ML
VIAL (ML) INTRAVENOUS
Status: DISCONTINUED | OUTPATIENT
Start: 2024-05-16 | End: 2024-05-16

## 2024-05-16 RX ORDER — KETAMINE HCL IN 0.9 % NACL 50 MG/5 ML
SYRINGE (ML) INTRAVENOUS
Status: DISCONTINUED | OUTPATIENT
Start: 2024-05-16 | End: 2024-05-16

## 2024-05-16 RX ORDER — ONDANSETRON HYDROCHLORIDE 2 MG/ML
INJECTION, SOLUTION INTRAVENOUS
Status: DISCONTINUED | OUTPATIENT
Start: 2024-05-16 | End: 2024-05-16

## 2024-05-16 RX ORDER — FENTANYL CITRATE 50 UG/ML
INJECTION, SOLUTION INTRAMUSCULAR; INTRAVENOUS
Status: DISCONTINUED | OUTPATIENT
Start: 2024-05-16 | End: 2024-05-16

## 2024-05-16 RX ORDER — ACETAMINOPHEN 500 MG
1000 TABLET ORAL
Status: COMPLETED | OUTPATIENT
Start: 2024-05-16 | End: 2024-05-16

## 2024-05-16 RX ORDER — BACITRACIN ZINC 500 UNIT/G
OINTMENT (GRAM) TOPICAL
Status: DISCONTINUED | OUTPATIENT
Start: 2024-05-16 | End: 2024-05-16 | Stop reason: HOSPADM

## 2024-05-16 RX ORDER — SODIUM CHLORIDE 9 MG/ML
INJECTION, SOLUTION INTRAVENOUS CONTINUOUS
Status: DISCONTINUED | OUTPATIENT
Start: 2024-05-16 | End: 2024-05-16 | Stop reason: HOSPADM

## 2024-05-16 RX ORDER — MUPIROCIN 20 MG/G
OINTMENT TOPICAL
Status: DISCONTINUED | OUTPATIENT
Start: 2024-05-16 | End: 2024-05-16 | Stop reason: HOSPADM

## 2024-05-16 RX ORDER — DEXAMETHASONE SODIUM PHOSPHATE 4 MG/ML
INJECTION, SOLUTION INTRA-ARTICULAR; INTRALESIONAL; INTRAMUSCULAR; INTRAVENOUS; SOFT TISSUE
Status: DISCONTINUED | OUTPATIENT
Start: 2024-05-16 | End: 2024-05-16

## 2024-05-16 RX ORDER — PHENYLEPHRINE HYDROCHLORIDE 10 MG/ML
INJECTION INTRAVENOUS
Status: DISCONTINUED | OUTPATIENT
Start: 2024-05-16 | End: 2024-05-16

## 2024-05-16 RX ORDER — FENTANYL CITRATE 50 UG/ML
25 INJECTION, SOLUTION INTRAMUSCULAR; INTRAVENOUS EVERY 5 MIN PRN
Status: DISCONTINUED | OUTPATIENT
Start: 2024-05-16 | End: 2024-05-16 | Stop reason: HOSPADM

## 2024-05-16 RX ORDER — OXYCODONE HYDROCHLORIDE 5 MG/1
5 TABLET ORAL EVERY 4 HOURS PRN
Status: DISCONTINUED | OUTPATIENT
Start: 2024-05-16 | End: 2024-05-16 | Stop reason: HOSPADM

## 2024-05-16 RX ORDER — CEFAZOLIN SODIUM 1 G/3ML
INJECTION, POWDER, FOR SOLUTION INTRAMUSCULAR; INTRAVENOUS
Status: DISCONTINUED | OUTPATIENT
Start: 2024-05-16 | End: 2024-05-16

## 2024-05-16 RX ORDER — ONDANSETRON HYDROCHLORIDE 2 MG/ML
4 INJECTION, SOLUTION INTRAVENOUS ONCE AS NEEDED
Status: COMPLETED | OUTPATIENT
Start: 2024-05-16 | End: 2024-05-16

## 2024-05-16 RX ORDER — NEOSTIGMINE METHYLSULFATE 0.5 MG/ML
INJECTION, SOLUTION INTRAVENOUS
Status: DISCONTINUED | OUTPATIENT
Start: 2024-05-16 | End: 2024-05-16

## 2024-05-16 RX ORDER — DEXMEDETOMIDINE HYDROCHLORIDE 100 UG/ML
INJECTION, SOLUTION INTRAVENOUS
Status: DISCONTINUED | OUTPATIENT
Start: 2024-05-16 | End: 2024-05-16

## 2024-05-16 RX ORDER — FENTANYL CITRATE 50 UG/ML
100 INJECTION, SOLUTION INTRAMUSCULAR; INTRAVENOUS
Status: DISCONTINUED | OUTPATIENT
Start: 2024-05-16 | End: 2024-05-16 | Stop reason: HOSPADM

## 2024-05-16 RX ORDER — EPINEPHRINE 1 MG/ML
INJECTION, SOLUTION, CONCENTRATE INTRAVENOUS
Status: DISCONTINUED | OUTPATIENT
Start: 2024-05-16 | End: 2024-05-16 | Stop reason: HOSPADM

## 2024-05-16 RX ORDER — ROCURONIUM BROMIDE 10 MG/ML
INJECTION, SOLUTION INTRAVENOUS
Status: DISCONTINUED | OUTPATIENT
Start: 2024-05-16 | End: 2024-05-16

## 2024-05-16 RX ADMIN — PHENYLEPHRINE HYDROCHLORIDE 100 MCG: 10 INJECTION INTRAVENOUS at 11:05

## 2024-05-16 RX ADMIN — MUPIROCIN: 20 OINTMENT TOPICAL at 08:05

## 2024-05-16 RX ADMIN — ROCURONIUM BROMIDE 50 MG: 10 INJECTION, SOLUTION INTRAVENOUS at 10:05

## 2024-05-16 RX ADMIN — DEXMEDETOMIDINE 4 MCG: 100 INJECTION, SOLUTION, CONCENTRATE INTRAVENOUS at 10:05

## 2024-05-16 RX ADMIN — PROPOFOL 150 MG: 10 INJECTION, EMULSION INTRAVENOUS at 10:05

## 2024-05-16 RX ADMIN — LIDOCAINE HYDROCHLORIDE 100 MG: 20 INJECTION INTRAVENOUS at 10:05

## 2024-05-16 RX ADMIN — FENTANYL CITRATE 100 MCG: 50 INJECTION, SOLUTION INTRAMUSCULAR; INTRAVENOUS at 10:05

## 2024-05-16 RX ADMIN — DEXMEDETOMIDINE 8 MCG: 100 INJECTION, SOLUTION, CONCENTRATE INTRAVENOUS at 10:05

## 2024-05-16 RX ADMIN — OXYCODONE 5 MG: 5 TABLET ORAL at 02:05

## 2024-05-16 RX ADMIN — GLYCOPYRROLATE 0.6 MG: 0.2 INJECTION, SOLUTION INTRAMUSCULAR; INTRAVENOUS at 01:05

## 2024-05-16 RX ADMIN — NEOSTIGMINE METHYLSULFATE 5 MG: 0.5 INJECTION INTRAVENOUS at 01:05

## 2024-05-16 RX ADMIN — MIDAZOLAM 1 MG: 1 INJECTION INTRAMUSCULAR; INTRAVENOUS at 09:05

## 2024-05-16 RX ADMIN — ONDANSETRON 4 MG: 2 INJECTION INTRAMUSCULAR; INTRAVENOUS at 10:05

## 2024-05-16 RX ADMIN — Medication: at 02:05

## 2024-05-16 RX ADMIN — ROCURONIUM BROMIDE 20 MG: 10 INJECTION, SOLUTION INTRAVENOUS at 12:05

## 2024-05-16 RX ADMIN — SODIUM CHLORIDE, SODIUM GLUCONATE, SODIUM ACETATE, POTASSIUM CHLORIDE, MAGNESIUM CHLORIDE, SODIUM PHOSPHATE, DIBASIC, AND POTASSIUM PHOSPHATE: .53; .5; .37; .037; .03; .012; .00082 INJECTION, SOLUTION INTRAVENOUS at 11:05

## 2024-05-16 RX ADMIN — METHOCARBAMOL 1000 MG: 500 TABLET ORAL at 02:05

## 2024-05-16 RX ADMIN — ONDANSETRON 4 MG: 2 INJECTION INTRAMUSCULAR; INTRAVENOUS at 02:05

## 2024-05-16 RX ADMIN — Medication 25 MG: at 10:05

## 2024-05-16 RX ADMIN — SODIUM CHLORIDE: 0.9 INJECTION, SOLUTION INTRAVENOUS at 10:05

## 2024-05-16 RX ADMIN — DEXAMETHASONE SODIUM PHOSPHATE 8 MG: 4 INJECTION, SOLUTION INTRAMUSCULAR; INTRAVENOUS at 10:05

## 2024-05-16 RX ADMIN — GLYCOPYRROLATE 0.2 MG: 0.2 INJECTION, SOLUTION INTRAMUSCULAR; INTRAVENOUS at 12:05

## 2024-05-16 RX ADMIN — SODIUM CHLORIDE, SODIUM GLUCONATE, SODIUM ACETATE, POTASSIUM CHLORIDE, MAGNESIUM CHLORIDE, SODIUM PHOSPHATE, DIBASIC, AND POTASSIUM PHOSPHATE: .53; .5; .37; .037; .03; .012; .00082 INJECTION, SOLUTION INTRAVENOUS at 01:05

## 2024-05-16 RX ADMIN — FENTANYL CITRATE 25 MCG: 50 INJECTION INTRAMUSCULAR; INTRAVENOUS at 02:05

## 2024-05-16 RX ADMIN — BUPIVACAINE HYDROCHLORIDE 20 ML: 2.5 INJECTION, SOLUTION EPIDURAL; INFILTRATION; INTRACAUDAL; PERINEURAL at 09:05

## 2024-05-16 RX ADMIN — FENTANYL CITRATE 50 MCG: 50 INJECTION INTRAMUSCULAR; INTRAVENOUS at 09:05

## 2024-05-16 RX ADMIN — SODIUM CHLORIDE: 0.9 INJECTION, SOLUTION INTRAVENOUS at 08:05

## 2024-05-16 RX ADMIN — ACETAMINOPHEN 1000 MG: 500 TABLET ORAL at 08:05

## 2024-05-16 RX ADMIN — FAMOTIDINE 20 MG: 10 INJECTION, SOLUTION INTRAVENOUS at 10:05

## 2024-05-16 RX ADMIN — CEFAZOLIN 2 G: 330 INJECTION, POWDER, FOR SOLUTION INTRAMUSCULAR; INTRAVENOUS at 11:05

## 2024-05-16 NOTE — ANESTHESIA POSTPROCEDURE EVALUATION
Anesthesia Post Evaluation    Patient: Grecia Boyd    Procedure(s) Performed: Procedure(s) (LRB):  ARTHROSCOPY,SHOULDER,WITH CAPSULORRHAPHY (Left)  DEBRIDEMENT, SHOULDER, ARTHROSCOPIC (Left)  ARTHROSCOPY,SHOULDER,WITH BICEPS TENODESIS (Left)    Final Anesthesia Type: general      Patient location during evaluation: PACU  Patient participation: Yes- Able to Participate  Level of consciousness: awake and alert and oriented  Post-procedure vital signs: reviewed and stable  Pain management: adequate  Airway patency: patent    PONV status at discharge: No PONV  Anesthetic complications: no      Cardiovascular status: blood pressure returned to baseline and hemodynamically stable  Respiratory status: unassisted, room air and spontaneous ventilation  Hydration status: euvolemic  Follow-up not needed.              Vitals Value Taken Time   /71 05/16/24 1416   Temp 36.6 °C (97.8 °F) 05/16/24 1406   Pulse 68 05/16/24 1418   Resp 15 05/16/24 1418   SpO2 100 % 05/16/24 1418   Vitals shown include unfiled device data.      No case tracking events are documented in the log.      Pain/Bud Score: Pain Rating Prior to Med Admin: 0 (5/16/2024  2:10 PM)  Pain Rating Post Med Admin: 0 (5/16/2024  2:10 PM)  Bud Score: 8 (5/16/2024  2:06 PM)

## 2024-05-16 NOTE — BRIEF OP NOTE
[facility]  Brief Operative Note    Surgery Date: 5/16/2024     Surgeon(s) and Role:     * Naif Ramirez MD - Primary    Assisting Provider:     * Yoandy Kelly PA-C - First Assist    No resident or fellow was available throughout the entire procedure as a result it was medically necessary for Yoandy Kelly PA-C to perform first assistant duties.    Pre-Operative Diagnosis: Closed displaced fracture of glenoid cavity of left scapula, initial encounter [S42.142A]  Traumatic complete tear of left rotator cuff, initial encounter [S46.012A]  Labral tear of shoulder, left, initial encounter [S43.432A]    Post-Operative Diagnosis: Post-Op Diagnosis Codes:     * Closed displaced fracture of glenoid cavity of left scapula, initial encounter [S42.142A]     * Traumatic complete tear of left rotator cuff, initial encounter [S46.012A]     * Labral tear of shoulder, left, initial encounter [S43.432A]    Procedure(s) (LRB):  ARTHROSCOPY,SHOULDER,WITH CAPSULORRHAPHY (Left)  DEBRIDEMENT, SHOULDER, ARTHROSCOPIC (Left)  ARTHROSCOPY,SHOULDER,WITH BICEPS TENODESIS (Left)    Anesthesia: General    Operative Findings: See Op note.    Estimated Blood Loss: * No values recorded between 5/16/2024 11:26 AM and 5/16/2024  1:59 PM *         Specimens:   Specimen (24h ago, onward)      None              Discharge Note    OUTCOME: Patient tolerated treatment/procedure well without complication and is now ready for discharge.    DISPOSITION: Home or Self Care    FINAL DIAGNOSIS:  Post-Op Diagnosis Codes:     * Closed displaced fracture of glenoid cavity of left scapula, initial encounter [S42.142A]     * Traumatic complete tear of left rotator cuff, initial encounter [S46.012A]     * Labral tear of shoulder, left, initial encounter [S43.432A]    FOLLOWUP: In clinic    DISCHARGE INSTRUCTIONS: See attached.

## 2024-05-16 NOTE — OP NOTE
DATE OF PROCEDURE: 5/16/2024    ATTENDING SURGEON: Surgeons and Role:     * Naif Ramirez MD - Primary    Assistant: Yoandy Kelly PA-C    No resident or fellow was available throughout the entire procedure as a result it was medically necessary for Yoandy Kelly PA-C to perform first assist duties.     PREOPERATIVE DIAGNOSIS:  Left shoulder  anterior shoulder dislocation with large bony Bankart    Left shoulder Dislocation, Anterior S43.016A   Left shoulder Loose Body M24.012   Left shoulder  synovitis      POSTOPERATIVE DIAGNOSIS:   Left shoulder anterior shoulder dislocation with large bony Bankart   Left shoulder Dislocation, Anterior S43.016A   Left shoulder Loose Body M24.012   Left shoulder synovitis     PROCEDURES PERFORMED:  1. Left shoulder Arthroscopic aneroinferior labral repair with capsulorrhaphy CPT - 09675 (arthroscopic bony Bankart repair)    2. Left shoulder Biceps tenodesis CPT - 15507    3. Left shoulder Arthroscopic loose body removal CPT - 94238    4. Left shoulder Arthroscopic extensive debridement CPT - 23538      Anesthesia: General/Regional    Complications: None    Implants:   Implant Name Type Inv. Item Serial No.  Lot No. LRB No. Used Action   ANCHOR SUT FIBERTAK 1.8 KNTLS - [id number]  ANCHOR SUT FIBERTAK 1.8 KNTLS  [employer] [id number] Left 3 Implanted   ANCHOR BIOCOMPOSITE 2.9X15.5MM - [id number]  ANCHOR BIOCOMPOSITE 2.9X15.5MM  [employer] [id number] Left 5 Implanted   ANCHOR SUT FIBERTAK 1.8 KNTLS - [id number]  ANCHOR SUT FIBERTAK 1.8 KNTLS  [employer] [id number] Left 1 Implanted   ANCHOR FORKED TIP 7MM PEEI - [id number]  ANCHOR FORKED TIP 7MM PEEI  [employer] [id number] Left 1 Implanted       Arthroscopic Findings:   Evaluation of the glenohumeral joint demonstrated a large anterior inferior glenoid fracture with comminution of the superior aspect of the anterior inferior glenoid fracture with medial displacement.  Labral tearing noted from proximally the 7 o'clock position  extending anteriorly to the superior labrum.  Degenerative fraying of the posterior labrum noted significant amount of synovitis throughout.  Loose body noted within the glenohumeral joint.  Loose body was also encountered in the biceps tendon sheath.  Chondral malacia noted of the posterior humeral head.  Significant chondral damage the anterior aspect of the glenoid.  No significant articular sided tearing of the rotator cuff noted.  Upper border of the subscapularis intact.    Indication for Procedure: 58 y.o. Female with a history of left shoulder pain who had an injury on 2/27/24 that occurred at home. She states that her leg gave out and when she was falling she caught herself with the left side. She did not go to the ED after her fall because the wait time was too long. She went to the ER on 3/16 24 when her symptoms had not improved. She was told she had a minimally displaced fracture of the left glenoid as well as an avulsion fracture of the left olecranon. She was referred to Ortho and saw Dr. James Palacios on 3/20/24. She was placed in a sling for support and given exercises to do at home on her own. She is no longer having any pain in her elbow but continues to struggle with left shoulder pain.    Her history, physical and imaging were consistent with the above-stated findings.  We discussed the risks, benefits and alternatives of surgery.  We did discuss an arthroscopic labral repair and fixation of a large bony Bankart fragment.  We also discussed debridement of the glenoid labrum, chondral damage and possible biceps tenodesis.  She elected proceed with surgical intervention.  She understood she may develop some stiffness after this operation as well as persistent pain secondary to the chondral damage.    Surgery in Detail:  The patient was marked identified in the holding room.  She was brought back to the operating room and placed in the lateral decubitus position.  After general endotracheal  anesthesia was administered the left upper extremity was prepped and draped in usual sterile fashion for a shoulder arthroscopy. The body was secured and well padded in a bean bag. Preoperative antibiotics were given within 1 hour the procedure.  A time-out was taken prior starting.  A posterior portal was created after insufflation of the joint with sterile saline.  A diagnostic arthroscopy was performed with the above-stated findings.    Arthroscopic Bankart Repair  Under needle localization, an anterior superior and anterior inferior portal were created and cannulas were introduced.  [place] were brought into the joint and extensive debridement was done to remove a lot of the loose chondral debris from the glenoid as well as the humerus.  [place] removed a significant amount of synovitis the rotator interval, axillary pouch and posterior capsular area.  We then brought in our vapor and stabilize lock the areas.  The bony Bankart injury was identified it appeared to be medialized.  With incomplete healing of the bony Bankart with likely a fibrous union.  At this point we osteotomized the area of the bony Bankart to move it more medially.  Once we did this we placed 2 PushLock anchors on the medial side of the glenoid and used a suture shuttling technique to pass around the large bony Bankart fragment.  The suture tapes were then brought over to a more laterally based push locks on the glenoid to secure the large bony fragment with a suture staple technique.  We then placed a low 6:00. anchor using a knotless suture tack which was passed with the assistance of a spectrum.  We then placed another 1 at the top of the bony Bankart fracture.  There was a piece from the bony Bankart that had to be removed at the superior part of the bony Bankart because this was loose chondral fragment that could not be saved or fixed.  We performed a biceps tenotomy at the beginning of the procedure for later biceps tenodesis secondary  to the extension into the superior labrum.  Once we completed the intra-articular work we moved to the subacromial space.  No bursal sided rotator cuff tear was identified.  We then opened the biceps tendon sheath.  The biceps tendon was removed.  We encountered a loose body in the biceps tendon sheath.  This was removed.  We then sized the biceps tendon to a 7 mm size after removing it through an accessory anterolateral portal.  This was whipstitched with a 2. FiberLoop.  It was then placed in a socket at the intertubercular groove.  We a 7 Reamer was used.  We secured this with a peek interference screw.  Arthroscopic knots were tied over the top of the screw for additional fixation.  The incision was closed with 3-0 nylon sutures. Adaptic, bacitracin, 4x4s and ABDs were then used.  The patient was then placed in an arc brace and extubated and taken recovery.    Post-Op Plan:  Bony Bankart repair protocol with biceps tenodesis    Attestation: I was present and scrubbed for the entire procedure.

## 2024-05-16 NOTE — PROGRESS NOTES
Pts baseline /75. Current /56 with HR 63. Pt asymptomatic with no c/o feeling lightheaded or dizzy. Dr. Becker notified. OK to discharge as planned at this time.

## 2024-05-16 NOTE — OPERATIVE NOTE ADDENDUM
Certification of Assistant at Surgery       Surgery Date: 5/16/2024     Participating Surgeons:  Surgeons and Role:     * Naif Ramirez MD - Primary    Assistant:   Yoandy Kelly PA-C    No resident or fellow was available throughout the entire procedure as a result it was medically necessary for Yoandy Kelly PA-C to perform first assist duties.      Procedures:  Procedure(s) (LRB):  ARTHROSCOPY,SHOULDER,WITH CAPSULORRHAPHY (Left)  DEBRIDEMENT, SHOULDER, ARTHROSCOPIC (Left)  ARTHROSCOPY,SHOULDER,WITH BICEPS TENODESIS (Left)    Assistant Surgeon's Certification of Necessity:  I understand that section 1842 (b) (6) (d) of the Social Security Act generally prohibits Medicare Part B reasonable charge payment for the services of assistants at surgery in teaching hospitals when qualified residents are available to furnish such services. I certify that the services for which payment is claimed were medically necessary, and that no qualified resident was available to perform the services. I further understand that these services are subject to post-payment review by the Medicare carrier.      Yoandy Kelly PA-C    05/16/2024  2:02 PM

## 2024-05-16 NOTE — PLAN OF CARE
Pre op complete. Pt is resting comfortably in bed in lowest position with side rails up and call light in reach. Pt belongings to be put in locker.  to be called with pt updates.

## 2024-05-16 NOTE — TRANSFER OF CARE
"Anesthesia Transfer of Care Note    Patient: Grecia Boyd    Procedure(s) Performed: Procedure(s) (LRB):  ARTHROSCOPY,SHOULDER,WITH CAPSULORRHAPHY (Left)  DEBRIDEMENT, SHOULDER, ARTHROSCOPIC (Left)  ARTHROSCOPY,SHOULDER,WITH BICEPS TENODESIS (Left)    Patient location: PACU    Anesthesia Type: general    Transport from OR: Transported from OR on 6-10 L/min O2 by face mask with adequate spontaneous ventilation    Post pain: adequate analgesia    Post assessment: no apparent anesthetic complications    Post vital signs: stable    Level of consciousness: awake    Nausea/Vomiting: no nausea/vomiting    Complications: none    Transfer of care protocol was followed      Last vitals: Visit Vitals  BP (!) 151/79 (BP Location: Right arm, Patient Position: Lying)   Pulse (!) 55   Temp 36.6 °C (97.8 °F) (Temporal)   Resp 16   Ht 5' 2" (1.575 m)   Wt 81.6 kg (180 lb)   SpO2 100%   Breastfeeding No   BMI 32.92 kg/m²     "

## 2024-05-16 NOTE — ANESTHESIA PROCEDURE NOTES
Intubation    Date/Time: 5/16/2024 10:36 AM    Performed by: Judith Hunt CRNA  Authorized by: Pj Becker MD    Intubation:     Induction:  Intravenous    Intubated:  Postinduction    Mask Ventilation:  Easy mask    Attempts:  1    Attempted By:  CRNA    Method of Intubation:  Video laryngoscopy    Blade:  Bynum 3    Laryngeal View Grade: Grade I - full view of cords      Difficult Airway Encountered?: No      Complications:  None    Airway Device:  Oral endotracheal tube    Airway Device Size:  7.0    Style/Cuff Inflation:  Cuffed (inflated to minimal occlusive pressure)    Inflation Amount (mL):  6    Tube secured:  22    Secured at:  The lips    Placement Verified By:  Capnometry    Complicating Factors:  None    Findings Post-Intubation:  BS equal bilateral and atraumatic/condition of teeth unchanged

## 2024-05-16 NOTE — ANESTHESIA PREPROCEDURE EVALUATION
05/16/2024  Pre-operative evaluation for Procedure(s) (LRB):  ARTHROSCOPY,SHOULDER,WITH CAPSULORRHAPHY (Left)  DEBRIDEMENT, SHOULDER, ARTHROSCOPIC (Left)  FIXATION, TENDON (Left)    Grecia Boyd is a 58 y.o. female     Patient Active Problem List   Diagnosis    CHIDI (obstructive sleep apnea)    Pneumonia due to COVID-19 virus    Type 2 diabetes mellitus without complication, without long-term current use of insulin    Chondromalacia of right knee    Degenerative tear of posterior horn of medial meniscus of right knee    Right knee pain    Obesity hypoventilation syndrome    Hypertension associated with type 2 diabetes mellitus    Hyperlipidemia associated with type 2 diabetes mellitus    Allergic rhinitis    Nasal septal deviation    Tinnitus of both ears       Review of patient's allergies indicates:  No Known Allergies    No current facility-administered medications on file prior to encounter.     Current Outpatient Medications on File Prior to Encounter   Medication Sig Dispense Refill    ibuprofen (ADVIL,MOTRIN) 800 MG tablet Take 1 tablet (800 mg total) by mouth 3 (three) times daily. Take with meals. 30 tablet 0    ibuprofen (ADVIL,MOTRIN) 800 MG tablet Take 1 tablet (800 mg total) by mouth every 6 (six) hours as needed for Pain. 20 tablet 0    valsartan-hydrochlorothiazide (DIOVAN-HCT) 160-12.5 mg per tablet Take 1 tablet by mouth once daily. 90 tablet 1    albuterol (PROAIR HFA) 90 mcg/actuation inhaler Inhale 2 puffs into the lungs every 6 (six) hours as needed for Wheezing or Shortness of Breath. Rescue 18 g 2    atorvastatin (LIPITOR) 40 MG tablet TAKE 1 TABLET(40 MG) BY MOUTH EVERY DAY (Patient not taking: Reported on 5/15/2024) 90 tablet 1    blood sugar diagnostic Strp Check blood sugars  strip 11    blood-glucose meter (FREESTYLE SYSTEM KIT) kit Use as instructed 1 each 1    lancets  (ONETOUCH ULTRASOFT LANCETS) Misc Check blood sugars  each 11    OZEMPIC 1 mg/dose (4 mg/3 mL) INJECT 1 MG UNDER THE SKIN ONCE A WEEK (Patient not taking: Reported on 5/15/2024) 9 mL 1       Past Surgical History:   Procedure Laterality Date    ADENOIDECTOMY      BREAST BIOPSY Right     @ age 18    CHONDROPLASTY OF KNEE Right 2/12/2021    Procedure: CHONDROPLASTY, KNEE;  Surgeon: ZULEIKA Barahona MD;  Location: [facility] OR;  Service: Orthopedics;  Laterality: Right;    COLONOSCOPY N/A 10/16/2023    Procedure: COLONOSCOPY;  Surgeon: Grace Wiley MD;  Location: [facility] ENDOSCOPY;  Service: Endoscopy;  Laterality: N/A;  Referred by: JOAN Chirinos NP  [facility] Meds: Ozempic - pt inst to hold per protocol  Prep: Suprep  Route instructions sent: portal  SW  10/9- precall complete.  DBM    HYSTERECTOMY      2009    KNEE ARTHROSCOPY W/ MENISCECTOMY Right 2/12/2021    Procedure: ARTHROSCOPY, KNEE, WITH MEDIAL MENISCECTOMY LATERAL RELEASE;  Surgeon: ZULEIKA Barahona MD;  Location: [facility] OR;  Service: Orthopedics;  Laterality: Right;  pericapsular injection: 30cc ropivacaine/epi/clonidine/ketorolac injection     KNEE SURGERY      SYNOVECTOMY OF KNEE Right 2/12/2021    Procedure: SYNOVECTOMY, KNEE;  Surgeon: ZULEIKA Barahona MD;  Location: [facility] OR;  Service: Orthopedics;  Laterality: Right;    THYROIDECTOMY, PARTIAL      TONSILLECTOMY         Social History     Socioeconomic History    Marital status:     Number of children: 2   Tobacco Use    Smoking status: Never    Smokeless tobacco: Never   Substance and Sexual Activity    Alcohol use: Yes     Comment: rare    Drug use: No    Sexual activity: Yes     Partners: Male     Social Determinants of Health     Financial Resource Strain: Low Risk  (2/15/2024)    Overall Financial Resource Strain (CARDIA)     Difficulty of Paying Living Expenses: Not hard at all   Food Insecurity: No Food Insecurity (2/15/2024)    Hunger Vital Sign     Worried About Running Out of Food in the  Last Year: Never true     Ran Out of Food in the Last Year: Never true   Transportation Needs: No Transportation Needs (2/15/2024)    PRAPARE - Transportation     Lack of Transportation (Medical): No     Lack of Transportation (Non-Medical): No   Physical Activity: Unknown (2/15/2024)    Exercise Vital Sign     Days of Exercise per Week: 3 days   Stress: No Stress Concern Present (2/15/2024)    [employer] [place] [employer] - Occupational Stress Questionnaire     Feeling of Stress : Not at all   Housing Stability: Low Risk  (2/15/2024)    Housing Stability Vital Sign     Unable to Pay for Housing in the Last Year: No     Number of Places Lived in the Last Year: 1     Unstable Housing in the Last Year: No     EKD Echo:  No results found for this or any previous visit.        Pre-op Assessment    I have reviewed the Patient Summary Reports.     I have reviewed the Nursing Notes. I have reviewed the NPO Status.   I have reviewed the Medications.     Review of Systems  Anesthesia Hx:  No problems with previous Anesthesia   History of prior surgery of interest to airway management or planning:  Previous anesthesia: General, Nerve Block, MAC 2021  right Knee Arthroscopy, Meniscectomy, Chondroplasty with general anesthesia.  Procedure performed at an [facility].     for 2021  Right KLnee Arthroscopy, Meniscectomy, Chondroplasty.  Procedure performed at an [facility].  10/16/2023  Colonoscopy with MAC.  Procedure performed at an [facility]. Airway issues documented on chart review include mask, easy, laryngeal mask airway used     Denies Family Hx of Anesthesia complications.    Denies Personal Hx of Anesthesia complications.                    Social:  Non-Smoker, Social Alcohol Use       Hematology/Oncology:  Hematology Normal   Oncology Normal                                   EENT/Dental:  chronic allergic rhinitis H/O T&A, Partial Thyroidectomy,  Nasal septal deviation,  Tinnitus of both ears, hearing loss          Cardiovascular:  Exercise tolerance: good   Hypertension    Denies CAD.       Denies Angina.     hyperlipidemia  Denies PRESLEY.  ECG has been reviewed.                          Pulmonary:  Pneumonia  Denies COPD.    Denies Shortness of breath.  Sleep Apnea Not using C-PAP since she lost weight, Pneumonia due to COVID-19 virus in 2020,  Obesity hypoventilation syndrome            Education provided regarding risk of obstructive sleep apnea            Renal/:  Renal/ Normal  Denies Chronic Renal Disease.                Hepatic/GI:  Hepatic/GI Normal     Denies GERD. Denies Liver Disease.            Musculoskeletal:  Arthritis   Labral tear of shoulder, left,   Closed displaced fracture of glenoid cavity of left scapula,   Traumatic complete tear of left rotator cuff,  minimally displaced fracture of the left glenoid, avulsion fracture of the left olecranon, Mild AC joint arthrosis,  H/O Degenerative tear of posterior horn of medial meniscus of right knee,   Right knee pain,  S/P Right Knee Arthroscopy w/ Meniscectomy, Chondroplasty, Synovectomy              OB/GYN/PEDS:  H/O Right Breast biopsy,  Hysterectomy           Neurological:  Neurology Normal   Denies CVA.    Denies Headaches. Denies Seizures.                                Endocrine:  Diabetes, well controlled, type 2 Denies Hypothyroidism.  Diet-controlled, HA1C = 6.3 on 1/2/2024      Obesity / BMI > 30  Psych:  Psychiatric Normal                    Physical Exam  General: Well nourished, Cooperative, Alert and Oriented    Airway:  Mallampati: II / I  Mouth Opening: Normal  TM Distance: Normal  Tongue: Normal  Neck ROM: Normal ROM    Dental:  Intact, Periodontal disease, Loose teeth    Chest/Lungs:  Clear to auscultation, Normal Respiratory Rate    Heart:  Rate: Normal  Rhythm: Regular Rhythm        Anesthesia Plan  Type of Anesthesia, risks & benefits discussed:    Anesthesia Type: Regional, Gen ETT  Intra-op  Monitoring Plan: Standard ASA Monitors  Post Op Pain Control Plan: multimodal analgesia and peripheral nerve block  Induction:  IV  Airway Plan: Direct, Post-Induction  Informed Consent: Informed consent signed with the Patient and all parties understand the risks and agree with anesthesia plan.  All questions answered. Patient consented to blood products? Yes  ASA Score: 3  Day of Surgery Review of History & Physical: H&P Update referred to the surgeon/provider.    Ready For Surgery From Anesthesia Perspective.     .

## 2024-05-16 NOTE — PLAN OF CARE
VS remain stable. Patient is AAO. Denies any further c/o dizziness. Tolerating PO and states pain is tolerable. Dressing CDI. Patient states they are ready for d/c. IV removed.  Catheter tip intact.  Caregiver at bedside.  Discharge instructions reviewed and copy given to the patient and caregiver.  Questions answered.  Both verbalized understanding.  Medication delivered to bedside and reviewed. No DME needed. Sling in place. Patient wheeled to car by RN with NAD noted.

## 2024-05-16 NOTE — PROGRESS NOTES
"On sitting pt up on side of bed and adjusting arm sling pt reported feeling "dizzy". Pt assisted back into bed and HOB lowered. VS and ACCK obtained. /67 HR 60 O2 sats 96% on RA. DYL=856. Pt reported feeling "better" while lying down.   "

## 2024-05-16 NOTE — ANESTHESIA PROCEDURE NOTES
Interscalene Continuous Nerve Catheter    Patient location during procedure: pre-op   Block not for primary anesthetic.  Reason for block: at surgeon's request and post-op pain management   Post-op Pain Location: L shoulder pain   Start time: 5/16/2024 9:14 AM  Timeout: 5/16/2024 9:13 AM   End time: 5/16/2024 9:20 AM    Staffing  Authorizing Provider: Pj Becker MD  Performing Provider: Pj Becker MD    Staffing  Performed by: Pj Becker MD  Authorized by: Pj Becker MD    Preanesthetic Checklist  Completed: patient identified, IV checked, site marked, risks and benefits discussed, surgical consent, monitors and equipment checked, pre-op evaluation and timeout performed  Peripheral Block  Patient position: sitting  Prep: ChloraPrep and site prepped and draped  Patient monitoring: heart rate, cardiac monitor, continuous pulse ox, continuous capnometry and frequent blood pressure checks  Block type: interscalene  Laterality: left  Injection technique: continuous  Needle  Needle type: Tuohy   Needle gauge: 18 G  Needle length: 2 in  Needle localization: anatomical landmarks and ultrasound guidance  Catheter type: non-stimulating  Catheter size: 20 G  Test dose: lidocaine 1.5% with Epi 1-to-200,000 and negative   -ultrasound image captured on disc.  Assessment  Injection assessment: negative aspiration, negative parasthesia and local visualized surrounding nerve  Paresthesia pain: none  Heart rate change: no  Slow fractionated injection: yes  Pain Tolerance: comfortable throughout block and no complaints  Medications:    Medications: bupivacaine (pf) (MARCAINE) injection 0.25% - Perineural   20 mL - 5/16/2024 9:20:00 AM    Additional Notes  VSS.  [facility] RN monitoring vitals throughout procedure.  Patient tolerated procedure well.

## 2024-05-16 NOTE — DISCHARGE INSTRUCTIONS
POST-OPERATIVE DISCHARGE INSTRUCTIONS    Diet: Ice chips, clear liquids, and then diet as tolerated.  Drink plenty of liquids.  Ice the area at least three times a day (20 minutes per session).    Elevate the extremity above the level of the heart to help reduce swelling.  Keep arm in sling and remain non-weightbearing until further notice.  Pain medication can be taken every four to six hours as needed.  It is helpful to take pain medication prior to physical therapy.  Any activity that requires precise thinking or accuracy should be avoided for a minimum of 72 hours after surgery and while on narcotic pain medication.  This includes operating machinery and/or driving a vehicle.  All sutures/staples will be removed approximately 14 days from the time of surgery. Leave steri-strips (skin tapes) in place until sutures are removed.  If skin glue is used instead of stitches, do not apply any ointments or solutions to the incision.  Keep the incision dry.  The skin glue will peel off in 3-4 weeks.  Dressing change directions, unless otherwise instructed:     ? Shoulder scope:  Remove dressings 48 hours after surgery and start using waterproof Band-Aids over the incision sites.      All casts, splints, braces, slings, crutches, abduction pillows, etc. are to be worn as instructed.    Rylan Hose are often used following knee surgery to help with swelling.  They can be removed for 2-3 hours daily as needed.  If the hose become uncomfortable after a few days, switch to an Ace Wrap if desired.  Keep the incision dry for 10-14 days.  A waterproof dressing ([facility] or [facility]) can be used to shower.  No bath, pool, or hot tub until instructed.  You should notify our office if you notice any of the following:  A temperature greater than 101 F  Severe increased pain  Excessive drainage or redness of the incision  Calf swelling and pain  Chest pain  Your post-op follow up appointment will be approximately 14 days from the time of  surgery.  If you do not have an appointment scheduled, please call our office and schedule within 1-2 days.   If you have any problems or questions, please call our office at [phone].

## 2024-05-17 ENCOUNTER — TELEPHONE (OUTPATIENT)
Dept: SPORTS MEDICINE | Facility: CLINIC | Age: 59
End: 2024-05-17
Payer: COMMERCIAL

## 2024-05-17 NOTE — TELEPHONE ENCOUNTER
----- Message from Christi Muse sent at 5/17/2024  8:28 AM CDT -----  Pt calling in regards to her pain pump , pt also has pain medication . Can she take that's as well     Confirmed patient's contact info below:  Contact Name: Grecia Boyd  Phone Number: [phone]

## 2024-05-20 ENCOUNTER — CLINICAL SUPPORT (OUTPATIENT)
Dept: REHABILITATION | Facility: HOSPITAL | Age: 59
End: 2024-05-20
Attending: ORTHOPAEDIC SURGERY
Payer: COMMERCIAL

## 2024-05-20 DIAGNOSIS — R29.3 POSTURE ABNORMALITY: ICD-10-CM

## 2024-05-20 DIAGNOSIS — S46.012A TRAUMATIC COMPLETE TEAR OF LEFT ROTATOR CUFF, INITIAL ENCOUNTER: ICD-10-CM

## 2024-05-20 DIAGNOSIS — S43.432A LABRAL TEAR OF SHOULDER, LEFT, INITIAL ENCOUNTER: ICD-10-CM

## 2024-05-20 DIAGNOSIS — M25.612 DECREASED RANGE OF MOTION OF SHOULDER, LEFT: Primary | ICD-10-CM

## 2024-05-20 DIAGNOSIS — S42.142A CLOSED DISPLACED FRACTURE OF GLENOID CAVITY OF LEFT SCAPULA, INITIAL ENCOUNTER: ICD-10-CM

## 2024-05-20 PROCEDURE — 97110 THERAPEUTIC EXERCISES: CPT

## 2024-05-20 PROCEDURE — 97161 PT EVAL LOW COMPLEX 20 MIN: CPT

## 2024-05-20 PROCEDURE — 97140 MANUAL THERAPY 1/> REGIONS: CPT

## 2024-05-20 NOTE — PLAN OF CARE
[facility]   Physical Therapy Initial Evaluation      Name: Grecia Boyd  [facility] Number: [id number]    Therapy Diagnosis:   Encounter Diagnoses   Name Primary?    Closed displaced fracture of glenoid cavity of left scapula, initial encounter     Traumatic complete tear of left rotator cuff, initial encounter     Labral tear of shoulder, left, initial encounter     Decreased range of motion of shoulder, left Yes    Posture abnormality      Physician: Naif Ramirez MD     Physician Orders: PT Eval and Treat   Medical Diagnosis from Referral: Closed displaced fracture of glenoid cavity of left scapula, initial encounter [S42.142A], Traumatic complete tear of left rotator cuff, initial encounter [S46.012A], Labral tear of shoulder, left, initial encounter [S43.432A]   Evaluation Date: 5/20/2024  Authorization Period Expiration: 5/13/2025  Plan of Care Expiration: 9/20/2024  Progress Note Due: 6/20/2024  Visit # / Visits authorized: 1/1   FOTO: 1/3    Date of Surgery: 5/16/2024  Return to MD: 5/29/2024, 6/28/2024    POSTOPERATIVE DIAGNOSIS:   Left shoulder anterior shoulder dislocation with large bony Bankart   Left shoulder Dislocation, Anterior S43.016A   Left shoulder Loose Body M24.012   Left shoulder synovitis     Post-op Plan: Bony Bankart repair protocol with biceps tenodesis     Precautions: Standard     Time In: 2:00 pm   Time Out: 2:45 pm  Total Appointment Time (timed & untimed codes): 45 minutes    Subjective     Date of onset: Surgery on 5/16/2024    History of current condition - Grecia reports: She had surgery on her right knee last year and then in February she tripped and fell and did not want to land on her knee so she fell hurting her left shoulder and elbow. She though she would be ok and would heal on its own but it continued to bother her so she got it checked out. She had a fracture in her elbow which has cleared up and healed but the shoulder was still having  problems and she underwent surgery on Thursday last well. She was in increased discomfort on Friday but is feeling better since the weekend. She feels like a pulling discomfort/pain in her shoulder with movement and feels like the sling is not on correctly. She will sometimes get sharp pains as well. The pain pump, ice, and rest make it feel better. She was able to get her shirt on today a little better so feels it is doing better. She has taken her sling off at times and kept a pillow under her shoulder to help keep it in place. She took her pain medication before coming.    Imaging:   MRI Left Shoulder (5/2/2024):  Impression:  1. Recent fracture of the anterior-inferior glenoid with 2 mm of depression.  2. Near circumferential labral fraying/tearing.  No paralabral cyst.  No glenohumeral ligament avulsion.  Possible Aurora complex.  3. Infraspinatus tendinosis with a small, partial-thickness interstitial/concealed footprint tear.  4. Supraspinatus tendinosis with mild articular surface fraying.  5. Biceps tendinosis.  6. Mild glenohumeral osteoarthritis.  7. Joint effusion with synovitis.    Prior Therapy: Yes, not for this   Social History: Lives with    Occupation: Teaching special education    Prior Level of Function: Independent in all ADLs and job duties  Current Level of Function: Utilizing sling, difficulty with ADLs and job duties.     Pain:  Current 0/10, worst 10/10, best 0/10   Location: left shoulder    Description: Aching and Sharp  Aggravating Factors: moving shoulder around   Easing Factors: pain medication, ice, and rest    Patients goals: To get her shoulder back to how it was so she can work and use her arm as she needs to at home.       Medical History:   Past Medical History:   Diagnosis Date    Diabetes mellitus, type 2     Hyperlipidemia     Hypertension     Obesity     CHIDI (obstructive sleep apnea)     Unspecified chronic bronchitis 02/2024       Surgical History:   Grecia Boyd   has a past surgical history that includes Thyroidectomy, partial; Adenoidectomy; Tonsillectomy; Breast biopsy (Right); Hysterectomy; Knee arthroscopy w/ meniscectomy (Right, 2/12/2021); Chondroplasty of knee (Right, 2/12/2021); Synovectomy of knee (Right, 2/12/2021); Knee surgery; Colonoscopy (N/A, 10/16/2023); arthroscopy, shoulder, with capsulorrhaphy (Left, 5/16/2024); Arthroscopic debridement of shoulder (Left, 5/16/2024); and arthroscopy,shoulder,with biceps tenodesis (Left, 5/16/2024).    Medications:   Grecia has a current medication list which includes the following prescription(s): albuterol, blood sugar diagnostic, blood-glucose meter, hydrocodone-acetaminophen, ibuprofen, ibuprofen, lancets, and valsartan-hydrochlorothiazide.    Allergies:   Review of patient's allergies indicates:  No Known Allergies     Objective      Observation: Patient presents to physical therapy with an overall pleasant general affect who does not appear to be in any acute distress. She presents with her sling donned poorly with shoulder not fully supported. She presents with pain pump intact.     Posture: Seated posture with sling donned demonstrates increased thoracic kyphosis, bilateral scapular abduction.     Sensation: Intact to light touch bilateral upper extremities dermatomal pattern C4-T1.     Range of Motion:  Cervical - WFL with no reports of pain/discomfort.     Shoulder   Right AROM Left AROM/PROM   Flexion 180 degrees  NT / 50 degrees*   Scaption 180 degrees  NT / NT   Abduction 175 degrees  NT / 30 degrees*   ER at 0 50 degrees  NT / NT    ER at 45 NT NT   ER at 90 NT NT   IR at 90 NT NT   Functional IR  NT  NT   *Patient apprehensive and guarding with assessment of shoulder flexion range of motion on left upper extremity.     Upper Extremity Strength (MMT): Not assessed secondary to post-op status.     Joint Mobility: Limited as expected with post-op status and limited with full assessment secondary to post-op  "status and patient apprehension.     Palpation: Slight tenderness to palpation/pressure anterior shoulder.     Intake Outcome Measure for FOTO Shoulder Survey    Therapist reviewed FOTO scores for Grecia Boyd on 5/20/2024.   FOTO documents entered into [employer] - see Media section.    Intake Score: 38%        Treatment     Total Treatment time (time-based codes) separate from Evaluation: 23 minutes     Grecia received the treatments listed below:      therapeutic exercises to develop strength, endurance, ROM, flexibility, posture, and core stabilization for 15 minutes including:    Pt education on PT POC, Prognosis, and HEP   Pt education on post-op precautions, sling compliance   Pendulum hangs 2 x 30"   Passive flexion walkouts within precautions 1 x 5   - discomfort and held   Table slide passively 1 x 2   - discomfort and held   Wrist flexion/extension 1 x 10 reps each  Scapular retractions 1 x 10 x 5" holds     manual therapy techniques: Joint mobilizations, Soft tissue Mobilization, and PROM were applied to the: Shoulder for 08 minutes, including:    (L) PROM shoulder/elbow within precautions     neuromuscular re-education activities to improve: Coordination, Kinesthetic, Sense, Proprioception, and Posture for 00 minutes. The following activities were included:      therapeutic activities to improve functional performance for 00  minutes, including:      Patient Education and Home Exercises     Education provided:   - PT POC, Prognosis, and HEP   - On post-op precautions and early goals, importance of sling compliance and monitoring incisions for signs/symptoms of infection     Written Home Exercises Provided: yes. Exercises were reviewed and Grecia was able to demonstrate them prior to the end of the session.  Grecia demonstrated good  understanding of the education provided. See EMR under Patient Instructions for exercises provided during therapy sessions.    Assessment     Grecia is a 58 y.o. female " referred to outpatient Physical Therapy with a medical diagnosis of Closed displaced fracture of glenoid cavity of left scapula, initial encounter [S42.142A], Traumatic complete tear of left rotator cuff, initial encounter [S46.012A], Labral tear of shoulder, left, initial encounter [S43.432A]. Patient presents with signs and symptoms consistent with post-op status. Patient presents with decreased range of motion, decreased strength, posture abnormality, pain, and difficulty with ADLs and job duties. Patient would benefit from skilled PT intervention to help address above stated deficits and improve overall functional mobility.     Patient prognosis is Good.   Patient will benefit from skilled outpatient Physical Therapy to address the deficits stated above and in the chart below, provide patient /family education, and to maximize patientt's level of independence.     Plan of care discussed with patient: Yes  Patient's spiritual, cultural and educational needs considered and patient is agreeable to the plan of care and goals as stated below:     Anticipated Barriers for therapy: Scheduling,     Medical Necessity is demonstrated by the following  History  Co-morbidities and personal factors that may impact the plan of care [] LOW: no personal factors / co-morbidities  [x] MODERATE: 1-2 personal factors / co-morbidities  [] HIGH: 3+ personal factors / co-morbidities    Moderate / High Support Documentation: BMI, HTN     Examination  Body Structures and Functions, activity limitations and participation restrictions that may impact the plan of care [] LOW: addressing 1-2 elements  [] MODERATE: 3+ elements  [x] HIGH: 3+ elements (please support below)    Moderate / High Support Documentation: Range of motion, strength, posture, motor control, neuromuscular re-education, pain, scar mobility      Clinical Presentation [x] LOW: stable  [] MODERATE: Evolving  [] HIGH: Unstable     Decision Making/ Complexity Score: low        Goals:  Short Term Goals: 4-6 weeks   Patient will be independent in initial HEP to help supplement PT.   Patient will improve passive shoulder flexion to >/= 140 degrees to help with reaching/lifting.   Patient will improve pain at its worst to </= 5/10 to help improve quality of life.     Long Term Goals: 12-16 weeks   Patient will be independent in updated HEPT to help supplement PT and maintain gains made in PT.   Patient will improve FOTO score to >/= predicted (68) to show improvement in condition.   Patient will improve AROM shoulder flexion to >/= 150 degrees to help with ADLs.   Patient will improve shoulder strength MMT to >/= 4/5 to help with job duties.   Patient will improve pain at its worst to </= 1/10 to help improve quality of life.   Patient goal: Patient will be able to return to work fully with no difficulty.   Plan     Plan of care Certification: 5/20/2024 to 9/20/2024.    Outpatient Physical Therapy 1-2 times weekly for 16 weeks to include the following interventions: Electrical Stimulation (NMES), Manual Therapy, Moist Heat/ Ice, Neuromuscular Re-ed, Patient Education, Self Care, Therapeutic Activities, and Therapeutic Exercise.     Kay Carolina, PT, DPT, OCS

## 2024-05-21 ENCOUNTER — PATIENT MESSAGE (OUTPATIENT)
Dept: ADMINISTRATIVE | Facility: HOSPITAL | Age: 59
End: 2024-05-21
Payer: COMMERCIAL

## 2024-05-23 ENCOUNTER — CLINICAL SUPPORT (OUTPATIENT)
Dept: REHABILITATION | Facility: HOSPITAL | Age: 59
End: 2024-05-23
Attending: ORTHOPAEDIC SURGERY
Payer: COMMERCIAL

## 2024-05-23 DIAGNOSIS — R29.3 POSTURE ABNORMALITY: ICD-10-CM

## 2024-05-23 DIAGNOSIS — M25.612 DECREASED RANGE OF MOTION OF SHOULDER, LEFT: Primary | ICD-10-CM

## 2024-05-23 PROCEDURE — 97110 THERAPEUTIC EXERCISES: CPT

## 2024-05-23 PROCEDURE — 97140 MANUAL THERAPY 1/> REGIONS: CPT

## 2024-05-23 NOTE — PROGRESS NOTES
[facility]   Physical Therapy Treatment Note     Name: Grecia Boyd  Clinic Number: [id number]    Therapy Diagnosis:   Encounter Diagnoses   Name Primary?    Decreased range of motion of shoulder, left Yes    Posture abnormality      Physician: Naif Ramirez MD    Visit Date: 5/23/2024  Physician Orders: PT Eval and Treat   Medical Diagnosis from Referral: Closed displaced fracture of glenoid cavity of left scapula, initial encounter [S42.142A], Traumatic complete tear of left rotator cuff, initial encounter [S46.012A], Labral tear of shoulder, left, initial encounter [S43.432A]   Evaluation Date: 5/20/2024  Authorization Period Expiration: 5/13/2025  Plan of Care Expiration: 9/20/2024  Progress Note Due: 6/20/2024  Visit # / Visits authorized: 1/20   FOTO: 1/3    PTA Visit #: 0/5     FOTO first follow up:   FOTO second follow up:     Date of Surgery: 5/16/2024  Return to MD: 5/29/2024, 6/28/2024    POSTOPERATIVE DIAGNOSIS:   Left shoulder anterior shoulder dislocation with large bony Bankart   Left shoulder Dislocation, Anterior S43.016A   Left shoulder Loose Body M24.012   Left shoulder synovitis      Post-op Plan: Bony Bankart repair protocol with biceps tenodesis      Precautions: Standard     Time In: 11:00 am   Time Out: 11:42 am   Total Billable Time: 42 minutes    SUBJECTIVE     Pt reports: She is doing better today than last week. She took her pain medication a little earlier today to prepare for PT. She has been doing her exercises but having some stiffness and discomfort with some.  She was compliant with home exercise program.  Response to previous treatment: Independent in HEP   Functional change: Ongoing     Pain: 2/10  Location: left shoulder      OBJECTIVE     Objective Measures updated at progress report unless specified.     Observation 5/23/2024: Patient presents into clinic with sling donned and incisions covered with no signs of increased drainage or infection.  "    Range of Motion:  Shoulder   Left PROM   Flexion 60 degrees    Abduction NT   ER in scap plane  10 degrees        Treatment     *Pt is 1 weeks and 0 days s/p L Bankhart repair as of 5/23/2024.*    Grecia received the treatments listed below:      therapeutic exercises to develop strength, endurance, ROM, flexibility, posture, and core stabilization for 25 minutes including:    Pt education on compliance with HEP and post-op precautions   Seated thoracic extensions 1 x 15 x 3" holds   Seated shoulder supported wrist flexion/extension 3 x 10 reps   Gentle passive walkouts within protocol 2 x 10 x 3" holds  - cueing for form  Table slide flexion passively x 3'     manual therapy techniques: Joint mobilizations, PROM and Soft tissue Mobilization were applied to the: Shoulder, Elbow for 12 minutes, including:    (L) PROM shoulder/elbow within protocol   (L) Gentle isometrics in scap plane arm supported with PT     neuromuscular re-education activities to improve: Coordination, Kinesthetic, Sense, Proprioception, and Posture for 05 minutes. The following activities were included:    Seated scapular retractions 2 x 10 x 5" holds   - cues for form     therapeutic activities to improve functional performance for 00 minutes, including:      Patient Education and Home Exercises     Home Exercises Provided and Patient Education Provided     Education provided:   - Continue with HEP  - Post-op precautions and early goals, importance of sling compliance and monitoring incisions for signs/symptoms of infection     Written Home Exercises Provided: Patient instructed to cont prior HEP. Exercises were reviewed and Grecia was able to demonstrate them prior to the end of the session.  Grecia demonstrated good  understanding of the education provided. See EMR under Patient Instructions for exercises provided during therapy sessions    ASSESSMENT     Grecia tolerated first physical therapy session well with no adverse effects. " She continues with guarding with passive range of motion assessment and cues to relax throughout. Improvement compared to initial evaluation in mobility and tolerance to therex. She was corrected on performance of scapular retractions as she compensates utilizing her upper traps and tactile cueing to improve. Significant cueing for passive walkouts to help improve mobility. Tolerated all interventions well with no adverse effects. Educated again on importance of compliance with post-op precautions to help with healing. Will continue to monitor and progress as able.     Grecia Is progressing well towards her goals.   Pt prognosis is Good.     Pt will continue to benefit from skilled outpatient physical therapy to address the deficits listed in the problem list box on initial evaluation, provide pt/family education and to maximize pt's level of independence in the home and community environment.     Pt's spiritual, cultural and educational needs considered and pt agreeable to plan of care and goals.     Anticipated barriers to physical therapy: Scheduling     Goals:   Short Term Goals: 4-6 weeks (Progressing, not met)  Patient will be independent in initial HEP to help supplement PT.   Patient will improve passive shoulder flexion to >/= 140 degrees to help with reaching/lifting.   Patient will improve pain at its worst to </= 5/10 to help improve quality of life.      Long Term Goals: 12-16 weeks (Progressing, not met)  Patient will be independent in updated HEPT to help supplement PT and maintain gains made in PT.   Patient will improve FOTO score to >/= predicted (68) to show improvement in condition.   Patient will improve AROM shoulder flexion to >/= 150 degrees to help with ADLs.   Patient will improve shoulder strength MMT to >/= 4/5 to help with job duties.   Patient will improve pain at its worst to </= 1/10 to help improve quality of life.   Patient goal: Patient will be able to return to work fully with no  difficulty.    PLAN   Plan of care Certification: 5/20/2024 to 9/20/2024.     Continue with PT POC within protocol limits.     Kay Carolina, PT, DPT, OCS

## 2024-05-27 ENCOUNTER — CLINICAL SUPPORT (OUTPATIENT)
Dept: REHABILITATION | Facility: HOSPITAL | Age: 59
End: 2024-05-27
Attending: ORTHOPAEDIC SURGERY
Payer: COMMERCIAL

## 2024-05-27 DIAGNOSIS — R29.3 POSTURE ABNORMALITY: ICD-10-CM

## 2024-05-27 DIAGNOSIS — M25.612 DECREASED RANGE OF MOTION OF SHOULDER, LEFT: Primary | ICD-10-CM

## 2024-05-27 PROCEDURE — 97140 MANUAL THERAPY 1/> REGIONS: CPT

## 2024-05-27 PROCEDURE — 97110 THERAPEUTIC EXERCISES: CPT

## 2024-05-27 PROCEDURE — 97112 NEUROMUSCULAR REEDUCATION: CPT

## 2024-05-28 NOTE — PROGRESS NOTES
"POST-OPERATIVE EXAMINATION    58 y.o. Female who returns for follow-up after surgery. She is 2 weeks s/p    PROCEDURE:   1. Left shoulder Arthroscopic aneroinferior labral repair with capsulorrhaphy CPT - 57535 (arthroscopic bony Bankart repair)  2. Left shoulder Biceps tenodesis CPT - 15688  3. Left shoulder Arthroscopic loose body removal CPT - 88643  4. Left shoulder Arthroscopic extensive debridement CPT - 02324     She is doing well without any issues.     PHYSICAL EXAMINATION:  /71   Pulse 72   Ht 5' 2" (1.575 m)   Wt 83.2 kg (183 lb 7.8 oz)   BMI 33.56 kg/m²   General: Well-developed well-nourished 58 y.o. femalein no acute distress   Cardiovascular: Regular rhythm   Lungs: No labored breathing or wheezing appreciated   Neuro: Alert and oriented ×3   Psychiatric: well oriented to person, place and time, demonstrates normal mood and affect   Skin: No rashes, lesions or ulcers, normal temperature, turgor, and texture on involved extremity    ORTHOPEDIC EXAM:  Normal post-operative swelling  Normal post-operative scarring  Strength: Grossly intact  ROM: Not tested  Tests: None    ASSESSMENT:      ICD-10-CM ICD-9-CM   1. S/P arthroscopy of left shoulder  Z98.890 V45.89         PLAN:       Sutures removed today  Continue physical therapy  RTC in 1 month with Yoandy Kelly PA-C              "

## 2024-05-28 NOTE — PROGRESS NOTES
[facility]   Physical Therapy Treatment Note     Name: Grecia Boyd  Clinic Number: [id number]    Therapy Diagnosis:   Encounter Diagnoses   Name Primary?    Decreased range of motion of shoulder, left Yes    Posture abnormality      Physician: Naif Ramirez MD    Visit Date: 5/29/2024  Physician Orders: PT Eval and Treat   Medical Diagnosis from Referral: Closed displaced fracture of glenoid cavity of left scapula, initial encounter [S42.142A], Traumatic complete tear of left rotator cuff, initial encounter [S46.012A], Labral tear of shoulder, left, initial encounter [S43.432A]   Evaluation Date: 5/20/2024  Authorization Period Expiration: 5/13/2025  Plan of Care Expiration: 9/20/2024  Progress Note Due: 6/20/2024  Visit # / Visits authorized: 3/20   FOTO: 1/3    PTA Visit #: 0/5     FOTO first follow up:   FOTO second follow up:     Date of Surgery: 5/16/2024  Return to MD: 5/29/2024, 6/28/2024    POSTOPERATIVE DIAGNOSIS:   Left shoulder anterior shoulder dislocation with large bony Bankart   Left shoulder Dislocation, Anterior S43.016A   Left shoulder Loose Body M24.012   Left shoulder synovitis      Post-op Plan: Bony Bankart repair protocol with biceps tenodesis      Precautions: Standard     Time In: 11:28  Time Out: 12:00   Total Billable Time: 32 minutes    SUBJECTIVE     Pt reports: came from doctor, shoulder is a little sore. Said she can take the sling over in another week. Still some soreness.   She was compliant with home exercise program.  Response to previous treatment: Independent in HEP   Functional change: Ongoing     Pain: 2/10  Location: left shoulder      OBJECTIVE     Objective Measures updated at progress report unless specified.     Observation 5/23/2024: Patient presents into clinic with sling donned and incisions covered with no signs of increased drainage or infection.     Range of Motion:  Shoulder   Left PROM   Flexion 60 degrees    Abduction NT   ER in  "scap plane  10 degrees        Treatment     *Pt is 2 weeks and 0 days s/p L Bankhart repair as of 5/29/2024.*    Grecia received the treatments listed below:      therapeutic exercises to develop strength, endurance, ROM, flexibility, posture, and core stabilization for 0 minutes including:    Seated thoracic extensions 1 x 15 x 3" holds   Seated shoulder supported wrist flexion/extension 3 x 10 reps   Gentle passive walkouts within protocol 2 x 10 x 3" holds  - cueing for form  Table slide flexion passively x 3'     manual therapy techniques: Joint mobilizations, PROM and Soft tissue Mobilization were applied to the: Shoulder, Elbow for 15 minutes, including:    (L) PROM shoulder/elbow within protocol   (L) Gentle isometrics in scap plane arm supported with PT     neuromuscular re-education activities to improve: Coordination, Kinesthetic, Sense, Proprioception, and Posture for 15 minutes. The following activities were included:  Rhythmic stabilizations multiple rounds  - also seated with arm supported   Pt education    Np   Seated scapular retractions 2 x 10 x 5" holds   - cues for form     therapeutic activities to improve functional performance for 00 minutes, including:      Patient Education and Home Exercises     Home Exercises Provided and Patient Education Provided     Education provided:   - Continue with HEP  - Post-op precautions and early goals, importance of sling compliance and monitoring incisions for signs/symptoms of infection     Written Home Exercises Provided: Patient instructed to cont prior HEP. Exercises were reviewed and Grecia was able to demonstrate them prior to the end of the session.  Grecia demonstrated good  understanding of the education provided. See EMR under Patient Instructions for exercises provided during therapy sessions    ASSESSMENT     Grecia still having pain with supine and when arm is not supported. Also having pain with shoulder flex but was able to progress with " PROM in session. Continued with rhythmic stabilizations with multiple rounds and tolerated seated with arm supported better. Educated about HEP and will follow in a week after a trip.     Grecia Is progressing well towards her goals.   Pt prognosis is Good.     Pt will continue to benefit from skilled outpatient physical therapy to address the deficits listed in the problem list box on initial evaluation, provide pt/family education and to maximize pt's level of independence in the home and community environment.     Pt's spiritual, cultural and educational needs considered and pt agreeable to plan of care and goals.     Anticipated barriers to physical therapy: Scheduling     Goals:   Short Term Goals: 4-6 weeks (Progressing, not met)  Patient will be independent in initial HEP to help supplement PT.   Patient will improve passive shoulder flexion to >/= 140 degrees to help with reaching/lifting.   Patient will improve pain at its worst to </= 5/10 to help improve quality of life.      Long Term Goals: 12-16 weeks (Progressing, not met)  Patient will be independent in updated HEPT to help supplement PT and maintain gains made in PT.   Patient will improve FOTO score to >/= predicted (68) to show improvement in condition.   Patient will improve AROM shoulder flexion to >/= 150 degrees to help with ADLs.   Patient will improve shoulder strength MMT to >/= 4/5 to help with job duties.   Patient will improve pain at its worst to </= 1/10 to help improve quality of life.   Patient goal: Patient will be able to return to work fully with no difficulty.    PLAN   Plan of care Certification: 5/20/2024 to 9/20/2024.     Continue with PT POC within protocol limits.     Flo Ramos, PT, DPT, OCS

## 2024-05-29 ENCOUNTER — CLINICAL SUPPORT (OUTPATIENT)
Dept: REHABILITATION | Facility: HOSPITAL | Age: 59
End: 2024-05-29
Attending: ORTHOPAEDIC SURGERY
Payer: COMMERCIAL

## 2024-05-29 ENCOUNTER — OFFICE VISIT (OUTPATIENT)
Dept: SPORTS MEDICINE | Facility: CLINIC | Age: 59
End: 2024-05-29
Payer: COMMERCIAL

## 2024-05-29 VITALS
BODY MASS INDEX: 33.77 KG/M2 | DIASTOLIC BLOOD PRESSURE: 71 MMHG | HEIGHT: 62 IN | WEIGHT: 183.5 LBS | HEART RATE: 72 BPM | SYSTOLIC BLOOD PRESSURE: 115 MMHG

## 2024-05-29 DIAGNOSIS — Z98.890 S/P ARTHROSCOPY OF LEFT SHOULDER: Primary | ICD-10-CM

## 2024-05-29 DIAGNOSIS — R29.3 POSTURE ABNORMALITY: ICD-10-CM

## 2024-05-29 DIAGNOSIS — M25.612 DECREASED RANGE OF MOTION OF SHOULDER, LEFT: Primary | ICD-10-CM

## 2024-05-29 PROCEDURE — 97112 NEUROMUSCULAR REEDUCATION: CPT

## 2024-05-29 PROCEDURE — 99024 POSTOP FOLLOW-UP VISIT: CPT | Mod: S$GLB,,, | Performed by: ORTHOPAEDIC SURGERY

## 2024-05-29 PROCEDURE — 1159F MED LIST DOCD IN RCRD: CPT | Mod: CPTII,S$GLB,, | Performed by: ORTHOPAEDIC SURGERY

## 2024-05-29 PROCEDURE — 97140 MANUAL THERAPY 1/> REGIONS: CPT

## 2024-05-29 PROCEDURE — 3074F SYST BP LT 130 MM HG: CPT | Mod: CPTII,S$GLB,, | Performed by: ORTHOPAEDIC SURGERY

## 2024-05-29 PROCEDURE — 3044F HG A1C LEVEL LT 7.0%: CPT | Mod: CPTII,S$GLB,, | Performed by: ORTHOPAEDIC SURGERY

## 2024-05-29 PROCEDURE — 3078F DIAST BP <80 MM HG: CPT | Mod: CPTII,S$GLB,, | Performed by: ORTHOPAEDIC SURGERY

## 2024-05-29 PROCEDURE — 4010F ACE/ARB THERAPY RXD/TAKEN: CPT | Mod: CPTII,S$GLB,, | Performed by: ORTHOPAEDIC SURGERY

## 2024-05-29 PROCEDURE — 99999 PR PBB SHADOW E&M-EST. PATIENT-LVL III: CPT | Mod: PBBFAC,,, | Performed by: ORTHOPAEDIC SURGERY

## 2024-06-07 ENCOUNTER — TELEPHONE (OUTPATIENT)
Dept: SPORTS MEDICINE | Facility: CLINIC | Age: 59
End: 2024-06-07
Payer: COMMERCIAL

## 2024-06-07 NOTE — TELEPHONE ENCOUNTER
Called and spoke with patient to let her know that Yoandy will be out of the office on 06/028/24 and wanted to see if she wanted to come in on 06/24/24.   Appointment reschedule on 06/26/24 at 9:45.

## 2024-06-10 ENCOUNTER — CLINICAL SUPPORT (OUTPATIENT)
Dept: REHABILITATION | Facility: HOSPITAL | Age: 59
End: 2024-06-10
Payer: COMMERCIAL

## 2024-06-10 DIAGNOSIS — M25.612 DECREASED RANGE OF MOTION OF SHOULDER, LEFT: Primary | ICD-10-CM

## 2024-06-10 DIAGNOSIS — R29.3 POSTURE ABNORMALITY: ICD-10-CM

## 2024-06-10 PROCEDURE — 97140 MANUAL THERAPY 1/> REGIONS: CPT

## 2024-06-10 PROCEDURE — 97112 NEUROMUSCULAR REEDUCATION: CPT

## 2024-06-10 PROCEDURE — 97110 THERAPEUTIC EXERCISES: CPT

## 2024-06-12 ENCOUNTER — CLINICAL SUPPORT (OUTPATIENT)
Dept: REHABILITATION | Facility: HOSPITAL | Age: 59
End: 2024-06-12
Payer: COMMERCIAL

## 2024-06-12 DIAGNOSIS — R29.3 POSTURE ABNORMALITY: ICD-10-CM

## 2024-06-12 DIAGNOSIS — M25.612 DECREASED RANGE OF MOTION OF SHOULDER, LEFT: Primary | ICD-10-CM

## 2024-06-12 PROCEDURE — 97112 NEUROMUSCULAR REEDUCATION: CPT

## 2024-06-12 PROCEDURE — 97140 MANUAL THERAPY 1/> REGIONS: CPT

## 2024-06-12 NOTE — PROGRESS NOTES
[facility]   Physical Therapy Treatment Note     Name: Grecia Boyd  Clinic Number: [id number]    Therapy Diagnosis:   Encounter Diagnoses   Name Primary?    Decreased range of motion of shoulder, left Yes    Posture abnormality      Physician: Naif Ramirez MD    Visit Date: 6/12/2024  Physician Orders: PT Eval and Treat   Medical Diagnosis from Referral: Closed displaced fracture of glenoid cavity of left scapula, initial encounter [S42.142A], Traumatic complete tear of left rotator cuff, initial encounter [S46.012A], Labral tear of shoulder, left, initial encounter [S43.432A]   Evaluation Date: 5/20/2024  Authorization Period Expiration: 5/13/2025  Plan of Care Expiration: 9/20/2024  Progress Note Due: 6/20/2024  Visit # / Visits authorized: 5/20   FOTO: 1/3    PTA Visit #: 0/5     FOTO first follow up:   FOTO second follow up:     Date of Surgery: 5/16/2024  Return to MD: 5/29/2024, 6/28/2024    POSTOPERATIVE DIAGNOSIS:   Left shoulder anterior shoulder dislocation with large bony Bankart   Left shoulder Dislocation, Anterior S43.016A   Left shoulder Loose Body M24.012   Left shoulder synovitis      Post-op Plan: Bony Bankart repair protocol with biceps tenodesis      Precautions: Standard     Time In: 11:05  Time Out: 11:50   Total Billable Time: 40 minutes    SUBJECTIVE     Pt reports: did not have a good night sleeping was having some pain in the arm and spasms in her arm. Also having some soreness with isometrics.    She was compliant with home exercise program.  Response to previous treatment: Independent in HEP   Functional change: Ongoing     Pain: 2/10  Location: left shoulder      OBJECTIVE     Objective Measures updated at progress report unless specified.     Observation 5/23/2024: Patient presents into clinic with sling donned and incisions covered with no signs of increased drainage or infection.     Range of Motion:  Shoulder   Left PROM   Flexion 60 degrees   "  Abduction NT   ER in scap plane  10 degrees        Treatment     *Pt is 2 weeks and 0 days s/p L Bankhart repair as of 5/29/2024.*    Grecia received the treatments listed below:      therapeutic exercises to develop strength, endurance, ROM, flexibility, posture, and core stabilization for 0 minutes including:    Seated thoracic extensions 1 x 15 x 3" holds   Seated shoulder supported wrist flexion/extension 3 x 10 reps   Gentle passive walkouts within protocol 2 x 10 x 3" holds  - cueing for form  Table slide flexion passively x 3'     manual therapy techniques: Joint mobilizations, PROM and Soft tissue Mobilization were applied to the: Shoulder, Elbow for 13 minutes, including:    (L) PROM shoulder/elbow within protocol   (L) Gentle isometrics in scap plane arm supported with PT     neuromuscular re-education activities to improve: Coordination, Kinesthetic, Sense, Proprioception, and Posture for 32 minutes. The following activities were included:  Rhythmic stabilizations multiple rounds  ER/flex isometrics with arm supported seated 20x5" each  Scap retractions 20x5"  Wrist flex/ext 1# 3x15  Pt education    Np   Seated scapular retractions 2 x 10 x 5" holds   - cues for form     therapeutic activities to improve functional performance for 00 minutes, including:      Patient Education and Home Exercises     Home Exercises Provided and Patient Education Provided     Education provided:   - Continue with HEP  - Post-op precautions and early goals, importance of sling compliance and monitoring incisions for signs/symptoms of infection     Written Home Exercises Provided: Patient instructed to cont prior HEP. Exercises were reviewed and Grecia was able to demonstrate them prior to the end of the session.  Grecia demonstrated good  understanding of the education provided. See EMR under Patient Instructions for exercises provided during therapy sessions    ASSESSMENT     Grecia having some pain and soreness today " in the shoulder. Adjusted the sling and subjective reported with improved support. Modified HEP with isometrics to perform seated and with support. She was able to progress with PROM flex to about 80 degrees pain free. Will continue to progress as tolerated.     Grecia Is progressing well towards her goals.   Pt prognosis is Good.     Pt will continue to benefit from skilled outpatient physical therapy to address the deficits listed in the problem list box on initial evaluation, provide pt/family education and to maximize pt's level of independence in the home and community environment.     Pt's spiritual, cultural and educational needs considered and pt agreeable to plan of care and goals.     Anticipated barriers to physical therapy: Scheduling     Goals:   Short Term Goals: 4-6 weeks (Progressing, not met)  Patient will be independent in initial HEP to help supplement PT.   Patient will improve passive shoulder flexion to >/= 140 degrees to help with reaching/lifting.   Patient will improve pain at its worst to </= 5/10 to help improve quality of life.      Long Term Goals: 12-16 weeks (Progressing, not met)  Patient will be independent in updated HEPT to help supplement PT and maintain gains made in PT.   Patient will improve FOTO score to >/= predicted (68) to show improvement in condition.   Patient will improve AROM shoulder flexion to >/= 150 degrees to help with ADLs.   Patient will improve shoulder strength MMT to >/= 4/5 to help with job duties.   Patient will improve pain at its worst to </= 1/10 to help improve quality of life.   Patient goal: Patient will be able to return to work fully with no difficulty.    PLAN   Plan of care Certification: 5/20/2024 to 9/20/2024.     Continue with PT POC within protocol limits.     Flo Ramos, PT, DPT, OCS

## 2024-06-13 ENCOUNTER — LAB VISIT (OUTPATIENT)
Dept: LAB | Facility: HOSPITAL | Age: 59
End: 2024-06-13
Attending: INTERNAL MEDICINE
Payer: COMMERCIAL

## 2024-06-13 DIAGNOSIS — E11.9 TYPE 2 DIABETES MELLITUS WITHOUT COMPLICATION, WITHOUT LONG-TERM CURRENT USE OF INSULIN: ICD-10-CM

## 2024-06-13 LAB
ALBUMIN/CREAT UR: 6.5 UG/MG (ref 0–30)
CREAT UR-MCNC: 248 MG/DL (ref 15–325)
MICROALBUMIN UR DL<=1MG/L-MCNC: 16 UG/ML

## 2024-06-13 PROCEDURE — 82570 ASSAY OF URINE CREATININE: CPT | Performed by: INTERNAL MEDICINE

## 2024-06-13 PROCEDURE — 82043 UR ALBUMIN QUANTITATIVE: CPT | Performed by: INTERNAL MEDICINE

## 2024-06-17 ENCOUNTER — CLINICAL SUPPORT (OUTPATIENT)
Dept: REHABILITATION | Facility: HOSPITAL | Age: 59
End: 2024-06-17
Payer: COMMERCIAL

## 2024-06-17 DIAGNOSIS — R29.3 POSTURE ABNORMALITY: ICD-10-CM

## 2024-06-17 DIAGNOSIS — M25.612 DECREASED RANGE OF MOTION OF SHOULDER, LEFT: Primary | ICD-10-CM

## 2024-06-17 PROCEDURE — 97112 NEUROMUSCULAR REEDUCATION: CPT

## 2024-06-17 PROCEDURE — 97110 THERAPEUTIC EXERCISES: CPT

## 2024-06-17 PROCEDURE — 97140 MANUAL THERAPY 1/> REGIONS: CPT

## 2024-06-19 ENCOUNTER — CLINICAL SUPPORT (OUTPATIENT)
Dept: REHABILITATION | Facility: HOSPITAL | Age: 59
End: 2024-06-19
Payer: COMMERCIAL

## 2024-06-19 DIAGNOSIS — M25.612 DECREASED RANGE OF MOTION OF SHOULDER, LEFT: Primary | ICD-10-CM

## 2024-06-19 DIAGNOSIS — R29.3 POSTURE ABNORMALITY: ICD-10-CM

## 2024-06-19 PROCEDURE — 97112 NEUROMUSCULAR REEDUCATION: CPT

## 2024-06-19 PROCEDURE — 97140 MANUAL THERAPY 1/> REGIONS: CPT

## 2024-06-22 NOTE — PROGRESS NOTES
[facility]   Physical Therapy Treatment Note     Name: Grecia Boyd  Clinic Number: [id number]    Therapy Diagnosis:   Encounter Diagnoses   Name Primary?    Decreased range of motion of shoulder, left Yes    Posture abnormality      Physician: Naif Ramirez MD    Visit Date: 5/27/2024  Physician Orders: PT Eval and Treat   Medical Diagnosis from Referral: Closed displaced fracture of glenoid cavity of left scapula, initial encounter [S42.142A], Traumatic complete tear of left rotator cuff, initial encounter [S46.012A], Labral tear of shoulder, left, initial encounter [S43.432A]   Evaluation Date: 5/20/2024  Authorization Period Expiration: 5/13/2025  Plan of Care Expiration: 9/20/2024  Progress Note Due: 6/20/2024  Visit # / Visits authorized: 1/20   FOTO: 1/3    PTA Visit #: 0/5     FOTO first follow up:   FOTO second follow up:     Date of Surgery: 5/16/2024  Return to MD: 5/29/2024, 6/28/2024    POSTOPERATIVE DIAGNOSIS:   Left shoulder anterior shoulder dislocation with large bony Bankart   Left shoulder Dislocation, Anterior S43.016A   Left shoulder Loose Body M24.012   Left shoulder synovitis      Post-op Plan: Bony Bankart repair protocol with biceps tenodesis      Precautions: Standard     Time In: 11:00 am   Time Out: 11:45 am   Total Billable Time: 45 minutes    SUBJECTIVE     Pt reports: Still having pain/soreness in her arm but has been trying to do her exercises at home.   She was compliant with home exercise program.  Response to previous treatment: Independent in HEP   Functional change: Ongoing     Pain: 2/10  Location: left shoulder      OBJECTIVE     Objective Measures updated at progress report unless specified.     Observation 5/23/2024: Patient presents into clinic with sling donned and incisions covered with no signs of increased drainage or infection.     Range of Motion:  Shoulder   Left PROM   Flexion 60 degrees    Abduction NT   ER in scap plane  10 degrees   "      Treatment     *Pt is 1 weeks and 4 days s/p L Bankhart repair as of 5/27/2024.*    Grecia received the treatments listed below:      therapeutic exercises to develop strength, endurance, ROM, flexibility, posture, and core stabilization for 21 minutes including:    Pt education on compliance with HEP and post-op precautions   Seated thoracic extensions 1 x 15 x 3" holds   Seated shoulder supported wrist flexion/extension 3 x 10 reps 1#  Gentle passive walkouts within protocol 2 x 10 x 3" holds  - cueing for form  Table slide flexion passively x 3'     manual therapy techniques: Joint mobilizations, PROM and Soft tissue Mobilization were applied to the: Shoulder, Elbow for 12 minutes, including:    (L) PROM shoulder/elbow within protocol   (L) Gentle isometrics in scap plane arm supported with PT     neuromuscular re-education activities to improve: Coordination, Kinesthetic, Sense, Proprioception, and Posture for 12 minutes. The following activities were included:    Seated scapular retractions 2 x 10 x 5" holds   - cues for form   Shoulder isometrics ER/ABD/FLX 2 x 10 x 5" (submax 25%)    therapeutic activities to improve functional performance for 00 minutes, including:      Patient Education and Home Exercises     Home Exercises Provided and Patient Education Provided     Education provided:   - Continue with HEP  - Post-op precautions and early goals, importance of sling compliance and monitoring incisions for signs/symptoms of infection     Written Home Exercises Provided: Patient instructed to cont prior HEP. Exercises were reviewed and Grecia was able to demonstrate them prior to the end of the session.  Grecia demonstrated good  understanding of the education provided. See EMR under Patient Instructions for exercises provided during therapy sessions    ASSESSMENT     Grecia tolerated therapy session fairly well. She continues with apprehension and guarding with passive range of motion requiring " cueing to improve. Continued passive mobility within her control with improvement in mobility noted. Added gentle submax isometrics as well to help improve stability and shoulder discomfort with good tolerance. Continued education on importance of exercises at home moving within tolerance and relaxing to help improve mobility. Will continue to monitor and progress as able.     Grecia Is progressing well towards her goals.   Pt prognosis is Good.     Pt will continue to benefit from skilled outpatient physical therapy to address the deficits listed in the problem list box on initial evaluation, provide pt/family education and to maximize pt's level of independence in the home and community environment.     Pt's spiritual, cultural and educational needs considered and pt agreeable to plan of care and goals.     Anticipated barriers to physical therapy: Scheduling     Goals:   Short Term Goals: 4-6 weeks (Progressing, not met)  Patient will be independent in initial HEP to help supplement PT.   Patient will improve passive shoulder flexion to >/= 140 degrees to help with reaching/lifting.   Patient will improve pain at its worst to </= 5/10 to help improve quality of life.      Long Term Goals: 12-16 weeks (Progressing, not met)  Patient will be independent in updated HEPT to help supplement PT and maintain gains made in PT.   Patient will improve FOTO score to >/= predicted (68) to show improvement in condition.   Patient will improve AROM shoulder flexion to >/= 150 degrees to help with ADLs.   Patient will improve shoulder strength MMT to >/= 4/5 to help with job duties.   Patient will improve pain at its worst to </= 1/10 to help improve quality of life.   Patient goal: Patient will be able to return to work fully with no difficulty.    PLAN   Plan of care Certification: 5/20/2024 to 9/20/2024.     Continue with PT POC within protocol limits.     Kay Carolina, PT, DPT, OCS

## 2024-06-24 ENCOUNTER — CLINICAL SUPPORT (OUTPATIENT)
Dept: REHABILITATION | Facility: HOSPITAL | Age: 59
End: 2024-06-24
Payer: COMMERCIAL

## 2024-06-24 DIAGNOSIS — M25.612 DECREASED RANGE OF MOTION OF SHOULDER, LEFT: Primary | ICD-10-CM

## 2024-06-24 DIAGNOSIS — R29.3 POSTURE ABNORMALITY: ICD-10-CM

## 2024-06-24 PROCEDURE — 97140 MANUAL THERAPY 1/> REGIONS: CPT

## 2024-06-24 PROCEDURE — 97110 THERAPEUTIC EXERCISES: CPT

## 2024-06-24 PROCEDURE — 97112 NEUROMUSCULAR REEDUCATION: CPT

## 2024-06-25 NOTE — PROGRESS NOTES
[facility]   Physical Therapy Treatment Note     Name: Grecia Boyd  Clinic Number: [id number]    Therapy Diagnosis:   Encounter Diagnoses   Name Primary?    Decreased range of motion of shoulder, left Yes    Posture abnormality      Physician: Naif Ramirez MD    Visit Date: 6/19/2024  Physician Orders: PT Eval and Treat   Medical Diagnosis from Referral: Closed displaced fracture of glenoid cavity of left scapula, initial encounter [S42.142A], Traumatic complete tear of left rotator cuff, initial encounter [S46.012A], Labral tear of shoulder, left, initial encounter [S43.432A]   Evaluation Date: 5/20/2024  Authorization Period Expiration: 5/13/2025  Plan of Care Expiration: 9/20/2024  Progress Note Due: 6/20/2024  Visit # / Visits authorized: 8/20   FOTO: 1/3    PTA Visit #: 0/5     FOTO first follow up:   FOTO second follow up:     Date of Surgery: 5/16/2024  Return to MD: 5/29/2024, 6/28/2024    POSTOPERATIVE DIAGNOSIS:   Left shoulder anterior shoulder dislocation with large bony Bankart   Left shoulder Dislocation, Anterior S43.016A   Left shoulder Loose Body M24.012   Left shoulder synovitis      Post-op Plan: Bony Bankart repair protocol with biceps tenodesis      Precautions: Standard     Time In: 11:25  Time Out: 12:05  Total Billable Time: 40 minutes    SUBJECTIVE     Pt reports: running a little late today due to the bus and having to walk to the clinic. Shoulder is still hurting a lot but feeling a little better.     She was compliant with home exercise program.  Response to previous treatment: Independent in HEP   Functional change: Ongoing     Pain: 2/10  Location: left shoulder      OBJECTIVE     Objective Measures updated at progress report unless specified.     Observation 5/23/2024: Patient presents into clinic with sling donned and incisions covered with no signs of increased drainage or infection.     6/19/24     Range of Motion:  Shoulder   Left PROM   Flexion  "80 degrees    Abduction NT   ER in scap plane  10 degrees        Treatment     *Pt is 2 weeks and 0 days s/p L Bankhart repair as of 5/29/2024.*    Grecia received the treatments listed below:      therapeutic exercises to develop strength, endurance, ROM, flexibility, posture, and core stabilization for 0 minutes including:    Seated thoracic extensions 1 x 15 x 3" holds   Seated shoulder supported wrist flexion/extension 3 x 10 reps   Gentle passive walkouts within protocol 2 x 10 x 3" holds  - cueing for form  Table slide flexion passively x 3'     manual therapy techniques: Joint mobilizations, PROM and Soft tissue Mobilization were applied to the: Shoulder, Elbow for 10 minutes, including:    (L) PROM shoulder/elbow within protocol   (L) Gentle isometrics in scap plane arm supported with PT     neuromuscular re-education activities to improve: Coordination, Kinesthetic, Sense, Proprioception, and Posture for 30 minutes. The following activities were included:  Rhythmic stabilizations multiple rounds  ER/IR walkouts yellow 3x10  Gentle shoulder ext modified range red with eccentric control 3x10  Table slide 3x10  Scap retractions 20x5"  Pt education    Np   Seated scapular retractions 2 x 10 x 5" holds   - cues for form     therapeutic activities to improve functional performance for 00 minutes, including:      Patient Education and Home Exercises     Home Exercises Provided and Patient Education Provided     Education provided:   - Continue with HEP  - Post-op precautions and early goals, importance of sling compliance and monitoring incisions for signs/symptoms of infection     Written Home Exercises Provided: Patient instructed to cont prior HEP. Exercises were reviewed and Grecia was able to demonstrate them prior to the end of the session.  Grecia demonstrated good  understanding of the education provided. See EMR under Patient Instructions for exercises provided during therapy sessions    ASSESSMENT "     Grecia still having some pain but is improving from last session and with PROM. Discussion about getting out of the sling at home and working on range of motion. Went through HEP and some exercises to work at home. Will continue to progress with range.     Grecia Is progressing well towards her goals.   Pt prognosis is Good.     Pt will continue to benefit from skilled outpatient physical therapy to address the deficits listed in the problem list box on initial evaluation, provide pt/family education and to maximize pt's level of independence in the home and community environment.     Pt's spiritual, cultural and educational needs considered and pt agreeable to plan of care and goals.     Anticipated barriers to physical therapy: Scheduling     Goals:   Short Term Goals: 4-6 weeks (Progressing, not met)  Patient will be independent in initial HEP to help supplement PT.   Patient will improve passive shoulder flexion to >/= 140 degrees to help with reaching/lifting.   Patient will improve pain at its worst to </= 5/10 to help improve quality of life.      Long Term Goals: 12-16 weeks (Progressing, not met)  Patient will be independent in updated HEPT to help supplement PT and maintain gains made in PT.   Patient will improve FOTO score to >/= predicted (68) to show improvement in condition.   Patient will improve AROM shoulder flexion to >/= 150 degrees to help with ADLs.   Patient will improve shoulder strength MMT to >/= 4/5 to help with job duties.   Patient will improve pain at its worst to </= 1/10 to help improve quality of life.   Patient goal: Patient will be able to return to work fully with no difficulty.    PLAN   Plan of care Certification: 5/20/2024 to 9/20/2024.     Continue with PT POC within protocol limits.     Flo Ramos, PT, DPT, OCS

## 2024-06-26 ENCOUNTER — OFFICE VISIT (OUTPATIENT)
Dept: SPORTS MEDICINE | Facility: CLINIC | Age: 59
End: 2024-06-26
Payer: COMMERCIAL

## 2024-06-26 ENCOUNTER — CLINICAL SUPPORT (OUTPATIENT)
Dept: REHABILITATION | Facility: HOSPITAL | Age: 59
End: 2024-06-26
Payer: COMMERCIAL

## 2024-06-26 VITALS
SYSTOLIC BLOOD PRESSURE: 116 MMHG | DIASTOLIC BLOOD PRESSURE: 75 MMHG | BODY MASS INDEX: 34.61 KG/M2 | HEIGHT: 62 IN | HEART RATE: 62 BPM | WEIGHT: 188.06 LBS

## 2024-06-26 DIAGNOSIS — Z98.890 S/P ARTHROSCOPY OF LEFT SHOULDER: Primary | ICD-10-CM

## 2024-06-26 DIAGNOSIS — M25.612 DECREASED RANGE OF MOTION OF SHOULDER, LEFT: Primary | ICD-10-CM

## 2024-06-26 DIAGNOSIS — R29.3 POSTURE ABNORMALITY: ICD-10-CM

## 2024-06-26 PROCEDURE — 3074F SYST BP LT 130 MM HG: CPT | Mod: CPTII,S$GLB,, | Performed by: PHYSICIAN ASSISTANT

## 2024-06-26 PROCEDURE — 99024 POSTOP FOLLOW-UP VISIT: CPT | Mod: S$GLB,,, | Performed by: PHYSICIAN ASSISTANT

## 2024-06-26 PROCEDURE — 3061F NEG MICROALBUMINURIA REV: CPT | Mod: CPTII,S$GLB,, | Performed by: PHYSICIAN ASSISTANT

## 2024-06-26 PROCEDURE — 99999 PR PBB SHADOW E&M-EST. PATIENT-LVL III: CPT | Mod: PBBFAC,,, | Performed by: PHYSICIAN ASSISTANT

## 2024-06-26 PROCEDURE — 97140 MANUAL THERAPY 1/> REGIONS: CPT

## 2024-06-26 PROCEDURE — 4010F ACE/ARB THERAPY RXD/TAKEN: CPT | Mod: CPTII,S$GLB,, | Performed by: PHYSICIAN ASSISTANT

## 2024-06-26 PROCEDURE — 3044F HG A1C LEVEL LT 7.0%: CPT | Mod: CPTII,S$GLB,, | Performed by: PHYSICIAN ASSISTANT

## 2024-06-26 PROCEDURE — 1160F RVW MEDS BY RX/DR IN RCRD: CPT | Mod: CPTII,S$GLB,, | Performed by: PHYSICIAN ASSISTANT

## 2024-06-26 PROCEDURE — 1159F MED LIST DOCD IN RCRD: CPT | Mod: CPTII,S$GLB,, | Performed by: PHYSICIAN ASSISTANT

## 2024-06-26 PROCEDURE — 97110 THERAPEUTIC EXERCISES: CPT

## 2024-06-26 PROCEDURE — 97112 NEUROMUSCULAR REEDUCATION: CPT

## 2024-06-26 PROCEDURE — 3066F NEPHROPATHY DOC TX: CPT | Mod: CPTII,S$GLB,, | Performed by: PHYSICIAN ASSISTANT

## 2024-06-26 PROCEDURE — 3078F DIAST BP <80 MM HG: CPT | Mod: CPTII,S$GLB,, | Performed by: PHYSICIAN ASSISTANT

## 2024-06-26 NOTE — PROGRESS NOTES
"POST-OPERATIVE EXAMINATION    58 y.o. Female who returns for follow-up after surgery. She is 8 weeks s/p    PROCEDURE:   1. Left shoulder Arthroscopic aneroinferior labral repair with capsulorrhaphy CPT - 95130 (arthroscopic bony Bankart repair)  2. Left shoulder Biceps tenodesis CPT - 84370  3. Left shoulder Arthroscopic loose body removal CPT - 73329  4. Left shoulder Arthroscopic extensive debridement CPT - 09284     She is doing well without any issues.     PHYSICAL EXAMINATION:  /75   Pulse 62   Ht 5' 2" (1.575 m)   Wt 85.3 kg (188 lb 0.8 oz)   BMI 34.40 kg/m²   General: Well-developed well-nourished 58 y.o. female in no acute distress   Cardiovascular: Regular rhythm   Lungs: No labored breathing or wheezing appreciated   Neuro: Alert and oriented ×3   Psychiatric: well oriented to person, place and time, demonstrates normal mood and affect   Skin: No rashes, lesions or ulcers, normal temperature, turgor, and texture on involved extremity    ORTHOPEDIC EXAM:  Normal post-operative swelling  Normal post-operative scarring  Strength: Grossly intact  ROM: FE- 90°; ER/Abd- 10°; IR/Abd- 10°; ER/Add- 0°    Tests: None    ASSESSMENT:      ICD-10-CM ICD-9-CM   1. S/P arthroscopy of left shoulder  Z98.890 V45.89           PLAN:       Wean from sling  Continue physical therapy  RTC in 2 months with Naif Ramirez MD                  "

## 2024-06-26 NOTE — PROGRESS NOTES
[facility]   Physical Therapy Treatment Note     Name: Grecia Boyd  Clinic Number: [id number]    Therapy Diagnosis:   Encounter Diagnoses   Name Primary?    Decreased range of motion of shoulder, left Yes    Posture abnormality      Physician: Naif Ramirez MD    Visit Date: 6/26/2024  Physician Orders: PT Eval and Treat   Medical Diagnosis from Referral: Closed displaced fracture of glenoid cavity of left scapula, initial encounter [S42.142A], Traumatic complete tear of left rotator cuff, initial encounter [S46.012A], Labral tear of shoulder, left, initial encounter [S43.432A]   Evaluation Date: 5/20/2024  Authorization Period Expiration: 5/13/2025  Plan of Care Expiration: 9/20/2024  Progress Note Due: 6/20/2024  Visit # / Visits authorized: 9/20   FOTO: 1/3    PTA Visit #: 0/5     FOTO first follow up:   FOTO second follow up:     Date of Surgery: 5/16/2024  Return to MD: 5/29/2024, 6/28/2024    POSTOPERATIVE DIAGNOSIS:   Left shoulder anterior shoulder dislocation with large bony Bankart   Left shoulder Dislocation, Anterior S43.016A   Left shoulder Loose Body M24.012   Left shoulder synovitis      Post-op Plan: Bony Bankart repair protocol with biceps tenodesis      Precautions: Standard     Time In: 10:00  Time Out: 11:00  Total Billable Time: 60 minutes    SUBJECTIVE     Pt reports: coming from the doctor who cleared her from the sling. Arm is feeling better today with less pain. Still feeling stiffness and anterior shoulder pain.     She was compliant with home exercise program.  Response to previous treatment: Independent in HEP   Functional change: Ongoing     Pain: 2/10  Location: left shoulder      OBJECTIVE     Objective Measures updated at progress report unless specified.     Observation 5/23/2024: Patient presents into clinic with sling donned and incisions covered with no signs of increased drainage or infection.     6/19/24     Range of Motion:  Shoulder   Left  "PROM   Flexion 90 degrees    Abduction NT   ER in scap plane  10 degrees        Treatment     *Pt is 2 weeks and 0 days s/p L Bankhart repair as of 5/29/2024.*    Grecia received the treatments listed below:      therapeutic exercises to develop strength, endurance, ROM, flexibility, posture, and core stabilization for 10 minutes including:  Supine press dowel 2x10  Supine flex dowel 2x10  Supine ER dowel 20x3"    manual therapy techniques: Joint mobilizations, PROM and Soft tissue Mobilization were applied to the: Shoulder, Elbow for 10 minutes, including:    (L) PROM shoulder/elbow within protocol   (L) Gentle isometrics in scap plane arm supported with PT     neuromuscular re-education activities to improve: Coordination, Kinesthetic, Sense, Proprioception, and Posture for 30 minutes. The following activities were included:  Rhythmic stabilizations multiple rounds  ER walkouts yellow 3x10  Gentle shoulder ext modified range red with eccentric control 3x10  Table slide 3x10  Scap retractions 20x5"  Pt education    Np   Seated scapular retractions 2 x 10 x 5" holds   - cues for form     therapeutic activities to improve functional performance for 08 minutes, including:  UBE 8'      Patient Education and Home Exercises     Home Exercises Provided and Patient Education Provided     Education provided:   - Continue with HEP  - Post-op precautions and early goals, importance of sling compliance and monitoring incisions for signs/symptoms of infection     Written Home Exercises Provided: Patient instructed to cont prior HEP. Exercises were reviewed and Grecia was able to demonstrate them prior to the end of the session.  Grecia demonstrated good  understanding of the education provided. See EMR under Patient Instructions for exercises provided during therapy sessions    ASSESSMENT     Grecia showing improvement and less pain from last session. Still having limitation with range and stiffness/anterior shoulder pain. " Still also limited with ER and improved with stretching. Continued to educate about sling and weaning out of sling. Importance of performing range exercises at home. Will continue to progress.     Grecia Is progressing well towards her goals.   Pt prognosis is Good.     Pt will continue to benefit from skilled outpatient physical therapy to address the deficits listed in the problem list box on initial evaluation, provide pt/family education and to maximize pt's level of independence in the home and community environment.     Pt's spiritual, cultural and educational needs considered and pt agreeable to plan of care and goals.     Anticipated barriers to physical therapy: Scheduling     Goals:   Short Term Goals: 4-6 weeks (Progressing, not met)  Patient will be independent in initial HEP to help supplement PT.   Patient will improve passive shoulder flexion to >/= 140 degrees to help with reaching/lifting.   Patient will improve pain at its worst to </= 5/10 to help improve quality of life.      Long Term Goals: 12-16 weeks (Progressing, not met)  Patient will be independent in updated HEPT to help supplement PT and maintain gains made in PT.   Patient will improve FOTO score to >/= predicted (68) to show improvement in condition.   Patient will improve AROM shoulder flexion to >/= 150 degrees to help with ADLs.   Patient will improve shoulder strength MMT to >/= 4/5 to help with job duties.   Patient will improve pain at its worst to </= 1/10 to help improve quality of life.   Patient goal: Patient will be able to return to work fully with no difficulty.    PLAN   Plan of care Certification: 5/20/2024 to 9/20/2024.     Continue with PT POC within protocol limits.     Flo Ramos, PT, DPT, OCS    Co-Treatment Kay Carolina, PT, DPT, OCS

## 2024-06-29 NOTE — PROGRESS NOTES
[facility]   Physical Therapy Treatment Note     Name: Grecia Boyd  Clinic Number: [id number]    Therapy Diagnosis:   Encounter Diagnoses   Name Primary?    Decreased range of motion of shoulder, left Yes    Posture abnormality      Physician: Naif Ramirez MD    Visit Date: 6/10/2024  Physician Orders: PT Eval and Treat   Medical Diagnosis from Referral: Closed displaced fracture of glenoid cavity of left scapula, initial encounter [S42.142A], Traumatic complete tear of left rotator cuff, initial encounter [S46.012A], Labral tear of shoulder, left, initial encounter [S43.432A]   Evaluation Date: 5/20/2024  Authorization Period Expiration: 5/13/2025  Plan of Care Expiration: 9/20/2024  Progress Note Due: 6/20/2024  Visit # / Visits authorized: 3/20   FOTO: 1/3    PTA Visit #: 0/5     FOTO first follow up:   FOTO second follow up:     Date of Surgery: 5/16/2024  Return to MD: 5/29/2024, 6/28/2024    POSTOPERATIVE DIAGNOSIS:   Left shoulder anterior shoulder dislocation with large bony Bankart   Left shoulder Dislocation, Anterior S43.016A   Left shoulder Loose Body M24.012   Left shoulder synovitis      Post-op Plan: Bony Bankart repair protocol with biceps tenodesis      Precautions: Standard     Time In: 9:00 am   Time Out: 9:43 am  Total Billable Time: 43 minutes    SUBJECTIVE     Pt reports: Was out of town but made sure to try and work and do her exercises. She is feeling stiff today.   She was compliant with home exercise program.  Response to previous treatment: Independent in HEP   Functional change: Ongoing     Pain: 2/10  Location: left shoulder      OBJECTIVE     Objective Measures updated at progress report unless specified.     Observation 5/23/2024: Patient presents into clinic with sling donned and incisions covered with no signs of increased drainage or infection.     Range of Motion:  Shoulder   Left PROM   Flexion 60 degrees    Abduction NT   ER in scap plane  10  "degrees        Treatment     *Pt is  3 weeks and 5 days s/p L Bankhart repair as of 6/10/2024.*    Grecia received the treatments listed below:      therapeutic exercises to develop strength, endurance, ROM, flexibility, posture, and core stabilization for 23 minutes including:    Seated thoracic extensions 1 x 15 x 3" holds   Pendulums 4 x 30"  Seated shoulder supported wrist flexion/extension 3 x 10 reps   Gentle passive walkouts within protocol 2 x 10 x 3" holds  - cueing for form  Table slide flexion passively x 3'     manual therapy techniques: Joint mobilizations, PROM and Soft tissue Mobilization were applied to the: Shoulder, Elbow for 10 minutes, including:    (L) PROM shoulder/elbow within protocol   (L) Gentle isometrics in scap plane arm supported with PT     neuromuscular re-education activities to improve: Coordination, Kinesthetic, Sense, Proprioception, and Posture for 10 minutes. The following activities were included:    Rhythmic stabilizations multiple rounds  - also seated with arm supported   Seated scapular retractions 2 x 10 x 5" holds   - cues for form     therapeutic activities to improve functional performance for 00 minutes, including:      Patient Education and Home Exercises     Home Exercises Provided and Patient Education Provided     Education provided:   - Continue with HEP  - Post-op precautions and early goals, importance of sling compliance and monitoring incisions for signs/symptoms of infection     Written Home Exercises Provided: Patient instructed to cont prior HEP. Exercises were reviewed and Grecia was able to demonstrate them prior to the end of the session.  Grecia demonstrated good  understanding of the education provided. See EMR under Patient Instructions for exercises provided during therapy sessions    ASSESSMENT     Grecia continues with apprehension and pain with mobility work and poor tolerance to manual therapy at today's session. She continues to be " challenged with passive mobility work and rhythmic stabilizations to help improve her pain/discomfort symptoms. Pt education on importance of exercises at home working through mobility to tolerance to help improve symptoms and stiffness. Will continue to monitor and progress as able.     Grecia Is progressing well towards her goals.   Pt prognosis is Good.     Pt will continue to benefit from skilled outpatient physical therapy to address the deficits listed in the problem list box on initial evaluation, provide pt/family education and to maximize pt's level of independence in the home and community environment.     Pt's spiritual, cultural and educational needs considered and pt agreeable to plan of care and goals.     Anticipated barriers to physical therapy: Scheduling     Goals:   Short Term Goals: 4-6 weeks (Progressing, not met)  Patient will be independent in initial HEP to help supplement PT.   Patient will improve passive shoulder flexion to >/= 140 degrees to help with reaching/lifting.   Patient will improve pain at its worst to </= 5/10 to help improve quality of life.      Long Term Goals: 12-16 weeks (Progressing, not met)  Patient will be independent in updated HEPT to help supplement PT and maintain gains made in PT.   Patient will improve FOTO score to >/= predicted (68) to show improvement in condition.   Patient will improve AROM shoulder flexion to >/= 150 degrees to help with ADLs.   Patient will improve shoulder strength MMT to >/= 4/5 to help with job duties.   Patient will improve pain at its worst to </= 1/10 to help improve quality of life.   Patient goal: Patient will be able to return to work fully with no difficulty.    PLAN   Plan of care Certification: 5/20/2024 to 9/20/2024.     Continue with PT POC within protocol limits.     Kay Carolina, PT, DPT, OCS

## 2024-06-30 NOTE — PROGRESS NOTES
[facility]   Physical Therapy Treatment Note     Name: Grecia Boyd  Clinic Number: [id number]    Therapy Diagnosis:   Encounter Diagnoses   Name Primary?    Decreased range of motion of shoulder, left Yes    Posture abnormality      Physician: Naif Ramirez MD    Visit Date: 6/17/2024  Physician Orders: PT Eval and Treat   Medical Diagnosis from Referral: Closed displaced fracture of glenoid cavity of left scapula, initial encounter [S42.142A], Traumatic complete tear of left rotator cuff, initial encounter [S46.012A], Labral tear of shoulder, left, initial encounter [S43.432A]   Evaluation Date: 5/20/2024  Authorization Period Expiration: 5/13/2025  Plan of Care Expiration: 9/20/2024  Progress Note Due: 6/20/2024  Visit # / Visits authorized: 5/20   FOTO: 1/3    PTA Visit #: 0/5     FOTO first follow up:   FOTO second follow up:     Date of Surgery: 5/16/2024  Return to MD: 5/29/2024, 6/28/2024    POSTOPERATIVE DIAGNOSIS:   Left shoulder anterior shoulder dislocation with large bony Bankart   Left shoulder Dislocation, Anterior S43.016A   Left shoulder Loose Body M24.012   Left shoulder synovitis      Post-op Plan: Bony Bankart repair protocol with biceps tenodesis      Precautions: Standard     Time In: 9:03 am   Time Out: 9:56 am   Total Billable Time:  53 minutes    SUBJECTIVE     Pt reports: Still having discomfort in her shoulder and difficulty sleeping at night. Wants to get this sling off.     She was compliant with home exercise program.  Response to previous treatment: Independent in HEP   Functional change: Ongoing     Pain: 2/10  Location: left shoulder      OBJECTIVE     Objective Measures updated at progress report unless specified.     Observation 5/23/2024: Patient presents into clinic with sling donned and incisions covered with no signs of increased drainage or infection.     Range of Motion:  Shoulder   Left PROM   Flexion 60 degrees    Abduction NT   ER in scap  "plane  10 degrees        Treatment     *Pt is 2 weeks and 0 days s/p L Bankhart repair as of 5/29/2024.*    Grecia received the treatments listed below:      therapeutic exercises to develop strength, endurance, ROM, flexibility, posture, and core stabilization for 12 minutes including:    Seated thoracic extensions 1 x 15 x 3" holds   Gentle passive walkouts within protocol 2 x 10 x 3" holds  - cueing for form  Table slide flexion passively x 3'     Not Today:  Seated shoulder supported wrist flexion/extension 3 x 10 reps       manual therapy techniques: Joint mobilizations, PROM and Soft tissue Mobilization were applied to the: Shoulder, Elbow for 12 minutes, including:    (L) PROM shoulder/elbow within protocol   (L) Gentle isometrics in scap plane arm supported with PT     neuromuscular re-education activities to improve: Coordination, Kinesthetic, Sense, Proprioception, and Posture for 29 minutes. The following activities were included:    Rhythmic stabilizations multiple rounds  ER/IR isometrics walkouts YellowTB 3 x 5 x 3" holds   Hitch hikers 2 x 10 x 3" holds   Seated scapular retractions 10 x 10" holds  Pendulum position scapular retractions 2 x 8 x 3" holds     Not Performed:  ER/flex isometrics with arm supported seated 20x5" each  Scap retractions 20x5"  Wrist flex/ext 1# 3x15  Seated scapular retractions 2 x 10 x 5" holds   - cues for form     therapeutic activities to improve functional performance for 00 minutes, including:      Patient Education and Home Exercises     Home Exercises Provided and Patient Education Provided     Education provided:   - Continue with HEP  - Post-op precautions and early goals, importance of sling compliance and monitoring incisions for signs/symptoms of infection     Written Home Exercises Provided: Patient instructed to cont prior HEP. Exercises were reviewed and Grecia was able to demonstrate them prior to the end of the session.  Grecia demonstrated good  " understanding of the education provided. See EMR under Patient Instructions for exercises provided during therapy sessions    ASSESSMENT     Grecia continues with limitations in range of motion and guarding with manual therapy assessment. Continued use of passive range of motion therex in her control to help improve mobility within her tolerance. Added further isometric training as well to help improve muscle contraction and help with increased pain/guarding with good tolerance. Further scapular strengthening added as well today as she continues with poor scapular control. Will continue to monitor and progress as able.     Grecia Is progressing well towards her goals.   Pt prognosis is Good.     Pt will continue to benefit from skilled outpatient physical therapy to address the deficits listed in the problem list box on initial evaluation, provide pt/family education and to maximize pt's level of independence in the home and community environment.     Pt's spiritual, cultural and educational needs considered and pt agreeable to plan of care and goals.     Anticipated barriers to physical therapy: Scheduling     Goals:   Short Term Goals: 4-6 weeks (Progressing, not met)  Patient will be independent in initial HEP to help supplement PT.   Patient will improve passive shoulder flexion to >/= 140 degrees to help with reaching/lifting.   Patient will improve pain at its worst to </= 5/10 to help improve quality of life.      Long Term Goals: 12-16 weeks (Progressing, not met)  Patient will be independent in updated HEPT to help supplement PT and maintain gains made in PT.   Patient will improve FOTO score to >/= predicted (68) to show improvement in condition.   Patient will improve AROM shoulder flexion to >/= 150 degrees to help with ADLs.   Patient will improve shoulder strength MMT to >/= 4/5 to help with job duties.   Patient will improve pain at its worst to </= 1/10 to help improve quality of life.   Patient  goal: Patient will be able to return to work fully with no difficulty.    PLAN   Plan of care Certification: 5/20/2024 to 9/20/2024.     Continue with PT POC within protocol limits.     Kay Carolina, PT, DPT, OCS

## 2024-06-30 NOTE — PROGRESS NOTES
[facility]   Physical Therapy Treatment Note     Name: Grecia Boyd  Clinic Number: [id number]    Therapy Diagnosis:   Encounter Diagnoses   Name Primary?    Decreased range of motion of shoulder, left Yes    Posture abnormality      Physician: Naif Ramirez MD    Visit Date: 6/24/2024  Physician Orders: PT Eval and Treat   Medical Diagnosis from Referral: Closed displaced fracture of glenoid cavity of left scapula, initial encounter [S42.142A], Traumatic complete tear of left rotator cuff, initial encounter [S46.012A], Labral tear of shoulder, left, initial encounter [S43.432A]   Evaluation Date: 5/20/2024  Authorization Period Expiration: 5/13/2025  Plan of Care Expiration: 9/20/2024  Progress Note Due: 6/20/2024  Visit # / Visits authorized: 8/20   FOTO: 1/3    PTA Visit #: 0/5     FOTO first follow up:   FOTO second follow up:     Date of Surgery: 5/16/2024  Return to MD: 5/29/2024, 6/28/2024    POSTOPERATIVE DIAGNOSIS:   Left shoulder anterior shoulder dislocation with large bony Bankart   Left shoulder Dislocation, Anterior S43.016A   Left shoulder Loose Body M24.012   Left shoulder synovitis      Post-op Plan: Bony Bankart repair protocol with biceps tenodesis      Precautions: Standard     Time In: 11:25 **  Time Out: 12:05  Total Billable Time: 40 minutes    SUBJECTIVE     Pt reports: running a little late today due to the bus and having to walk to the clinic. Shoulder is still hurting a lot but feeling a little better. **    She was compliant with home exercise program.  Response to previous treatment: Independent in HEP   Functional change: Ongoing     Pain: 2/10  Location: left shoulder      OBJECTIVE     Objective Measures updated at progress report unless specified.     Observation 5/23/2024: Patient presents into clinic with sling donned and incisions covered with no signs of increased drainage or infection.     6/19/24     Range of Motion:  Shoulder   Left PROM  "  Flexion 80 degrees    Abduction NT   ER in scap plane  10 degrees        Treatment     *Pt is 2 weeks and 0 days s/p L Bankhart repair as of 5/29/2024.*  **  Grecia received the treatments listed below:      therapeutic exercises to develop strength, endurance, ROM, flexibility, posture, and core stabilization for 0 minutes including:    Seated thoracic extensions 1 x 15 x 3" holds   Seated shoulder supported wrist flexion/extension 3 x 10 reps   Gentle passive walkouts within protocol 2 x 10 x 3" holds  - cueing for form  Table slide flexion passively x 3'     manual therapy techniques: Joint mobilizations, PROM and Soft tissue Mobilization were applied to the: Shoulder, Elbow for 10 minutes, including:    (L) PROM shoulder/elbow within protocol   (L) Gentle isometrics in scap plane arm supported with PT     neuromuscular re-education activities to improve: Coordination, Kinesthetic, Sense, Proprioception, and Posture for 30 minutes. The following activities were included:  Rhythmic stabilizations multiple rounds  ER/IR walkouts yellow 3x10  Gentle shoulder ext modified range red with eccentric control 3x10  Table slide 3x10  Scap retractions 20x5"  Pt education    Np   Seated scapular retractions 2 x 10 x 5" holds   - cues for form     therapeutic activities to improve functional performance for 00 minutes, including:      Patient Education and Home Exercises     Home Exercises Provided and Patient Education Provided     Education provided:   - Continue with HEP  - Post-op precautions and early goals, importance of sling compliance and monitoring incisions for signs/symptoms of infection     Written Home Exercises Provided: Patient instructed to cont prior HEP. Exercises were reviewed and Grecia was able to demonstrate them prior to the end of the session.  Grecia demonstrated good  understanding of the education provided. See EMR under Patient Instructions for exercises provided during therapy " sessions    ASSESSMENT     **Grecia still having some pain but is improving from last session and with PROM. Discussion about getting out of the sling at home and working on range of motion. Went through HEP and some exercises to work at home. Will continue to progress with range.     Grecia Is progressing well towards her goals.   Pt prognosis is Good.     Pt will continue to benefit from skilled outpatient physical therapy to address the deficits listed in the problem list box on initial evaluation, provide pt/family education and to maximize pt's level of independence in the home and community environment.     Pt's spiritual, cultural and educational needs considered and pt agreeable to plan of care and goals.     Anticipated barriers to physical therapy: Scheduling     Goals:   Short Term Goals: 4-6 weeks (Progressing, not met)  Patient will be independent in initial HEP to help supplement PT.   Patient will improve passive shoulder flexion to >/= 140 degrees to help with reaching/lifting.   Patient will improve pain at its worst to </= 5/10 to help improve quality of life.      Long Term Goals: 12-16 weeks (Progressing, not met)  Patient will be independent in updated HEPT to help supplement PT and maintain gains made in PT.   Patient will improve FOTO score to >/= predicted (68) to show improvement in condition.   Patient will improve AROM shoulder flexion to >/= 150 degrees to help with ADLs.   Patient will improve shoulder strength MMT to >/= 4/5 to help with job duties.   Patient will improve pain at its worst to </= 1/10 to help improve quality of life.   Patient goal: Patient will be able to return to work fully with no difficulty.    PLAN   Plan of care Certification: 5/20/2024 to 9/20/2024.     Continue with PT POC within protocol limits.     Kay Carolina, PT, DPT, OCS        fully with no difficulty.    PLAN   Plan of care Certification: 5/20/2024 to 9/20/2024.     Continue with PT POC within protocol limits.     Kay Carolina, PT, DPT, OCS

## 2024-07-01 ENCOUNTER — CLINICAL SUPPORT (OUTPATIENT)
Dept: REHABILITATION | Facility: HOSPITAL | Age: 59
End: 2024-07-01
Payer: COMMERCIAL

## 2024-07-01 DIAGNOSIS — M25.612 DECREASED RANGE OF MOTION OF SHOULDER, LEFT: Primary | ICD-10-CM

## 2024-07-01 DIAGNOSIS — R29.3 POSTURE ABNORMALITY: ICD-10-CM

## 2024-07-01 PROCEDURE — 97112 NEUROMUSCULAR REEDUCATION: CPT

## 2024-07-01 PROCEDURE — 97140 MANUAL THERAPY 1/> REGIONS: CPT

## 2024-07-01 PROCEDURE — 97110 THERAPEUTIC EXERCISES: CPT

## 2024-07-01 NOTE — PROGRESS NOTES
[facility]   Physical Therapy Treatment Note     Name: Grecia Boyd  Clinic Number: [id number]    Therapy Diagnosis:   Encounter Diagnoses   Name Primary?    Decreased range of motion of shoulder, left Yes    Posture abnormality      Physician: Naif Ramirez MD    Visit Date: 7/1/2024  Physician Orders: PT Eval and Treat   Medical Diagnosis from Referral: Closed displaced fracture of glenoid cavity of left scapula, initial encounter [S42.142A], Traumatic complete tear of left rotator cuff, initial encounter [S46.012A], Labral tear of shoulder, left, initial encounter [S43.432A]   Evaluation Date: 5/20/2024  Authorization Period Expiration: 5/13/2025  Plan of Care Expiration: 9/20/2024  Progress Note Due: 6/20/2024  Visit # / Visits authorized: 9/20   FOTO: 1/3    PTA Visit #: 0/5     FOTO first follow up:   FOTO second follow up:     Date of Surgery: 5/16/2024  Return to MD: 5/29/2024, 6/28/2024    POSTOPERATIVE DIAGNOSIS:   Left shoulder anterior shoulder dislocation with large bony Bankart   Left shoulder Dislocation, Anterior S43.016A   Left shoulder Loose Body M24.012   Left shoulder synovitis      Post-op Plan: Bony Bankart repair protocol with biceps tenodesis      Precautions: Standard     Time In: 10:01 am  Time Out: 11:00 am  Total Billable Time: 59 minutes    SUBJECTIVE     Pt reports: She is doing ok, still feeling stiff in her shoulder and having trouble sleeping. Feels like her arm is always sore.   She was compliant with home exercise program.  Response to previous treatment: Independent in HEP   Functional change: Ongoing     Pain: 2/10  Location: left shoulder      OBJECTIVE     Objective Measures updated at progress report unless specified.     Observation 5/23/2024: Patient presents into clinic with sling donned and incisions covered with no signs of increased drainage or infection.     6/19/24     Range of Motion:  Shoulder   Left PROM   Flexion 90 degrees   "  Abduction NT   ER in scap plane  10 degrees        Treatment     *Pt is 6 weeks and 5 days s/p L Bankhart repair as of 7/1/2024.*    Grecia received the treatments listed below:      therapeutic exercises to develop strength, endurance, ROM, flexibility, posture, and core stabilization for 10 minutes including:    Supine ER with dowle 1 x 15 x 5" holds  Supine dowel press into overhead flexion to tolerance 2 x 10 reps     manual therapy techniques: Joint mobilizations, PROM and Soft tissue Mobilization were applied to the: Shoulder, Elbow for 12 minutes, including:    (L) PROM shoulder/elbow within protocol   (L) Gentle isometrics in scap plane arm supported with PT     neuromuscular re-education activities to improve: Coordination, Kinesthetic, Sense, Proprioception, and Posture for 29 minutes. The following activities were included:    Pt education on importance of exercises outside of PT and working on her mobility.   Rhythmic stabilizations multiple rounds  ER walkouts YellowTB 3 x 6 x 5" holds   Scapular retractions to neutral with GreenTB 3 x 8 x 3" holds  Bent over scapular retractions in pendulum position 2 x 8-10 reps x 3" holds  Table slides elevated x 2'     Not Performed:  Gentle shoulder ext modified range red with eccentric control 3x10  Table slide 3x10  Scap retractions 20x5"   Seated scapular retractions 2 x 10 x 5" holds   - cues for form     therapeutic activities to improve functional performance for 08 minutes, including:    UBE x 8' for range of motion, functional mobility, upper extremity strengthening, and cardiovascular endurance       Patient Education and Home Exercises     Home Exercises Provided and Patient Education Provided     Education provided:   - Continue with HEP  - Post-op precautions and early goals, importance of sling compliance and monitoring incisions for signs/symptoms of infection     Written Home Exercises Provided: Patient instructed to cont prior HEP. Exercises were " reviewed and Grecia was able to demonstrate them prior to the end of the session.  Grecia demonstrated good  understanding of the education provided. See EMR under Patient Instructions for exercises provided during therapy sessions    ASSESSMENT     Grecia presented to today's session with increased stiffness, with improvement in discomfort/pain. Continued focus on improving mobility as she continues with limitations in all planes of motion with most limitations in external rotation and continued emphasis on improving. She continues to respond well to rhythmic stabilizations and continued use at today's session. Will continue to monitor and progress as able.     Grecia Is progressing well towards her goals.   Pt prognosis is Good.     Pt will continue to benefit from skilled outpatient physical therapy to address the deficits listed in the problem list box on initial evaluation, provide pt/family education and to maximize pt's level of independence in the home and community environment.     Pt's spiritual, cultural and educational needs considered and pt agreeable to plan of care and goals.     Anticipated barriers to physical therapy: Scheduling     Goals:   Short Term Goals: 4-6 weeks (Progressing, not met)  Patient will be independent in initial HEP to help supplement PT.   Patient will improve passive shoulder flexion to >/= 140 degrees to help with reaching/lifting.   Patient will improve pain at its worst to </= 5/10 to help improve quality of life.      Long Term Goals: 12-16 weeks (Progressing, not met)  Patient will be independent in updated HEPT to help supplement PT and maintain gains made in PT.   Patient will improve FOTO score to >/= predicted (68) to show improvement in condition.   Patient will improve AROM shoulder flexion to >/= 150 degrees to help with ADLs.   Patient will improve shoulder strength MMT to >/= 4/5 to help with job duties.   Patient will improve pain at its worst to </= 1/10 to  help improve quality of life.   Patient goal: Patient will be able to return to work fully with no difficulty.    PLAN   Plan of care Certification: 5/20/2024 to 9/20/2024.     Continue with PT POC within protocol limits.     Kay Carolina, PT, DPT, OCS    Co-Treatment Kay Carolina PT, DPT, OCS

## 2024-07-03 ENCOUNTER — CLINICAL SUPPORT (OUTPATIENT)
Dept: REHABILITATION | Facility: HOSPITAL | Age: 59
End: 2024-07-03
Payer: COMMERCIAL

## 2024-07-03 DIAGNOSIS — M25.612 DECREASED RANGE OF MOTION OF SHOULDER, LEFT: Primary | ICD-10-CM

## 2024-07-03 DIAGNOSIS — R29.3 POSTURE ABNORMALITY: ICD-10-CM

## 2024-07-03 PROCEDURE — 97112 NEUROMUSCULAR REEDUCATION: CPT

## 2024-07-03 PROCEDURE — 97110 THERAPEUTIC EXERCISES: CPT

## 2024-07-03 PROCEDURE — 97530 THERAPEUTIC ACTIVITIES: CPT

## 2024-07-03 PROCEDURE — 97140 MANUAL THERAPY 1/> REGIONS: CPT

## 2024-07-06 NOTE — PROGRESS NOTES
[facility]   Physical Therapy Treatment Note     Name: Grecia Boyd  Clinic Number: [id number]    Therapy Diagnosis:   Encounter Diagnoses   Name Primary?    Decreased range of motion of shoulder, left Yes    Posture abnormality      Physician: Naif Ramirez MD    Visit Date: 7/3/2024  Physician Orders: PT Eval and Treat   Medical Diagnosis from Referral: Closed displaced fracture of glenoid cavity of left scapula, initial encounter [S42.142A], Traumatic complete tear of left rotator cuff, initial encounter [S46.012A], Labral tear of shoulder, left, initial encounter [S43.432A]   Evaluation Date: 5/20/2024  Authorization Period Expiration: 5/13/2025  Plan of Care Expiration: 9/20/2024  Progress Note Due: 6/20/2024  Visit # / Visits authorized: 10/20   FOTO: 1/3    PTA Visit #: 0/5     FOTO first follow up:   FOTO second follow up:     Date of Surgery: 5/16/2024  Return to MD: 5/29/2024, 6/28/2024    POSTOPERATIVE DIAGNOSIS:   Left shoulder anterior shoulder dislocation with large bony Bankart   Left shoulder Dislocation, Anterior S43.016A   Left shoulder Loose Body M24.012   Left shoulder synovitis      Post-op Plan: Bony Bankart repair protocol with biceps tenodesis      Precautions: Standard     Time In: 10:00  Time Out: 11:00  Total Billable Time: 60 minutes    SUBJECTIVE     Pt reports: coming from the doctor who cleared her from the sling. Arm is feeling better today with less pain. Still feeling stiffness and anterior shoulder pain.     She was compliant with home exercise program.  Response to previous treatment: Independent in HEP   Functional change: Ongoing     Pain: 2/10  Location: left shoulder      OBJECTIVE     Objective Measures updated at progress report unless specified.     Observation 5/23/2024: Patient presents into clinic with sling donned and incisions covered with no signs of increased drainage or infection.     6/19/24     Range of Motion:  Shoulder   Left  "PROM   Flexion 90 degrees    Abduction NT   ER in scap plane  10 degrees        Treatment     *Pt is 2 weeks and 0 days s/p L Bankhart repair as of 5/29/2024.*    Grecia received the treatments listed below:      therapeutic exercises to develop strength, endurance, ROM, flexibility, posture, and core stabilization for 17 minutes including:  Supine press dowel 2x10  Supine flex dowel 2x10  Supine ER dowel 20x3"  Seated ER stretching     manual therapy techniques: Joint mobilizations, PROM and Soft tissue Mobilization were applied to the: Shoulder, Elbow for 10 minutes, including:    (L) PROM shoulder/elbow within protocol   (L) Gentle isometrics in scap plane arm supported with PT     neuromuscular re-education activities to improve: Coordination, Kinesthetic, Sense, Proprioception, and Posture for 20 minutes. The following activities were included:  Rhythmic stabilizations multiple rounds - np  ER walkouts yellow 3x10  Gentle shoulder ext modified range red with eccentric control 3x10  Table slide 3x10  Pt education    Np   Seated scapular retractions 2 x 10 x 5" holds   - cues for form     therapeutic activities to improve functional performance for 08 minutes, including:  UBE 8'       Patient Education and Home Exercises     Home Exercises Provided and Patient Education Provided     Education provided:   - Continue with HEP  - Post-op precautions and early goals, importance of sling compliance and monitoring incisions for signs/symptoms of infection     Written Home Exercises Provided: Patient instructed to cont prior HEP. Exercises were reviewed and Grecia was able to demonstrate them prior to the end of the session.  Grecia demonstrated good  understanding of the education provided. See EMR under Patient Instructions for exercises provided during therapy sessions    ASSESSMENT     Grecia still limited with range of motion and having some increased soreness. Worked on manual and focusing on range of motion " today. Was able to improve with manual and less pain. Still limited with overhead and will continue to progress as tolerated.     Grecia Is progressing well towards her goals.   Pt prognosis is Good.     Pt will continue to benefit from skilled outpatient physical therapy to address the deficits listed in the problem list box on initial evaluation, provide pt/family education and to maximize pt's level of independence in the home and community environment.     Pt's spiritual, cultural and educational needs considered and pt agreeable to plan of care and goals.     Anticipated barriers to physical therapy: Scheduling     Goals:   Short Term Goals: 4-6 weeks (Progressing, not met)  Patient will be independent in initial HEP to help supplement PT.   Patient will improve passive shoulder flexion to >/= 140 degrees to help with reaching/lifting.   Patient will improve pain at its worst to </= 5/10 to help improve quality of life.      Long Term Goals: 12-16 weeks (Progressing, not met)  Patient will be independent in updated HEPT to help supplement PT and maintain gains made in PT.   Patient will improve FOTO score to >/= predicted (68) to show improvement in condition.   Patient will improve AROM shoulder flexion to >/= 150 degrees to help with ADLs.   Patient will improve shoulder strength MMT to >/= 4/5 to help with job duties.   Patient will improve pain at its worst to </= 1/10 to help improve quality of life.   Patient goal: Patient will be able to return to work fully with no difficulty.    PLAN   Plan of care Certification: 5/20/2024 to 9/20/2024.     Continue with PT POC within protocol limits.     Flo Ramos, PT, DPT, OCS    Co-Treatment Kay Carolina, PT, DPT, OCS

## 2024-07-08 ENCOUNTER — CLINICAL SUPPORT (OUTPATIENT)
Dept: REHABILITATION | Facility: HOSPITAL | Age: 59
End: 2024-07-08
Payer: COMMERCIAL

## 2024-07-08 DIAGNOSIS — M25.612 DECREASED RANGE OF MOTION OF SHOULDER, LEFT: Primary | ICD-10-CM

## 2024-07-08 DIAGNOSIS — R29.3 POSTURE ABNORMALITY: ICD-10-CM

## 2024-07-08 PROCEDURE — 97140 MANUAL THERAPY 1/> REGIONS: CPT

## 2024-07-08 PROCEDURE — 97112 NEUROMUSCULAR REEDUCATION: CPT

## 2024-07-08 PROCEDURE — 97110 THERAPEUTIC EXERCISES: CPT

## 2024-07-08 NOTE — PROGRESS NOTES
[facility]   Physical Therapy Treatment Note     Name: Grecia Boyd  Clinic Number: [id number]    Therapy Diagnosis:   Encounter Diagnoses   Name Primary?    Decreased range of motion of shoulder, left Yes    Posture abnormality      Physician: Naif Ramirez MD    Visit Date: 7/8/2024  Physician Orders: PT Eval and Treat   Medical Diagnosis from Referral: Closed displaced fracture of glenoid cavity of left scapula, initial encounter [S42.142A], Traumatic complete tear of left rotator cuff, initial encounter [S46.012A], Labral tear of shoulder, left, initial encounter [S43.432A]   Evaluation Date: 5/20/2024  Authorization Period Expiration: 5/13/2025  Plan of Care Expiration: 9/20/2024  Progress Note Due: 6/20/2024  Visit # / Visits authorized: 12/20   FOTO: 1/3    PTA Visit #: 0/5     FOTO first follow up:   FOTO second follow up:     Date of Surgery: 5/16/2024  Return to MD: 5/29/2024, 6/28/2024    POSTOPERATIVE DIAGNOSIS:   Left shoulder anterior shoulder dislocation with large bony Bankart   Left shoulder Dislocation, Anterior S43.016A   Left shoulder Loose Body M24.012   Left shoulder synovitis      Post-op Plan: Bony Bankart repair protocol with biceps tenodesis      Precautions: Standard     Time In: 2:00  Time Out: 3:30  Total Billable Time: 60 minutes    SUBJECTIVE     Pt reports: not having a good day today and having a lot of pain.     She was compliant with home exercise program.  Response to previous treatment: Independent in HEP   Functional change: Ongoing     Pain: 2/10  Location: left shoulder      OBJECTIVE     Objective Measures updated at progress report unless specified.     Observation 5/23/2024: Patient presents into clinic with sling donned and incisions covered with no signs of increased drainage or infection.     6/19/24     Range of Motion:  Shoulder   Left PROM   Flexion 90 degrees    Abduction NT   ER in scap plane  10 degrees        Treatment     *Pt  "is 2 weeks and 0 days s/p L Bankhart repair as of 5/29/2024.*    Grecia received the treatments listed below:      therapeutic exercises to develop strength, endurance, ROM, flexibility, posture, and core stabilization for 27 minutes including:  Supine press dowel 2x10  Supine flex dowel 2x10  Table ER stretch 3x10x5"  ER stretch 2x10x5"    Np   Supine ER dowel 20x3"    manual therapy techniques: Joint mobilizations, PROM and Soft tissue Mobilization were applied to the: Shoulder, Elbow for 15 minutes, including:    (L) PROM shoulder/elbow within protocol   (L) Gentle isometrics in scap plane arm supported with PT   Posterior/inferior mobilizations     neuromuscular re-education activities to improve: Coordination, Kinesthetic, Sense, Proprioception, and Posture for 30 minutes. The following activities were included:  Rhythmic stabilizations multiple rounds  Slot machine 3x10  Seated flex CC AAROM 3# 3x10  Sidelying serratus dowel 2x10  Pt education    Np   ER walkouts yellow 3x10  Gentle shoulder ext modified range red with eccentric control 3x10  Table slide 3x10  Scap retractions 20x5"  Pt education    therapeutic activities to improve functional performance for 08 minutes, including:  UBE 8'     Ice at the beginning of the session for 8' to reduce irritability       Patient Education and Home Exercises     Home Exercises Provided and Patient Education Provided     Education provided:   - Continue with HEP  - Post-op precautions and early goals, importance of sling compliance and monitoring incisions for signs/symptoms of infection     Written Home Exercises Provided: Patient instructed to cont prior HEP. Exercises were reviewed and Grecia was able to demonstrate them prior to the end of the session.  Grecia demonstrated good  understanding of the education provided. See EMR under Patient Instructions for exercises provided during therapy sessions    ASSESSMENT     Grecia having significant pain/stiffness " today initially in the session. Was able to improve today with range and improved with manual. Focused on stretching and range today. She did have improved range and less pain at the end of the session. Educated on stretching and added exercises to perform at home. Will continue to progress.     Grecia Is progressing well towards her goals.   Pt prognosis is Good.     Pt will continue to benefit from skilled outpatient physical therapy to address the deficits listed in the problem list box on initial evaluation, provide pt/family education and to maximize pt's level of independence in the home and community environment.     Pt's spiritual, cultural and educational needs considered and pt agreeable to plan of care and goals.     Anticipated barriers to physical therapy: Scheduling     Goals:   Short Term Goals: 4-6 weeks (Progressing, not met)  Patient will be independent in initial HEP to help supplement PT.   Patient will improve passive shoulder flexion to >/= 140 degrees to help with reaching/lifting.   Patient will improve pain at its worst to </= 5/10 to help improve quality of life.      Long Term Goals: 12-16 weeks (Progressing, not met)  Patient will be independent in updated HEPT to help supplement PT and maintain gains made in PT.   Patient will improve FOTO score to >/= predicted (68) to show improvement in condition.   Patient will improve AROM shoulder flexion to >/= 150 degrees to help with ADLs.   Patient will improve shoulder strength MMT to >/= 4/5 to help with job duties.   Patient will improve pain at its worst to </= 1/10 to help improve quality of life.   Patient goal: Patient will be able to return to work fully with no difficulty.    PLAN   Plan of care Certification: 5/20/2024 to 9/20/2024.     Continue with PT POC within protocol limits.     Flo Ramos, PT, DPT, OCS

## 2024-07-10 ENCOUNTER — CLINICAL SUPPORT (OUTPATIENT)
Dept: REHABILITATION | Facility: HOSPITAL | Age: 59
End: 2024-07-10
Payer: COMMERCIAL

## 2024-07-10 ENCOUNTER — OFFICE VISIT (OUTPATIENT)
Dept: SPORTS MEDICINE | Facility: CLINIC | Age: 59
End: 2024-07-10
Payer: COMMERCIAL

## 2024-07-10 VITALS — HEIGHT: 62 IN | BODY MASS INDEX: 34.4 KG/M2

## 2024-07-10 DIAGNOSIS — R29.3 POSTURE ABNORMALITY: ICD-10-CM

## 2024-07-10 DIAGNOSIS — M24.612 ARTHROFIBROSIS OF LEFT SHOULDER: ICD-10-CM

## 2024-07-10 DIAGNOSIS — Z98.890 S/P ARTHROSCOPY OF LEFT SHOULDER: Primary | ICD-10-CM

## 2024-07-10 DIAGNOSIS — M25.612 DECREASED RANGE OF MOTION OF SHOULDER, LEFT: Primary | ICD-10-CM

## 2024-07-10 PROCEDURE — 3061F NEG MICROALBUMINURIA REV: CPT | Mod: CPTII,S$GLB,, | Performed by: ORTHOPAEDIC SURGERY

## 2024-07-10 PROCEDURE — 97530 THERAPEUTIC ACTIVITIES: CPT

## 2024-07-10 PROCEDURE — 4010F ACE/ARB THERAPY RXD/TAKEN: CPT | Mod: CPTII,S$GLB,, | Performed by: ORTHOPAEDIC SURGERY

## 2024-07-10 PROCEDURE — 99024 POSTOP FOLLOW-UP VISIT: CPT | Mod: S$GLB,,, | Performed by: ORTHOPAEDIC SURGERY

## 2024-07-10 PROCEDURE — 97110 THERAPEUTIC EXERCISES: CPT

## 2024-07-10 PROCEDURE — 97140 MANUAL THERAPY 1/> REGIONS: CPT

## 2024-07-10 PROCEDURE — 3044F HG A1C LEVEL LT 7.0%: CPT | Mod: CPTII,S$GLB,, | Performed by: ORTHOPAEDIC SURGERY

## 2024-07-10 PROCEDURE — 3066F NEPHROPATHY DOC TX: CPT | Mod: CPTII,S$GLB,, | Performed by: ORTHOPAEDIC SURGERY

## 2024-07-10 PROCEDURE — 99999 PR PBB SHADOW E&M-EST. PATIENT-LVL II: CPT | Mod: PBBFAC,,, | Performed by: ORTHOPAEDIC SURGERY

## 2024-07-10 PROCEDURE — 97112 NEUROMUSCULAR REEDUCATION: CPT

## 2024-07-10 NOTE — PROGRESS NOTES
[facility]   Physical Therapy Treatment Note     Name: Grecia Boyd  Clinic Number: [id number]    Therapy Diagnosis:   Encounter Diagnoses   Name Primary?    Decreased range of motion of shoulder, left Yes    Posture abnormality      Physician: Naif Ramirez MD    Visit Date: 7/10/2024  Physician Orders: PT Eval and Treat   Medical Diagnosis from Referral: Closed displaced fracture of glenoid cavity of left scapula, initial encounter [S42.142A], Traumatic complete tear of left rotator cuff, initial encounter [S46.012A], Labral tear of shoulder, left, initial encounter [S43.432A]   Evaluation Date: 5/20/2024  Authorization Period Expiration: 5/13/2025  Plan of Care Expiration: 9/20/2024  Progress Note Due: 6/20/2024  Visit # / Visits authorized: 13/20   FOTO: 1/3    PTA Visit #: 0/5     FOTO first follow up:   FOTO second follow up:     Date of Surgery: 5/16/2024  Return to MD: 5/29/2024, 6/28/2024    POSTOPERATIVE DIAGNOSIS:   Left shoulder anterior shoulder dislocation with large bony Bankart   Left shoulder Dislocation, Anterior S43.016A   Left shoulder Loose Body M24.012   Left shoulder synovitis      Post-op Plan: Bony Bankart repair protocol with biceps tenodesis      Precautions: Standard     Time In: 9:50  Time Out: 10:46  Total Billable Time: 56 minutes    SUBJECTIVE     Pt reports: she is still having a lot of pain. Feeling trouble sleeping at night and is really hurting a lot. Feels a little better with exercises and stretching but still really painful and stiff.     She was compliant with home exercise program.  Response to previous treatment: Independent in HEP   Functional change: Ongoing     Pain: 2/10  Location: left shoulder      OBJECTIVE     Objective Measures updated at progress report unless specified.     Observation 5/23/2024: Patient presents into clinic with sling donned and incisions covered with no signs of increased drainage or infection.     7/10/24  "    Range of Motion:  Shoulder   Left PROM   Flexion 80 degrees    Abduction NT   ER in scap plane  0 degrees        Treatment     *Pt is 7 weeks and 6 days s/p L Bankhart repair as of 7/10/2024.*    Grecia received the treatments listed below:      therapeutic exercises to develop strength, endurance, ROM, flexibility, posture, and core stabilization for 18 minutes including:  Supine press dowel 2x10  Supine flex dowel 2x10  Table ER stretch 3x10x5"  ER stretch 2x10x5"    Np   Supine ER dowel 20x3"    manual therapy techniques: Joint mobilizations, PROM and Soft tissue Mobilization were applied to the: Shoulder, Elbow for 15 minutes, including:    (L) PROM shoulder/elbow within protocol   (L) Gentle isometrics in scap plane arm supported with PT   Posterior/inferior mobilizations     neuromuscular re-education activities to improve: Coordination, Kinesthetic, Sense, Proprioception, and Posture for 18 minutes. The following activities were included:  Rhythmic stabilizations multiple rounds  Slot machine 3x10  Seated flex CC AAROM 3# 3x10  Sidelying serratus dowel 2x10  Pt education    Np   ER walkouts yellow 3x10  Gentle shoulder ext modified range red with eccentric control 3x10  Table slide 3x10  Scap retractions 20x5"  Pt education    therapeutic activities to improve functional performance for 08 minutes, including:  UBE 8'     Ice at the beginning of the session for 8' to reduce irritability       Patient Education and Home Exercises     Home Exercises Provided and Patient Education Provided     Education provided:   - Continue with HEP  - Post-op precautions and early goals, importance of sling compliance and monitoring incisions for signs/symptoms of infection     Written Home Exercises Provided: Patient instructed to cont prior HEP. Exercises were reviewed and Grecia was able to demonstrate them prior to the end of the session.  Grecia demonstrated good  understanding of the education provided. See EMR " under Patient Instructions for exercises provided during therapy sessions    ASSESSMENT     Grecia still having increased pain today but did have some improved PROM but still limited compared to last week. She did have some improvement with manual and with some gentle stretching but still painful with AAROM. Discussed with doctor and staff about symptoms and she will see him today due to lacking of range and no improvement. Will continue to progress as able.     Grecia Is progressing well towards her goals.   Pt prognosis is Good.     Pt will continue to benefit from skilled outpatient physical therapy to address the deficits listed in the problem list box on initial evaluation, provide pt/family education and to maximize pt's level of independence in the home and community environment.     Pt's spiritual, cultural and educational needs considered and pt agreeable to plan of care and goals.     Anticipated barriers to physical therapy: Scheduling     Goals:   Short Term Goals: 4-6 weeks (Progressing, not met)  Patient will be independent in initial HEP to help supplement PT.   Patient will improve passive shoulder flexion to >/= 140 degrees to help with reaching/lifting.   Patient will improve pain at its worst to </= 5/10 to help improve quality of life.      Long Term Goals: 12-16 weeks (Progressing, not met)  Patient will be independent in updated HEPT to help supplement PT and maintain gains made in PT.   Patient will improve FOTO score to >/= predicted (68) to show improvement in condition.   Patient will improve AROM shoulder flexion to >/= 150 degrees to help with ADLs.   Patient will improve shoulder strength MMT to >/= 4/5 to help with job duties.   Patient will improve pain at its worst to </= 1/10 to help improve quality of life.   Patient goal: Patient will be able to return to work fully with no difficulty.    PLAN   Plan of care Certification: 5/20/2024 to 9/20/2024.     Continue with PT POC within  protocol limits.     Flo Ramos, PT, DPT, OCS

## 2024-07-10 NOTE — PROGRESS NOTES
"POST-OPERATIVE EXAMINATION    58 y.o. Female who returns for follow-up after surgery. She is 8 weeks s/p    PROCEDURE:   1. Left shoulder Arthroscopic aneroinferior labral repair with capsulorrhaphy CPT - 54482 (arthroscopic bony Bankart repair)  2. Left shoulder Biceps tenodesis CPT - 28764  3. Left shoulder Arthroscopic loose body removal CPT - 33961  4. Left shoulder Arthroscopic extensive debridement CPT - 12960     She is having pain and stiffness.     PHYSICAL EXAMINATION:  Ht 5' 2" (1.575 m)   BMI 34.40 kg/m²   General: Well-developed well-nourished 58 y.o. female in no acute distress   Cardiovascular: Regular rhythm   Lungs: No labored breathing or wheezing appreciated   Neuro: Alert and oriented ×3   Psychiatric: well oriented to person, place and time, demonstrates normal mood and affect   Skin: No rashes, lesions or ulcers, normal temperature, turgor, and texture on involved extremity    ORTHOPEDIC EXAM:  Normal post-operative swelling  Normal post-operative scarring  Strength: Grossly intact  ROM: FE- 90°; ER/Abd- 20°; IR/Abd- 10°; ER/Add- 0°     Tests: None    ASSESSMENT:      ICD-10-CM ICD-9-CM   1. S/P arthroscopy of left shoulder  Z98.890 V45.89   2. Arthrofibrosis of left shoulder  M24.612 718.51             PLAN:         Continue physical therapy and focus on ROM exercises at home  RTC in 2 months   Alternate 800mg ibuprofen and tylenol   I advised her to continue a passive home range of motion program.  If the symptoms persist or the motion continues to become difficult to obtain, we did discuss a left shoulder intra-articular corticosteroid injection.  If needed, we could perform a lysis of adhesions.  I advised her against this right now considering she did have a very extensive surgical procedure and that we are only proximally 8 weeks out from surgery.              Naif Oconnell M.D.  [email or web address]             "

## 2024-07-15 ENCOUNTER — OFFICE VISIT (OUTPATIENT)
Dept: INTERNAL MEDICINE | Facility: CLINIC | Age: 59
End: 2024-07-15
Payer: COMMERCIAL

## 2024-07-15 VITALS
HEIGHT: 62 IN | WEIGHT: 184.5 LBS | TEMPERATURE: 97 F | OXYGEN SATURATION: 98 % | DIASTOLIC BLOOD PRESSURE: 80 MMHG | BODY MASS INDEX: 33.95 KG/M2 | HEART RATE: 72 BPM | SYSTOLIC BLOOD PRESSURE: 110 MMHG | RESPIRATION RATE: 16 BRPM

## 2024-07-15 DIAGNOSIS — E11.59 HYPERTENSION ASSOCIATED WITH TYPE 2 DIABETES MELLITUS: ICD-10-CM

## 2024-07-15 DIAGNOSIS — I15.2 HYPERTENSION ASSOCIATED WITH TYPE 2 DIABETES MELLITUS: ICD-10-CM

## 2024-07-15 DIAGNOSIS — E66.9 OBESITY (BMI 30-39.9): ICD-10-CM

## 2024-07-15 DIAGNOSIS — G47.33 OSA (OBSTRUCTIVE SLEEP APNEA): ICD-10-CM

## 2024-07-15 DIAGNOSIS — E11.9 TYPE 2 DIABETES MELLITUS WITHOUT COMPLICATION, WITHOUT LONG-TERM CURRENT USE OF INSULIN: ICD-10-CM

## 2024-07-15 DIAGNOSIS — E78.5 HYPERLIPIDEMIA ASSOCIATED WITH TYPE 2 DIABETES MELLITUS: ICD-10-CM

## 2024-07-15 DIAGNOSIS — Z00.00 ANNUAL PHYSICAL EXAM: Primary | ICD-10-CM

## 2024-07-15 DIAGNOSIS — E89.0 S/P PARTIAL THYROIDECTOMY: ICD-10-CM

## 2024-07-15 DIAGNOSIS — E11.69 HYPERLIPIDEMIA ASSOCIATED WITH TYPE 2 DIABETES MELLITUS: ICD-10-CM

## 2024-07-15 DIAGNOSIS — Z11.59 NEED FOR HEPATITIS C SCREENING TEST: ICD-10-CM

## 2024-07-15 DIAGNOSIS — Z98.890 S/P LEFT ROTATOR CUFF REPAIR: ICD-10-CM

## 2024-07-15 PROBLEM — J12.82 PNEUMONIA DUE TO COVID-19 VIRUS: Status: RESOLVED | Noted: 2020-12-12 | Resolved: 2024-07-15

## 2024-07-15 PROBLEM — U07.1 PNEUMONIA DUE TO COVID-19 VIRUS: Status: RESOLVED | Noted: 2020-12-12 | Resolved: 2024-07-15

## 2024-07-15 PROBLEM — E66.2 OBESITY HYPOVENTILATION SYNDROME: Status: RESOLVED | Noted: 2022-01-19 | Resolved: 2024-07-15

## 2024-07-15 PROBLEM — R29.3 POSTURE ABNORMALITY: Status: RESOLVED | Noted: 2024-05-20 | Resolved: 2024-07-15

## 2024-07-15 PROCEDURE — 1159F MED LIST DOCD IN RCRD: CPT | Mod: CPTII,S$GLB,, | Performed by: INTERNAL MEDICINE

## 2024-07-15 PROCEDURE — 3008F BODY MASS INDEX DOCD: CPT | Mod: CPTII,S$GLB,, | Performed by: INTERNAL MEDICINE

## 2024-07-15 PROCEDURE — 3074F SYST BP LT 130 MM HG: CPT | Mod: CPTII,S$GLB,, | Performed by: INTERNAL MEDICINE

## 2024-07-15 PROCEDURE — 99999 PR PBB SHADOW E&M-EST. PATIENT-LVL IV: CPT | Mod: PBBFAC,,, | Performed by: INTERNAL MEDICINE

## 2024-07-15 PROCEDURE — 3079F DIAST BP 80-89 MM HG: CPT | Mod: CPTII,S$GLB,, | Performed by: INTERNAL MEDICINE

## 2024-07-15 PROCEDURE — 99396 PREV VISIT EST AGE 40-64: CPT | Mod: S$GLB,,, | Performed by: INTERNAL MEDICINE

## 2024-07-15 PROCEDURE — 4010F ACE/ARB THERAPY RXD/TAKEN: CPT | Mod: CPTII,S$GLB,, | Performed by: INTERNAL MEDICINE

## 2024-07-15 PROCEDURE — 3066F NEPHROPATHY DOC TX: CPT | Mod: CPTII,S$GLB,, | Performed by: INTERNAL MEDICINE

## 2024-07-15 PROCEDURE — 3044F HG A1C LEVEL LT 7.0%: CPT | Mod: CPTII,S$GLB,, | Performed by: INTERNAL MEDICINE

## 2024-07-15 PROCEDURE — 3061F NEG MICROALBUMINURIA REV: CPT | Mod: CPTII,S$GLB,, | Performed by: INTERNAL MEDICINE

## 2024-07-15 PROCEDURE — 1160F RVW MEDS BY RX/DR IN RCRD: CPT | Mod: CPTII,S$GLB,, | Performed by: INTERNAL MEDICINE

## 2024-07-15 RX ORDER — VALSARTAN 160 MG/1
160 TABLET ORAL DAILY
COMMUNITY

## 2024-07-15 RX ORDER — SEMAGLUTIDE 1.34 MG/ML
1 INJECTION, SOLUTION SUBCUTANEOUS
COMMUNITY
Start: 2024-06-23

## 2024-07-15 NOTE — PROGRESS NOTES
Subjective:     PCP: Scott Tripathi DO    Grecia Boyd is a 58 y.o. female who presents for an annual exam.    Medical History:   Past Medical History:   Diagnosis Date    Diabetes mellitus, type 2     Hyperlipidemia     Hypertension     Obesity     CHIDI (obstructive sleep apnea)     Unspecified chronic bronchitis 02/2024       Family History: family history includes Breast cancer in her cousin and paternal aunt; Cancer in her paternal aunt; Cataracts in her mother; Diabetes in her mother and paternal grandmother; Heart disease in her mother; Hyperlipidemia in her mother; Hypertension in her mother; Macular degeneration in her paternal aunt; Stroke in her mother.    Surgical History:   Past Surgical History:   Procedure Laterality Date    ADENOIDECTOMY      ARTHROSCOPIC DEBRIDEMENT OF SHOULDER Left 5/16/2024    Procedure: DEBRIDEMENT, SHOULDER, ARTHROSCOPIC;  Surgeon: Naif Ramirez MD;  Location: [facility] OR;  Service: Orthopedics;  Laterality: Left;    ARTHROSCOPY, SHOULDER, WITH CAPSULORRHAPHY Left 5/16/2024    Procedure: ARTHROSCOPY,SHOULDER,WITH CAPSULORRHAPHY;  Surgeon: Naif Ramirez MD;  Location: [facility] OR;  Service: Orthopedics;  Laterality: Left;  REGIONAL BLOCK    ARTHROSCOPY,SHOULDER,WITH BICEPS TENODESIS Left 5/16/2024    Procedure: ARTHROSCOPY,SHOULDER,WITH BICEPS TENODESIS;  Surgeon: Naif Ramirez MD;  Location: [facility] OR;  Service: Orthopedics;  Laterality: Left;    BREAST BIOPSY Right     @ age 18    CHONDROPLASTY OF KNEE Right 2/12/2021    Procedure: CHONDROPLASTY, KNEE;  Surgeon: ZULEIKA Barahona MD;  Location: [facility] OR;  Service: Orthopedics;  Laterality: Right;    COLONOSCOPY N/A 10/16/2023    Procedure: COLONOSCOPY;  Surgeon: Grace Wiley MD;  Location: [facility] ENDOSCOPY;  Service: Endoscopy;  Laterality: N/A;  Referred by: JOAN Chirinos NP   Meds: Ozempic - pt inst to hold per protocol  Prep: Suprep  Route instructions sent: portal  SW  10/9- precall complete.  DBM     HYSTERECTOMY      2009    KNEE ARTHROSCOPY W/ MENISCECTOMY Right 2/12/2021    Procedure: ARTHROSCOPY, KNEE, WITH MEDIAL MENISCECTOMY LATERAL RELEASE;  Surgeon: ZULEIKA Barahona MD;  Location: [facility] OR;  Service: Orthopedics;  Laterality: Right;  pericapsular injection: 30cc ropivacaine/epi/clonidine/ketorolac injection     KNEE SURGERY      SYNOVECTOMY OF KNEE Right 2/12/2021    Procedure: SYNOVECTOMY, KNEE;  Surgeon: ZULEIKA Barahona MD;  Location: [facility] OR;  Service: Orthopedics;  Laterality: Right;    THYROIDECTOMY, PARTIAL      TONSILLECTOMY          Social History:  reports that she has never smoked. She has never used smokeless tobacco. She reports current alcohol use. She reports that she does not use drugs.     Allergies: Review of patient's allergies indicates:  No Known Allergies    Medications:   Current Outpatient Medications   Medication Sig    albuterol (PROAIR HFA) 90 mcg/actuation inhaler Inhale 2 puffs into the lungs every 6 (six) hours as needed for Wheezing or Shortness of Breath. Rescue    blood sugar diagnostic Strp Check blood sugars TID    blood-glucose meter (FREESTYLE SYSTEM KIT) kit Use as instructed    HYDROcodone-acetaminophen (NORCO) 7.5-325 mg per tablet Take 1 tablet by mouth every 4 (four) hours as needed for Pain.    ibuprofen (ADVIL,MOTRIN) 800 MG tablet Take 1 tablet (800 mg total) by mouth every 6 (six) hours as needed for Pain.    lancets (ONETOUCH ULTRASOFT LANCETS) Misc Check blood sugars TID    OZEMPIC 1 mg/dose (4 mg/3 mL) Inject 1 mg into the skin every 7 days.    valsartan (DIOVAN) 160 MG tablet Take 160 mg by mouth once daily.     No current facility-administered medications for this visit.       Health Maintenance:   Health Maintenance Topics with due status: Not Due       Topic Last Completion Date    TETANUS VACCINE 07/12/2023    Colorectal Cancer Screening 10/16/2023    Influenza Vaccine 11/28/2023    Mammogram 02/27/2024    Diabetes Urine Screening 06/13/2024     Hemoglobin A1c 06/13/2024       Eye Exam:   in 2023  Dental Exam: every 6 months  S/p hysterectomy  Mammogram: 2/2024    Colonoscopy: Last Colonoscopy completed on 10/16/2023 10 years  Hepatitis C  Ab: due    Vaccinations:  Immunization History   Administered Date(s) Administered    COVID-19, MRNA, LN-S, PF (Pfizer) (Gray Cap) 07/07/2022    COVID-19, MRNA, LN-S, PF (Pfizer) (Purple Cap) 03/03/2021, 03/24/2021, 11/03/2021    Influenza - Quadrivalent - PF *Preferred* (6 months and older) 10/23/2019, 11/12/2020, 01/19/2022    PPD Test 07/22/2019, 08/05/2020, 07/19/2021    Pneumococcal Conjugate - 13 Valent 07/19/2021    Pneumococcal Conjugate - 20 Valent 11/28/2023    Pneumococcal Polysaccharide - 23 Valent 07/16/2020    Tdap 07/12/2023    Zoster Recombinant 07/22/2022, 09/16/2022       Tetanus: 2023  Shingrix: done  Pneumovax: 2020  Prevnar-13: 2021  Covid vaccine: not boosted recently    Body mass index is 33.75 kg/m².  Wt Readings from Last 3 Encounters:   07/15/24 83.7 kg (184 lb 8.4 oz)   06/26/24 85.3 kg (188 lb 0.8 oz)   05/29/24 83.2 kg (183 lb 7.8 oz)       Review of Systems   Constitutional:  Negative for chills, diaphoresis, fatigue and fever.   HENT:  Negative for congestion, dental problem, ear discharge, ear pain, postnasal drip, rhinorrhea, sinus pressure, sore throat and trouble swallowing.    Eyes:  Negative for redness and visual disturbance.   Respiratory:  Negative for cough and shortness of breath.    Cardiovascular:  Negative for chest pain, palpitations and leg swelling.   Gastrointestinal:  Negative for abdominal pain, blood in stool, constipation, diarrhea, nausea and vomiting.   Endocrine: Negative for cold intolerance and heat intolerance.   Genitourinary:  Negative for decreased urine volume, dysuria, frequency and hematuria.   Musculoskeletal:  Positive for arthralgias (left shoulder pain after surgery, in PT, takes tylenol and ibuprofen as needed). Negative for back pain and myalgias.    Skin:  Negative for rash and wound.   Neurological:  Negative for dizziness, weakness, numbness and headaches.   Hematological:  Negative for adenopathy.   Psychiatric/Behavioral:  Negative for dysphoric mood and sleep disturbance. The patient is not nervous/anxious.           Objective:     Physical Exam  Vitals reviewed.   Constitutional:       General: She is awake. She is not in acute distress.     Appearance: Normal appearance. She is well-developed and well-groomed. She is not diaphoretic.   HENT:      Head: Normocephalic and atraumatic.      Right Ear: Hearing, tympanic membrane, ear canal and external ear normal. Tympanic membrane is not erythematous or bulging.      Left Ear: Hearing, tympanic membrane, ear canal and external ear normal. Tympanic membrane is not erythematous or bulging.      Nose: Nose normal. No congestion.      Mouth/Throat:      Mouth: Mucous membranes are moist.      Tongue: No lesions.      Pharynx: Oropharynx is clear. Uvula midline. No oropharyngeal exudate or posterior oropharyngeal erythema.   Eyes:      General: Lids are normal. Vision grossly intact. Gaze aligned appropriately. No scleral icterus.     Conjunctiva/sclera:      Right eye: Right conjunctiva is not injected.      Left eye: Left conjunctiva is not injected.      Pupils: Pupils are equal, round, and reactive to light.   Neck:      Thyroid: No thyroid mass or thyromegaly.   Cardiovascular:      Rate and Rhythm: Normal rate and regular rhythm.      Pulses: Normal pulses.      Heart sounds: Normal heart sounds. No murmur heard.  Pulmonary:      Effort: Pulmonary effort is normal. No respiratory distress.      Breath sounds: Normal breath sounds. No decreased breath sounds or wheezing.   Abdominal:      General: Bowel sounds are normal. There is no distension.      Palpations: Abdomen is soft. Abdomen is not rigid.      Tenderness: There is no abdominal tenderness. There is no guarding or rebound.   Musculoskeletal:          General: Normal range of motion.      Cervical back: Normal range of motion and neck supple.      Right lower leg: No edema.      Left lower leg: No edema.   Lymphadenopathy:      Cervical: No cervical adenopathy.      Upper Body:      Right upper body: No supraclavicular adenopathy.      Left upper body: No supraclavicular adenopathy.   Skin:     General: Skin is warm and dry.      Coloration: Skin is not cyanotic.      Findings: No lesion or rash.      Nails: There is no clubbing.   Neurological:      General: No focal deficit present.      Mental Status: She is alert and oriented to person, place, and time.      Sensory: Sensation is intact.      Coordination: Coordination is intact.      Gait: Gait is intact.      Deep Tendon Reflexes: Reflexes are normal and symmetric.   Psychiatric:         Attention and Perception: Attention normal.         Mood and Affect: Mood normal.         Behavior: Behavior is cooperative.              Assessment:        1. Annual physical exam    2. Hypertension associated with type 2 diabetes mellitus    3. Type 2 diabetes mellitus without complication, without long-term current use of insulin    4. Hyperlipidemia associated with type 2 diabetes mellitus    5. CHIDI (obstructive sleep apnea)    6. S/P partial thyroidectomy    7. S/P left rotator cuff repair    8. Need for hepatitis C screening test    9. Obesity (BMI 30-39.9)           Plan:     1. Annual physical exam  - TSH; Future  - Lipid Panel; Future  - CBC Auto Differential; Future  - Comprehensive Metabolic Panel; Future    2. Hypertension associated with type 2 diabetes mellitus  - controlled, continue valsartan  - Comprehensive Metabolic Panel; Future    3. Type 2 diabetes mellitus without complication, without long-term current use of insulin  - recent HbA1c 6.9 and at goal, continue semaglutide  - CBC Auto Differential; Future    4. Hyperlipidemia associated with type 2 diabetes mellitus  - Lipid Panel; Future    5.   CHIDI (obstructive sleep apnea)  - has not been using CPAP after weight loss, sleeps well, will monitor    6. S/P partial thyroidectomy  - completed at [facility], monitor    7. S/P left rotator cuff repair  - improving, continue PT    8. Need for hepatitis C screening test  - Hepatitis C Antibody; Future    9. Obesity (BMI 30-39.9)  - continue semaglutide, goal wt 130 lbs, continue healthy eating habits and exercise regularly    RTC in 6 months for follow-up or sooner if needed (with PCP)    __________________________    Lilia Ríos MD, PharmD  [facility]- [facility]  American Board of Obesity Medicine diplomate  Office [phone]

## 2024-07-16 ENCOUNTER — LAB VISIT (OUTPATIENT)
Dept: LAB | Facility: HOSPITAL | Age: 59
End: 2024-07-16
Attending: INTERNAL MEDICINE
Payer: COMMERCIAL

## 2024-07-16 DIAGNOSIS — I15.2 HYPERTENSION ASSOCIATED WITH TYPE 2 DIABETES MELLITUS: ICD-10-CM

## 2024-07-16 DIAGNOSIS — E11.9 TYPE 2 DIABETES MELLITUS WITHOUT COMPLICATION, WITHOUT LONG-TERM CURRENT USE OF INSULIN: ICD-10-CM

## 2024-07-16 DIAGNOSIS — E11.69 HYPERLIPIDEMIA ASSOCIATED WITH TYPE 2 DIABETES MELLITUS: ICD-10-CM

## 2024-07-16 DIAGNOSIS — E78.5 HYPERLIPIDEMIA ASSOCIATED WITH TYPE 2 DIABETES MELLITUS: ICD-10-CM

## 2024-07-16 DIAGNOSIS — Z11.59 NEED FOR HEPATITIS C SCREENING TEST: ICD-10-CM

## 2024-07-16 DIAGNOSIS — E11.59 HYPERTENSION ASSOCIATED WITH TYPE 2 DIABETES MELLITUS: ICD-10-CM

## 2024-07-16 DIAGNOSIS — Z00.00 ANNUAL PHYSICAL EXAM: ICD-10-CM

## 2024-07-16 LAB
ALBUMIN SERPL BCP-MCNC: 3.9 G/DL (ref 3.5–5.2)
ALP SERPL-CCNC: 111 U/L (ref 55–135)
ALT SERPL W/O P-5'-P-CCNC: 10 U/L (ref 10–44)
ANION GAP SERPL CALC-SCNC: 13 MMOL/L (ref 8–16)
AST SERPL-CCNC: 16 U/L (ref 10–40)
BASOPHILS # BLD AUTO: 0.07 K/UL (ref 0–0.2)
BASOPHILS NFR BLD: 0.9 % (ref 0–1.9)
BILIRUB SERPL-MCNC: 0.5 MG/DL (ref 0.1–1)
BUN SERPL-MCNC: 12 MG/DL (ref 6–20)
CALCIUM SERPL-MCNC: 9.7 MG/DL (ref 8.7–10.5)
CHLORIDE SERPL-SCNC: 104 MMOL/L (ref 95–110)
CHOLEST SERPL-MCNC: 251 MG/DL (ref 120–199)
CHOLEST/HDLC SERPL: 5.5 {RATIO} (ref 2–5)
CO2 SERPL-SCNC: 22 MMOL/L (ref 23–29)
CREAT SERPL-MCNC: 0.8 MG/DL (ref 0.5–1.4)
DIFFERENTIAL METHOD BLD: ABNORMAL
EOSINOPHIL # BLD AUTO: 0.2 K/UL (ref 0–0.5)
EOSINOPHIL NFR BLD: 2.5 % (ref 0–8)
ERYTHROCYTE [DISTWIDTH] IN BLOOD BY AUTOMATED COUNT: 13.7 % (ref 11.5–14.5)
EST. GFR  (NO RACE VARIABLE): >60 ML/MIN/1.73 M^2
GLUCOSE SERPL-MCNC: 130 MG/DL (ref 70–110)
HCT VFR BLD AUTO: 39.5 % (ref 37–48.5)
HCV AB SERPL QL IA: NORMAL
HDLC SERPL-MCNC: 46 MG/DL (ref 40–75)
HDLC SERPL: 18.3 % (ref 20–50)
HGB BLD-MCNC: 12.7 G/DL (ref 12–16)
IMM GRANULOCYTES # BLD AUTO: 0.02 K/UL (ref 0–0.04)
IMM GRANULOCYTES NFR BLD AUTO: 0.3 % (ref 0–0.5)
LDLC SERPL CALC-MCNC: 168 MG/DL (ref 63–159)
LYMPHOCYTES # BLD AUTO: 3.5 K/UL (ref 1–4.8)
LYMPHOCYTES NFR BLD: 46.1 % (ref 18–48)
MCH RBC QN AUTO: 27.6 PG (ref 27–31)
MCHC RBC AUTO-ENTMCNC: 32.2 G/DL (ref 32–36)
MCV RBC AUTO: 86 FL (ref 82–98)
MONOCYTES # BLD AUTO: 0.6 K/UL (ref 0.3–1)
MONOCYTES NFR BLD: 7.5 % (ref 4–15)
NEUTROPHILS # BLD AUTO: 3.2 K/UL (ref 1.8–7.7)
NEUTROPHILS NFR BLD: 42.7 % (ref 38–73)
NONHDLC SERPL-MCNC: 205 MG/DL
NRBC BLD-RTO: 0 /100 WBC
PLATELET # BLD AUTO: 398 K/UL (ref 150–450)
PMV BLD AUTO: 8.6 FL (ref 9.2–12.9)
POTASSIUM SERPL-SCNC: 3.9 MMOL/L (ref 3.5–5.1)
PROT SERPL-MCNC: 7.7 G/DL (ref 6–8.4)
RBC # BLD AUTO: 4.6 M/UL (ref 4–5.4)
SODIUM SERPL-SCNC: 139 MMOL/L (ref 136–145)
TRIGL SERPL-MCNC: 185 MG/DL (ref 30–150)
TSH SERPL DL<=0.005 MIU/L-ACNC: 1.12 UIU/ML (ref 0.4–4)
WBC # BLD AUTO: 7.5 K/UL (ref 3.9–12.7)

## 2024-07-16 PROCEDURE — 80053 COMPREHEN METABOLIC PANEL: CPT | Performed by: INTERNAL MEDICINE

## 2024-07-16 PROCEDURE — 84443 ASSAY THYROID STIM HORMONE: CPT | Performed by: INTERNAL MEDICINE

## 2024-07-16 PROCEDURE — 86803 HEPATITIS C AB TEST: CPT | Performed by: INTERNAL MEDICINE

## 2024-07-16 PROCEDURE — 36415 COLL VENOUS BLD VENIPUNCTURE: CPT | Mod: PN | Performed by: INTERNAL MEDICINE

## 2024-07-16 PROCEDURE — 85025 COMPLETE CBC W/AUTO DIFF WBC: CPT | Performed by: INTERNAL MEDICINE

## 2024-07-16 PROCEDURE — 80061 LIPID PANEL: CPT | Performed by: INTERNAL MEDICINE

## 2024-07-19 ENCOUNTER — ON-DEMAND VIRTUAL (OUTPATIENT)
Dept: URGENT CARE | Facility: CLINIC | Age: 59
End: 2024-07-19
Payer: COMMERCIAL

## 2024-07-19 DIAGNOSIS — R05.9 COUGH, UNSPECIFIED TYPE: Primary | ICD-10-CM

## 2024-07-19 PROCEDURE — 99212 OFFICE O/P EST SF 10 MIN: CPT | Mod: 95,,, | Performed by: NURSE PRACTITIONER

## 2024-07-19 RX ORDER — BENZONATATE 100 MG/1
100 CAPSULE ORAL 3 TIMES DAILY PRN
Qty: 30 CAPSULE | Refills: 0 | Status: SHIPPED | OUTPATIENT
Start: 2024-07-19 | End: 2024-07-29

## 2024-07-19 NOTE — PROGRESS NOTES
Subjective:      Patient ID: Grecia Boyd is a 58 y.o. female.    Vitals:  vitals were not taken for this visit.     Chief Complaint: Cough      Visit Type: TELE AUDIOVISUAL    Present with the patient at the time of consultation: TELEMED PRESENT WITH PATIENT: None    Location: Home in [facility]    Past Medical History:   Diagnosis Date    Diabetes mellitus, type 2     Hyperlipidemia     Hypertension     Obesity     CHIDI (obstructive sleep apnea)     Unspecified chronic bronchitis 02/2024     Past Surgical History:   Procedure Laterality Date    ADENOIDECTOMY      ARTHROSCOPIC DEBRIDEMENT OF SHOULDER Left 5/16/2024    Procedure: DEBRIDEMENT, SHOULDER, ARTHROSCOPIC;  Surgeon: Naif Ramirez MD;  Location: [facility] OR;  Service: Orthopedics;  Laterality: Left;    ARTHROSCOPY, SHOULDER, WITH CAPSULORRHAPHY Left 5/16/2024    Procedure: ARTHROSCOPY,SHOULDER,WITH CAPSULORRHAPHY;  Surgeon: Naif Ramirez MD;  Location: [facility] OR;  Service: Orthopedics;  Laterality: Left;  REGIONAL BLOCK    ARTHROSCOPY,SHOULDER,WITH BICEPS TENODESIS Left 5/16/2024    Procedure: ARTHROSCOPY,SHOULDER,WITH BICEPS TENODESIS;  Surgeon: Naif Ramirez MD;  Location: [facility] OR;  Service: Orthopedics;  Laterality: Left;    BREAST BIOPSY Right     @ age 18    CHONDROPLASTY OF KNEE Right 2/12/2021    Procedure: CHONDROPLASTY, KNEE;  Surgeon: ZULEIKA Barahona MD;  Location: [facility] OR;  Service: Orthopedics;  Laterality: Right;    COLONOSCOPY N/A 10/16/2023    Procedure: COLONOSCOPY;  Surgeon: Grace Wiley MD;  Location: [facility] ENDOSCOPY;  Service: Endoscopy;  Laterality: N/A;  Referred by: JOAN Chirinos NP   Meds: Ozempic - pt inst to hold per protocol  Prep: Suprep  Route instructions sent: portal  SW  10/9- precall complete.  DBM    HYSTERECTOMY      2009    KNEE ARTHROSCOPY W/ MENISCECTOMY Right 2/12/2021    Procedure: ARTHROSCOPY, KNEE, WITH MEDIAL MENISCECTOMY LATERAL RELEASE;  Surgeon: ZULEIKA Barahona MD;  Location: [facility] OR;  Service:  Orthopedics;  Laterality: Right;  pericapsular injection: 30cc ropivacaine/epi/clonidine/ketorolac injection     KNEE SURGERY      SYNOVECTOMY OF KNEE Right 2/12/2021    Procedure: SYNOVECTOMY, KNEE;  Surgeon: ZULEIKA Barahona MD;  Location: [facility];  Service: Orthopedics;  Laterality: Right;    THYROIDECTOMY, PARTIAL      TONSILLECTOMY       Review of patient's allergies indicates:  No Known Allergies  Current Outpatient Medications on File Prior to Visit   Medication Sig Dispense Refill    albuterol (PROAIR HFA) 90 mcg/actuation inhaler Inhale 2 puffs into the lungs every 6 (six) hours as needed for Wheezing or Shortness of Breath. Rescue 18 g 2    blood sugar diagnostic Strp Check blood sugars  strip 11    blood-glucose meter (FREESTYLE SYSTEM KIT) kit Use as instructed 1 each 1    HYDROcodone-acetaminophen (NORCO) 7.5-325 mg per tablet Take 1 tablet by mouth every 4 (four) hours as needed for Pain. 20 tablet 0    ibuprofen (ADVIL,MOTRIN) 800 MG tablet Take 1 tablet (800 mg total) by mouth every 6 (six) hours as needed for Pain. 20 tablet 0    lancets (ONETOUCH ULTRASOFT LANCETS) Misc Check blood sugars  each 11    OZEMPIC 1 mg/dose (4 mg/3 mL) Inject 1 mg into the skin every 7 days.      valsartan (DIOVAN) 160 MG tablet Take 160 mg by mouth once daily.       No current facility-administered medications on file prior to visit.     Family History   Problem Relation Name Age of Onset    Diabetes Mother      Heart disease Mother      Hypertension Mother      Hyperlipidemia Mother      Stroke Mother      Cataracts Mother      Cancer Paternal Aunt          Lung    Diabetes Paternal Grandmother      Breast cancer Paternal Aunt      Macular degeneration Paternal Aunt      Breast cancer Cousin      Colon cancer Neg Hx      Ovarian cancer Neg Hx             [facility] P[facility]vv Intake    7/19/2024  1:23 PM CDT - Filed by Patient   What is your current physical address in the event of a medical emergency? [address] [place]   Are you able to take your vital signs? No   Please attach any relevant images or files          Pt reports  was diagnosed with COVID 4 days ago. She took a test 2 days ago and yesterday it was negative. Denies fever or body aches. Just having a cough -- dry and occurs mostly at night. Took OTC nyquil for symptoms with moderate relief. Has been vaccinated and boosted for covid.     Cough  This is a new problem. The current episode started in the past 7 days. The problem has been unchanged. The cough is Non-productive. Associated symptoms include nasal congestion. Pertinent negatives include no fever, myalgias, postnasal drip, rhinorrhea, sore throat, shortness of breath or wheezing. The symptoms are aggravated by lying down. She has tried OTC cough suppressant for the symptoms. The treatment provided no relief. Her past medical history is significant for bronchitis.       Constitution: Negative for fever.   HENT:  Positive for congestion. Negative for postnasal drip and sore throat.    Respiratory:  Positive for cough. Negative for shortness of breath and wheezing.    Musculoskeletal:  Negative for muscle ache.        Objective:   The physical exam was conducted virtually.  Physical Exam   Constitutional: She is oriented to person, place, and time. No distress.   HENT:   Head: Normocephalic.   Nose: Congestion present.   Eyes: Conjunctivae are normal. No scleral icterus. Extraocular movement intact   Pulmonary/Chest: Effort normal. No respiratory distress.         Comments: + cough    Musculoskeletal: Normal range of motion.         General: Normal range of motion.   Neurological: She is alert and oriented to person, place, and time.   Skin: Skin is not diaphoretic.   Psychiatric: Her behavior is normal. Judgment and thought content normal.       Assessment:     1. Cough, unspecified type        Plan:       Cough, unspecified type      Rest. Drink plenty of fluids to stay well hydrated.    Use  meds as directed.     Cough: Cover the cough/cough into the elbow and wash hands often to prevent the spread of germs.  A cough may be the last symptom to go away and may persist for up to 4-6 weeks, so do not be alarmed.    If you start with worsening breathing/wheezing, shortness of breath, fever, coughing up blood, etc., go to the nearest urgent care or emergency department immediately.     Follow-up with your PCP for recheck in 3 days.    All questions were answered to the best of my abilities and all concerns were addressed at this time.     Follow up:   1. Please schedule a virtual follow up visit as needed.  2. Please see an in-person provider if your symptoms are worsening or not improving in 2-3 days.  3. Please print a copy of this note and send it to your regular doctor or take it to your next visit so it may be included in your medical record.   4. You must understand that you've received Telehealth Urgent Care treatment only and that you may be released before all your medical problems are known or treated. You, the patient, will arrange for follow up care as instructed.  5. Follow up with your PCP or specialty clinic as directed. To schedule an appointment with the appropriate provider with [facility], please call [phone]. For pediatric referrals, please call [phone]  6. Contact customer support at [phone] for questions or concerns  7. For prescription questions or problems, call [phone] anytime for assistance.  8. For [facility] support, call [phone].

## 2024-07-24 ENCOUNTER — CLINICAL SUPPORT (OUTPATIENT)
Dept: REHABILITATION | Facility: HOSPITAL | Age: 59
End: 2024-07-24
Payer: COMMERCIAL

## 2024-07-24 DIAGNOSIS — M25.612 DECREASED RANGE OF MOTION OF SHOULDER, LEFT: Primary | ICD-10-CM

## 2024-07-24 PROCEDURE — 97140 MANUAL THERAPY 1/> REGIONS: CPT

## 2024-07-24 PROCEDURE — 97110 THERAPEUTIC EXERCISES: CPT

## 2024-07-24 PROCEDURE — 97112 NEUROMUSCULAR REEDUCATION: CPT

## 2024-07-24 NOTE — PROGRESS NOTES
[facility]   Physical Therapy Treatment Note     Name: Grecia Boyd  [facility] Number: [id number]    Therapy Diagnosis:   Encounter Diagnosis   Name Primary?    Decreased range of motion of shoulder, left Yes     Physician: Naif Ramirez MD    Visit Date: 7/24/2024  Physician Orders: PT Eval and Treat   Medical Diagnosis from Referral: Closed displaced fracture of glenoid cavity of left scapula, initial encounter [S42.142A], Traumatic complete tear of left rotator cuff, initial encounter [S46.012A], Labral tear of shoulder, left, initial encounter [S43.432A]   Evaluation Date: 5/20/2024  Authorization Period Expiration: 5/13/2025  Plan of Care Expiration: 9/20/2024  Progress Note Due: 6/20/2024  Visit # / Visits authorized: 14/20   FOTO: 1/3    PTA Visit #: 0/5     FOTO first follow up:   FOTO second follow up:     Date of Surgery: 5/16/2024  Return to MD: 5/29/2024, 6/28/2024    POSTOPERATIVE DIAGNOSIS:   Left shoulder anterior shoulder dislocation with large bony Bankart   Left shoulder Dislocation, Anterior S43.016A   Left shoulder Loose Body M24.012   Left shoulder synovitis      Post-op Plan: Bony Bankart repair protocol with biceps tenodesis      Precautions: Standard     Time In: 11:00 am   Time Out: 11:55 am   Total Billable Time: 55 minutes    SUBJECTIVE     Pt reports: She is still having pain in her shoulder and feeling stiff but the pain is a little better. She has been sick so she hasn't done as much and does not want to do the arm bike today. She is scheduled to return to work tomorrow.   She was compliant with home exercise program.  Response to previous treatment: Independent in HEP   Functional change: Ongoing     Pain: 2/10  Location: left shoulder      OBJECTIVE     Objective Measures updated at progress report unless specified.     Observation 7/24/2024:  Range of Motion:  Shoulder   Left AROM Left PROM   Flexion 80 degrees  90 degrees*   Abduction 45 degrees*  60  "degrees*   ER at 20  10 degrees*  15 degrees*    ER at 45  NT 5 degrees*    *denotes pain    Treatment     *Pt is 9 weeks and 6 days s/p L Bankhart repair as of 7/24/2024.*    Grecia received the treatments listed below:      therapeutic exercises to develop strength, endurance, ROM, flexibility, posture, and core stabilization for 23 minutes including:    Assessment as above   Supine dowel AAROM chest press 2 x 10 reps   Supine ER gentle stretch with dowel in scaption 1 x 15 x 3-5" holds  Sidelying table RTC interval / ER modified stretch x 3'   Pulleys elbow bent 90 x 3' in scaption to tolerance   Table elevated walkouts x 2'     Not Performed:  Supine flex dowel 2x10  Table ER stretch 3x10x5"  ER stretch 2x10x5"      manual therapy techniques: Joint mobilizations, PROM and Soft tissue Mobilization were applied to the: Shoulder, Elbow for 15 minutes, including:    (L) PROM shoulder/elbow within protocol   (L) Gentle isometrics in scap plane arm supported with PT   Posterior/inferior mobilizations     neuromuscular re-education activities to improve: Coordination, Kinesthetic, Sense, Proprioception, and Posture for 17 minutes. The following activities were included:    Pt education   Slot machine / landmines with dowel 2 x 10 reps   Standing AAROM flexion with YellowTB 2 x 10 reps   Table slides flex/scap x 2' each   Scapular retractions 2 x 10 x 5" holds     Not Today:  Rhythmic stabilizations multiple rounds  Seated flex CC AAROM 3# 3x10  Sidelying serratus dowel 2x10  ER walkouts yellow 3x10  Gentle shoulder ext modified range red with eccentric control 3x10  Pt education    therapeutic activities to improve functional performance for 00 minutes, including:    Not Performed:  UBE 8'     Ice at the beginning of the session for 8' to reduce irritability - Not Today      Patient Education and Home Exercises     Home Exercises Provided and Patient Education Provided     Education provided:   - Continue with HEP  - " Post-op precautions and early goals, importance of sling compliance and monitoring incisions for signs/symptoms of infection     Written Home Exercises Provided: Patient instructed to cont prior HEP. Exercises were reviewed and Grecia was able to demonstrate them prior to the end of the session.  Grecia demonstrated good  understanding of the education provided. See EMR under Patient Instructions for exercises provided during therapy sessions    ASSESSMENT     Grecia continues with pain in her shoulder and limited range of motion with minimal improvement since last session. She has missed a few sessions secondary to being sick and presents today with some improvement in pain but continued stiffness and limitations in both passive and active/active assist range of motion. Session focused on improving mobility and scapular control with fair to god tolerance. She requires cues throughout to limit shoulder hike and leaning her body to compensate for limited mobility. She was educated on importance of her mobility exercises at home several times a day to help with progress to not stiffen up. She is scheduled to return to work tomorrow and educated as well on importance of following her precautions and limiting any lifting/reaching to not further injure her arm to which she verbalized understanding. Will continue to monitor and progress as able with continued focus on range of motion, postural retraining and progressing with RTC activation/strengthening.     Grecia Is progressing well towards her goals.   Pt prognosis is Good.     Pt will continue to benefit from skilled outpatient physical therapy to address the deficits listed in the problem list box on initial evaluation, provide pt/family education and to maximize pt's level of independence in the home and community environment.     Pt's spiritual, cultural and educational needs considered and pt agreeable to plan of care and goals.     Anticipated barriers to  physical therapy: Scheduling     Goals:   Short Term Goals: 4-6 weeks (Progressing, not met)  Patient will be independent in initial HEP to help supplement PT. - MET  Patient will improve passive shoulder flexion to >/= 140 degrees to help with reaching/lifting.   Patient will improve pain at its worst to </= 5/10 to help improve quality of life.      Long Term Goals: 12-16 weeks (Progressing, not met)  Patient will be independent in updated HEPT to help supplement PT and maintain gains made in PT.   Patient will improve FOTO score to >/= predicted (68) to show improvement in condition.   Patient will improve AROM shoulder flexion to >/= 150 degrees to help with ADLs.   Patient will improve shoulder strength MMT to >/= 4/5 to help with job duties.   Patient will improve pain at its worst to </= 1/10 to help improve quality of life.   Patient goal: Patient will be able to return to work fully with no difficulty.    PLAN   Plan of care Certification: 5/20/2024 to 9/20/2024.     Continue with PT POC within protocol limits.     Kay Carolina, PT, DPT, OCS

## 2024-07-30 ENCOUNTER — CLINICAL SUPPORT (OUTPATIENT)
Dept: REHABILITATION | Facility: HOSPITAL | Age: 59
End: 2024-07-30
Payer: COMMERCIAL

## 2024-07-30 DIAGNOSIS — M25.612 DECREASED RANGE OF MOTION OF SHOULDER, LEFT: Primary | ICD-10-CM

## 2024-07-30 PROCEDURE — 97112 NEUROMUSCULAR REEDUCATION: CPT

## 2024-07-30 PROCEDURE — 97140 MANUAL THERAPY 1/> REGIONS: CPT

## 2024-07-30 PROCEDURE — 97110 THERAPEUTIC EXERCISES: CPT

## 2024-07-30 NOTE — PROGRESS NOTES
[facility]   Physical Therapy Treatment Note     Name: Grecia Boyd  [facility] Number: [id number]    Therapy Diagnosis:   Encounter Diagnosis   Name Primary?    Decreased range of motion of shoulder, left Yes     Physician: Naif Ramirez MD    Visit Date: 7/30/2024  Physician Orders: PT Eval and Treat   Medical Diagnosis from Referral: Closed displaced fracture of glenoid cavity of left scapula, initial encounter [S42.142A], Traumatic complete tear of left rotator cuff, initial encounter [S46.012A], Labral tear of shoulder, left, initial encounter [S43.432A]   Evaluation Date: 5/20/2024  Authorization Period Expiration: 5/13/2025  Plan of Care Expiration: 9/20/2024  Progress Note Due: 6/20/2024  Visit # / Visits authorized: 15/20   FOTO: 1/3    PTA Visit #: 0/5     FOTO first follow up:   FOTO second follow up:     Date of Surgery: 5/16/2024  Return to MD: 5/29/2024, 6/28/2024    POSTOPERATIVE DIAGNOSIS:   Left shoulder anterior shoulder dislocation with large bony Bankart   Left shoulder Dislocation, Anterior S43.016A   Left shoulder Loose Body M24.012   Left shoulder synovitis      Post-op Plan: Bony Bankart repair protocol with biceps tenodesis      Precautions: Standard     Time In: 8:51 am   Time Out: 9:45 am   Total Billable Time: 54 minutes    SUBJECTIVE     Pt reports: She has been working on her mobility at home and has also returned to work. It is getting better but she is still having pain in her shoulder and difficulty with full mobility.   She was compliant with home exercise program.  Response to previous treatment: Independent in HEP   Functional change: Ongoing     Pain: 2/10  Location: left shoulder      OBJECTIVE     Objective Measures updated at progress report unless specified.     Observation 7/24/2024:  Range of Motion:  Shoulder   Left AROM Left PROM   Flexion 80 degrees  90 degrees*   Abduction 45 degrees*  60 degrees*   ER at 20  10 degrees*  15 degrees*    ER at  "45  NT 5 degrees*    *denotes pain    Treatment     *Pt is 9 weeks and 6 days s/p L Bankhart repair as of 7/24/2024.*    Grecia received the treatments listed below:      therapeutic exercises to develop strength, endurance, ROM, flexibility, posture, and core stabilization for 23 minutes including:    Assessment as above   Sidelying table RTC interval / ER modified stretch x 5'   Supine ER gentle stretch with dowel in scaption 1 x 15 x 3-5" holds  Pulleys elbow bent 90 x 3' in scaption to tolerance  Supine dowel AAROM chest press 2 x 10 reps     Not Performed:  Supine flex dowel 2x10  Table ER stretch 3x10x5"  ER stretch 2x10x5"  Table elevated walkouts x 2'     manual therapy techniques: Joint mobilizations, PROM and Soft tissue Mobilization were applied to the: Shoulder, Elbow for 12 minutes, including:    (L) PROM shoulder/elbow within protocol   (L) Gentle isometrics in scap plane arm supported with PT   Posterior/inferior mobilizations     neuromuscular re-education activities to improve: Coordination, Kinesthetic, Sense, Proprioception, and Posture for 19 minutes. The following activities were included:    Pt education   Rhythmic stabilizations multiple rounds  AAROM shoulder flexion with 3# CC 3 x 10 reps   Slot machine / landmines with dowel 2 x 10 reps   Supine ER holds with YellowTB 3 x 8 reps with heel slides RedTB     Not Performed:  Standing AAROM flexion with YellowTB 2 x 10 reps   Table slides flex/scap x 2' each   Scapular retractions 2 x 10 x 5" holds   Seated flex CC AAROM 3# 3x10  Sidelying serratus dowel 2x10  ER walkouts yellow 3x10  Gentle shoulder ext modified range red with eccentric control 3x10  Pt education    therapeutic activities to improve functional performance for 00 minutes, including:    Not Performed:  UBE 8'     Ice at the beginning of the session for 8' to reduce irritability - Not Today      Patient Education and Home Exercises     Home Exercises Provided and Patient " Education Provided     Education provided:   - Continue with HEP  - Post-op precautions and early goals, importance of sling compliance and monitoring incisions for signs/symptoms of infection     Written Home Exercises Provided: Patient instructed to cont prior HEP. Exercises were reviewed and Grecia was able to demonstrate them prior to the end of the session.  Grecia demonstrated good  understanding of the education provided. See EMR under Patient Instructions for exercises provided during therapy sessions    ASSESSMENT     Grecia continues with limitations in her shoulder range of motion with minimal improvement between session and pain limited. Continued emphasis with manual therapy on maximizing mobility to tolerance and further rhythmic stabilizations and stability work to help improve as well. She continues to be challenged with active assist range of motion as well and educate on importance of exercises at home starting with LLLD stretch followed by active assist range of motion exercises. Will be following up with the doctor soon as well.     Grecia Is progressing well towards her goals.   Pt prognosis is Good.     Pt will continue to benefit from skilled outpatient physical therapy to address the deficits listed in the problem list box on initial evaluation, provide pt/family education and to maximize pt's level of independence in the home and community environment.     Pt's spiritual, cultural and educational needs considered and pt agreeable to plan of care and goals.     Anticipated barriers to physical therapy: Scheduling     Goals:   Short Term Goals: 4-6 weeks (Progressing, not met)  Patient will be independent in initial HEP to help supplement PT. - MET  Patient will improve passive shoulder flexion to >/= 140 degrees to help with reaching/lifting.   Patient will improve pain at its worst to </= 5/10 to help improve quality of life.      Long Term Goals: 12-16 weeks (Progressing, not  met)  Patient will be independent in updated HEPT to help supplement PT and maintain gains made in PT.   Patient will improve FOTO score to >/= predicted (68) to show improvement in condition.   Patient will improve AROM shoulder flexion to >/= 150 degrees to help with ADLs.   Patient will improve shoulder strength MMT to >/= 4/5 to help with job duties.   Patient will improve pain at its worst to </= 1/10 to help improve quality of life.   Patient goal: Patient will be able to return to work fully with no difficulty.    PLAN   Plan of care Certification: 5/20/2024 to 9/20/2024.     Continue with PT POC within protocol limits.     Kay Carolina, PT, DPT, OCS

## 2024-08-06 ENCOUNTER — DOCUMENTATION ONLY (OUTPATIENT)
Dept: REHABILITATION | Facility: HOSPITAL | Age: 59
End: 2024-08-06
Payer: COMMERCIAL

## 2024-08-11 NOTE — PROGRESS NOTES
POST-OPERATIVE EXAMINATION    58 y.o. Female who returns for follow-up after surgery. She is 3 months s/p    PROCEDURE: 5/16/2024  1. Left shoulder Arthroscopic aneroinferior labral repair with capsulorrhaphy CPT - 15208 (arthroscopic bony Bankart repair)  2. Left shoulder Biceps tenodesis CPT - 16508  3. Left shoulder Arthroscopic loose body removal CPT - 34443  4. Left shoulder Arthroscopic extensive debridement CPT - 53344     She is still having pain and stiffness. She states that most of her pain is at night     PHYSICAL EXAMINATION:  /83   Pulse 83   Wt 84 kg (185 lb 3 oz)   BMI 33.87 kg/m²   General: Well-developed well-nourished 58 y.o. female in no acute distress   Cardiovascular: Regular rhythm   Lungs: No labored breathing or wheezing appreciated   Neuro: Alert and oriented ×3   Psychiatric: well oriented to person, place and time, demonstrates normal mood and affect   Skin: No rashes, lesions or ulcers, normal temperature, turgor, and texture on involved extremity    ORTHOPEDIC EXAM:  Normal post-operative swelling  Normal post-operative scarring  Strength: Grossly intact  ROM: FE- 90°; ER/Abd- 20°; IR/Abd- 10°; ER/Add- 0°     Tests: None    ASSESSMENT:      ICD-10-CM ICD-9-CM   1. S/P arthroscopy of left shoulder  Z98.890 V45.89   2. Arthrofibrosis of left shoulder  M24.612 718.51   3. Adhesive capsulitis of left shoulder  M75.02 726.0               PLAN:         Discontinue physical therapy for 6 weeks   RTC in 6 weeks for possible CSI for adhesive capsulitis               Naif Oconnell M.D.  [email or web address]

## 2024-08-12 ENCOUNTER — OFFICE VISIT (OUTPATIENT)
Dept: SPORTS MEDICINE | Facility: CLINIC | Age: 59
End: 2024-08-12
Payer: COMMERCIAL

## 2024-08-12 VITALS
WEIGHT: 185.19 LBS | BODY MASS INDEX: 33.87 KG/M2 | SYSTOLIC BLOOD PRESSURE: 125 MMHG | HEART RATE: 83 BPM | DIASTOLIC BLOOD PRESSURE: 83 MMHG

## 2024-08-12 DIAGNOSIS — M24.612 ARTHROFIBROSIS OF LEFT SHOULDER: ICD-10-CM

## 2024-08-12 DIAGNOSIS — M75.02 ADHESIVE CAPSULITIS OF LEFT SHOULDER: ICD-10-CM

## 2024-08-12 DIAGNOSIS — Z98.890 S/P ARTHROSCOPY OF LEFT SHOULDER: Primary | ICD-10-CM

## 2024-08-12 PROCEDURE — 99999 PR PBB SHADOW E&M-EST. PATIENT-LVL III: CPT | Mod: PBBFAC,,, | Performed by: ORTHOPAEDIC SURGERY

## 2024-08-12 PROCEDURE — 1159F MED LIST DOCD IN RCRD: CPT | Mod: CPTII,S$GLB,, | Performed by: ORTHOPAEDIC SURGERY

## 2024-08-12 PROCEDURE — 99024 POSTOP FOLLOW-UP VISIT: CPT | Mod: S$GLB,,, | Performed by: ORTHOPAEDIC SURGERY

## 2024-08-12 PROCEDURE — 3074F SYST BP LT 130 MM HG: CPT | Mod: CPTII,S$GLB,, | Performed by: ORTHOPAEDIC SURGERY

## 2024-08-12 PROCEDURE — 3079F DIAST BP 80-89 MM HG: CPT | Mod: CPTII,S$GLB,, | Performed by: ORTHOPAEDIC SURGERY

## 2024-08-12 PROCEDURE — 3066F NEPHROPATHY DOC TX: CPT | Mod: CPTII,S$GLB,, | Performed by: ORTHOPAEDIC SURGERY

## 2024-08-12 PROCEDURE — 4010F ACE/ARB THERAPY RXD/TAKEN: CPT | Mod: CPTII,S$GLB,, | Performed by: ORTHOPAEDIC SURGERY

## 2024-08-12 PROCEDURE — 3044F HG A1C LEVEL LT 7.0%: CPT | Mod: CPTII,S$GLB,, | Performed by: ORTHOPAEDIC SURGERY

## 2024-08-12 PROCEDURE — 3061F NEG MICROALBUMINURIA REV: CPT | Mod: CPTII,S$GLB,, | Performed by: ORTHOPAEDIC SURGERY

## 2024-08-12 NOTE — LETTER
Patient: Grecia Boyd   YOB: 1965   Clinic Number: [id number]   Today's Date: August 12, 2024          Work Status Summary     Date of Injury: 5/16/24      Work Status: Limited duties. Limit range of motion in left shoulder. No over head movements or movements outside body line.           _____  _______________________         August 12, 2024    Naif Ramirez MD  Signed (Provider)

## 2024-08-13 RX ORDER — CELECOXIB 200 MG/1
200 CAPSULE ORAL DAILY
Qty: 30 CAPSULE | Refills: 1 | Status: SHIPPED | OUTPATIENT
Start: 2024-08-13

## 2024-08-29 ENCOUNTER — TELEPHONE (OUTPATIENT)
Dept: PHARMACY | Facility: CLINIC | Age: 59
End: 2024-08-29
Payer: COMMERCIAL

## 2024-08-29 ENCOUNTER — PATIENT MESSAGE (OUTPATIENT)
Dept: INTERNAL MEDICINE | Facility: CLINIC | Age: 59
End: 2024-08-29
Payer: COMMERCIAL

## 2024-08-29 NOTE — TELEPHONE ENCOUNTER
[facility]Population [facility] Chart Review & Patient Outreach Details For Medication Adherence Project    Reason for Outreach Encounter: 3rd Party payor non-compliance report ([employer], [employer], [employer], etc)  2.  Patient Outreach Method: Reviewed patient chart   3.   Medication in question: atorvastatin    LAST FILLED: n/a         4.  Reviewed and or Updates Made To: Patient Chart  5. Outreach Outcomes and/or actions taken: Medication discontinued    Additional Notes:     Pt not taking

## 2024-09-02 ENCOUNTER — TELEPHONE (OUTPATIENT)
Dept: PHARMACY | Facility: CLINIC | Age: 59
End: 2024-09-02
Payer: COMMERCIAL

## 2024-09-03 NOTE — TELEPHONE ENCOUNTER
[facility] Chart Review & Patient Outreach Details For Medication Adherence Project    Reason for Outreach Encounter: 3rd Party payor non-compliance report ([employer], [employer], C, etc)  2.  Patient Outreach Method: Reviewed patient chart   3.   Medication in question: metformin   LAST FILLED: n/a  Diabetes Medications               OZEMPIC 1 mg/dose (4 mg/3 mL) Inject 1 mg into the skin every 7 days.              4.  Reviewed and or Updates Made To: Patient Chart  5. Outreach Outcomes and/or actions taken: Medication discontinued    Additional Notes:     Pt no longer taking

## 2024-09-23 ENCOUNTER — OFFICE VISIT (OUTPATIENT)
Dept: SPORTS MEDICINE | Facility: CLINIC | Age: 59
End: 2024-09-23
Payer: COMMERCIAL

## 2024-09-23 VITALS — WEIGHT: 185.19 LBS | BODY MASS INDEX: 34.08 KG/M2 | HEIGHT: 62 IN

## 2024-09-23 DIAGNOSIS — Z98.890 S/P ARTHROSCOPY OF LEFT SHOULDER: Primary | ICD-10-CM

## 2024-09-23 DIAGNOSIS — M75.02 ADHESIVE CAPSULITIS OF LEFT SHOULDER: ICD-10-CM

## 2024-09-23 DIAGNOSIS — M24.612 ARTHROFIBROSIS OF LEFT SHOULDER: ICD-10-CM

## 2024-09-23 PROCEDURE — 3061F NEG MICROALBUMINURIA REV: CPT | Mod: CPTII,S$GLB,, | Performed by: ORTHOPAEDIC SURGERY

## 2024-09-23 PROCEDURE — 3066F NEPHROPATHY DOC TX: CPT | Mod: CPTII,S$GLB,, | Performed by: ORTHOPAEDIC SURGERY

## 2024-09-23 PROCEDURE — 3044F HG A1C LEVEL LT 7.0%: CPT | Mod: CPTII,S$GLB,, | Performed by: ORTHOPAEDIC SURGERY

## 2024-09-23 PROCEDURE — 99214 OFFICE O/P EST MOD 30 MIN: CPT | Mod: 25,S$GLB,, | Performed by: ORTHOPAEDIC SURGERY

## 2024-09-23 PROCEDURE — 1159F MED LIST DOCD IN RCRD: CPT | Mod: CPTII,S$GLB,, | Performed by: ORTHOPAEDIC SURGERY

## 2024-09-23 PROCEDURE — 99999 PR PBB SHADOW E&M-EST. PATIENT-LVL III: CPT | Mod: PBBFAC,,, | Performed by: ORTHOPAEDIC SURGERY

## 2024-09-23 PROCEDURE — 4010F ACE/ARB THERAPY RXD/TAKEN: CPT | Mod: CPTII,S$GLB,, | Performed by: ORTHOPAEDIC SURGERY

## 2024-09-23 PROCEDURE — 3008F BODY MASS INDEX DOCD: CPT | Mod: CPTII,S$GLB,, | Performed by: ORTHOPAEDIC SURGERY

## 2024-09-23 PROCEDURE — 20611 DRAIN/INJ JOINT/BURSA W/US: CPT | Mod: LT,S$GLB,, | Performed by: ORTHOPAEDIC SURGERY

## 2024-09-23 RX ADMIN — TRIAMCINOLONE ACETONIDE 40 MG: 40 INJECTION, SUSPENSION INTRA-ARTICULAR; INTRAMUSCULAR at 04:09

## 2024-09-23 NOTE — LETTER
Patient: Grecia Boyd   YOB: 1965   Clinic Number: [id number]   Today's Date: September 23, 2024        Certificate to Return to Work     Grecia Singh was seen by Naif Ramirez MD on 9/23/2024.      Grecia Singh can return to work on 9/23/24 with full duty     Specific restrictions: none    If you have any questions or concerns, please feel free to contact the office at [phone].    Thank you.    Naif Ramirez MD        Signature: ___    Naif Oconnell M.D.  [email or web address]  ______________________________________________

## 2024-09-23 NOTE — PROGRESS NOTES
POST-OPERATIVE EXAMINATION    59 y.o. Female who returns for follow-up after surgery. She is 4 months s/p    PROCEDURE: 5/16/2024  1. Left shoulder Arthroscopic aneroinferior labral repair with capsulorrhaphy CPT - 81392 (arthroscopic bony Bankart repair)  2. Left shoulder Biceps tenodesis CPT - 30761  3. Left shoulder Arthroscopic loose body removal CPT - 41399  4. Left shoulder Arthroscopic extensive debridement CPT - 35284     She is still having pain and stiffness. She states that most of her pain is at night     PHYSICAL EXAMINATION:  There were no vitals taken for this visit.  General: Well-developed well-nourished 59 y.o. female in no acute distress   Cardiovascular: Regular rhythm   Lungs: No labored breathing or wheezing appreciated   Neuro: Alert and oriented ×3   Psychiatric: well oriented to person, place and time, demonstrates normal mood and affect   Skin: No rashes, lesions or ulcers, normal temperature, turgor, and texture on involved extremity    ORTHOPEDIC EXAM:  Normal post-operative swelling  Normal post-operative scarring  Strength: Grossly intact  ROM: FE- 90°; ER/Abd- 20°; IR/Abd- 10°; ER/Add- 0°     Tests: None    ASSESSMENT:      ICD-10-CM ICD-9-CM   1. S/P arthroscopy of left shoulder  Z98.890 V45.89   2. Adhesive capsulitis of left shoulder  M75.02 726.0   3. Arthrofibrosis of left shoulder  M24.612 718.51                 PLAN:         CSI left    RTC in 3 months   We also discussed a possible lysis of adhesions for her left shoulder if needed.    Large Joint Aspiration/Injection: L glenohumeral    Date/Time: 9/23/2024 4:15 PM    Performed by: Naif Ramirez MD  Authorized by: Naif Ramirez MD    Consent Done?:  Yes (Verbal)  Indications:  Pain  Site marked: the procedure site was marked    Timeout: prior to procedure the correct patient, procedure, and site was verified    Prep: patient was prepped and draped in usual sterile fashion      Local anesthesia used?: Yes    Local  anesthetic:  Co-phenylcaine spray (0.2% Naropin)  Anesthetic total (ml):  4      Details:  Needle Size:  22 G  Ultrasonic Guidance for needle placement?: Yes (Ultrasound guidance was used for needle localization.  Images were saved and stored for documentation.  Dynamic visualization of the needle was continuous throughout the procedure and maintained accurate placement.)    Images are saved and documented.  Approach:  Posterior  Location:  Shoulder  Site:  L glenohumeral  Medications:  40 mg triamcinolone acetonide 40 mg/mL  Medications comment:  5 mL LIDOcaine HCL 10 mg/ml (1%) 10 mg/mL (1 %)  Patient tolerance:  Patient tolerated the procedure well with no immediate complications                  Naif Oconnell M.D.  [email or web address]

## 2024-09-24 RX ORDER — TRIAMCINOLONE ACETONIDE 40 MG/ML
40 INJECTION, SUSPENSION INTRA-ARTICULAR; INTRAMUSCULAR
Status: DISCONTINUED | OUTPATIENT
Start: 2024-09-23 | End: 2024-09-24 | Stop reason: HOSPADM

## 2024-10-01 ENCOUNTER — PATIENT MESSAGE (OUTPATIENT)
Dept: INTERNAL MEDICINE | Facility: CLINIC | Age: 59
End: 2024-10-01
Payer: COMMERCIAL

## 2024-10-04 NOTE — PATIENT INSTRUCTIONS
This pharmacy faxed over request for the following prescriptions to be filled:    Medication requested : amLODIPine (NORVASC) 10 MG tablet QTY 90 Refill 1    fenofibrate (TRIGLIDE) 160 MG tablet  QTY 90 Refill 1  PCP: Pratibha  Pharmacy or Print: [facility]  Mail order or Local pharmacy [address] [place] [address]     Scheduled appointment if not seen by current providers in office: LOV 4/1/2024 NS 9/30/2024 LMOV to reschedule     Use an antihistamine such as Claritin, Zyrtec or Allegra to dry you out.     Use mucinex (guaifenisin) to break up mucous up to 2400mg/day to loosen any mucous. The mucinex DM pill has a cough suppressant that can be used at night to stop the tickle at the back of your throat.    Use albuterol inhaler every 4-6 hours as needed for chest tightness.  Take Tessalon Perles every 6 hr as needed for cough suppression    May use cough syrup nightly.  May make drowsy use with caution.    Use Flonase 1-2 sprays/nostril per day. It is a local acting steroid nasal spray, if you develop a bloody nose, stop using the medication immediately.    Take ibuprofen as directed for minor pain.    Drink plenty of fluids and get rest.  Follow-up with PCP if symptoms persist or worsen.      Bronchitis, Viral (Adult)    You have a viral bronchitis. Bronchitis is inflammation and swelling of the lining of the lungs. This is often caused by an infection. Symptoms include a dry, hacking cough that is worse at night. The cough may bring up yellow-green mucus. You may also feel short of breath or wheeze. Other symptoms may include tiredness, chest discomfort, and chills.  Bronchitis that is caused by a virus is not treated with antibiotics. Instead, medicines may be given to help relieve symptoms. Symptoms can last up to 2 weeks, although the cough may last much longer.  This illness is contagious during the first few days and is spread through the air by coughing and sneezing, or by direct contact (touching the sick person and then touching your own eyes, nose, or mouth).  Most viral illnesses resolve within 10 to 14 days with rest and simple home remedies, although they may sometimes last for several weeks.  Home care  · If symptoms are severe, rest at home for the first 2 to 3 days. When you go back to your usual activities, don't let yourself get too tired.  · Do not smoke. Also avoid being exposed to secondhand smoke.  · You may use  over-the-counter medicine to control fever or pain, unless another pain medicine was prescribed. (Note: If you have chronic liver or kidney disease or have ever had a stomach ulcer or gastrointestinal bleeding, talk with your healthcare provider before using these medicines. Also talk to your provider if you are taking medicine to prevent blood clots.) Aspirin should never be given to anyone younger than 18 years of age who is ill with a viral infection or fever. It may cause severe liver or brain damage.  · Your appetite may be poor, so a light diet is fine. Avoid dehydration by drinking 6 to 8 glasses of fluids per day (such as water, soft drinks, sports drinks, juices, tea, or soup). Extra fluids will help loosen secretions in the nose and lungs.  · Over-the-counter cough, cold, and sore-throat medicines will not shorten the length of the illness, but they may help to reduce symptoms. (Note: Do not use decongestants if you have high blood pressure.)  Follow-up care  Follow up with your healthcare provider, or as advised. If you had an X-ray or ECG (electrocardiogram), a specialist will review it. You will be notified of any new findings that may affect your care.  Note: If you are age 65 or older, or if you have a chronic lung disease or condition that affects your immune system, or you smoke, talk to your healthcare provider about having pneumococcal vaccinations and a yearly influenza vaccination (flu shot).  When to seek medical advice  Call your healthcare provider right away if any of these occur:  · Fever of 100.4°F (38°C) or higher  · Coughing up increased amounts of colored sputum  · Weakness, drowsiness, headache, facial pain, ear pain, or a stiff neck  Call 911, or get immediate medical care  Contact emergency services right away if any of these occur:  · Coughing up blood  · Worsening weakness, drowsiness, headache, or stiff neck  · Trouble breathing, wheezing, or pain with breathing  Date Last  Reviewed: 9/13/2015  © 7230-4642 [employer]. [address] [place] [address], [place] [address]. All rights reserved. This information is not intended as a substitute for professional medical care. Always follow your healthcare professional's instructions.

## 2024-11-05 ENCOUNTER — ON-DEMAND VIRTUAL (OUTPATIENT)
Dept: URGENT CARE | Facility: CLINIC | Age: 59
End: 2024-11-05
Payer: COMMERCIAL

## 2024-11-05 DIAGNOSIS — H10.9 CONJUNCTIVITIS, UNSPECIFIED CONJUNCTIVITIS TYPE, UNSPECIFIED LATERALITY: Primary | ICD-10-CM

## 2024-11-05 PROCEDURE — 99213 OFFICE O/P EST LOW 20 MIN: CPT | Mod: 95,,, | Performed by: NURSE PRACTITIONER

## 2024-11-05 RX ORDER — TOBRAMYCIN 3 MG/ML
1 SOLUTION/ DROPS OPHTHALMIC EVERY 4 HOURS
Qty: 5 ML | Refills: 0 | Status: SHIPPED | OUTPATIENT
Start: 2024-11-05

## 2024-11-05 NOTE — PROGRESS NOTES
Subjective:      Patient ID: Grecia Boyd is a 59 y.o. female.    Vitals:  vitals were not taken for this visit.     Chief Complaint: Conjunctivitis      Visit Type: TELE AUDIOVISUAL - This visit was conducted virtually based on  subjective information and limited objective exam    Present with the patient at the time of consultation: TELEMED PRESENT WITH PATIENT: None  Two patient identifiers used to verify patient- saying out date of birth and full name.       Past Medical History:   Diagnosis Date    Diabetes mellitus, type 2     Hyperlipidemia     Hypertension     Obesity     CHIDI (obstructive sleep apnea)     Unspecified chronic bronchitis 02/2024     Past Surgical History:   Procedure Laterality Date    ADENOIDECTOMY      ARTHROSCOPIC DEBRIDEMENT OF SHOULDER Left 5/16/2024    Procedure: DEBRIDEMENT, SHOULDER, ARTHROSCOPIC;  Surgeon: Naif Ramirez MD;  Location: [facility] OR;  Service: Orthopedics;  Laterality: Left;    ARTHROSCOPY, SHOULDER, WITH CAPSULORRHAPHY Left 5/16/2024    Procedure: ARTHROSCOPY,SHOULDER,WITH CAPSULORRHAPHY;  Surgeon: Naif Ramirez MD;  Location: [facility] OR;  Service: Orthopedics;  Laterality: Left;  REGIONAL BLOCK    ARTHROSCOPY,SHOULDER,WITH BICEPS TENODESIS Left 5/16/2024    Procedure: ARTHROSCOPY,SHOULDER,WITH BICEPS TENODESIS;  Surgeon: Naif Ramirez MD;  Location: [facility] OR;  Service: Orthopedics;  Laterality: Left;    BREAST BIOPSY Right     @ age 18    CHONDROPLASTY OF KNEE Right 2/12/2021    Procedure: CHONDROPLASTY, KNEE;  Surgeon: ZULEIKA Barahona MD;  Location: [facility] OR;  Service: Orthopedics;  Laterality: Right;    COLONOSCOPY N/A 10/16/2023    Procedure: COLONOSCOPY;  Surgeon: Grace Wiley MD;  Location: [facility] ENDOSCOPY;  Service: Endoscopy;  Laterality: N/A;  Referred by: JOAN Chirinos NP   Meds: Ozempic - pt inst to hold per protocol  Prep: Suprep  Route instructions sent: portal  SW  10/9- precall complete.  DBM    HYSTERECTOMY      2009    KNEE ARTHROSCOPY W/  MENISCECTOMY Right 2/12/2021    Procedure: ARTHROSCOPY, KNEE, WITH MEDIAL MENISCECTOMY LATERAL RELEASE;  Surgeon: ZULEIKA Barahona MD;  Location: [facility] OR;  Service: Orthopedics;  Laterality: Right;  pericapsular injection: 30cc ropivacaine/epi/clonidine/ketorolac injection     KNEE SURGERY      SYNOVECTOMY OF KNEE Right 2/12/2021    Procedure: SYNOVECTOMY, KNEE;  Surgeon: ZULEIKA Barahona MD;  Location: [facility] OR;  Service: Orthopedics;  Laterality: Right;    THYROIDECTOMY, PARTIAL      TONSILLECTOMY       Review of patient's allergies indicates:  No Known Allergies  Current Outpatient Medications on File Prior to Visit   Medication Sig Dispense Refill    albuterol (PROAIR HFA) 90 mcg/actuation inhaler Inhale 2 puffs into the lungs every 6 (six) hours as needed for Wheezing or Shortness of Breath. Rescue 18 g 2    blood sugar diagnostic Strp Check blood sugars  strip 11    blood-glucose meter (FREESTYLE SYSTEM KIT) kit Use as instructed 1 each 1    celecoxib (CELEBREX) 200 MG capsule Take 1 capsule (200 mg total) by mouth once daily. 30 capsule 1    hydroCHLOROthiazide (HYDRODIURIL) 12.5 MG Tab Take 1 tablet (12.5 mg total) by mouth daily as needed (For fluid and high blood pressure). 90 tablet 1    HYDROcodone-acetaminophen (NORCO) 7.5-325 mg per tablet Take 1 tablet by mouth every 4 (four) hours as needed for Pain. 20 tablet 0    ibuprofen (ADVIL,MOTRIN) 800 MG tablet Take 1 tablet (800 mg total) by mouth every 6 (six) hours as needed for Pain. 20 tablet 0    lancets (ONETOUCH ULTRASOFT LANCETS) Misc Check blood sugars  each 11    OZEMPIC 1 mg/dose (4 mg/3 mL) Inject 1 mg into the skin every 7 days. 1 each 2    valsartan (DIOVAN) 160 MG tablet Take 1 tablet (160 mg total) by mouth once daily. 90 tablet 3     No current facility-administered medications on file prior to visit.     Family History   Problem Relation Name Age of Onset    Diabetes Mother      Heart disease Mother      Hypertension Mother       Hyperlipidemia Mother      Stroke Mother      Cataracts Mother      Cancer Paternal Aunt          Lung    Diabetes Paternal Grandmother      Breast cancer Paternal Aunt      Macular degeneration Paternal Aunt      Breast cancer Cousin      Colon cancer Neg Hx      Ovarian cancer Neg Hx         Medications Ordered                [id number] - [place] [id number] [facility] [address] [place] [id number] [facility] [address]    Telephone: [phone]   Fax: [phone]   Hours: Not open 24 hours                         E-Prescribed (1 of 1)              tobramycin sulfate 0.3% (TOBREX) 0.3 % ophthalmic solution    Sig: Place 1 drop into both eyes every 4 (four) hours.       Start: 11/5/24     Quantity: 5 mL Refills: 0                           Ohs Peq Odvv Intake    11/5/2024  6:27 AM CST - Filed by Patient   What is your current physical address in the event of a medical emergency? [facility]   Are you able to take your vital signs? No   Please attach any relevant images or files    Is your employer contracted with [facility]? Yes   Please enter your employer supplied coupon code. Don't know it         58 yo female with c/o bilateral eye redness. She states yesterday eyes were itchy and had false lashes on and she took them off yesterday. She states this morning both eye irritated but right eye worse than left. She states no crusting. She denies changes in vision and uri symptoms.         Constitution: Negative. Negative for fever.   HENT: Negative.  Negative for congestion, sinus pressure and sore throat.    Eyes:  Positive for eye discharge, eye itching, eye pain and eye redness. Negative for eye trauma, foreign body in eye, photophobia, vision loss, double vision, blurred vision and eyelid swelling.   Respiratory: Negative.     Gastrointestinal: Negative.  Negative for nausea and vomiting.   Genitourinary: Negative.    Musculoskeletal:  Negative.    Allergic/Immunologic: Negative.    Neurological: Negative.  Negative for headaches.        Objective:   The physical exam was conducted virtually.  LOCATION OF PATIENT [place]  AAO x 3 ; no acute distress noted; appearance normal; mood and behavior normal; thought process normal  Head- normocephalic  Nose- appears normal, no discharge or erythema  Eyes- pupils appear normal in size, no drainage, no erythema  Ears- normal appearing; no discharge, no erythema  Mouth- appears normal  Oropharynx- no erythema, lesions  Lungs- breathing at a normal rate, no acute distress noted  Heart- no reports of tachycardia, palpitations, chest pain  Abdomen- non distended, non tender reported by patient  Skin- warm and dry, no erythema or edema noted by patient or visualized  Psych- as above; no si/hi      Assessment:     1. Conjunctivitis, unspecified conjunctivitis type, unspecified laterality        Plan:     Recommendations:    Avoid contact with eyes and perform good hand hygiene.     If you were prescribed antibiotic eye drops take as directed.     Do not wear contacts for one week. Avoid eye make-up.     Do not rub eyes. Wash hands frequently and disinfect common surfaces to avoid spread.    Warm and cool compresses may be soothing.    Artificial tears may help lubricate and rinse the eye. You may refrigerate the drops for added comfort    For allergic conjunctivitis, use antihistamine eye drops such as Pataday or Zaditor as directed on package.       Follow up with Eye Doctor if no improvement in symptoms or for any worsening of symptoms.          Thank you for choosing [facility]!    Our goal in the [facility] is to always provide outstanding medical care. You may receive a survey by mail or e-mail in the next week regarding your experience today. We would greatly appreciate you completing and returning the survey. Your feedback provides us with a way to recognize  our staff who provide very good care, and it helps us learn how to improve when your experience was below our aspiration of excellence.         We appreciate you trusting us with your medical care. We hope you feel better soon. We will be happy to take care of you for all of your future medical needs.    You must understand that you've received an Urgent Care treatment only and that you may be released before all your medical problems are known or treated. You, the patient, will arrange for follow up care as instructed.    Follow up with your PCP or specialty clinic as directed in the next 1-2 weeks if not improved or as needed.  You can call [phone] to schedule an appointment with the appropriate provider.    If your condition worsens we recommend that you receive another evaluation in person, with your primary care provider, urgent care or at the emergency room immediately or contact your primary medical clinics after hours call service to discuss your concerns.         Conjunctivitis, unspecified conjunctivitis type, unspecified laterality  -     tobramycin sulfate 0.3% (TOBREX) 0.3 % ophthalmic solution; Place 1 drop into both eyes every 4 (four) hours.  Dispense: 5 mL; Refill: 0

## 2024-11-12 ENCOUNTER — TELEPHONE (OUTPATIENT)
Dept: PHARMACY | Facility: CLINIC | Age: 59
End: 2024-11-12
Payer: COMMERCIAL

## 2024-12-09 ENCOUNTER — OFFICE VISIT (OUTPATIENT)
Dept: SPORTS MEDICINE | Facility: CLINIC | Age: 59
End: 2024-12-09
Payer: COMMERCIAL

## 2024-12-09 VITALS
DIASTOLIC BLOOD PRESSURE: 74 MMHG | SYSTOLIC BLOOD PRESSURE: 114 MMHG | BODY MASS INDEX: 35.48 KG/M2 | WEIGHT: 194 LBS | HEART RATE: 79 BPM

## 2024-12-09 DIAGNOSIS — M75.02 ADHESIVE CAPSULITIS OF LEFT SHOULDER: ICD-10-CM

## 2024-12-09 DIAGNOSIS — M24.612 ARTHROFIBROSIS OF LEFT SHOULDER: Primary | ICD-10-CM

## 2024-12-09 DIAGNOSIS — Z98.890 S/P ARTHROSCOPY OF LEFT SHOULDER: ICD-10-CM

## 2024-12-09 PROCEDURE — 3074F SYST BP LT 130 MM HG: CPT | Mod: CPTII,S$GLB,, | Performed by: ORTHOPAEDIC SURGERY

## 2024-12-09 PROCEDURE — 3008F BODY MASS INDEX DOCD: CPT | Mod: CPTII,S$GLB,, | Performed by: ORTHOPAEDIC SURGERY

## 2024-12-09 PROCEDURE — 3078F DIAST BP <80 MM HG: CPT | Mod: CPTII,S$GLB,, | Performed by: ORTHOPAEDIC SURGERY

## 2024-12-09 PROCEDURE — 99999 PR PBB SHADOW E&M-EST. PATIENT-LVL III: CPT | Mod: PBBFAC,,, | Performed by: ORTHOPAEDIC SURGERY

## 2024-12-09 PROCEDURE — 3044F HG A1C LEVEL LT 7.0%: CPT | Mod: CPTII,S$GLB,, | Performed by: ORTHOPAEDIC SURGERY

## 2024-12-09 PROCEDURE — 4010F ACE/ARB THERAPY RXD/TAKEN: CPT | Mod: CPTII,S$GLB,, | Performed by: ORTHOPAEDIC SURGERY

## 2024-12-09 PROCEDURE — 99214 OFFICE O/P EST MOD 30 MIN: CPT | Mod: S$GLB,,, | Performed by: ORTHOPAEDIC SURGERY

## 2024-12-09 PROCEDURE — 1159F MED LIST DOCD IN RCRD: CPT | Mod: CPTII,S$GLB,, | Performed by: ORTHOPAEDIC SURGERY

## 2024-12-09 PROCEDURE — 3061F NEG MICROALBUMINURIA REV: CPT | Mod: CPTII,S$GLB,, | Performed by: ORTHOPAEDIC SURGERY

## 2024-12-09 PROCEDURE — 3066F NEPHROPATHY DOC TX: CPT | Mod: CPTII,S$GLB,, | Performed by: ORTHOPAEDIC SURGERY

## 2024-12-09 NOTE — PROGRESS NOTES
POST-OPERATIVE EXAMINATION    59 y.o. Female who returns for follow-up after surgery. She is nearly 7 months  months s/p    PROCEDURE: 5/16/2024  1. Left shoulder Arthroscopic aneroinferior labral repair with capsulorrhaphy CPT - 81086 (arthroscopic bony Bankart repair)  2. Left shoulder Biceps tenodesis CPT - 07401  3. Left shoulder Arthroscopic loose body removal CPT - 21272  4. Left shoulder Arthroscopic extensive debridement CPT - 46034     History 12/8/24  She is states she is still having pain and stiffness. She received a intraarticular CSI on 9/23 that did not provide relief. She states she is very stiff at night.    History 9/23/24  She is still having pain and stiffness. She states that most of her pain is at night     PHYSICAL EXAMINATION:  /74   Pulse 79   Wt 88 kg (194 lb 0.1 oz)   BMI 35.48 kg/m²   General: Well-developed well-nourished 59 y.o. female in no acute distress   Cardiovascular: Regular rhythm   Lungs: No labored breathing or wheezing appreciated   Neuro: Alert and oriented ×3   Psychiatric: well oriented to person, place and time, demonstrates normal mood and affect   Skin: No rashes, lesions or ulcers, normal temperature, turgor, and texture on involved extremity    ORTHOPEDIC EXAM:  Normal post-operative swelling  Normal post-operative scarring  Strength: 5/5  ROM: FE- 90°; ER/Abd- 30°; IR/Abd- 10°; ER/Add- 0°     Tests: Negative Jobes, negative aranda      ASSESSMENT:      ICD-10-CM ICD-9-CM   1. Arthrofibrosis of left shoulder  M24.612 718.51   2. S/P arthroscopy of left shoulder  Z98.890 V45.89   3. Adhesive capsulitis of left shoulder  M75.02 726.0           PLAN:         Her strength has returned, however her stiffness and pain is persistent.   Surgical versus non-surgical options discussed today; left shoulder arthroscopy with lysis of adhesions, extensive debridement and manipulation under anaesthesia    The patient elected to proceed with operative intervention after all  risks, benefits, and alternatives were discussed with the patient.  The risks of surgery include bleeding, scar formation, injuries to nerves, arteries and veins, need for additional surgeries, blood clots, infections, inability to return to the same level of the performance and the risk of anesthesia.   Pre-op with Yoandy Kelly PA-C  Shoulder CPM order and aggressive physical thearpy post-op                     Naif Oconnell M.D.  [email or web address]

## 2024-12-10 DIAGNOSIS — M75.02 ADHESIVE CAPSULITIS OF LEFT SHOULDER: Primary | ICD-10-CM

## 2024-12-10 DIAGNOSIS — M24.612 ARTHROFIBROSIS OF LEFT SHOULDER: ICD-10-CM

## 2024-12-11 ENCOUNTER — PATIENT MESSAGE (OUTPATIENT)
Dept: PREADMISSION TESTING | Facility: HOSPITAL | Age: 59
End: 2024-12-11
Payer: COMMERCIAL

## 2024-12-11 NOTE — ANESTHESIA PAT ROS NOTE
12/11/2024  Grecia Boyd is a 59 y.o., female.      Pre-op Assessment    I have reviewed the Patient Summary Reports.     I have reviewed the Nursing Notes.    I have reviewed the Medications.     Review of Systems  Anesthesia Hx:  No problems with previous Anesthesia   History of prior surgery of interest to airway management or planning:  Previous anesthesia: General, Nerve Block 5/16/2024  Left Shoulder Arthroscopy w/ Capsulorraphy with general anesthesia.  Procedure performed at an [facility].     for 5/16/2024  Left Shoulder Arthroscopy w/ Capsulorraphy.  Procedure performed at an [facility].  Airway issues documented on chart review include mask, easy, GETA, videolaryngoscope used  , view on video-laryngoscopy Grade I    Denies Family Hx of Anesthesia complications.    Denies Personal Hx of Anesthesia complications.                    Social:  Alcohol Use, Non-Smoker       Hematology/Oncology:  Hematology Normal   Oncology Normal                                   EENT/Dental:  chronic allergic rhinitis H/O T&A,  Nasal septal deviation,  Tinnitus of both ears            Cardiovascular:  Exercise tolerance: good   Hypertension    Denies CAD.       Denies Angina.     hyperlipidemia  Denies PRESLEY.  ECG has been reviewed.  Patient not on beta blockers                          Pulmonary:     Denies Asthma.   Denies Shortness of breath. Recent URI Sleep Apnea Not using C-Pap since she lost weight,  Unspecified chronic bronchitis,  Nasal congestion and post nasal drip present on preop exam 12/17,  Pt taking Fluticasone nasal spray and OTC Mucinex               Renal/:  Renal/ Normal                 Hepatic/GI:  Hepatic/GI Normal     Denies GERD. Denies Liver Disease.   Not Taking GLP-1 Agonists            Musculoskeletal:  Arthritis   Adhesive capsulitis of left shoulder,  Arthrofibrosis of left  shoulder,  H/O Left Shoulder Arthroscopy, Capsulorrhaphy, Biceps Tenodesis,  Right Knee Arthroscopy, meniscectomy, Chondroplasty, Synovectomy              OB/GYN/PEDS:  Hysterectomy           Neurological:  Neurology Normal   Denies CVA.    Denies Headaches. Denies Seizures.                                Endocrine:  Diabetes, well controlled, type 2 Denies Hypothyroidism.  Partial Thyroidectomy,  HA1C = 7.4 on 12/17/2024,  Diet-controlled DM      Obesity / BMI > 30  Psych:  Psychiatric Normal                  Past Medical History:   Diagnosis Date    Diabetes mellitus, type 2     Hyperlipidemia     Hypertension     Obesity     CHIDI (obstructive sleep apnea)     Unspecified chronic bronchitis 02/2024        Past Surgical History:   Procedure Laterality Date    ADENOIDECTOMY      ARTHROSCOPIC DEBRIDEMENT OF SHOULDER Left 5/16/2024    Procedure: DEBRIDEMENT, SHOULDER, ARTHROSCOPIC;  Surgeon: Naif Ramirez MD;  Location: [facility] OR;  Service: Orthopedics;  Laterality: Left;    ARTHROSCOPY, SHOULDER, WITH CAPSULORRHAPHY Left 5/16/2024    Procedure: ARTHROSCOPY,SHOULDER,WITH CAPSULORRHAPHY;  Surgeon: Naif Ramirez MD;  Location: [facility] OR;  Service: Orthopedics;  Laterality: Left;  REGIONAL BLOCK    ARTHROSCOPY,SHOULDER,WITH BICEPS TENODESIS Left 5/16/2024    Procedure: ARTHROSCOPY,SHOULDER,WITH BICEPS TENODESIS;  Surgeon: Naif Ramirez MD;  Location: [facility] OR;  Service: Orthopedics;  Laterality: Left;    BREAST BIOPSY Right     @ age 18    CHONDROPLASTY OF KNEE Right 2/12/2021    Procedure: CHONDROPLASTY, KNEE;  Surgeon: ZULEIKA Barahona MD;  Location: [facility] OR;  Service: Orthopedics;  Laterality: Right;    COLONOSCOPY N/A 10/16/2023    Procedure: COLONOSCOPY;  Surgeon: Grace Wiley MD;  Location: [facility] ENDOSCOPY;  Service: Endoscopy;  Laterality: N/A;  Referred by: JOAN Chirinos NP  [facility] Meds: Ozempic - pt inst to hold per protocol  Prep: Suprep  Route instructions sent: portal  SW  10/9- precall complete.  DBM     HYSTERECTOMY      2009    KNEE ARTHROSCOPY W/ MENISCECTOMY Right 2/12/2021    Procedure: ARTHROSCOPY, KNEE, WITH MEDIAL MENISCECTOMY LATERAL RELEASE;  Surgeon: ZULEIKA Barahona MD;  Location: [facility] OR;  Service: Orthopedics;  Laterality: Right;  pericapsular injection: 30cc ropivacaine/epi/clonidine/ketorolac injection     KNEE SURGERY      SYNOVECTOMY OF KNEE Right 2/12/2021    Procedure: SYNOVECTOMY, KNEE;  Surgeon: ZULEIKA Barahona MD;  Location: [facility] OR;  Service: Orthopedics;  Laterality: Right;    THYROIDECTOMY, PARTIAL      TONSILLECTOMY        Past Medical History:   Diagnosis Date    Diabetes mellitus, type 2     Hyperlipidemia     Hypertension     Obesity     CHIDI (obstructive sleep apnea)     Unspecified chronic bronchitis 02/2024         Anesthesia Assessment: Preoperative EQUATION    Planned Procedure: Procedure(s) (LRB):  LYSIS,ADHESIONS,SHOULDER,ARTHROSCOPIC (Left)  Requested Anesthesia Type:General/Regional  Surgeon: Naif Ramirez MD  Service: Orthopedics  Known or anticipated Date of Surgery:12/26/2024    Surgeon notes: reviewed    Electronic QUestionnaire Assessment completed via nurse interview with patient.        Triage considerations:   The patient has no apparent active cardiac condition (No unstable coronary Syndrome such as severe unstable angina or recent [<1 month] myocardial infarction, decompensated CHF, severe valvular   disease or significant arrhythmia)    Previous anesthesia records: GETA, LMA General, Easy airway, and No problems, Nerve block for post-op pain, video laryngoscopy, Bynum 3    Last PCP note:  within 1 month, within [facility]  Subspecialty notes: n/a    Other important co-morbidities: DM2, HTN, and Obesity       EKG 5/9/2024:   Vent. Rate : 068 BPM     Atrial Rate : 068 BPM      P-R Int : 178 ms          QRS Dur : 084 ms       QT Int : 364 ms       P-R-T Axes : 016 020 027 degrees      QTc Int : 387 ms   Normal sinus rhythm   Abnormal R wave progression   Cannot  "rule out Anterior infarct ,age undetermined   Abnormal ECG   When compared with ECG of 10-FEB-2021 09:09,   Nonspecific T wave abnormality no longer evident in Anterior leads   Confirmed by HOMA TORRES MD ([id number]) on 5/9/2024 10:17:47 AM       Echo 5/17/2019:   Summary  Normal left ventricular systolic function. The estimated ejection fraction is 65%  Normal LV diastolic function.  No wall motion abnormalities.  Normal right ventricular systolic function.  Normal central venous pressure (3 mm Hg).       Tests already available:  Recent tests reviewed              Instructions given. (See in Nurse's note)    Optimization:  Anesthesia Preop Clinic Assessment Not Indicated    Medical Opinion Indicated: Yes, PCP       Sub-specialist consult indicated:No      Plan:   Consultation:Patient's PCP for a statement of optimization      Patient  has previously scheduled Medical Appointment: 12/17/2024    Navigation:  Patient is cleared/ optimized for surgery per PCP.       Ht: 5'2"  Wt: 88 kg (194 lb)  BMI: 35.48  "

## 2024-12-12 ENCOUNTER — TELEPHONE (OUTPATIENT)
Dept: SPORTS MEDICINE | Facility: CLINIC | Age: 59
End: 2024-12-12
Payer: COMMERCIAL

## 2024-12-13 ENCOUNTER — TELEPHONE (OUTPATIENT)
Dept: INTERNAL MEDICINE | Facility: CLINIC | Age: 59
End: 2024-12-13
Payer: COMMERCIAL

## 2024-12-13 NOTE — TELEPHONE ENCOUNTER
----- Message from ROSALINDA Olsen sent at 12/13/2024  8:58 AM CST -----  Regarding: Ppointment for preop clearance  Good morning,  Ms. Boyd is scheduled for surgery with Dr. Ramirez on 12/26/2024, Left Knee Arthroscopy, Lysis of Adhesions. Dr. Ramirez is requesting medical clearance for this procedure. Can your staff assist with scheduling her with Dr. Tripathi or another available provider for preop appointment?     Thank you,  Best regards,    Pretty GERMAIN, RN  Pre-Post Anesthesia Review   [facility]  [address]  [phone]

## 2024-12-13 NOTE — TELEPHONE ENCOUNTER
Spoke with pt regarding of her upcoming surgery on 12/26/2024. She stated that she needed clearance for preop. I assist with scheduling her for an appt

## 2024-12-17 ENCOUNTER — LAB VISIT (OUTPATIENT)
Dept: LAB | Facility: HOSPITAL | Age: 59
End: 2024-12-17
Attending: INTERNAL MEDICINE
Payer: COMMERCIAL

## 2024-12-17 ENCOUNTER — OFFICE VISIT (OUTPATIENT)
Dept: INTERNAL MEDICINE | Facility: CLINIC | Age: 59
End: 2024-12-17
Payer: COMMERCIAL

## 2024-12-17 VITALS
WEIGHT: 194 LBS | TEMPERATURE: 98 F | BODY MASS INDEX: 35.7 KG/M2 | OXYGEN SATURATION: 98 % | SYSTOLIC BLOOD PRESSURE: 114 MMHG | DIASTOLIC BLOOD PRESSURE: 78 MMHG | HEART RATE: 71 BPM | HEIGHT: 62 IN | RESPIRATION RATE: 16 BRPM

## 2024-12-17 DIAGNOSIS — R09.81 SINUS CONGESTION: ICD-10-CM

## 2024-12-17 DIAGNOSIS — E11.9 TYPE 2 DIABETES MELLITUS WITHOUT COMPLICATION, WITHOUT LONG-TERM CURRENT USE OF INSULIN: ICD-10-CM

## 2024-12-17 DIAGNOSIS — Z01.818 PREOP EXAM FOR INTERNAL MEDICINE: Primary | ICD-10-CM

## 2024-12-17 DIAGNOSIS — L98.9 SCALP LESION: ICD-10-CM

## 2024-12-17 LAB
ALBUMIN SERPL BCP-MCNC: 3.9 G/DL (ref 3.5–5.2)
ALP SERPL-CCNC: 88 U/L (ref 40–150)
ALT SERPL W/O P-5'-P-CCNC: 9 U/L (ref 10–44)
ANION GAP SERPL CALC-SCNC: 9 MMOL/L (ref 8–16)
AST SERPL-CCNC: 18 U/L (ref 10–40)
BASOPHILS # BLD AUTO: 0.05 K/UL (ref 0–0.2)
BASOPHILS NFR BLD: 0.6 % (ref 0–1.9)
BILIRUB SERPL-MCNC: 0.4 MG/DL (ref 0.1–1)
BUN SERPL-MCNC: 8 MG/DL (ref 6–20)
CALCIUM SERPL-MCNC: 9.7 MG/DL (ref 8.7–10.5)
CHLORIDE SERPL-SCNC: 102 MMOL/L (ref 95–110)
CO2 SERPL-SCNC: 25 MMOL/L (ref 23–29)
CREAT SERPL-MCNC: 0.8 MG/DL (ref 0.5–1.4)
DIFFERENTIAL METHOD BLD: ABNORMAL
EOSINOPHIL # BLD AUTO: 0.2 K/UL (ref 0–0.5)
EOSINOPHIL NFR BLD: 1.8 % (ref 0–8)
ERYTHROCYTE [DISTWIDTH] IN BLOOD BY AUTOMATED COUNT: 13.6 % (ref 11.5–14.5)
EST. GFR  (NO RACE VARIABLE): >60 ML/MIN/1.73 M^2
ESTIMATED AVG GLUCOSE: 166 MG/DL (ref 68–131)
GLUCOSE SERPL-MCNC: 105 MG/DL (ref 70–110)
HBA1C MFR BLD: 7.4 % (ref 4–5.6)
HCT VFR BLD AUTO: 40.5 % (ref 37–48.5)
HGB BLD-MCNC: 12.3 G/DL (ref 12–16)
IMM GRANULOCYTES # BLD AUTO: 0.02 K/UL (ref 0–0.04)
IMM GRANULOCYTES NFR BLD AUTO: 0.2 % (ref 0–0.5)
LYMPHOCYTES # BLD AUTO: 3.4 K/UL (ref 1–4.8)
LYMPHOCYTES NFR BLD: 38.8 % (ref 18–48)
MCH RBC QN AUTO: 26.3 PG (ref 27–31)
MCHC RBC AUTO-ENTMCNC: 30.4 G/DL (ref 32–36)
MCV RBC AUTO: 87 FL (ref 82–98)
MONOCYTES # BLD AUTO: 0.6 K/UL (ref 0.3–1)
MONOCYTES NFR BLD: 6.4 % (ref 4–15)
NEUTROPHILS # BLD AUTO: 4.6 K/UL (ref 1.8–7.7)
NEUTROPHILS NFR BLD: 52.2 % (ref 38–73)
NRBC BLD-RTO: 0 /100 WBC
PLATELET # BLD AUTO: 385 K/UL (ref 150–450)
PMV BLD AUTO: 8.6 FL (ref 9.2–12.9)
POTASSIUM SERPL-SCNC: 3.7 MMOL/L (ref 3.5–5.1)
PROT SERPL-MCNC: 7.5 G/DL (ref 6–8.4)
RBC # BLD AUTO: 4.68 M/UL (ref 4–5.4)
SODIUM SERPL-SCNC: 136 MMOL/L (ref 136–145)
WBC # BLD AUTO: 8.84 K/UL (ref 3.9–12.7)

## 2024-12-17 PROCEDURE — 3074F SYST BP LT 130 MM HG: CPT | Mod: CPTII,S$GLB,, | Performed by: NURSE PRACTITIONER

## 2024-12-17 PROCEDURE — 3066F NEPHROPATHY DOC TX: CPT | Mod: CPTII,S$GLB,, | Performed by: NURSE PRACTITIONER

## 2024-12-17 PROCEDURE — 80053 COMPREHEN METABOLIC PANEL: CPT | Performed by: INTERNAL MEDICINE

## 2024-12-17 PROCEDURE — 4010F ACE/ARB THERAPY RXD/TAKEN: CPT | Mod: CPTII,S$GLB,, | Performed by: NURSE PRACTITIONER

## 2024-12-17 PROCEDURE — 93010 ELECTROCARDIOGRAM REPORT: CPT | Mod: S$GLB,,, | Performed by: INTERNAL MEDICINE

## 2024-12-17 PROCEDURE — 1159F MED LIST DOCD IN RCRD: CPT | Mod: CPTII,S$GLB,, | Performed by: NURSE PRACTITIONER

## 2024-12-17 PROCEDURE — 3061F NEG MICROALBUMINURIA REV: CPT | Mod: CPTII,S$GLB,, | Performed by: NURSE PRACTITIONER

## 2024-12-17 PROCEDURE — 3078F DIAST BP <80 MM HG: CPT | Mod: CPTII,S$GLB,, | Performed by: NURSE PRACTITIONER

## 2024-12-17 PROCEDURE — 83036 HEMOGLOBIN GLYCOSYLATED A1C: CPT | Performed by: INTERNAL MEDICINE

## 2024-12-17 PROCEDURE — 93005 ELECTROCARDIOGRAM TRACING: CPT | Mod: S$GLB,,, | Performed by: NURSE PRACTITIONER

## 2024-12-17 PROCEDURE — 3008F BODY MASS INDEX DOCD: CPT | Mod: CPTII,S$GLB,, | Performed by: NURSE PRACTITIONER

## 2024-12-17 PROCEDURE — 99999 PR PBB SHADOW E&M-EST. PATIENT-LVL V: CPT | Mod: PBBFAC,,, | Performed by: NURSE PRACTITIONER

## 2024-12-17 PROCEDURE — 85025 COMPLETE CBC W/AUTO DIFF WBC: CPT | Performed by: INTERNAL MEDICINE

## 2024-12-17 PROCEDURE — 1160F RVW MEDS BY RX/DR IN RCRD: CPT | Mod: CPTII,S$GLB,, | Performed by: NURSE PRACTITIONER

## 2024-12-17 PROCEDURE — 3051F HG A1C>EQUAL 7.0%<8.0%: CPT | Mod: CPTII,S$GLB,, | Performed by: NURSE PRACTITIONER

## 2024-12-17 PROCEDURE — 36415 COLL VENOUS BLD VENIPUNCTURE: CPT | Mod: PO | Performed by: INTERNAL MEDICINE

## 2024-12-17 PROCEDURE — 99214 OFFICE O/P EST MOD 30 MIN: CPT | Mod: S$GLB,,, | Performed by: NURSE PRACTITIONER

## 2024-12-17 RX ORDER — FLUTICASONE PROPIONATE 50 MCG
1 SPRAY, SUSPENSION (ML) NASAL DAILY
Qty: 16 G | Refills: 2 | Status: SHIPPED | OUTPATIENT
Start: 2024-12-17

## 2024-12-17 NOTE — PROGRESS NOTES
Subjective     Patient ID: Grecia Boyd is a 59 y.o. female.    Chief Complaint: Pre-op Exam    Objective   59 y.o. yo female here for pre-op evaluation for LYSIS, ADHESIONS, SHOULDER, ARTHROSCOPIC, LEFT  by Dr. Ramirez planned for 12/26/2024.    History of prior anesthetic complications: No  Chronic Steroid usage: No  No tobacco, No EtOH, No Illicit substances     Surgical Risk Assessment     Active cardiac issues:  Active decompensated heart failure? No   Unstable angina?  No   Significant uncontrolled arrhythmias? No   Severe valvular heart disease-Aortic or Mitral Stenosis? No   Recent MI or coronary revascularization < 30 days? No     Clinical risk factors predicting perioperative major adverse cardiac events per RCRI  High risk surgery (suprainguinal vascular, intraperitoneal, or intrathoracic surgery)? No   History of CAD/ischemic heart disease? No   History of cerebrovascular disease (CVA or TIA)? No   History of compensated heart failure? No   Type 2 diabetes requiring insulin? No   Serum Creatinine > 2? No   Total cardiac risk factors 0     0 predictors = 0.4%, 1 predictor = 0.9%, 2 predictors = 6.6%, >=3 predictors = >11%    According to the revised cardiac risk index, the risk of karen-procedural major cardiac complications (cardiac death, nonfatal MI, nonfatal cardiac arrest, postoperative cardiogenic pulmonary edema, complete heart block) is: 0     Patient's functional mets: >4    < 4 METs -unable to walk > 2 blocks on level ground without stopping due to symptoms  - eating, dressing, toileting, walking indoors, light housework. POOR   > 4 METs -climbing > 1 flight of stairs without stopping  -walking up hill > 1-2 blocks  -scrubbing floors  -moving furniture  - golf, bowling, dancing or tennis  -running short distance MODERATE to EXCELLENT     Review of Systems   HENT:  Positive for nasal congestion, postnasal drip and sinus pressure/congestion.    All other systems reviewed and are  negative.    Physical Exam  Vitals reviewed.   Constitutional:       Appearance: Normal appearance. She is obese.   HENT:      Head: Normocephalic and atraumatic.      Nose: Congestion and rhinorrhea present.   Eyes:      Conjunctiva/sclera: Conjunctivae normal.      Pupils: Pupils are equal, round, and reactive to light.   Cardiovascular:      Rate and Rhythm: Normal rate and regular rhythm.   Pulmonary:      Effort: Pulmonary effort is normal.      Breath sounds: Normal breath sounds.   Abdominal:      General: Bowel sounds are normal.      Palpations: Abdomen is soft.   Musculoskeletal:      Left shoulder: Tenderness present. Decreased range of motion.      Cervical back: Normal range of motion and neck supple.   Skin:     General: Skin is warm.      Comments: Suspicious nevus on L scalp.   See image   Neurological:      General: No focal deficit present.   Psychiatric:         Mood and Affect: Mood normal.        Assessment and Plan   1. Preop exam for internal medicine  Assessment & Plan:  -Pt optimized for surgery.   -Pain management per surgeon  -Follow preop instructions recently provided at recent preop visit  -Complete HgA1c, CBC and CMP today.  -Addendum: HgA1c of 7.4%, CBC and CMP unremarkable.   Orders:  -     IN OFFICE EKG 12-LEAD (to Muse)    2. Sinus congestion  Assessment & Plan:  -Pt to start Flonase to help with ongoing sinus congestion  -Ok to continue with OTC mucinex.     Orders:  -     fluticasone propionate (FLONASE) 50 mcg/actuation nasal spray; 1 spray (50 mcg total) by Each Nostril route once daily.  Dispense: 16 g; Refill: 2    3. Scalp lesion  Assessment & Plan:  -Referral to derm placed for further evaluation.     Orders:  -     Ambulatory referral/consult to Dermatology; Future; Expected date: 12/24/2024

## 2024-12-17 NOTE — PROGRESS NOTES
59 y.o. yo female here for pre-op evaluation for LYSIS, ADHESIONS, SHOULDR, ARTHROSCOPIC, LEFT by Dr. Ramirez planned for 12/26/2024.  The patient is generally healthy with a history of well controlled diabetes mellitus, hypertension and obesity.  She reports that all is doing well presently with no new particular concerns problems or complaints.  Taking all medications as directed.  She has had several previous surgeries with general anesthesia with no difficulties or complications.     History of prior anesthetic complications: No  Chronic Steroid usage: No  No tobacco, No EtOH, No Illicit substances     Surgical Risk Assessment     Active cardiac issues:  Active decompensated heart failure? No   Unstable angina?  No   Significant uncontrolled arrhythmias? No   Severe valvular heart disease-Aortic or Mitral Stenosis? No   Recent MI or coronary revascularization < 30 days? No     Clinical risk factors predicting perioperative major adverse cardiac events per RCRI  High risk surgery (suprainguinal vascular, intraperitoneal, or intrathoracic surgery)? No   History of CAD/ischemic heart disease? No   History of cerebrovascular disease (CVA or TIA)? No   History of compensated heart failure? No   Type 2 diabetes requiring insulin? No   Serum Creatinine > 2? No   Total cardiac risk factors 0     0 predictors = 0.4%, 1 predictor = 0.9%, 2 predictors = 6.6%, >=3 predictors = >11%    According to the revised cardiac risk index, the risk of karen-procedural major cardiac complications (cardiac death, nonfatal MI, nonfatal cardiac arrest, postoperative cardiogenic pulmonary edema, complete heart block) is: 0    If patient has a low risk of MACE (<1%), proceed to surgery. If the patient is at elevated risk of MACE, then determine functional capacity (pt reported activity or DASI model).     If the patient has moderate, good, or excellent functional capacity (>=4 METs), then proceed to surgery without further evaluation. If  patient has poor or unknown functional capacity, will further testing impact decision making or perioperative care? If yes, then pharmacological stress testing is appropriate. In those patients with unknown functional capacity, exercise stress testing may be reasonable to perform.     Patient's functional mets: >4    < 4 METs -unable to walk > 2 blocks on level ground without stopping due to symptoms  - eating, dressing, toileting, walking indoors, light housework. POOR   > 4 METs -climbing > 1 flight of stairs without stopping  -walking up hill > 1-2 blocks  -scrubbing floors  -moving furniture  - golf, bowling, dancing or tennis  -running short distance MODERATE to EXCELLENT

## 2024-12-17 NOTE — ASSESSMENT & PLAN NOTE
-Pt optimized for surgery  -Pain management per surgeon  -Follow preop instructions recently provided at recent preop visit  -Complete HgA1c, CBC and CMP today.

## 2024-12-18 LAB
OHS QRS DURATION: 94 MS
OHS QTC CALCULATION: 418 MS

## 2024-12-20 ENCOUNTER — TELEPHONE (OUTPATIENT)
Dept: PHARMACY | Facility: CLINIC | Age: 59
End: 2024-12-20
Payer: COMMERCIAL

## 2024-12-22 ENCOUNTER — PATIENT MESSAGE (OUTPATIENT)
Dept: OTHER | Facility: OTHER | Age: 59
End: 2024-12-22
Payer: COMMERCIAL

## 2024-12-23 ENCOUNTER — ANESTHESIA EVENT (OUTPATIENT)
Dept: SURGERY | Facility: HOSPITAL | Age: 59
End: 2024-12-23
Payer: COMMERCIAL

## 2024-12-24 ENCOUNTER — OFFICE VISIT (OUTPATIENT)
Dept: SPORTS MEDICINE | Facility: CLINIC | Age: 59
End: 2024-12-24
Payer: COMMERCIAL

## 2024-12-24 ENCOUNTER — TELEPHONE (OUTPATIENT)
Dept: SPORTS MEDICINE | Facility: CLINIC | Age: 59
End: 2024-12-24

## 2024-12-24 VITALS
HEART RATE: 65 BPM | DIASTOLIC BLOOD PRESSURE: 75 MMHG | SYSTOLIC BLOOD PRESSURE: 118 MMHG | BODY MASS INDEX: 35.7 KG/M2 | WEIGHT: 194 LBS | HEIGHT: 62 IN

## 2024-12-24 DIAGNOSIS — M75.02 ADHESIVE CAPSULITIS OF LEFT SHOULDER: ICD-10-CM

## 2024-12-24 DIAGNOSIS — Z98.890 S/P ARTHROSCOPY OF LEFT SHOULDER: ICD-10-CM

## 2024-12-24 DIAGNOSIS — M24.612 ARTHROFIBROSIS OF LEFT SHOULDER: Primary | ICD-10-CM

## 2024-12-24 PROCEDURE — 99999 PR PBB SHADOW E&M-EST. PATIENT-LVL IV: CPT | Mod: PBBFAC,,, | Performed by: PHYSICIAN ASSISTANT

## 2024-12-24 PROCEDURE — 99499 UNLISTED E&M SERVICE: CPT | Mod: S$GLB,,, | Performed by: PHYSICIAN ASSISTANT

## 2024-12-24 RX ORDER — MUPIROCIN 20 MG/G
OINTMENT TOPICAL
Status: CANCELLED | OUTPATIENT
Start: 2024-12-24

## 2024-12-24 RX ORDER — HYDROCODONE BITARTRATE AND ACETAMINOPHEN 7.5; 325 MG/1; MG/1
1 TABLET ORAL EVERY 6 HOURS PRN
Qty: 20 TABLET | Refills: 0 | Status: SHIPPED | OUTPATIENT
Start: 2024-12-24

## 2024-12-24 NOTE — H&P (VIEW-ONLY)
"H&P    History 12/24/2024:  Grecia returns here today for follow-up evaluation of her left shoulder and to complete her preoperative paperwork.  She continues to have pain and stiffness despite conservative treatment.  Surgical intervention has been recommended and she is scheduled to undergo a  left shoulder arthroscopy with lysis of adhesions, extensive debridement and manipulation under anaesthesia on December 26, 2024.    History 12/9/2024:  59 y.o. Female who returns for follow-up after surgery. She is nearly 7 months  months s/p    PROCEDURE: 5/16/2024  1. Left shoulder Arthroscopic aneroinferior labral repair with capsulorrhaphy CPT - 74508 (arthroscopic bony Bankart repair)  2. Left shoulder Biceps tenodesis CPT - 86554  3. Left shoulder Arthroscopic loose body removal CPT - 04825  4. Left shoulder Arthroscopic extensive debridement CPT - 66121     History 12/8/24  She is states she is still having pain and stiffness. She received a intraarticular CSI on 9/23 that did not provide relief. She states she is very stiff at night.    History 9/23/24  She is still having pain and stiffness. She states that most of her pain is at night     PHYSICAL EXAMINATION:  /75   Pulse 65   Ht 5' 2" (1.575 m)   Wt 88 kg (194 lb 0.1 oz)   BMI 35.48 kg/m²   General: Well-developed well-nourished 59 y.o. female in no acute distress   Cardiovascular: Regular rhythm   Lungs: No labored breathing or wheezing appreciated   Neuro: Alert and oriented ×3   Psychiatric: well oriented to person, place and time, demonstrates normal mood and affect   Skin: No rashes, lesions or ulcers, normal temperature, turgor, and texture on involved extremity    ORTHOPEDIC EXAM:  Normal post-operative swelling  Normal post-operative scarring  Strength: 5/5  ROM: FE- 90°; ER/Abd- 30°; IR/Abd- 10°; ER/Add- 0°     Tests: Negative Jobes, negative aranda        ASSESSMENT:      ICD-10-CM ICD-9-CM   1. Arthrofibrosis of left shoulder  M24.612 " 718.51   2. Adhesive capsulitis of left shoulder  M75.02 726.0   3. S/P arthroscopy of left shoulder  Z98.890 V45.89             MEDICATIONS:    Hydrocodone 7.5/325 mg      PLAN:       Her strength has returned, however her stiffness and pain is persistent.   Surgical versus non-surgical options discussed today; left shoulder arthroscopy with lysis of adhesions, extensive debridement and manipulation under anaesthesia    The patient elected to proceed with operative intervention after all risks, benefits, and alternatives were discussed with the patient.  The risks of surgery include bleeding, scar formation, injuries to nerves, arteries and veins, need for additional surgeries, blood clots, infections, inability to return to the same level of the performance and the risk of anesthesia.   Informed consent was obtained  Shoulder CPM order and aggressive physical thearpy post-op             Yoandy Kelly PA-C, MMS

## 2024-12-24 NOTE — PROGRESS NOTES
"PREOPERATIVE EXAMINATION    History 12/24/2024:  Grecia returns here today for follow-up evaluation of her left shoulder and to complete her preoperative paperwork.  She continues to have pain and stiffness despite conservative treatment.  Surgical intervention has been recommended and she is scheduled to undergo a  left shoulder arthroscopy with lysis of adhesions, extensive debridement and manipulation under anaesthesia on December 26, 2024.    History 12/9/2024:  59 y.o. Female who returns for follow-up after surgery. She is nearly 7 months  months s/p    PROCEDURE: 5/16/2024  1. Left shoulder Arthroscopic aneroinferior labral repair with capsulorrhaphy CPT - 45350 (arthroscopic bony Bankart repair)  2. Left shoulder Biceps tenodesis CPT - 98022  3. Left shoulder Arthroscopic loose body removal CPT - 76099  4. Left shoulder Arthroscopic extensive debridement CPT - 94816     History 12/8/24  She is states she is still having pain and stiffness. She received a intraarticular CSI on 9/23 that did not provide relief. She states she is very stiff at night.    History 9/23/24  She is still having pain and stiffness. She states that most of her pain is at night     PHYSICAL EXAMINATION:  /75   Pulse 65   Ht 5' 2" (1.575 m)   Wt 88 kg (194 lb 0.1 oz)   BMI 35.48 kg/m²   General: Well-developed well-nourished 59 y.o. female in no acute distress   Cardiovascular: Regular rhythm   Lungs: No labored breathing or wheezing appreciated   Neuro: Alert and oriented ×3   Psychiatric: well oriented to person, place and time, demonstrates normal mood and affect   Skin: No rashes, lesions or ulcers, normal temperature, turgor, and texture on involved extremity    ORTHOPEDIC EXAM:  Normal post-operative swelling  Normal post-operative scarring  Strength: 5/5  ROM: FE- 90°; ER/Abd- 30°; IR/Abd- 10°; ER/Add- 0°     Tests: Negative Jobes, negative aranda        ASSESSMENT:      ICD-10-CM ICD-9-CM   1. Arthrofibrosis of left " shoulder  M24.612 718.51   2. Adhesive capsulitis of left shoulder  M75.02 726.0   3. S/P arthroscopy of left shoulder  Z98.890 V45.89             MEDICATIONS:    Hydrocodone 7.5/325 mg      PLAN:       Her strength has returned, however her stiffness and pain is persistent.   Surgical versus non-surgical options discussed today; left shoulder arthroscopy with lysis of adhesions, extensive debridement and manipulation under anaesthesia    The patient elected to proceed with operative intervention after all risks, benefits, and alternatives were discussed with the patient.  The risks of surgery include bleeding, scar formation, injuries to nerves, arteries and veins, need for additional surgeries, blood clots, infections, inability to return to the same level of the performance and the risk of anesthesia.   Informed consent was obtained  Shoulder CPM order and aggressive physical thearpy post-op             Yoandy Kelly PA-C, MMS

## 2024-12-24 NOTE — H&P
"H&P    History 12/24/2024:  Grecia returns here today for follow-up evaluation of her left shoulder and to complete her preoperative paperwork.  She continues to have pain and stiffness despite conservative treatment.  Surgical intervention has been recommended and she is scheduled to undergo a  left shoulder arthroscopy with lysis of adhesions, extensive debridement and manipulation under anaesthesia on December 26, 2024.    History 12/9/2024:  59 y.o. Female who returns for follow-up after surgery. She is nearly 7 months  months s/p    PROCEDURE: 5/16/2024  1. Left shoulder Arthroscopic aneroinferior labral repair with capsulorrhaphy CPT - 22741 (arthroscopic bony Bankart repair)  2. Left shoulder Biceps tenodesis CPT - 15050  3. Left shoulder Arthroscopic loose body removal CPT - 05904  4. Left shoulder Arthroscopic extensive debridement CPT - 11596     History 12/8/24  She is states she is still having pain and stiffness. She received a intraarticular CSI on 9/23 that did not provide relief. She states she is very stiff at night.    History 9/23/24  She is still having pain and stiffness. She states that most of her pain is at night     PHYSICAL EXAMINATION:  /75   Pulse 65   Ht 5' 2" (1.575 m)   Wt 88 kg (194 lb 0.1 oz)   BMI 35.48 kg/m²   General: Well-developed well-nourished 59 y.o. female in no acute distress   Cardiovascular: Regular rhythm   Lungs: No labored breathing or wheezing appreciated   Neuro: Alert and oriented ×3   Psychiatric: well oriented to person, place and time, demonstrates normal mood and affect   Skin: No rashes, lesions or ulcers, normal temperature, turgor, and texture on involved extremity    ORTHOPEDIC EXAM:  Normal post-operative swelling  Normal post-operative scarring  Strength: 5/5  ROM: FE- 90°; ER/Abd- 30°; IR/Abd- 10°; ER/Add- 0°     Tests: Negative Jobes, negative aranda        ASSESSMENT:      ICD-10-CM ICD-9-CM   1. Arthrofibrosis of left shoulder  M24.612 " 718.51   2. Adhesive capsulitis of left shoulder  M75.02 726.0   3. S/P arthroscopy of left shoulder  Z98.890 V45.89             MEDICATIONS:    Hydrocodone 7.5/325 mg      PLAN:       Her strength has returned, however her stiffness and pain is persistent.   Surgical versus non-surgical options discussed today; left shoulder arthroscopy with lysis of adhesions, extensive debridement and manipulation under anaesthesia    The patient elected to proceed with operative intervention after all risks, benefits, and alternatives were discussed with the patient.  The risks of surgery include bleeding, scar formation, injuries to nerves, arteries and veins, need for additional surgeries, blood clots, infections, inability to return to the same level of the performance and the risk of anesthesia.   Informed consent was obtained  Shoulder CPM order and aggressive physical thearpy post-op             Yoandy Kelly PA-C, MMS

## 2024-12-26 ENCOUNTER — HOSPITAL ENCOUNTER (OUTPATIENT)
Facility: HOSPITAL | Age: 59
Discharge: HOME | End: 2024-12-26
Attending: ORTHOPAEDIC SURGERY | Admitting: ORTHOPAEDIC SURGERY
Payer: COMMERCIAL

## 2024-12-26 ENCOUNTER — ANESTHESIA (OUTPATIENT)
Dept: SURGERY | Facility: HOSPITAL | Age: 59
End: 2024-12-26
Payer: COMMERCIAL

## 2024-12-26 VITALS
BODY MASS INDEX: 35.7 KG/M2 | HEART RATE: 60 BPM | DIASTOLIC BLOOD PRESSURE: 69 MMHG | OXYGEN SATURATION: 96 % | WEIGHT: 194 LBS | RESPIRATION RATE: 18 BRPM | SYSTOLIC BLOOD PRESSURE: 150 MMHG | TEMPERATURE: 98 F | HEIGHT: 62 IN

## 2024-12-26 DIAGNOSIS — M24.612 ARTHROFIBROSIS OF LEFT SHOULDER: ICD-10-CM

## 2024-12-26 DIAGNOSIS — M75.02 ADHESIVE CAPSULITIS OF LEFT SHOULDER: ICD-10-CM

## 2024-12-26 PROCEDURE — 64416 NJX AA&/STRD BRCH PL NFS IMG: CPT | Performed by: ANESTHESIOLOGY

## 2024-12-26 PROCEDURE — 37000008 HC ANESTHESIA 1ST 15 MINUTES: Performed by: ORTHOPAEDIC SURGERY

## 2024-12-26 PROCEDURE — 25000003 PHARM REV CODE 250: Performed by: STUDENT IN AN ORGANIZED HEALTH CARE EDUCATION/TRAINING PROGRAM

## 2024-12-26 PROCEDURE — 36000711: Performed by: ORTHOPAEDIC SURGERY

## 2024-12-26 PROCEDURE — 99900035 HC TECH TIME PER 15 MIN (STAT)

## 2024-12-26 PROCEDURE — 63600175 PHARM REV CODE 636 W HCPCS: Performed by: STUDENT IN AN ORGANIZED HEALTH CARE EDUCATION/TRAINING PROGRAM

## 2024-12-26 PROCEDURE — 94761 N-INVAS EAR/PLS OXIMETRY MLT: CPT

## 2024-12-26 PROCEDURE — 25000003 PHARM REV CODE 250: Performed by: NURSE ANESTHETIST, CERTIFIED REGISTERED

## 2024-12-26 PROCEDURE — 63600175 PHARM REV CODE 636 W HCPCS: Performed by: ANESTHESIOLOGY

## 2024-12-26 PROCEDURE — 37000009 HC ANESTHESIA EA ADD 15 MINS: Performed by: ORTHOPAEDIC SURGERY

## 2024-12-26 PROCEDURE — 71000015 HC POSTOP RECOV 1ST HR: Performed by: ORTHOPAEDIC SURGERY

## 2024-12-26 PROCEDURE — 25000003 PHARM REV CODE 250: Performed by: ANESTHESIOLOGY

## 2024-12-26 PROCEDURE — 27201423 OPTIME MED/SURG SUP & DEVICES STERILE SUPPLY: Performed by: ORTHOPAEDIC SURGERY

## 2024-12-26 PROCEDURE — 36000710: Performed by: ORTHOPAEDIC SURGERY

## 2024-12-26 PROCEDURE — 25000003 PHARM REV CODE 250: Performed by: PHYSICIAN ASSISTANT

## 2024-12-26 PROCEDURE — 71000039 HC RECOVERY, EACH ADD'L HOUR: Performed by: ORTHOPAEDIC SURGERY

## 2024-12-26 PROCEDURE — 82962 GLUCOSE BLOOD TEST: CPT | Performed by: ORTHOPAEDIC SURGERY

## 2024-12-26 PROCEDURE — 29823 SHO ARTHRS SRG XTNSV DBRDMT: CPT | Mod: LT,,, | Performed by: ORTHOPAEDIC SURGERY

## 2024-12-26 PROCEDURE — C1751 CATH, INF, PER/CENT/MIDLINE: HCPCS | Performed by: ANESTHESIOLOGY

## 2024-12-26 PROCEDURE — 63600175 PHARM REV CODE 636 W HCPCS: Performed by: NURSE ANESTHETIST, CERTIFIED REGISTERED

## 2024-12-26 PROCEDURE — 63600175 PHARM REV CODE 636 W HCPCS: Performed by: ORTHOPAEDIC SURGERY

## 2024-12-26 PROCEDURE — 71000033 HC RECOVERY, INTIAL HOUR: Performed by: ORTHOPAEDIC SURGERY

## 2024-12-26 RX ORDER — DEXAMETHASONE SODIUM PHOSPHATE 4 MG/ML
INJECTION, SOLUTION INTRA-ARTICULAR; INTRALESIONAL; INTRAMUSCULAR; INTRAVENOUS; SOFT TISSUE
Status: DISCONTINUED | OUTPATIENT
Start: 2024-12-26 | End: 2024-12-26

## 2024-12-26 RX ORDER — ROCURONIUM BROMIDE 10 MG/ML
INJECTION, SOLUTION INTRAVENOUS
Status: DISCONTINUED | OUTPATIENT
Start: 2024-12-26 | End: 2024-12-26

## 2024-12-26 RX ORDER — MIDAZOLAM HYDROCHLORIDE 1 MG/ML
.5-4 INJECTION, SOLUTION INTRAMUSCULAR; INTRAVENOUS
Status: DISCONTINUED | OUTPATIENT
Start: 2024-12-26 | End: 2024-12-26 | Stop reason: HOSPADM

## 2024-12-26 RX ORDER — ROPIVACAINE HYDROCHLORIDE 2 MG/ML
INJECTION, SOLUTION EPIDURAL; INFILTRATION; PERINEURAL CONTINUOUS
Status: DISCONTINUED | OUTPATIENT
Start: 2024-12-26 | End: 2024-12-26 | Stop reason: HOSPADM

## 2024-12-26 RX ORDER — KETAMINE HCL IN 0.9 % NACL 50 MG/5 ML
SYRINGE (ML) INTRAVENOUS
Status: DISCONTINUED | OUTPATIENT
Start: 2024-12-26 | End: 2024-12-26

## 2024-12-26 RX ORDER — DEXMEDETOMIDINE HYDROCHLORIDE 100 UG/ML
INJECTION, SOLUTION INTRAVENOUS
Status: DISCONTINUED | OUTPATIENT
Start: 2024-12-26 | End: 2024-12-26

## 2024-12-26 RX ORDER — ONDANSETRON HYDROCHLORIDE 2 MG/ML
INJECTION, SOLUTION INTRAVENOUS
Status: DISCONTINUED | OUTPATIENT
Start: 2024-12-26 | End: 2024-12-26

## 2024-12-26 RX ORDER — CEFAZOLIN 2 G/1
2 INJECTION, POWDER, FOR SOLUTION INTRAMUSCULAR; INTRAVENOUS
Status: DISCONTINUED | OUTPATIENT
Start: 2024-12-26 | End: 2024-12-26 | Stop reason: HOSPADM

## 2024-12-26 RX ORDER — MUPIROCIN 20 MG/G
OINTMENT TOPICAL
Status: DISCONTINUED | OUTPATIENT
Start: 2024-12-26 | End: 2024-12-26 | Stop reason: HOSPADM

## 2024-12-26 RX ORDER — ONDANSETRON HYDROCHLORIDE 2 MG/ML
4 INJECTION, SOLUTION INTRAVENOUS DAILY PRN
Status: DISCONTINUED | OUTPATIENT
Start: 2024-12-26 | End: 2024-12-26 | Stop reason: HOSPADM

## 2024-12-26 RX ORDER — FENTANYL CITRATE 50 UG/ML
25-200 INJECTION, SOLUTION INTRAMUSCULAR; INTRAVENOUS
Status: DISCONTINUED | OUTPATIENT
Start: 2024-12-26 | End: 2024-12-26 | Stop reason: HOSPADM

## 2024-12-26 RX ORDER — EPINEPHRINE 1 MG/ML
INJECTION, SOLUTION, CONCENTRATE INTRAVENOUS
Status: DISCONTINUED | OUTPATIENT
Start: 2024-12-26 | End: 2024-12-26 | Stop reason: HOSPADM

## 2024-12-26 RX ORDER — LIDOCAINE HYDROCHLORIDE 10 MG/ML
INJECTION, SOLUTION INTRAVENOUS
Status: DISCONTINUED | OUTPATIENT
Start: 2024-12-26 | End: 2024-12-26

## 2024-12-26 RX ORDER — GLUCAGON 1 MG
1 KIT INJECTION
Status: DISCONTINUED | OUTPATIENT
Start: 2024-12-26 | End: 2024-12-26 | Stop reason: HOSPADM

## 2024-12-26 RX ORDER — CEFAZOLIN SODIUM 1 G/3ML
INJECTION, POWDER, FOR SOLUTION INTRAMUSCULAR; INTRAVENOUS
Status: DISCONTINUED | OUTPATIENT
Start: 2024-12-26 | End: 2024-12-26

## 2024-12-26 RX ORDER — FENTANYL CITRATE 50 UG/ML
INJECTION, SOLUTION INTRAMUSCULAR; INTRAVENOUS
Status: DISCONTINUED | OUTPATIENT
Start: 2024-12-26 | End: 2024-12-26

## 2024-12-26 RX ORDER — CELECOXIB 200 MG/1
400 CAPSULE ORAL
Status: COMPLETED | OUTPATIENT
Start: 2024-12-26 | End: 2024-12-26

## 2024-12-26 RX ORDER — ROPIVACAINE HYDROCHLORIDE 5 MG/ML
INJECTION, SOLUTION EPIDURAL; INFILTRATION; PERINEURAL
Status: COMPLETED | OUTPATIENT
Start: 2024-12-26 | End: 2024-12-26

## 2024-12-26 RX ORDER — SODIUM CHLORIDE 0.9 % (FLUSH) 0.9 %
10 SYRINGE (ML) INJECTION
Status: DISCONTINUED | OUTPATIENT
Start: 2024-12-26 | End: 2024-12-26 | Stop reason: HOSPADM

## 2024-12-26 RX ORDER — ACETAMINOPHEN 500 MG
1000 TABLET ORAL
Status: COMPLETED | OUTPATIENT
Start: 2024-12-26 | End: 2024-12-26

## 2024-12-26 RX ORDER — HALOPERIDOL 5 MG/ML
0.5 INJECTION INTRAMUSCULAR EVERY 10 MIN PRN
Status: DISCONTINUED | OUTPATIENT
Start: 2024-12-26 | End: 2024-12-26 | Stop reason: HOSPADM

## 2024-12-26 RX ORDER — METHOCARBAMOL 500 MG/1
1000 TABLET, FILM COATED ORAL ONCE
Status: COMPLETED | OUTPATIENT
Start: 2024-12-26 | End: 2024-12-26

## 2024-12-26 RX ORDER — PROPOFOL 10 MG/ML
VIAL (ML) INTRAVENOUS
Status: DISCONTINUED | OUTPATIENT
Start: 2024-12-26 | End: 2024-12-26

## 2024-12-26 RX ORDER — NEOSTIGMINE METHYLSULFATE 0.5 MG/ML
INJECTION INTRAVENOUS
Status: DISCONTINUED | OUTPATIENT
Start: 2024-12-26 | End: 2024-12-26

## 2024-12-26 RX ADMIN — SODIUM CHLORIDE: 9 INJECTION, SOLUTION INTRAVENOUS at 10:12

## 2024-12-26 RX ADMIN — CEFAZOLIN 2 G: 330 INJECTION, POWDER, FOR SOLUTION INTRAMUSCULAR; INTRAVENOUS at 10:12

## 2024-12-26 RX ADMIN — METHOCARBAMOL 1000 MG: 500 TABLET ORAL at 12:12

## 2024-12-26 RX ADMIN — PROPOFOL 150 MG: 10 INJECTION, EMULSION INTRAVENOUS at 10:12

## 2024-12-26 RX ADMIN — FENTANYL CITRATE 50 MCG: 50 INJECTION, SOLUTION INTRAMUSCULAR; INTRAVENOUS at 10:12

## 2024-12-26 RX ADMIN — GLYCOPYRROLATE 0.6 MG: 0.2 INJECTION, SOLUTION INTRAMUSCULAR; INTRAVENOUS at 11:12

## 2024-12-26 RX ADMIN — DEXAMETHASONE SODIUM PHOSPHATE 8 MG: 4 INJECTION, SOLUTION INTRAMUSCULAR; INTRAVENOUS at 10:12

## 2024-12-26 RX ADMIN — FENTANYL CITRATE 50 MCG: 50 INJECTION INTRAMUSCULAR; INTRAVENOUS at 09:12

## 2024-12-26 RX ADMIN — Medication 20 MG: at 10:12

## 2024-12-26 RX ADMIN — MIDAZOLAM HYDROCHLORIDE 2 MG: 1 INJECTION, SOLUTION INTRAMUSCULAR; INTRAVENOUS at 09:12

## 2024-12-26 RX ADMIN — ONDANSETRON 4 MG: 2 INJECTION INTRAMUSCULAR; INTRAVENOUS at 10:12

## 2024-12-26 RX ADMIN — ROCURONIUM BROMIDE 50 MG: 10 INJECTION, SOLUTION INTRAVENOUS at 10:12

## 2024-12-26 RX ADMIN — NEOSTIGMINE METHYLSULFATE 5 MG: 0.5 INJECTION INTRAVENOUS at 11:12

## 2024-12-26 RX ADMIN — LIDOCAINE HYDROCHLORIDE 80 MG: 10 INJECTION, SOLUTION INTRAVENOUS at 10:12

## 2024-12-26 RX ADMIN — MUPIROCIN: 20 OINTMENT TOPICAL at 08:12

## 2024-12-26 RX ADMIN — Medication: at 12:12

## 2024-12-26 RX ADMIN — DEXMEDETOMIDINE 10 MCG: 100 INJECTION, SOLUTION, CONCENTRATE INTRAVENOUS at 10:12

## 2024-12-26 RX ADMIN — ACETAMINOPHEN 1000 MG: 500 TABLET ORAL at 08:12

## 2024-12-26 RX ADMIN — CELECOXIB 400 MG: 200 CAPSULE ORAL at 08:12

## 2024-12-26 RX ADMIN — ROPIVACAINE HYDROCHLORIDE 10 ML: 5 INJECTION EPIDURAL; INFILTRATION; PERINEURAL at 09:12

## 2024-12-26 NOTE — ANESTHESIA PROCEDURE NOTES
Intubation    Date/Time: 12/26/2024 10:30 AM    Performed by: Denae Booth CRNA  Authorized by: Elly Nuñez MD    Intubation:     Induction:  Intravenous    Intubated:  Postinduction    Mask Ventilation:  Easy with oral airway    Attempts:  1    Attempted By:  CRNA    Method of Intubation:  Video laryngoscopy    Blade:  Bynum 3    Laryngeal View Grade: Grade I - full view of cords      Difficult Airway Encountered?: No      Complications:  None    Airway Device:  Oral endotracheal tube    Airway Device Size:  7.0    Style/Cuff Inflation:  Cuffed    Tube secured:  21    Secured at:  The lips    Placement Verified By:  Capnometry    Complicating Factors:  Obesity and short neck    Findings Post-Intubation:  BS equal bilateral and atraumatic/condition of teeth unchanged  Notes:      Very poor dentition- unchanged post intubation

## 2024-12-26 NOTE — ANESTHESIA PROCEDURE NOTES
Left interscalene catheter    Patient location during procedure: pre-op   Block not for primary anesthetic.  Reason for block: at surgeon's request and post-op pain management   Post-op Pain Location: Left shoulder pain   Start time: 12/26/2024 9:02 AM  Timeout: 12/26/2024 9:01 AM   End time: 12/26/2024 9:12 AM    Staffing  Authorizing Provider: Elly Nuñez MD  Performing Provider: Elly Nuñez MD    Staffing  Performed by: Elly Nuñez MD  Authorized by: Elly Nuñez MD    Preanesthetic Checklist  Completed: patient identified, IV checked, site marked, risks and benefits discussed, surgical consent, monitors and equipment checked, pre-op evaluation and timeout performed  Peripheral Block  Patient position: sitting  Prep: ChloraPrep and site prepped and draped  Patient monitoring: heart rate, cardiac monitor, continuous pulse ox, continuous capnometry and frequent blood pressure checks  Block type: interscalene  Laterality: left  Injection technique: continuous  Needle  Needle type: Tuohy   Needle gauge: 18 G  Needle length: 2 in  Needle localization: anatomical landmarks and ultrasound guidance  Catheter type: non-stimulating  Catheter size: 20 G  Test dose: lidocaine 1.5% with Epi 1-to-200,000 and negative   -ultrasound image captured on disc.  Assessment  Injection assessment: negative aspiration, negative parasthesia and local visualized surrounding nerve  Paresthesia pain: none  Heart rate change: no  Slow fractionated injection: yes  Pain Tolerance: comfortable throughout block and no complaints  Medications:    Medications: ropivacaine (NAROPIN) injection 0.5% - Perineural   10 mL - 12/26/2024 9:08:00 AM    Additional Notes  VSS.  [facility] RN monitoring vitals throughout procedure.  Patient tolerated procedure well.     With 10mL normal saline, 1:200,000 epi

## 2024-12-26 NOTE — DISCHARGE INSTRUCTIONS
POST-OPERATIVE DISCHARGE INSTRUCTIONS    Diet: Ice chips, clear liquids, and then diet as tolerated.  Drink plenty of liquids.  Ice the area at least three times a day (20 minutes per session).    Elevate the extremity above the level of the heart to help reduce swelling.  Keep arm in sling and remain non-weightbearing until further notice.  Pain medication can be taken every four to six hours as needed.  It is helpful to take pain medication prior to physical therapy.  Any activity that requires precise thinking or accuracy should be avoided for a minimum of 72 hours after surgery and while on narcotic pain medication.  This includes operating machinery and/or driving a vehicle.  All sutures/staples will be removed approximately 14 days from the time of surgery. Leave steri-strips (skin tapes) in place until sutures are removed.  If skin glue is used instead of stitches, do not apply any ointments or solutions to the incision.  Keep the incision dry.  The skin glue will peel off in 3-4 weeks.  Dressing change directions, unless otherwise instructed:     ? Shoulder scope:  Remove dressings 48 hours after surgery and start using waterproof Band-Aids over the incision sites.      All casts, splints, braces, slings, crutches, abduction pillows, etc. are to be worn as instructed.    Rylan Hose are often used following knee surgery to help with swelling.  They can be removed for 2-3 hours daily as needed.  If the hose become uncomfortable after a few days, switch to an Ace Wrap if desired.  Keep the incision dry for 10-14 days.  A waterproof dressing ([facility] or [facility]) can be used to shower.  No bath, pool, or hot tub until instructed.  You should notify our office if you notice any of the following:  A temperature greater than 101 F  Severe increased pain  Excessive drainage or redness of the incision  Calf swelling and pain  Chest pain  Your post-op follow up appointment will be approximately 14 days from the time of  surgery.  If you do not have an appointment scheduled, please call our office and schedule within 1-2 days.   If you have any problems or questions, please call our office at [phone].

## 2024-12-26 NOTE — BRIEF OP NOTE
[facility]  Brief Operative Note    Surgery Date: 12/26/2024     Surgeon(s) and Role:     * Naif Ramirez MD - Primary    Pre-Operative Diagnosis: Adhesive capsulitis of left shoulder [M75.02]  Arthrofibrosis of left shoulder [M24.612]    Post-Operative Diagnosis: Post-Op Diagnosis Codes:     * Adhesive capsulitis of left shoulder [M75.02]     * Arthrofibrosis of left shoulder [M24.612]    Procedure(s) (LRB):  LYSIS,ADHESIONS,SHOULDER,ARTHROSCOPIC (Left)  MANIPULATION, SHOULDER (Left)    Anesthesia: General    Operative Findings: See Op note.    Estimated Blood Loss: * No values recorded between 12/26/2024 11:07 AM and 12/26/2024 12:04 PM *         Specimens:   Specimen (24h ago, onward)      None              Discharge Note    OUTCOME: Patient tolerated treatment/procedure well without complication and is now ready for discharge.    DISPOSITION: Home or Self Care    FINAL DIAGNOSIS:  Post-Op Diagnosis Codes:     * Adhesive capsulitis of left shoulder [M75.02]     * Arthrofibrosis of left shoulder [M24.612]    FOLLOWUP: In clinic    DISCHARGE INSTRUCTIONS: See attached.

## 2024-12-26 NOTE — PLAN OF CARE
Nursing pre-op complete. Pt calm, will continue to monitor. Call light within reach. Pt tolerated bedside nerve block procedure well. Visited with daughter and granddaughter prior to nerve block. Family has pt's belongings.

## 2024-12-26 NOTE — ANESTHESIA PREPROCEDURE EVALUATION
12/26/2024  Grecia Boyd is a 59 y.o., female.    Procedure: LYSIS,ADHESIONS,SHOULDER,ARTHROSCOPIC (Left: Shoulder) - REGIONAL BLOCK   Anesthesia type: General/Regional   Diagnosis:      Adhesive capsulitis of left shoulder [M75.02]      Arthrofibrosis of left shoulder [M24.612]       Pre-op Assessment    I have reviewed the Patient Summary Reports.     I have reviewed the Nursing Notes. I have reviewed the NPO Status.   I have reviewed the Medications.     Review of Systems  Anesthesia Hx:  No problems with previous Anesthesia   History of prior surgery of interest to airway management or planning:  Previous anesthesia: General, Nerve Block 5/16/2024  Left Shoulder Arthroscopy w/ Capsulorraphy with general anesthesia.  Procedure performed at an [facility].     for 5/16/2024  Left Shoulder Arthroscopy w/ Capsulorraphy.  Procedure performed at an [facility].  Airway issues documented on chart review include mask, easy, GETA, videolaryngoscope used  , view on video-laryngoscopy Grade I    Denies Family Hx of Anesthesia complications.    Denies Personal Hx of Anesthesia complications.                    Social:  Alcohol Use, Non-Smoker       Hematology/Oncology:  Hematology Normal   Oncology Normal                                   EENT/Dental:  chronic allergic rhinitis H/O T&A,  Nasal septal deviation,  Tinnitus of both ears            Cardiovascular:  Exercise tolerance: good   Hypertension    Denies CAD.       Denies Angina.     hyperlipidemia  Denies PRESLEY.  ECG has been reviewed.  Patient not on beta blockers                          Pulmonary:     Denies Asthma.   Denies Shortness of breath. Recent URI Sleep Apnea Not using C-Pap since she lost weight,  Unspecified chronic bronchitis,  Nasal congestion and post nasal drip present on preop exam 12/17,  Pt taking Fluticasone nasal spray and OTC  Mucinex               Renal/:  Renal/ Normal                 Hepatic/GI:  Hepatic/GI Normal     Denies GERD. Denies Liver Disease.   Not Taking GLP-1 Agonists            Musculoskeletal:  Arthritis   Adhesive capsulitis of left shoulder,  Arthrofibrosis of left shoulder,  H/O Left Shoulder Arthroscopy, Capsulorrhaphy, Biceps Tenodesis,  Right Knee Arthroscopy, meniscectomy, Chondroplasty, Synovectomy              OB/GYN/PEDS:  Hysterectomy           Neurological:  Neurology Normal   Denies CVA.    Denies Headaches. Denies Seizures.                                Endocrine:  Diabetes, well controlled, type 2 Denies Hypothyroidism.  Partial Thyroidectomy,  HA1C = 7.4 on 12/17/2024,  Diet-controlled DM      Obesity / BMI > 30  Psych:  Psychiatric Normal                Past Medical History:   Diagnosis Date    Diabetes mellitus, type 2     Hyperlipidemia     Hypertension     Obesity     CHIDI (obstructive sleep apnea)     Unspecified chronic bronchitis 02/2024        Past Surgical History:   Procedure Laterality Date    ADENOIDECTOMY      ARTHROSCOPIC DEBRIDEMENT OF SHOULDER Left 5/16/2024    Procedure: DEBRIDEMENT, SHOULDER, ARTHROSCOPIC;  Surgeon: Naif Ramirez MD;  Location: [facility] OR;  Service: Orthopedics;  Laterality: Left;    ARTHROSCOPY, SHOULDER, WITH CAPSULORRHAPHY Left 5/16/2024    Procedure: ARTHROSCOPY,SHOULDER,WITH CAPSULORRHAPHY;  Surgeon: Naif Ramirez MD;  Location: [facility] OR;  Service: Orthopedics;  Laterality: Left;  REGIONAL BLOCK    ARTHROSCOPY,SHOULDER,WITH BICEPS TENODESIS Left 5/16/2024    Procedure: ARTHROSCOPY,SHOULDER,WITH BICEPS TENODESIS;  Surgeon: Naif Ramirez MD;  Location: [facility] OR;  Service: Orthopedics;  Laterality: Left;    BREAST BIOPSY Right     @ age 18    CHONDROPLASTY OF KNEE Right 2/12/2021    Procedure: CHONDROPLASTY, KNEE;  Surgeon: ZULEIKA Barahona MD;  Location: [facility] OR;  Service: Orthopedics;  Laterality: Right;    COLONOSCOPY N/A 10/16/2023     Procedure: COLONOSCOPY;  Surgeon: Grace Wiley MD;  Location: [facility] ENDOSCOPY;  Service: Endoscopy;  Laterality: N/A;  Referred by: JOAN Chirinos NP   Meds: Ozempic - pt inst to hold per protocol  Prep: Suprep  Route instructions sent: portal  SW  10/9- precall complete.  DBM    HYSTERECTOMY      2009    KNEE ARTHROSCOPY W/ MENISCECTOMY Right 2/12/2021    Procedure: ARTHROSCOPY, KNEE, WITH MEDIAL MENISCECTOMY LATERAL RELEASE;  Surgeon: ZULEIKA Barahona MD;  Location: [facility] OR;  Service: Orthopedics;  Laterality: Right;  pericapsular injection: 30cc ropivacaine/epi/clonidine/ketorolac injection     KNEE SURGERY      SYNOVECTOMY OF KNEE Right 2/12/2021    Procedure: SYNOVECTOMY, KNEE;  Surgeon: ZULEIKA Barahona MD;  Location: [facility] OR;  Service: Orthopedics;  Laterality: Right;    THYROIDECTOMY, PARTIAL      TONSILLECTOMY        Past Medical History:   Diagnosis Date    Diabetes mellitus, type 2     Hyperlipidemia     Hypertension     Obesity     CHIDI (obstructive sleep apnea)     Unspecified chronic bronchitis 02/2024        Physical Exam    Airway:  Mallampati: II / II  Mouth Opening: Normal  TM Distance: Normal  Tongue: Normal  Neck ROM: Normal ROM    Dental:  Intact  Anesthesia Assessment: Preoperative EQUATION    Planned Procedure: Procedure(s) (LRB):  LYSIS,ADHESIONS,SHOULDER,ARTHROSCOPIC (Left)  Requested Anesthesia Type:General/Regional  Surgeon: Naif Ramirez MD  Service: Orthopedics  Known or anticipated Date of Surgery:12/26/2024    Surgeon notes: reviewed    Electronic QUestionnaire Assessment completed via nurse interview with patient.        Triage considerations:   The patient has no apparent active cardiac condition (No unstable coronary Syndrome such as severe unstable angina or recent [<1 month] myocardial infarction, decompensated CHF, severe valvular   disease or significant arrhythmia)    Previous anesthesia records: GETA, LMA General, Easy airway, and No problems, Nerve block  "for post-op pain, video laryngoscopy, [place]    Last PCP note:  within 1 month, within [facility]  Subspecialty notes: n/a    Other important co-morbidities: DM2, HTN, and Obesity       EKG 5/9/2024:   Vent. Rate : 068 BPM     Atrial Rate : 068 BPM      P-R Int : 178 ms          QRS Dur : 084 ms       QT Int : 364 ms       P-R-T Axes : 016 020 027 degrees      QTc Int : 387 ms   Normal sinus rhythm   Abnormal R wave progression   Cannot rule out Anterior infarct ,age undetermined   Abnormal ECG   When compared with ECG of 10-FEB-2021 09:09,   Nonspecific T wave abnormality no longer evident in Anterior leads   Confirmed by HOMA TORRES MD ([id number]) on 5/9/2024 10:17:47 AM       Echo 5/17/2019:   Summary  Normal left ventricular systolic function. The estimated ejection fraction is 65%  Normal LV diastolic function.  No wall motion abnormalities.  Normal right ventricular systolic function.  Normal central venous pressure (3 mm Hg).       Tests already available:  Recent tests reviewed              Instructions given. (See in Nurse's note)    Optimization:  Anesthesia Preop Clinic Assessment Not Indicated    Medical Opinion Indicated: Yes, PCP       Sub-specialist consult indicated:No      Plan:   Consultation:Patient's PCP for a statement of optimization      Patient  has previously scheduled Medical Appointment: 12/17/2024    Navigation:  Patient is cleared/ optimized for surgery per PCP.       Ht: 5'2"  Wt: 88 kg (194 lb)  BMI: 35.48    Anesthesia Plan  Type of Anesthesia, risks & benefits discussed:    Anesthesia Type: Gen ETT, Regional  Intra-op Monitoring Plan: Standard ASA Monitors  Post Op Pain Control Plan: multimodal analgesia, IV/PO Opioids PRN and peripheral nerve block  Induction:  IV  Airway Plan: Direct  Informed Consent: Informed consent signed with the Patient and all parties understand the risks and agree with anesthesia plan.  All questions answered.   ASA Score: 3    Ready For Surgery From " Anesthesia Perspective.   .

## 2024-12-26 NOTE — OP NOTE
Date of Procedure: 12/26/2024    Preoperative Diagnosis:  Left shoulder  posttraumatic osteoarthritis  Left shoulder  arthrofibrosis      Postoperative Diagnosis:   Left shoulder posttraumatic osteoarthritis  Left shoulder arthrofibrosis  Left shoulder loose body    Procedures Performed:  1. Left shoulder Arthroscopic lysis of adhesions CPT - 93978    2. Left shoulder Arthroscopic loose body removal CPT - 40326    3. Left shoulder arthroscopic extensive debridement - 94017    Surgeons and Role:     * Naif Ramirez MD - Primary    Assistant: Dallas Castillo MD - Fellow    Anesthesia: General/Regional    Complications: None    Implants: * No implants in log *    Arthroscopic Findings:   Glenohumeral joint  Evaluation of the glenohumeral joint demonstrated a healed bony Bankart repair however significant posttraumatic glenohumeral osteoarthritis was noted.  The fracture appeared to be completely healed however areas of significant chondral loss was noted on the glenoid with significant chondral loss in the humerus.  Loose body was encountered in the glenohumeral joint.  Intact articular side of the rotator cuff was noted.  Significant amount of synovitis and areas of thick scar was noted throughout the glenohumeral joint.    Exam Under Anesthesia:    Left shoulder:90/30/10/10    Exam after surgery:    Left shoulder: 150/60/30/45    Indication for Procedure:  The patient is a 59-year-old female who had a surgery of her left shoulder for a large bony Bankart injury after after an injury in February of 2024.  She had a left arthroscopic bony Bankart repair, biceps tenodesis, loose body removal and extensive debridement in May of 2024.  Postoperatively she developed significant amount of stiffness was unable to regain her motion for the left shoulder.  We were treating her initially with conservative management including anti-inflammatories, physical therapy and eventually tried an intra-articular corticosteroid  injection.  She did not get relief or improve her motion.  At this time we did discuss a left shoulder arthroscopic manipulation under anesthesia, lysis of adhesions and any other necessary indicated procedure.  She elected to proceed with surgery after risks, benefits and alternatives were discussed with the patient.    Surgery in Detail:  The patient was marked identified in the holding room.  She was brought back to the operating room and placed in the lateral decubitus position.  After general endotracheal anesthesia was administered the left upper extremity was prepped and draped in usual sterile fashion for a shoulder arthroscopy. The body was secured and well padded in a bean bag. Preoperative antibiotics were given within 1 hour the procedure.  A time-out was taken prior starting.     We 1st began with a gentle manipulation under anesthesia with the above-stated motion of the left upper extremity.  We were able to get a gentle amount of motion but not significant amount of scarring was noted to be released with the manipulation.     A posterior portal was created after insufflation of the joint with sterile saline.  A diagnostic arthroscopy was performed with the above-stated findings.  A vapor was then introduced through the anterior portal and an extensive lysis of adhesions was performed removing all the tissue in the rotator interval until we could see the upper border of the subscapularis.  We then released the middle glenohumeral ligament extending this into the inferior glenohumeral ligament all the way to the axillary pouch.  We then changed portals and brought the vapor in from the posterior portal and completed the released from the inferior pouch all the way through our posterior portal and extended this superiorly to a proximally the 12 o'clock position while releasing the capsule.  We did encounter a large loose body which was removed with a pituitary through the anterior portal.  [place] were  then brought in to debride loose tissue and perform a chondroplasty on the humeral and glenoid side.  We did remove some of the sutures from her previous operation.  We then took the arm out of the shoulder traction and assess her range of motion.  The above-stated range of motion parameters are stated post surgery.  This was significantly improved from her preoperative range of motion.  Arthroscope were then removed from the joint and the incisions were closed with 3-0 nylon sutures in simple interrupted fashion.  Adaptic, bacitracin, 4x4s and ABDs were then used.  The patient was then placed in an arc brace and extubated and taken recovery.    Post-Op Plan:  Aggressive range of motion protocol.  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  At this point if her symptoms progress in terms of pain, she would be a candidate for total shoulder arthroplasty.  Her glenoid morphology has been restored and should be amenable for an anatomic total shoulder arthroplasty.    Attestation: I was present and scrubbed for the entire procedure.

## 2024-12-26 NOTE — TRANSFER OF CARE
"Anesthesia Transfer of Care Note    Patient: Grecia Boyd    Procedure(s) Performed: Procedure(s) (LRB):  LYSIS,ADHESIONS,SHOULDER,ARTHROSCOPIC (Left)  MANIPULATION, SHOULDER (Left)    Patient location: PACU    Anesthesia Type: general    Transport from OR: Transported from OR on 6-10 L/min O2 by face mask with adequate spontaneous ventilation    Post pain: adequate analgesia    Post assessment: no apparent anesthetic complications and tolerated procedure well    Post vital signs: stable    Level of consciousness: awake, alert and oriented    Nausea/Vomiting: no nausea/vomiting    Complications: none    Transfer of care protocol was followed      Last vitals: Visit Vitals  /67 (BP Location: Right arm, Patient Position: Lying)   Pulse 75   Temp 36.7 °C (98.1 °F) (Tympanic)   Resp 18   Ht 5' 2" (1.575 m)   Wt 88 kg (194 lb)   SpO2 100%   Breastfeeding No   BMI 35.48 kg/m²     "

## 2024-12-27 ENCOUNTER — CLINICAL SUPPORT (OUTPATIENT)
Dept: REHABILITATION | Facility: HOSPITAL | Age: 59
End: 2024-12-27
Attending: PHYSICIAN ASSISTANT
Payer: COMMERCIAL

## 2024-12-27 DIAGNOSIS — M25.612 DECREASED RANGE OF MOTION OF SHOULDER, LEFT: Primary | ICD-10-CM

## 2024-12-27 DIAGNOSIS — M24.612 ARTHROFIBROSIS OF LEFT SHOULDER: ICD-10-CM

## 2024-12-27 DIAGNOSIS — M75.02 ADHESIVE CAPSULITIS OF LEFT SHOULDER: ICD-10-CM

## 2024-12-27 DIAGNOSIS — Z98.890 S/P ARTHROSCOPY OF LEFT SHOULDER: ICD-10-CM

## 2024-12-27 LAB
POCT GLUCOSE: 110 MG/DL (ref 70–110)
POCT GLUCOSE: 140 MG/DL (ref 70–110)

## 2024-12-27 PROCEDURE — 97161 PT EVAL LOW COMPLEX 20 MIN: CPT

## 2024-12-27 PROCEDURE — 97110 THERAPEUTIC EXERCISES: CPT

## 2024-12-27 PROCEDURE — 97140 MANUAL THERAPY 1/> REGIONS: CPT

## 2024-12-27 NOTE — ANESTHESIA POSTPROCEDURE EVALUATION
Anesthesia Post Evaluation    Patient: Grecia Boyd    Procedure(s) Performed: Procedure(s) (LRB):  LYSIS,ADHESIONS,SHOULDER,ARTHROSCOPIC (Left)  MANIPULATION, SHOULDER (Left)    Final Anesthesia Type: general      Patient location during evaluation: PACU  Patient participation: Yes- Able to Participate  Level of consciousness: awake and alert and oriented  Post-procedure vital signs: reviewed and stable  Pain management: adequate  Airway patency: patent    PONV status at discharge: No PONV  Anesthetic complications: no      Cardiovascular status: hemodynamically stable  Respiratory status: nasal cannula  Hydration status: euvolemic  Follow-up not needed.          Vitals Value Taken Time   /67 12/26/24 1317   Temp 36.7 °C (98 °F) 12/26/24 1200   Pulse 65 12/26/24 1331   Resp 19 12/26/24 1329   SpO2 95 % 12/26/24 1331   Vitals shown include unfiled device data.      Event Time   Out of Recovery 13:20:00         Pain/Bud Score: Pain Rating Prior to Med Admin: 0 (12/26/2024 12:19 PM)  Pain Rating Post Med Admin: 0 (12/26/2024 10:00 AM)  Bud Score: 10 (12/26/2024 12:35 PM)

## 2024-12-28 ENCOUNTER — CLINICAL SUPPORT (OUTPATIENT)
Dept: REHABILITATION | Facility: HOSPITAL | Age: 59
End: 2024-12-28
Attending: PHYSICIAN ASSISTANT
Payer: COMMERCIAL

## 2024-12-28 DIAGNOSIS — M25.612 DECREASED RANGE OF MOTION OF SHOULDER, LEFT: Primary | ICD-10-CM

## 2024-12-28 PROCEDURE — 97112 NEUROMUSCULAR REEDUCATION: CPT

## 2024-12-28 PROCEDURE — 97140 MANUAL THERAPY 1/> REGIONS: CPT

## 2024-12-28 PROCEDURE — 97110 THERAPEUTIC EXERCISES: CPT

## 2024-12-28 NOTE — PROGRESS NOTES
[facility]   Physical Therapy Treatment Note     Name: Grecia Boyd  [facility] Number: [id number]    Therapy Diagnosis:   Encounter Diagnosis   Name Primary?    Decreased range of motion of shoulder, left Yes     Physician: Yoandy Kelly PA-C  Surgeon: Dr. Ramirez     Visit Date: 12/28/2024  Physician Orders: PT Eval and Treat   Medical Diagnosis from Referral: Arthrofibrosis of left shoulder [M24.612], Adhesive capsulitis of left shoulder [M75.02], S/P arthroscopy of left shoulder [Z98.890]   Evaluation Date: 12/27/2024  Authorization Period Expiration: 12/24/2025  Plan of Care Expiration: 4/15/2025  Progress Note Due: 1/27/2025  Date of Surgery: 12/26/24  Visit # / Visits authorized: 2/1   FOTO: 1/3    PTA Visit #: 0/5     FOTO first follow up:   FOTO second follow up:     Date of Surgery: 12/26/2024  Return to MD: 1/8/2025, 2/5/2025    Procedure:  1. Left shoulder Arthroscopic lysis of adhesions CPT - 61122  2. Left shoulder Arthroscopic loose body removal CPT - 23317  3. Left shoulder arthroscopic extensive debridement - 10638    Post-op Plan: Aggressive range of motion protocol. 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 At this point if her symptoms progress in terms of pain, she would be a candidate for total shoulder arthroplasty. Her glenoid morphology has been restored and should be amenable for an anatomic total shoulder arthroplasty.     Precautions: Standard and Diabetes     Time In: 10:00 am   Time Out: 10:55 am   Total Billable Time: 55 minutes    SUBJECTIVE     Pt reports: She is doing pretty good. A little sore still in her shoulder and having difficulty sleeping but otherwise doing pretty good.   She was compliant with home exercise program.  Response to previous treatment: Independent in HEP   Functional change: Ongoing     Pain: 5/10  Location: left shoulder      OBJECTIVE     Objective Measures updated at progress report unless  "specified.     Treatment   *Pt is 0 weeks and 2 days s/p as of 12/28/2024.*    Grecia received the treatments listed below:      therapeutic exercises to develop strength, endurance, ROM, flexibility, posture, and core stabilization for 29 minutes including:    Pt education on post-op goals/precautions, importance of mobility throughout the day   Pendulum hangs 3 x 1'   Shoulder passive flexion HOB elevated 2 x 10 x 3-5" holds   Supine ER with dowel 2 x 10 x 3-5" holds  Seated ER with dowel 2 x 10 x 3-5" holds   Sidelying RTC interval stretch 2 x 3'     manual therapy techniques: Joint mobilizations and Soft tissue Mobilization were applied to the: Shoulder for 14 minutes, including:    Shoulder PROM within protocol limits     neuromuscular re-education activities to improve: Coordination, Kinesthetic, Sense, Proprioception, and Posture for 12 minutes. The following activities were included:    Seated scapular retractions 2 x 10 x 5" holds   Table slides flex/scap x 3' each   Seated dowel AAROM flexion 2 x 10 reps     therapeutic activities to improve functional performance for 00  minutes, including:      Patient Education and Home Exercises     Home Exercises Provided and Patient Education Provided     Education provided:   - Continue with HEP   - Post-op precautions, importance of mobility throughout the day     Written Home Exercises Provided: Patient instructed to cont prior HEP. Exercises were reviewed and Grecia was able to demonstrate them prior to the end of the session.  Grecia demonstrated good  understanding of the education provided. See EMR under Patient Instructions for exercises provided during therapy sessions    ASSESSMENT     Grecia presents to today's session with continued pain and limitations in mobility most notably in internal and external rotation. Continued manual therapy to improve mobility and further therex working on mobility. Further external rotation work with dowel and RTC " interval stretching as well added with good tolerance. She would benefit from further scapular work as well to improve GHJ position. End of session shoulder flexion passive range of motion to 80 degrees and external rotation 10 degrees. Will continue to monitor and progress as able.     Grecia Is progressing well towards her goals.   Pt prognosis is Good.     Pt will continue to benefit from skilled outpatient physical therapy to address the deficits listed in the problem list box on initial evaluation, provide pt/family education and to maximize pt's level of independence in the home and community environment.     Pt's spiritual, cultural and educational needs considered and pt agreeable to plan of care and goals.     Anticipated barriers to physical therapy: Scheduling, transportation/work, co-morbidities     Goals:   Short Term Goals:  2-4 weeks (Progressing, not met)  Report decreased shoulder pain < / =  3/10  to increase tolerance for work activities  Increase PROM ER to 20 degrees  Increased strength by 1/3 MMT grade in shoulder to increase tolerance for ADL and work activities.   Pt to tolerate HEP to improve ROM and independence with ADL's    Long Term Goals: 4-18 weeks (Progressing, not met)  Report decreased shoulder pain  < / =  1 /10  to increase tolerance for work activities  Increase AROM to ER to 20 degrees  Increase strength to >/= 4/5 in shoulder to increase tolerance for ADL and work activities.  Pt goal:  improve range and pain for everyday use, work activities, and prevent another surgery.   Pt will have improved gcode of CJ (20-40% limited) on FOTO shoulder in order to demonstrate true functional improvement.  Pt will improve shoulder flex AROM to 120 in order to show improvement with range of motion       PLAN   Plan of care Certification: 12/27/2024 to 4/15/2025.     Continue with current PT plan of care with progression as able within protocol.     Kay Carolina, PT, DPT, OCS

## 2024-12-29 ENCOUNTER — NURSE TRIAGE (OUTPATIENT)
Dept: ADMINISTRATIVE | Facility: CLINIC | Age: 59
End: 2024-12-29
Payer: COMMERCIAL

## 2024-12-29 NOTE — TELEPHONE ENCOUNTER
Reason for Disposition   [1] Follow-up call to recent contact AND [2] information only call, no triage required     See notes    Protocols used: Information Only Call - No Triage-A-  Pt's states she just spoke with the Provider on call for Dr. Ramirez. States he told her to call back and ask for the anesthesiologist on call about the nerve block she received. Warm transferred to Main  [facility] for assistance.

## 2024-12-29 NOTE — PLAN OF CARE
[facility]   Physical Therapy Initial Evaluation      Name: Grecia Boyd  Clinic Number: [id number]    Therapy Diagnosis:   Encounter Diagnoses   Name Primary?    Arthrofibrosis of left shoulder     Adhesive capsulitis of left shoulder     S/P arthroscopy of left shoulder     Decreased range of motion of shoulder, left Yes        Physician: Yoandy Kelly PA-C    Physician Orders: PT Eval and Treat   Medical Diagnosis from Referral: Arthrofibrosis of left shoulder [M24.612], Adhesive capsulitis of left shoulder [M75.02], S/P arthroscopy of left shoulder [Z98.890]   Evaluation Date: 12/27/2024  Authorization Period Expiration: 12/24/25  Plan of Care Expiration: 4/15/25  Progress Note Due: 1/27/25  Date of Surgery: 12/26/24  Visit # / Visits authorized: 1/ 1   FOTO: 1/ 3  FOTO 1st Follow Up:  FOTO 2nd Follow Up:      Precautions: Standard and Diabetes     Time In: 12:33  Time Out: 1:35  Total Billable Time: 60 minutes    Procedures Performed:  1. Left shoulder Arthroscopic lysis of adhesions CPT - 64603     2. Left shoulder Arthroscopic loose body removal CPT - 74440     3. Left shoulder arthroscopic extensive debridement - 35807    Post-Op Plan:  Aggressive range of motion protocol.  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  At this point if her symptoms progress in terms of pain, she would be a candidate for total shoulder arthroplasty.  Her glenoid morphology has been restored and should be amenable for an anatomic total shoulder arthroplasty.     Subjective     Date of onset: 12/26/24    History of current condition - Grecia reports: having surgery yesterday as above. She had bony bakhart surgery prior and had a frozen shoulder. She tried injections and PT but did not help. Shoulder is hurting a lot today and has been using pain pump. Knows she needs to get her shoulder moving and doctor said if symptoms do not improve potentially get a total  shoulder.     Imaging: MRI - Impression:     1. Recent fracture of the anterior-inferior glenoid with 2 mm of depression.  2. Near circumferential labral fraying/tearing.  No paralabral cyst.  No glenohumeral ligament avulsion.  Possible [place] complex.  3. Infraspinatus tendinosis with a small, partial-thickness interstitial/concealed footprint tear.  4. Supraspinatus tendinosis with mild articular surface fraying.  5. Biceps tendinosis.  6. Mild glenohumeral osteoarthritis.  7. Joint effusion with synovitis.    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      Medical History:   Past Medical History:   Diagnosis Date    Diabetes mellitus, type 2     Hyperlipidemia     Hypertension     Obesity     CHIDI (obstructive sleep apnea)     Unspecified chronic bronchitis 02/2024       Surgical History:   Grecia Boyd  has a past surgical history that includes Thyroidectomy, partial; Adenoidectomy; Tonsillectomy; Breast biopsy (Right); Hysterectomy; Knee arthroscopy w/ meniscectomy (Right, 2/12/2021); Chondroplasty of knee (Right, 2/12/2021); Synovectomy of knee (Right, 2/12/2021); Knee surgery; Colonoscopy (N/A, 10/16/2023); arthroscopy, shoulder, with capsulorrhaphy (Left, 5/16/2024); Arthroscopic debridement of shoulder (Left, 5/16/2024); arthroscopy,shoulder,with biceps tenodesis (Left, 5/16/2024); lysis, adhesions, shoulder, arthroscopic (Left, 12/26/2024); and Manipulation of shoulder joint (Left, 12/26/2024).    Medications:  "  Grecia has a current medication list which includes the following prescription(s): albuterol, blood sugar diagnostic, blood-glucose meter, celecoxib, fluticasone propionate, hydrochlorothiazide, hydrocodone-acetaminophen, lancets, ozempic, tobramycin sulfate 0.3%, and valsartan.    Allergies:   Review of patient's allergies indicates:  No Known Allergies     Objective      Observation: arm in sling with pain pump in place. Patient in noticeable pain with shoulder motion    Posture: increased thoracic kyphosis.     Passive Range of Motion: beginning  Shoulder Right Left   Flexion 160 50*   Abduction 160 40*   ER at 0 40 -5*   ER at 30 60 0*   IR 60 15*      Passive Range of Motion: following  Shoulder Left   Flexion 90*   Abduction 60*   ER at 0 5*   ER at 30 5*   IR 15*     Joint Mobility: decreased and guarded posterior/inferior    Palpation: TTP anterior shoulder    Sensation: intact to light touch akin UE    Edema: slight increased along left shoulder    Integumentary: guaze removed with incisions clean and no drainage.       Intake Outcome Measure for FOTO shoulder Survey    Therapist reviewed FOTO scores for Grecia Boyd on 12/27/2024.   FOTO report - see Media section or FOTO account episode details.    Intake Score: see media         Treatment     Total Treatment time (time-based codes) separate from Evaluation: 35 minutes     Grecia received the treatments listed below:      therapeutic exercises to develop strength, endurance, and ROM for 19 minutes including:  Pt education  HEP  Table walk backs 2x20  Supine ER stick 30x5"  Table slides 30x  Pendulums     manual therapy techniques: Joint mobilizations, Soft tissue Mobilization, and PROM were applied to the: Shoulder for 16 minutes, including:  PROM all directions  Gentle shoulder oscillations  Bandage placement       Patient Education and Home Exercises     Education provided:   - HEP  - importance of shoulder mobility especially ER    Written Home " Exercises Provided: Yes. Exercises were reviewed and Grecia was able to demonstrate them prior to the end of the session.  Grecia demonstrated good  understanding of the education provided. See EMR under Patient Instructions for exercises provided during therapy sessions.    Assessment     Grecia is a 59 y.o. female referred to outpatient Physical Therapy with a medical diagnosis of Arthrofibrosis of left shoulder [M24.612], Adhesive capsulitis of left shoulder [M75.02], S/P arthroscopy of left shoulder [Z98.890] . Patient presents with decreased shoulder range, decreased strength, decreased joint mobility, high irritability and pain/limitations affecting everyday activities.     Patient prognosis is Good.   Patient will benefit from skilled outpatient Physical Therapy to address the deficits stated above and in the chart below, provide patient /family education, and to maximize patientt's level of independence.     Plan of care discussed with patient: Yes  Patient's spiritual, cultural and educational needs considered and patient is agreeable to the plan of care and goals as stated below:     Anticipated Barriers for therapy: scheduling, transportation, work    Medical Necessity is demonstrated by the following  History  Co-morbidities and personal factors that may impact the plan of care [] LOW: no personal factors / co-morbidities  [] MODERATE: 1-2 personal factors / co-morbidities  [x] HIGH: 3+ personal factors / co-morbidities    Moderate / High Support Documentation:   Co-morbidities affecting plan of care: see medical    Personal Factors:   age     Examination  Body Structures and Functions, activity limitations and participation restrictions that may impact the plan of care [] LOW: addressing 1-2 elements  [] MODERATE: 3+ elements  [x] HIGH: 4+ elements (please support below)    Moderate / High Support Documentation: range, strength, joint mobility, motor control, gait, edema       Clinical Presentation  [] LOW: stable  [x] MODERATE: Evolving  [] HIGH: Unstable     Decision Making/ Complexity Score: low       GOALS: Short Term Goals:  2-4 weeks  1.Report decreased shoulder pain < / =  3/10  to increase tolerance for work activities  2. Increase PROM ER to 20 degrees   3. Increased strength by 1/3 MMT grade in shoulder to increase tolerance for ADL and work activities.  4. Pt to tolerate HEP to improve ROM and independence with ADL's    Long Term Goals: 4-18 weeks  1.Report decreased shoulder pain  < / =  1 /10  to increase tolerance for work activities  2.Increase AROM to ER to 20 degrees  3.Increase strength to >/= 4/5 in shoulder to increase tolerance for ADL and work activities.  4. Pt goal:  improve range and pain for everyday use, work activities, and prevent another surgery.   5. Pt will have improved gcode of CJ (20-40% limited) on FOTO shoulder in order to demonstrate true functional improvement.  6. Pt will improve shoulder flex AROM to 120 in order to show improvement with range of motion    Plan     Plan of care Certification: 12/27/2024 to 4/15/25.    Outpatient Physical Therapy 1-5 times weekly for 4-18 weeks to include the following interventions: Electrical Stimulation NMES, Manual Therapy, Moist Heat/ Ice, Neuromuscular Re-ed, Patient Education, Self Care, Therapeutic Activities, and Therapeutic Exercise.     Flo Ramos, PT        Physician's Signature: _________________________________________ Date: ________________

## 2024-12-30 ENCOUNTER — CLINICAL SUPPORT (OUTPATIENT)
Dept: REHABILITATION | Facility: HOSPITAL | Age: 59
End: 2024-12-30
Attending: PHYSICIAN ASSISTANT
Payer: COMMERCIAL

## 2024-12-30 DIAGNOSIS — M25.612 DECREASED RANGE OF MOTION OF SHOULDER, LEFT: Primary | ICD-10-CM

## 2024-12-30 PROCEDURE — 97112 NEUROMUSCULAR REEDUCATION: CPT

## 2024-12-30 PROCEDURE — 97140 MANUAL THERAPY 1/> REGIONS: CPT

## 2024-12-30 PROCEDURE — 97110 THERAPEUTIC EXERCISES: CPT

## 2024-12-31 ENCOUNTER — PATIENT MESSAGE (OUTPATIENT)
Dept: INTERNAL MEDICINE | Facility: CLINIC | Age: 59
End: 2024-12-31
Payer: COMMERCIAL

## 2024-12-31 DIAGNOSIS — E11.9 TYPE 2 DIABETES MELLITUS WITHOUT COMPLICATION, WITHOUT LONG-TERM CURRENT USE OF INSULIN: ICD-10-CM

## 2024-12-31 NOTE — PROGRESS NOTES
[facility]   Physical Therapy Treatment Note     Name: Grecia Boyd  [facility] Number: [id number]    Therapy Diagnosis:   Encounter Diagnosis   Name Primary?    Decreased range of motion of shoulder, left Yes       Physician: Yoandy Kelly PA-C  Surgeon: Dr. Ramirez     Visit Date: 12/30/2024  Physician Orders: PT Eval and Treat   Medical Diagnosis from Referral: Arthrofibrosis of left shoulder [M24.612], Adhesive capsulitis of left shoulder [M75.02], S/P arthroscopy of left shoulder [Z98.890]   Evaluation Date: 12/27/2024  Authorization Period Expiration: 12/24/2025  Plan of Care Expiration: 4/15/2025  Progress Note Due: 1/27/2025  Date of Surgery: 12/26/24  Visit # / Visits authorized: 1/2   FOTO: 1/3    PTA Visit #: 0/5     FOTO first follow up:   FOTO second follow up:     Date of Surgery: 12/26/2024  Return to MD: 1/8/2025, 2/5/2025    Procedure:  1. Left shoulder Arthroscopic lysis of adhesions CPT - 32580  2. Left shoulder Arthroscopic loose body removal CPT - 11809  3. Left shoulder arthroscopic extensive debridement - 51757    Post-op Plan: Aggressive range of motion protocol. 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 At this point if her symptoms progress in terms of pain, she would be a candidate for total shoulder arthroplasty. Her glenoid morphology has been restored and should be amenable for an anatomic total shoulder arthroplasty.     Precautions: Standard and Diabetes     Time In: 1:30 pm   Time Out: 2:35 pm  Total Billable Time: 60 minutes    SUBJECTIVE     Pt reports: still having a lot of pain. Has been doing exercises but pain. Feels like she has a little more motion today.   She was compliant with home exercise program.  Response to previous treatment: Independent in HEP   Functional change: Ongoing     Pain: 5/10  Location: left shoulder      OBJECTIVE     Objective Measures updated at progress report unless specified.     Treatment  "  *Pt is 0 weeks and 4 days s/p as of 12/30/2024.*    Grecia received the treatments listed below:      therapeutic exercises to develop strength, endurance, ROM, flexibility, posture, and core stabilization for 31 minutes including:    Pt education on post-op goals/precautions, importance of mobility throughout the day   Pendulum hangs 3 x 1'   Shoulder passive flexion HOB elevated 2 x 10 x 3-5" holds   Supine ER with dowel 2 x 10 x 3-5" holds  Seated ER with dowel 2 x 10 x 3-5" holds   Sidelying RTC interval stretch 2 x 3'     manual therapy techniques: Joint mobilizations and Soft tissue Mobilization were applied to the: Shoulder for 14 minutes, including:    Shoulder PROM within protocol limits  Gentle GHJ mobilizations posterior/inferior     neuromuscular re-education activities to improve: Coordination, Kinesthetic, Sense, Proprioception, and Posture for 12 minutes. The following activities were included:    Seated scapular retractions 2 x 10 x 5" holds   Table slides flex/scap x 3' each   Seated dowel AAROM flexion 2 x 10 reps     therapeutic activities to improve functional performance for 00  minutes, including:      Patient Education and Home Exercises     Home Exercises Provided and Patient Education Provided     Education provided:   - Continue with HEP   - Post-op precautions, importance of mobility throughout the day     Written Home Exercises Provided: Patient instructed to cont prior HEP. Exercises were reviewed and Grecia was able to demonstrate them prior to the end of the session.  Grecia demonstrated good  understanding of the education provided. See EMR under Patient Instructions for exercises provided during therapy sessions    ASSESSMENT     Grecia still presented with increased pain in her shoulder. Worked today on continued range of motion and continuing with AAROM exercises. She was able to improve slightly with range of motion but still significantly limited with ER and IR range of " motion. Will continue to monitor and progress as able.     Grecia Is progressing well towards her goals.   Pt prognosis is Good.     Pt will continue to benefit from skilled outpatient physical therapy to address the deficits listed in the problem list box on initial evaluation, provide pt/family education and to maximize pt's level of independence in the home and community environment.     Pt's spiritual, cultural and educational needs considered and pt agreeable to plan of care and goals.     Anticipated barriers to physical therapy: Scheduling, transportation/work, co-morbidities     Goals:   Short Term Goals:  2-4 weeks (Progressing, not met)  Report decreased shoulder pain < / =  3/10  to increase tolerance for work activities  Increase PROM ER to 20 degrees  Increased strength by 1/3 MMT grade in shoulder to increase tolerance for ADL and work activities.   Pt to tolerate HEP to improve ROM and independence with ADL's    Long Term Goals: 4-18 weeks (Progressing, not met)  Report decreased shoulder pain  < / =  1 /10  to increase tolerance for work activities  Increase AROM to ER to 20 degrees  Increase strength to >/= 4/5 in shoulder to increase tolerance for ADL and work activities.  Pt goal:  improve range and pain for everyday use, work activities, and prevent another surgery.   Pt will have improved gcode of CJ (20-40% limited) on FOTO shoulder in order to demonstrate true functional improvement.  Pt will improve shoulder flex AROM to 120 in order to show improvement with range of motion       PLAN   Plan of care Certification: 12/27/2024 to 4/15/2025.     Continue with current PT plan of care with progression as able within protocol.     Kay Carolina, PT, DPT, OCS

## 2025-01-02 ENCOUNTER — CLINICAL SUPPORT (OUTPATIENT)
Dept: REHABILITATION | Facility: HOSPITAL | Age: 60
End: 2025-01-02
Payer: COMMERCIAL

## 2025-01-02 DIAGNOSIS — M25.612 DECREASED RANGE OF MOTION OF SHOULDER, LEFT: Primary | ICD-10-CM

## 2025-01-02 PROCEDURE — 97140 MANUAL THERAPY 1/> REGIONS: CPT

## 2025-01-02 PROCEDURE — 97110 THERAPEUTIC EXERCISES: CPT

## 2025-01-02 PROCEDURE — 97112 NEUROMUSCULAR REEDUCATION: CPT

## 2025-01-02 NOTE — PROGRESS NOTES
[facility]   Physical Therapy Treatment Note     Name: Grecia Boyd  [facility] Number: [id number]    Therapy Diagnosis:   Encounter Diagnosis   Name Primary?    Decreased range of motion of shoulder, left Yes         Physician: Yoandy Kelly PA-C  Surgeon: Dr. Ramirez     Visit Date: 1/2/2025  Physician Orders: PT Eval and Treat   Medical Diagnosis from Referral: Arthrofibrosis of left shoulder [M24.612], Adhesive capsulitis of left shoulder [M75.02], S/P arthroscopy of left shoulder [Z98.890]   Evaluation Date: 12/27/2024  Authorization Period Expiration: 12/24/2025  Plan of Care Expiration: 4/15/2025  Progress Note Due: 1/27/2025  Date of Surgery: 12/26/24  Visit # / Visits authorized: 1/2   FOTO: 1/3    PTA Visit #: 0/5     FOTO first follow up:   FOTO second follow up:     Date of Surgery: 12/26/2024  Return to MD: 1/8/2025, 2/5/2025    Procedure:  1. Left shoulder Arthroscopic lysis of adhesions CPT - 38565  2. Left shoulder Arthroscopic loose body removal CPT - 07891  3. Left shoulder arthroscopic extensive debridement - 73910    Post-op Plan: Aggressive range of motion protocol. 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 At this point if her symptoms progress in terms of pain, she would be a candidate for total shoulder arthroplasty. Her glenoid morphology has been restored and should be amenable for an anatomic total shoulder arthroplasty.     Precautions: Standard and Diabetes     Time In: 1300   Time Out: 1358  Total Billable Time: 58 minutes    SUBJECTIVE     Pt reports: Still in a lot of pain in the front of her shoulder. She is still doing her exercises because she does not want a shoulder replacement.   She was compliant with home exercise program.  Response to previous treatment: Independent in HEP   Functional change: Ongoing     Pain: 5/10  Location: left shoulder      OBJECTIVE     Objective Measures updated at progress report unless  "specified.     Treatment   *Pt is 1 weeks and 0 days s/p as of 1/2/2024.*    Grecia received the treatments listed below:      therapeutic exercises to develop strength, endurance, ROM, flexibility, posture, and core stabilization for 25 minutes including:    Pt education on post-op goals/precautions, importance of mobility throughout the day   UBE 4' fwd   Pendulum hangs 3 x 1'   Shoulder passive flexion HOB elevated 3 x 10 x 3-5" holds   Supine ER with dowel 2 x 10 x 3-5" holds  Seated ER with dowel 2 x 10 x 3-5" holds   Sidelying RTC interval stretch 5'   Sidelying flexion with dowel 2x10 3-5" holds     manual therapy techniques: Joint mobilizations and Soft tissue Mobilization were applied to the: Shoulder for 16 minutes, including:    Shoulder PROM within protocol limits  Gentle GHJ mobilizations posterior/inferior   GH joint oscillations     neuromuscular re-education activities to improve: Coordination, Kinesthetic, Sense, Proprioception, and Posture for 18 minutes. The following activities were included:    Seated scapular retractions 2 x 10 x 5" holds   Table slides flex/scap x 3' each   Pulleys flexion 2x10     NT  Seated dowel AAROM flexion 2 x 10 reps     therapeutic activities to improve functional performance for 00  minutes, including:      Patient Education and Home Exercises     Home Exercises Provided and Patient Education Provided     Education provided:   - Continue with HEP   - Post-op precautions, importance of mobility throughout the day     Written Home Exercises Provided: Patient instructed to cont prior HEP. Exercises were reviewed and Grecia was able to demonstrate them prior to the end of the session.  Grecia demonstrated good  understanding of the education provided. See EMR under Patient Instructions for exercises provided during therapy sessions    ASSESSMENT     Presented with increased shoulder pain and decreased ROM. Continued to work on increasing range of motion, but she is " still limited with rotation and flexion. Pt has pain with flexion over 90. Pt had an increase with pain on the UBE and had to stop at 4 minutes. All of the pt's pain was in her anterior shoulder near the incision. Some pain relief with pendulums and oscillations. Will continue to monitor and progress as able.     Grecia Is progressing well towards her goals.   Pt prognosis is Good.     Pt will continue to benefit from skilled outpatient physical therapy to address the deficits listed in the problem list box on initial evaluation, provide pt/family education and to maximize pt's level of independence in the home and community environment.     Pt's spiritual, cultural and educational needs considered and pt agreeable to plan of care and goals.     Anticipated barriers to physical therapy: Scheduling, transportation/work, co-morbidities     Goals:   Short Term Goals:  2-4 weeks (Progressing, not met)  Report decreased shoulder pain < / =  3/10  to increase tolerance for work activities  Increase PROM ER to 20 degrees  Increased strength by 1/3 MMT grade in shoulder to increase tolerance for ADL and work activities.   Pt to tolerate HEP to improve ROM and independence with ADL's    Long Term Goals: 4-18 weeks (Progressing, not met)  Report decreased shoulder pain  < / =  1 /10  to increase tolerance for work activities  Increase AROM to ER to 20 degrees  Increase strength to >/= 4/5 in shoulder to increase tolerance for ADL and work activities.  Pt goal:  improve range and pain for everyday use, work activities, and prevent another surgery.   Pt will have improved gcode of CJ (20-40% limited) on FOTO shoulder in order to demonstrate true functional improvement.  Pt will improve shoulder flex AROM to 120 in order to show improvement with range of motion       PLAN   Plan of care Certification: 12/27/2024 to 4/15/2025.     Continue with current PT plan of care with progression as able within protocol.     Phan  S[place], PT, DPT, OCS

## 2025-01-06 RX ORDER — SEMAGLUTIDE 2.68 MG/ML
2 INJECTION, SOLUTION SUBCUTANEOUS
Qty: 3 ML | Refills: 11 | Status: SHIPPED | OUTPATIENT
Start: 2025-01-06 | End: 2026-01-06

## 2025-01-07 ENCOUNTER — CLINICAL SUPPORT (OUTPATIENT)
Dept: REHABILITATION | Facility: HOSPITAL | Age: 60
End: 2025-01-07
Payer: COMMERCIAL

## 2025-01-07 DIAGNOSIS — M25.612 DECREASED RANGE OF MOTION OF SHOULDER, LEFT: Primary | ICD-10-CM

## 2025-01-07 PROCEDURE — 97110 THERAPEUTIC EXERCISES: CPT

## 2025-01-07 PROCEDURE — 97112 NEUROMUSCULAR REEDUCATION: CPT

## 2025-01-07 PROCEDURE — 97140 MANUAL THERAPY 1/> REGIONS: CPT

## 2025-01-07 NOTE — PROGRESS NOTES
POST-OPERATIVE EXAMINATION    59 y.o. Female who returns for follow after surgery. She is 2 weeks s/p     Procedures Performed:  1. Left shoulder Arthroscopic lysis of adhesions CPT - 90449     2. Left shoulder Arthroscopic loose body removal CPT - 81705     3. Left shoulder arthroscopic extensive debridement - 22803    She is doing well without any issues.       PHYSICAL EXAMINATION:  There were no vitals taken for this visit.  General: Well-developed well-nourished 59 y.o. female in no acute distress   Cardiovascular: Regular rhythm   Lungs: No labored breathing or wheezing appreciated   Neuro: Alert and oriented ×3   Psychiatric: well oriented to person, place and time, demonstrates normal mood and affect   Skin: No rashes, lesions or ulcers, normal temperature, turgor, and texture on involved extremity    ORTHOPEDIC EXAM:  Normal post-operative swelling  Normal post-operative scarring  Strength: Grossly intact  ROM: as expected  Tests: none today     ASSESSMENT:      ICD-10-CM ICD-9-CM   1. S/P arthroscopy of left shoulder  Z98.890 V45.89   2. Arthrofibrosis of left shoulder  M24.612 718.51   3. Adhesive capsulitis of left shoulder  M75.02 726.0         PLAN:       Sutures removed today  Continue physical therapy  RTC in 1 month with Yoandy Kelly PA-C  We had a discussion concerning her operative findings.  It is clear she has posttraumatic osteoarthritis of the glenohumeral joint.  If her symptoms do not improve with the surgery, I did recommend proceeding at some point with a left total shoulder arthroplasty.  She understands.  We will try and continue to treat this with further nonsurgical management and anti-inflammatories with the occasional injections until she wants to proceed with an arthroplasty.

## 2025-01-08 ENCOUNTER — CLINICAL SUPPORT (OUTPATIENT)
Dept: REHABILITATION | Facility: HOSPITAL | Age: 60
End: 2025-01-08
Payer: COMMERCIAL

## 2025-01-08 ENCOUNTER — OFFICE VISIT (OUTPATIENT)
Dept: SPORTS MEDICINE | Facility: CLINIC | Age: 60
End: 2025-01-08
Payer: COMMERCIAL

## 2025-01-08 VITALS — BODY MASS INDEX: 35.7 KG/M2 | WEIGHT: 194 LBS | HEIGHT: 62 IN

## 2025-01-08 DIAGNOSIS — M24.612 ARTHROFIBROSIS OF LEFT SHOULDER: ICD-10-CM

## 2025-01-08 DIAGNOSIS — M25.612 DECREASED RANGE OF MOTION OF SHOULDER, LEFT: Primary | ICD-10-CM

## 2025-01-08 DIAGNOSIS — Z98.890 S/P ARTHROSCOPY OF LEFT SHOULDER: Primary | ICD-10-CM

## 2025-01-08 DIAGNOSIS — M75.02 ADHESIVE CAPSULITIS OF LEFT SHOULDER: ICD-10-CM

## 2025-01-08 PROCEDURE — 97140 MANUAL THERAPY 1/> REGIONS: CPT

## 2025-01-08 PROCEDURE — 1159F MED LIST DOCD IN RCRD: CPT | Mod: CPTII,S$GLB,, | Performed by: ORTHOPAEDIC SURGERY

## 2025-01-08 PROCEDURE — 97110 THERAPEUTIC EXERCISES: CPT

## 2025-01-08 PROCEDURE — 97112 NEUROMUSCULAR REEDUCATION: CPT

## 2025-01-08 PROCEDURE — 99999 PR PBB SHADOW E&M-EST. PATIENT-LVL III: CPT | Mod: PBBFAC,,, | Performed by: ORTHOPAEDIC SURGERY

## 2025-01-08 PROCEDURE — 99024 POSTOP FOLLOW-UP VISIT: CPT | Mod: S$GLB,,, | Performed by: ORTHOPAEDIC SURGERY

## 2025-01-08 NOTE — PROGRESS NOTES
[facility]   Physical Therapy Treatment Note     Name: Grecia Boyd  [facility] Number: [id number]    Therapy Diagnosis:   Encounter Diagnosis   Name Primary?    Decreased range of motion of shoulder, left Yes         Physician: Yoandy Kelly PA-C  Surgeon: Dr. Ramirez     Visit Date: 1/8/2025  Physician Orders: PT Eval and Treat   Medical Diagnosis from Referral: Arthrofibrosis of left shoulder [M24.612], Adhesive capsulitis of left shoulder [M75.02], S/P arthroscopy of left shoulder [Z98.890]   Evaluation Date: 12/27/2024  Authorization Period Expiration: 12/24/2025  Plan of Care Expiration: 4/15/2025  Progress Note Due: 1/27/2025  Date of Surgery: 12/26/24  Visit # / Visits authorized: 3/20  FOTO: 1/3  FOTO 1st Follow Up:  FOTO 2nd Follow Up:      PTA Visit #: 0/5     FOTO first follow up:   FOTO second follow up:     Date of Surgery: 12/26/2024  Return to MD: 1/8/2025, 2/5/2025    Procedure:  1. Left shoulder Arthroscopic lysis of adhesions CPT - 33471  2. Left shoulder Arthroscopic loose body removal CPT - 28035  3. Left shoulder arthroscopic extensive debridement - 31231    Post-op Plan: Aggressive range of motion protocol. 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 At this point if her symptoms progress in terms of pain, she would be a candidate for total shoulder arthroplasty. Her glenoid morphology has been restored and should be amenable for an anatomic total shoulder arthroplasty.     Precautions: Standard and Diabetes     Time In: 1:30   Time Out: 2:20  Total Billable Time: 45 minutes    SUBJECTIVE     Pt reports: feeling a little better but stiffer today. Did not do as much with work. Manual has been helping.   She was compliant with home exercise program.  Response to previous treatment: Independent in HEP   Functional change: Ongoing     Pain: 5/10  Location: left shoulder      OBJECTIVE     Objective Measures updated at progress report unless  "specified.     Treatment   *Pt is 1 weeks and 0 days s/p as of 1/2/2024.*    Grecia received the treatments listed below:      therapeutic exercises to develop strength, endurance, ROM, flexibility, posture, and core stabilization for 18 minutes including:    Pt education on post-op goals/precautions, importance of mobility throughout the day   Pendulum hangs 3 x 1'   Shoulder passive flexion HOB elevated 3 x 10 x 3-5" holds   Supine ER with dowel 2 x 10 x 3-5" holds - again end of session    Sidelying RTC interval stretch 5'   Sidelying flexion with dowel 2x10 3-5" holds     Np   Seated ER with dowel 2 x 10 x 3-5" holds     manual therapy techniques: Joint mobilizations and Soft tissue Mobilization were applied to the: Shoulder for 16 minutes, including:    Shoulder PROM within protocol limits  Gentle GHJ mobilizations posterior/inferior - again end of session  GH joint oscillations     neuromuscular re-education activities to improve: Coordination, Kinesthetic, Sense, Proprioception, and Posture for 16 minutes. The following activities were included:    Seated scapular retractions 2 x 10 x 5" holds   Table slides flex/scap x 3' each   CC pulleys 3# 2x10 - slight increased pain    NT  Seated dowel AAROM flexion 2 x 10 reps     therapeutic activities to improve functional performance for 00  minutes, including:      Patient Education and Home Exercises     Home Exercises Provided and Patient Education Provided     Education provided:   - Continue with HEP   - Post-op precautions, importance of mobility throughout the day     Written Home Exercises Provided: Patient instructed to cont prior HEP. Exercises were reviewed and Grecia was able to demonstrate them prior to the end of the session.  Grecia demonstrated good  understanding of the education provided. See EMR under Patient Instructions for exercises provided during therapy sessions    ASSESSMENT     Presented today with slight improvement in pain and " decreased crepitus. Still significantly decreased range of motion especially with IR/ER. She did have some slight pain increased but improved again with manual at the end of the session. Will continue to progress as able.     Grecia Is progressing well towards her goals.   Pt prognosis is Good.     Pt will continue to benefit from skilled outpatient physical therapy to address the deficits listed in the problem list box on initial evaluation, provide pt/family education and to maximize pt's level of independence in the home and community environment.     Pt's spiritual, cultural and educational needs considered and pt agreeable to plan of care and goals.     Anticipated barriers to physical therapy: Scheduling, transportation/work, co-morbidities     Goals:   Short Term Goals:  2-4 weeks (Progressing, not met)  Report decreased shoulder pain < / =  3/10  to increase tolerance for work activities  Increase PROM ER to 20 degrees  Increased strength by 1/3 MMT grade in shoulder to increase tolerance for ADL and work activities.   Pt to tolerate HEP to improve ROM and independence with ADL's    Long Term Goals: 4-18 weeks (Progressing, not met)  Report decreased shoulder pain  < / =  1 /10  to increase tolerance for work activities  Increase AROM to ER to 20 degrees  Increase strength to >/= 4/5 in shoulder to increase tolerance for ADL and work activities.  Pt goal:  improve range and pain for everyday use, work activities, and prevent another surgery.   Pt will have improved gcode of CJ (20-40% limited) on FOTO shoulder in order to demonstrate true functional improvement.  Pt will improve shoulder flex AROM to 120 in order to show improvement with range of motion       PLAN   Plan of care Certification: 12/27/2024 to 4/15/2025.     Continue with current PT plan of care with progression as able within protocol.     Flo Ramos, PT, DPT, OCS

## 2025-01-20 ENCOUNTER — CLINICAL SUPPORT (OUTPATIENT)
Dept: REHABILITATION | Facility: HOSPITAL | Age: 60
End: 2025-01-20
Payer: COMMERCIAL

## 2025-01-20 DIAGNOSIS — M25.612 DECREASED RANGE OF MOTION OF SHOULDER, LEFT: Primary | ICD-10-CM

## 2025-01-20 PROCEDURE — 97140 MANUAL THERAPY 1/> REGIONS: CPT

## 2025-01-20 PROCEDURE — 97112 NEUROMUSCULAR REEDUCATION: CPT

## 2025-01-20 PROCEDURE — 97110 THERAPEUTIC EXERCISES: CPT

## 2025-01-20 NOTE — PROGRESS NOTES
[facility]   Physical Therapy Treatment Note     Name: Grecia Boyd  [facility] Number: [id number]    Therapy Diagnosis:   Encounter Diagnosis   Name Primary?    Decreased range of motion of shoulder, left Yes         Physician: Yoandy Kelly PA-C  Surgeon: Dr. Ramirez     Visit Date: 1/7/2025  Physician Orders: PT Eval and Treat   Medical Diagnosis from Referral: Arthrofibrosis of left shoulder [M24.612], Adhesive capsulitis of left shoulder [M75.02], S/P arthroscopy of left shoulder [Z98.890]   Evaluation Date: 12/27/2024  Authorization Period Expiration: 12/24/2025  Plan of Care Expiration: 4/15/2025  Progress Note Due: 1/27/2025  Date of Surgery: 12/26/24  Visit # / Visits authorized: 3/20  FOTO: 1/3    PTA Visit #: 0/5     FOTO first follow up:   FOTO second follow up:     Date of Surgery: 12/26/2024  Return to MD: 1/8/2025, 2/5/2025    Procedure:  1. Left shoulder Arthroscopic lysis of adhesions CPT - 29698  2. Left shoulder Arthroscopic loose body removal CPT - 26784  3. Left shoulder arthroscopic extensive debridement - 76251    Post-op Plan: Aggressive range of motion protocol. 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 At this point if her symptoms progress in terms of pain, she would be a candidate for total shoulder arthroplasty. Her glenoid morphology has been restored and should be amenable for an anatomic total shoulder arthroplasty.     Precautions: Standard and Diabetes     Time In: 4:00  Time Out: 4:58  Total Billable Time: 58 minutes    SUBJECTIVE     Pt reports: a lot of pain after last visit. Feeling better. Less of the symptoms in the shoulder.   She was compliant with home exercise program.  Response to previous treatment: Independent in HEP   Functional change: Ongoing     Pain: 5/10  Location: left shoulder      OBJECTIVE     Objective Measures updated at progress report unless specified.     Treatment   *Pt is 1 weeks and 0 days s/p  "as of 1/2/2024.*    Grecia received the treatments listed below:      therapeutic exercises to develop strength, endurance, ROM, flexibility, posture, and core stabilization for 25 minutes including:    Pt education on post-op goals/precautions, importance of mobility throughout the day   Pendulum hangs 3 x 1'   Shoulder passive flexion HOB elevated 3 x 10 x 3-5" holds   Supine ER with dowel 2 x 10 x 3-5" holds  Seated ER with dowel 2 x 10 x 3-5" holds       Np   Sidelying RTC interval stretch 5'   Sidelying flexion with dowel 2x10 3-5" holds     manual therapy techniques: Joint mobilizations and Soft tissue Mobilization were applied to the: Shoulder for 16 minutes, including:    Shoulder PROM within protocol limits  Gentle GHJ mobilizations posterior/inferior   GH joint oscillations     neuromuscular re-education activities to improve: Coordination, Kinesthetic, Sense, Proprioception, and Posture for 18 minutes. The following activities were included:    Seated scapular retractions 2 x 10 x 5" holds   Table slides flex/scap x 3' each   Pulleys flexion 2x10     NT  Seated dowel AAROM flexion 2 x 10 reps     therapeutic activities to improve functional performance for 00  minutes, including:      Patient Education and Home Exercises     Home Exercises Provided and Patient Education Provided     Education provided:   - Continue with HEP   - Post-op precautions, importance of mobility throughout the day     Written Home Exercises Provided: Patient instructed to cont prior HEP. Exercises were reviewed and Grecia was able to demonstrate them prior to the end of the session.  Grecia demonstrated good  understanding of the education provided. See EMR under Patient Instructions for exercises provided during therapy sessions    ASSESSMENT     Grecia still having increased pain but having improvement from last week. Still having crepitus with IR/ER range exercises. She did have improvement with manual and performed again " at the end of the session.     Grecia Is progressing well towards her goals.   Pt prognosis is Good.     Pt will continue to benefit from skilled outpatient physical therapy to address the deficits listed in the problem list box on initial evaluation, provide pt/family education and to maximize pt's level of independence in the home and community environment.     Pt's spiritual, cultural and educational needs considered and pt agreeable to plan of care and goals.     Anticipated barriers to physical therapy: Scheduling, transportation/work, co-morbidities     Goals:   Short Term Goals:  2-4 weeks (Progressing, not met)  Report decreased shoulder pain < / =  3/10  to increase tolerance for work activities  Increase PROM ER to 20 degrees  Increased strength by 1/3 MMT grade in shoulder to increase tolerance for ADL and work activities.   Pt to tolerate HEP to improve ROM and independence with ADL's    Long Term Goals: 4-18 weeks (Progressing, not met)  Report decreased shoulder pain  < / =  1 /10  to increase tolerance for work activities  Increase AROM to ER to 20 degrees  Increase strength to >/= 4/5 in shoulder to increase tolerance for ADL and work activities.  Pt goal:  improve range and pain for everyday use, work activities, and prevent another surgery.   Pt will have improved gcode of CJ (20-40% limited) on FOTO shoulder in order to demonstrate true functional improvement.  Pt will improve shoulder flex AROM to 120 in order to show improvement with range of motion       PLAN   Plan of care Certification: 12/27/2024 to 4/15/2025.     Continue with current PT plan of care with progression as able within protocol.     Flo Ramos, PT, DPT, OCS

## 2025-01-20 NOTE — PROGRESS NOTES
[facility]   Physical Therapy Treatment Note     Name: Grecia Boyd  [facility] Number: [id number]    Therapy Diagnosis:   Encounter Diagnosis   Name Primary?    Decreased range of motion of shoulder, left Yes     Physician: Yoandy Kelly PA-C  Surgeon: Dr. Ramirez     Visit Date: 1/20/2025  Physician Orders: PT Eval and Treat   Medical Diagnosis from Referral: Arthrofibrosis of left shoulder [M24.612], Adhesive capsulitis of left shoulder [M75.02], S/P arthroscopy of left shoulder [Z98.890]   Evaluation Date: 12/27/2024  Authorization Period Expiration: 12/24/2025  Plan of Care Expiration: 4/15/2025  Progress Note Due: 1/27/2025  Visit # / Visits authorized: 4/20  FOTO: 1/3    PTA Visit #: 0/5     FOTO first follow up:   FOTO second follow up:     Date of Surgery: 12/26/2024  Return to MD: 1/8/2025, 2/5/2025    Procedure:  1. Left shoulder Arthroscopic lysis of adhesions CPT - 90218  2. Left shoulder Arthroscopic loose body removal CPT - 57517  3. Left shoulder arthroscopic extensive debridement - 14666    Post-op Plan: Aggressive range of motion protocol. 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 At this point if her symptoms progress in terms of pain, she would be a candidate for total shoulder arthroplasty. Her glenoid morphology has been restored and should be amenable for an anatomic total shoulder arthroplasty.     Precautions: Standard and Diabetes     Time In: 12:00 pm    Time Out: 12:54 pm   Total Billable Time: 54 minutes    SUBJECTIVE     Pt reports: Took a pain pill prior to coming. Has been doing her exercises trying her best as she does not want to have a total shoulder replacement.   She was compliant with home exercise program.  Response to previous treatment: Independent in HEP   Functional change: Ongoing     Pain: 5/10  Location: left shoulder      OBJECTIVE     Objective Measures updated at progress report unless specified.     Range of  "Motion:  Left Shoulder  - Passive Flexion: 115 degrees   - Passive ER: 10 degrees     Treatment   *Pt is 3 weeks and 04 days s/p as of 1/20/2025.*     Grecia received the treatments listed below:      therapeutic exercises to develop strength, endurance, ROM, flexibility, posture, and core stabilization for 23 minutes including:    Pt education on post-op goals/precautions, importance of mobility throughout the day   Supine wand AAROM chest press 2 x 10 reps   Supine dowel flexion 1 x 15 x 3" holds  Supine ER with dowel 1 x 15 x 3-5" holds   Sidelying RTC interval stretch 5'   Pendulum hangs 2 x 1'     Not Performed:  Shoulder passive flexion HOB elevated 3 x 10 x 3-5" holds   Supine ER with dowel 2 x 10 x 3-5" holds - again end of session  Sidelying flexion with dowel 2x10 3-5" holds   Seated ER with dowel 2 x 10 x 3-5" holds     manual therapy techniques: Joint mobilizations and Soft tissue Mobilization were applied to the: Shoulder for 12 minutes, including:    Shoulder PROM within protocol limits  Gentle GHJ mobilizations posterior/inferior - again end of session  GH joint oscillations     neuromuscular re-education activities to improve: Coordination, Kinesthetic, Sense, Proprioception, and Posture for 19 minutes. The following activities were included:    Seated dowel AAROM flexion 2 x 10 reps   Table slides flexion x 3'   Sidelying dowel flexion with scap retraction 2 x 10 x 3" holds   Pendulum position scap retracts 2 x 10 x 3" holds  - noticeable UT compensation and cueing to improve   ER isometrics submax into ball 2 x 10 x 3" holds     Not Today:  Seated scapular retractions 2 x 10 x 5" holds   Table slides flex/scap x 3' each   CC pulleys 3# 2x10 - slight increased pain    therapeutic activities to improve functional performance for 00 minutes, including:    Patient Education and Home Exercises     Home Exercises Provided and Patient Education Provided     Education provided:   - Continue with HEP   - " Post-op precautions, importance of mobility throughout the day     Written Home Exercises Provided: Patient instructed to cont prior HEP. Exercises were reviewed and Grecia was able to demonstrate them prior to the end of the session.  Grecia demonstrated good  understanding of the education provided. See EMR under Patient Instructions for exercises provided during therapy sessions    ASSESSMENT     Grecia continues with limitations in range of motion with improvement following manual. Notable improvement in crepitus compared to previous sessions with improved shoulder flexion however continues with significant limitations in external rotation range of motion. Continued focus with therex on improving mobility and further scapular strengthening as well. Significant cueing to limit upper trap compensations. Will continue to monitor and progress as able.     Grecia Is progressing well towards her goals.   Pt prognosis is Good.     Pt will continue to benefit from skilled outpatient physical therapy to address the deficits listed in the problem list box on initial evaluation, provide pt/family education and to maximize pt's level of independence in the home and community environment.     Pt's spiritual, cultural and educational needs considered and pt agreeable to plan of care and goals.     Anticipated barriers to physical therapy: Scheduling, transportation/work, co-morbidities     Goals:   Short Term Goals:  2-4 weeks (Progressing, not met)  Report decreased shoulder pain < / =  3/10  to increase tolerance for work activities  Increase PROM ER to 20 degrees  Increased strength by 1/3 MMT grade in shoulder to increase tolerance for ADL and work activities.   Pt to tolerate HEP to improve ROM and independence with ADL's    Long Term Goals: 4-18 weeks (Progressing, not met)  Report decreased shoulder pain  < / =  1 /10  to increase tolerance for work activities  Increase AROM to ER to 20 degrees  Increase strength  to >/= 4/5 in shoulder to increase tolerance for ADL and work activities.  Pt goal:  improve range and pain for everyday use, work activities, and prevent another surgery.   Pt will have improved gcode of CJ (20-40% limited) on FOTO shoulder in order to demonstrate true functional improvement.  Pt will improve shoulder flex AROM to 120 in order to show improvement with range of motion       PLAN   Plan of care Certification: 12/27/2024 to 4/15/2025.     Continue with current PT plan of care with progression as able within protocol.     Kay Carolina, PT, DPT, OCS

## 2025-02-13 ENCOUNTER — CLINICAL SUPPORT (OUTPATIENT)
Dept: REHABILITATION | Facility: HOSPITAL | Age: 60
End: 2025-02-13
Payer: COMMERCIAL

## 2025-02-13 ENCOUNTER — OFFICE VISIT (OUTPATIENT)
Dept: SPORTS MEDICINE | Facility: CLINIC | Age: 60
End: 2025-02-13
Payer: COMMERCIAL

## 2025-02-13 VITALS
SYSTOLIC BLOOD PRESSURE: 123 MMHG | BODY MASS INDEX: 35.73 KG/M2 | HEIGHT: 62 IN | HEART RATE: 71 BPM | WEIGHT: 194.13 LBS | DIASTOLIC BLOOD PRESSURE: 72 MMHG

## 2025-02-13 DIAGNOSIS — M24.612 ARTHROFIBROSIS OF LEFT SHOULDER: ICD-10-CM

## 2025-02-13 DIAGNOSIS — M25.612 DECREASED RANGE OF MOTION OF SHOULDER, LEFT: Primary | ICD-10-CM

## 2025-02-13 DIAGNOSIS — Z98.890 S/P ARTHROSCOPY OF LEFT SHOULDER: Primary | ICD-10-CM

## 2025-02-13 DIAGNOSIS — M17.11 PRIMARY OSTEOARTHRITIS OF RIGHT KNEE: ICD-10-CM

## 2025-02-13 PROCEDURE — 99999 PR PBB SHADOW E&M-EST. PATIENT-LVL III: CPT | Mod: PBBFAC,,, | Performed by: PHYSICIAN ASSISTANT

## 2025-02-13 PROCEDURE — 97112 NEUROMUSCULAR REEDUCATION: CPT

## 2025-02-13 PROCEDURE — 97110 THERAPEUTIC EXERCISES: CPT

## 2025-02-13 PROCEDURE — 97140 MANUAL THERAPY 1/> REGIONS: CPT

## 2025-02-13 NOTE — PROGRESS NOTES
"POST-OPERATIVE EXAMINATION    59 y.o. Female who returns for follow after surgery. She is 7 weeks s/p:     Procedures Performed:  1. Left shoulder Arthroscopic lysis of adhesions CPT - 79319     2. Left shoulder Arthroscopic loose body removal CPT - 61347     3. Left shoulder arthroscopic extensive debridement - 73071    She is doing well without any issues. She is having stiffness but is improving with PT.  She had to miss therapy last week due to an illness.      PHYSICAL EXAMINATION:  /72   Pulse 71   Ht 5' 2" (1.575 m)   Wt 88 kg (194 lb 1.8 oz)   BMI 35.50 kg/m²   General: Well-developed well-nourished 59 y.o. female in no acute distress   Cardiovascular: Regular rhythm   Lungs: No labored breathing or wheezing appreciated   Neuro: Alert and oriented ×3   Psychiatric: well oriented to person, place and time, demonstrates normal mood and affect   Skin: No rashes, lesions or ulcers, normal temperature, turgor, and texture on involved extremity    ORTHOPEDIC EXAM:  Normal post-operative swelling  Normal post-operative scarring  Strength: Grossly intact  ROM: FE- 110; ER/Abd- 10; IR/Abd- 15; ER/Add- 10    Tests: none today     ASSESSMENT:      ICD-10-CM ICD-9-CM   1. S/P arthroscopy of left shoulder  Z98.890 V45.89   2. Arthrofibrosis of left shoulder  M24.612 718.51   3. Primary osteoarthritis of right knee  M17.11 715.16           PLAN:       Continue physical therapy  RTC in 2 months  We had a discussion concerning her operative findings.  It is clear she has posttraumatic osteoarthritis of the glenohumeral joint.  If her symptoms do not improve with the surgery, I did recommend proceeding at some point with a left total shoulder arthroplasty.  She understands.  We will try and continue to treat this with further nonsurgical management and anti-inflammatories with the occasional injections until she wants to proceed with an arthroplasty.              Yoandy Kelly PA-C, [facility]          "

## 2025-02-13 NOTE — PROGRESS NOTES
[facility]   Physical Therapy Treatment Note     Name: Grecia Boyd  Clinic Number: [id number]    Therapy Diagnosis:   Encounter Diagnosis   Name Primary?    Decreased range of motion of shoulder, left Yes     Physician: Yoandy Kelly PA-C  Surgeon: Dr. Ramirez     Visit Date: 2/13/2025  Physician Orders: PT Eval and Treat   Medical Diagnosis from Referral: Arthrofibrosis of left shoulder [M24.612], Adhesive capsulitis of left shoulder [M75.02], S/P arthroscopy of left shoulder [Z98.890]   Evaluation Date: 12/27/2024  Authorization Period Expiration: 12/24/2025  Plan of Care Expiration: 4/15/2025  Progress Note Due: 1/27/2025  Visit # / Visits authorized: 5/20  FOTO: 1/3    PTA Visit #: 0/5     FOTO first follow up:   FOTO second follow up:     Date of Surgery: 12/26/2024  Return to MD: 1/8/2025, 2/5/2025    Procedure:  1. Left shoulder Arthroscopic lysis of adhesions CPT - 08340  2. Left shoulder Arthroscopic loose body removal CPT - 10680  3. Left shoulder arthroscopic extensive debridement - 99940    Post-op Plan: Aggressive range of motion protocol. 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 At this point if her symptoms progress in terms of pain, she would be a candidate for total shoulder arthroplasty. Her glenoid morphology has been restored and should be amenable for an anatomic total shoulder arthroplasty.     Precautions: Standard and Diabetes     Time In: 2:38 pm   Time Out: 3:30 pm    Total Billable Time: 52 minutes    SUBJECTIVE     Pt reports: Saw the PA said see her back in 2 months. Continue with exercises and PT. Discussed possible total shoulder but she does not want to have one at all. Still having pain and stiffness but trying to work on her exercises has just been very busy with work as well.   She was compliant with home exercise program.  Response to previous treatment: Independent in HEP   Functional change: Ongoing     Pain:  "5/10  Location: left shoulder      OBJECTIVE     Objective Measures updated at progress report unless specified.     Range of Motion:  Left Shoulder  - AROM Flexion: 80 degrees with sh hike  - PROM Flexion: 110 degrees   - AROM ER at 0 deg AB: 5 degrees   - PROM ER at 0 deg AB: 12 degrees     Treatment   *Pt is 7 weeks and 0 days s/p as of 2/13/2025.*     Grecia received the treatments listed below:      therapeutic exercises to develop strength, endurance, ROM, flexibility, posture, and core stabilization for 24 minutes including:    Pt education on post-op goals/precautions, importance of mobility throughout the day   Supine ER with dowel 1 x 15 x 3-5" holds 45 and 60 deg  Supine wand AAROM chest press 2 x 10 reps   Elevated HOB ER with dowel 1 x 15 x 5" holds   Seated ER with dowel 1 x 15 x 3-5" holds   Supine dowel flexion 1 x 15 x 3" holds     Not Performed:  Shoulder passive flexion HOB elevated 3 x 10 x 3-5" holds   Sidelying flexion with dowel 2x10 3-5" holds   Sidelying RTC interval stretch 5'   Pendulum hangs 2 x 1'     manual therapy techniques: Joint mobilizations and Soft tissue Mobilization were applied to the: Shoulder for 12 minutes, including:    Shoulder PROM within protocol limits  Gentle GHJ mobilizations posterior/inferior - again end of session  GH joint oscillations     neuromuscular re-education activities to improve: Coordination, Kinesthetic, Sense, Proprioception, and Posture for 16 minutes. The following activities were included:    ER/IR isometrics with Orange/YellowTB 2 x 10 x 3" holds  No moneys YellowTB 2 x 5 x 5" holds   Serratus landmines / slot machines mod range 3 x 8 reps     Not Today:  Seated scapular retractions 2 x 10 x 5" holds   Table slides flex/scap x 3' each   CC pulleys 3# 2x10 - slight increased pain  Seated dowel AAROM flexion 2 x 10 reps   Table slides flexion x 3'   Sidelying dowel flexion with scap retraction 2 x 10 x 3" holds   Pendulum position scap retracts 2 x " "10 x 3" holds  - noticeable UT compensation and cueing to improve   ER isometrics submax into ball 2 x 10 x 3" holds     therapeutic activities to improve functional performance for 00 minutes, including:    Patient Education and Home Exercises     Home Exercises Provided and Patient Education Provided     Education provided:   - Continue with HEP   - Post-op precautions, importance of mobility throughout the day     Written Home Exercises Provided: Patient instructed to cont prior HEP. Exercises were reviewed and Grecia was able to demonstrate them prior to the end of the session.  Grecia demonstrated good  understanding of the education provided. See EMR under Patient Instructions for exercises provided during therapy sessions    ASSESSMENT     Grecia presents to today's session following ~4 week hiatus secondary to not feeling well and work. She continues with significant limitations in her range of motion most notably with external rotation range of motion and continued emphasis on improving with manual and therex. She continues with pain limiting her mobility as well and educated on importance of exercises at home working on improving mobility and following with strengthening to help maintain. She struggles with external rotation activation and proper form requiring cueing throughout and with flexion range of motion to limit hiking. Will continue to monitor and progress as able.     Grecia Is progressing well towards her goals.   Pt prognosis is Good.     Pt will continue to benefit from skilled outpatient physical therapy to address the deficits listed in the problem list box on initial evaluation, provide pt/family education and to maximize pt's level of independence in the home and community environment.     Pt's spiritual, cultural and educational needs considered and pt agreeable to plan of care and goals.     Anticipated barriers to physical therapy: Scheduling, transportation/work, co-morbidities "     Goals:   Short Term Goals:  2-4 weeks (Progressing, not met)  Report decreased shoulder pain < / =  3/10  to increase tolerance for work activities  Increase PROM ER to 20 degrees  Increased strength by 1/3 MMT grade in shoulder to increase tolerance for ADL and work activities.   Pt to tolerate HEP to improve ROM and independence with ADL's    Long Term Goals: 4-18 weeks (Progressing, not met)  Report decreased shoulder pain  < / =  1 /10  to increase tolerance for work activities  Increase AROM to ER to 20 degrees  Increase strength to >/= 4/5 in shoulder to increase tolerance for ADL and work activities.  Pt goal:  improve range and pain for everyday use, work activities, and prevent another surgery.   Pt will have improved gcode of CJ (20-40% limited) on FOTO shoulder in order to demonstrate true functional improvement.  Pt will improve shoulder flex AROM to 120 in order to show improvement with range of motion     PLAN   Plan of care Certification: 12/27/2024 to 4/15/2025.     Continue with current PT plan of care with progression as able within protocol.     Kay Carolina, PT, DPT, OCS

## 2025-03-05 ENCOUNTER — HOSPITAL ENCOUNTER (OUTPATIENT)
Dept: RADIOLOGY | Facility: OTHER | Age: 60
Discharge: HOME | End: 2025-03-05
Attending: INTERNAL MEDICINE
Payer: COMMERCIAL

## 2025-03-05 DIAGNOSIS — Z12.31 ENCOUNTER FOR SCREENING MAMMOGRAM FOR BREAST CANCER: ICD-10-CM

## 2025-03-05 PROCEDURE — 77067 SCR MAMMO BI INCL CAD: CPT | Mod: 26,,, | Performed by: RADIOLOGY

## 2025-03-05 PROCEDURE — 77063 BREAST TOMOSYNTHESIS BI: CPT | Mod: 26,,, | Performed by: RADIOLOGY

## 2025-03-05 PROCEDURE — 77067 SCR MAMMO BI INCL CAD: CPT | Mod: TC

## 2025-03-07 ENCOUNTER — CLINICAL SUPPORT (OUTPATIENT)
Dept: REHABILITATION | Facility: HOSPITAL | Age: 60
End: 2025-03-07
Payer: COMMERCIAL

## 2025-03-07 DIAGNOSIS — M25.612 DECREASED RANGE OF MOTION OF SHOULDER, LEFT: Primary | ICD-10-CM

## 2025-03-07 PROCEDURE — 97140 MANUAL THERAPY 1/> REGIONS: CPT

## 2025-03-07 PROCEDURE — 97112 NEUROMUSCULAR REEDUCATION: CPT

## 2025-03-07 PROCEDURE — 97110 THERAPEUTIC EXERCISES: CPT

## 2025-03-07 NOTE — PROGRESS NOTES
[facility]   Physical Therapy Treatment Note     Name: Grecia Boyd  Clinic Number: [id number]    Therapy Diagnosis:   Encounter Diagnosis   Name Primary?    Decreased range of motion of shoulder, left Yes     Physician: Yoandy Kelly PA-C  Surgeon: Dr. Ramirez     Visit Date: 3/7/2025  Physician Orders: PT Eval and Treat   Medical Diagnosis from Referral: Arthrofibrosis of left shoulder [M24.612], Adhesive capsulitis of left shoulder [M75.02], S/P arthroscopy of left shoulder [Z98.890]   Evaluation Date: 12/27/2024  Authorization Period Expiration: 12/24/2025  Plan of Care Expiration: 4/15/2025  Progress Note Due: 1/27/2025  Visit # / Visits authorized: 5/20  FOTO: 1/3    PTA Visit #: 0/5     FOTO first follow up:   FOTO second follow up:     Date of Surgery: 12/26/2024  Return to MD: 1/8/2025, 2/5/2025    Procedure:  1. Left shoulder Arthroscopic lysis of adhesions CPT - 10367  2. Left shoulder Arthroscopic loose body removal CPT - 42543  3. Left shoulder arthroscopic extensive debridement - 56221    Post-op Plan: Aggressive range of motion protocol. 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 At this point if her symptoms progress in terms of pain, she would be a candidate for total shoulder arthroplasty. Her glenoid morphology has been restored and should be amenable for an anatomic total shoulder arthroplasty.     Precautions: Standard and Diabetes     Time In: 10:48 am   Time Out:11:44 am   Total Billable Time: 56 minutes    SUBJECTIVE     Pt reports: Its coming along, its still stiff and sore    She was compliant with home exercise program.  Response to previous treatment: Independent in HEP   Functional change: Ongoing     Pain: 5/10  Location: left shoulder      OBJECTIVE     Objective Measures updated at progress report unless specified.     Range of Motion:  Left Shoulder  - AROM Flexion: 85 degrees with sh hike  - PROM Flexion: 110 degrees  "  - AROM ER at 0 deg AB: 8 degrees   - PROM ER at 0 deg AB: 12 degrees     Treatment   *Pt is 10 weeks and 01 days s/p as of 3/7/2025.*     Grecia received the treatments listed below:      therapeutic exercises to develop strength, endurance, ROM, flexibility, posture, and core stabilization for 35 minutes including:    Pt education on post-op goals/precautions, importance of mobility throughout the day   Shoulder AB prop x 6' with MHP  Sidelying RTC interval stretch with MHP x 4'   Supine ER with dowel 1 x 15 x 3-5" holds 45 and 60 deg  Elevated HOB ER with dowel 1 x 15 x 5" holds   Supine wand AAROM chest press into flexion as tolerated 2 x 10 reps   Sidelying shoulder flexion with dowel 2 x 10 reps   Shoulder flexion with SB 2 x 15 reps    Not Performed:  Shoulder passive flexion HOB elevated 3 x 10 x 3-5" holds   Seated ER with dowel 1 x 15 x 3-5" holds   Supine dowel flexion 1 x 15 x 3" holds     manual therapy techniques: Joint mobilizations and Soft tissue Mobilization were applied to the: Shoulder for 12 minutes, including:    Shoulder PROM within protocol limits  Gentle GHJ mobilizations posterior/inferior - again end of session  GH joint oscillations     neuromuscular re-education activities to improve: Coordination, Kinesthetic, Sense, Proprioception, and Posture for 09 minutes. The following activities were included:    No moneys YellowTB 3 x 5 x 5" holds   Serratus landmines / slot machines mod range 3 x 8 reps     Not Today:  Seated scapular retractions 2 x 10 x 5" holds   Table slides flex/scap x 3' each   CC pulleys 3# 2x10 - slight increased pain  Seated dowel AAROM flexion 2 x 10 reps   Table slides flexion x 3'   Sidelying dowel flexion with scap retraction 2 x 10 x 3" holds   Pendulum position scap retracts 2 x 10 x 3" holds  - noticeable UT compensation and cueing to improve   ER isometrics submax into ball 2 x 10 x 3" holds   ER/IR isometrics with Orange/YellowTB 2 x 10 x 3" " holds    therapeutic activities to improve functional performance for 00 minutes, including:    Patient Education and Home Exercises     Home Exercises Provided and Patient Education Provided     Education provided:   - Continue with HEP   - Post-op precautions, importance of mobility throughout the day     Written Home Exercises Provided: Patient instructed to cont prior HEP. Exercises were reviewed and Grecia was able to demonstrate them prior to the end of the session.  Grecia demonstrated good  understanding of the education provided. See EMR under Patient Instructions for exercises provided during therapy sessions    ASSESSMENT     Grecia presents to today's session following ~3 week hiatus with continued limitations in range of motion. Continued focus with manual and therex maximizing mobility. She continues with significant limitations in external rotation range of motion and pain further limiting mobility as well. LLLD stretches as well as work on mobility with modifications to help with pain. She continues to be educated on importance of physical therapy and working through mobility to help maximize her function. Will continue to monitor and progress as able.     Grecia Is progressing well towards her goals.   Pt prognosis is Good.     Pt will continue to benefit from skilled outpatient physical therapy to address the deficits listed in the problem list box on initial evaluation, provide pt/family education and to maximize pt's level of independence in the home and community environment.     Pt's spiritual, cultural and educational needs considered and pt agreeable to plan of care and goals.     Anticipated barriers to physical therapy: Scheduling, transportation/work, co-morbidities     Goals:   Short Term Goals:  2-4 weeks (Progressing, not met)  Report decreased shoulder pain < / =  3/10  to increase tolerance for work activities  Increase PROM ER to 20 degrees  Increased strength by 1/3 MMT grade in  shoulder to increase tolerance for ADL and work activities.   Pt to tolerate HEP to improve ROM and independence with ADL's    Long Term Goals: 4-18 weeks (Progressing, not met)  Report decreased shoulder pain  < / =  1 /10  to increase tolerance for work activities  Increase AROM to ER to 20 degrees  Increase strength to >/= 4/5 in shoulder to increase tolerance for ADL and work activities.  Pt goal:  improve range and pain for everyday use, work activities, and prevent another surgery.   Pt will have improved gcode of CJ (20-40% limited) on FOTO shoulder in order to demonstrate true functional improvement.  Pt will improve shoulder flex AROM to 120 in order to show improvement with range of motion     PLAN   Plan of care Certification: 12/27/2024 to 4/15/2025.     Continue with current PT plan of care with progression as able within protocol.     Kay Carolina, PT, DPT, OCS

## 2025-03-13 ENCOUNTER — CLINICAL SUPPORT (OUTPATIENT)
Dept: REHABILITATION | Facility: HOSPITAL | Age: 60
End: 2025-03-13
Payer: COMMERCIAL

## 2025-03-13 DIAGNOSIS — M25.612 DECREASED RANGE OF MOTION OF SHOULDER, LEFT: Primary | ICD-10-CM

## 2025-03-13 PROCEDURE — 97110 THERAPEUTIC EXERCISES: CPT

## 2025-03-13 PROCEDURE — 97140 MANUAL THERAPY 1/> REGIONS: CPT

## 2025-03-16 DIAGNOSIS — I15.2 HYPERTENSION ASSOCIATED WITH TYPE 2 DIABETES MELLITUS: ICD-10-CM

## 2025-03-16 DIAGNOSIS — E11.59 HYPERTENSION ASSOCIATED WITH TYPE 2 DIABETES MELLITUS: ICD-10-CM

## 2025-03-17 RX ORDER — HYDROCHLOROTHIAZIDE 12.5 MG/1
TABLET ORAL
Qty: 90 TABLET | Refills: 1 | Status: SHIPPED | OUTPATIENT
Start: 2025-03-17

## 2025-03-17 NOTE — PROGRESS NOTES
[facility]   Physical Therapy Treatment Note     Name: Grecia Boyd  Clinic Number: [id number]    Therapy Diagnosis:   Encounter Diagnosis   Name Primary?    Decreased range of motion of shoulder, left Yes     Physician: Yoandy Kelly PA-C  Surgeon: Dr. Ramirez     Visit Date: 3/13/2025  Physician Orders: PT Eval and Treat   Medical Diagnosis from Referral: Arthrofibrosis of left shoulder [M24.612], Adhesive capsulitis of left shoulder [M75.02], S/P arthroscopy of left shoulder [Z98.890]   Evaluation Date: 12/27/2024  Authorization Period Expiration: 12/24/2025  Plan of Care Expiration: 4/15/2025  Progress Note Due: 1/27/2025  Visit # / Visits authorized: 7/20  FOTO: 1/3    PTA Visit #: 0/5     FOTO first follow up:   FOTO second follow up:     Date of Surgery: 12/26/2024  Return to MD: 1/8/2025, 2/5/2025    Procedure:  1. Left shoulder Arthroscopic lysis of adhesions CPT - 80808  2. Left shoulder Arthroscopic loose body removal CPT - 90689  3. Left shoulder arthroscopic extensive debridement - 57775    Post-op Plan: Aggressive range of motion protocol. 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 At this point if her symptoms progress in terms of pain, she would be a candidate for total shoulder arthroplasty. Her glenoid morphology has been restored and should be amenable for an anatomic total shoulder arthroplasty.     Precautions: Standard and Diabetes     Time In: 4:10 pm   Time Out: 4:52 pm   Total Billable Time: 42 minutes (30)    SUBJECTIVE     Pt reports: She messed up her appointment days, she thought she had PT today not next Thursday. She is happy can fit her in but she can't stay until 5 because she has a second job she has to get to.   She was compliant with home exercise program.  Response to previous treatment: Independent in HEP   Functional change: Ongoing     Pain: 5/10  Location: left shoulder      OBJECTIVE     Objective Measures  "updated at progress report unless specified.     Range of Motion:  Left Shoulder  - AROM Flexion: 85 degrees with sh hike  - PROM Flexion: 110 degrees   - AROM ER at 0 deg AB: 8 degrees   - PROM ER at 0 deg AB: 12 degrees     Treatment   *Pt is 11 weeks and 00 days s/p as of 3/13/2025.*     Grecia received the treatments listed below:      therapeutic exercises to develop strength, endurance, ROM, flexibility, posture, and core stabilization for 24 minutes including:    Pt education on post-op goals/precautions, importance of mobility throughout the day   Shoulder AB prop x 6' with MHP  Sidelying RTC interval stretch with MHP x 4'   Supine ER with dowel 1 x 15 x 3-5" holds 45 and 60 deg  Elevated HOB ER with dowel 1 x 15 x 5" holds   Supine wand AAROM chest press into flexion as tolerated 2 x 10 reps   Table slides flex/scaption x 3' each    Not Performed:  Shoulder passive flexion HOB elevated 3 x 10 x 3-5" holds   Seated ER with dowel 1 x 15 x 3-5" holds   Supine dowel flexion 1 x 15 x 3" holds   Sidelying shoulder flexion with dowel 2 x 10 reps   Shoulder flexion with SB 2 x 15 reps    manual therapy techniques: Joint mobilizations and Soft tissue Mobilization were applied to the: Shoulder for 12 minutes, including:    Shoulder PROM within protocol limits  Gentle GHJ mobilizations posterior/inferior - again end of session  GH joint oscillations     neuromuscular re-education activities to improve: Coordination, Kinesthetic, Sense, Proprioception, and Posture for 08 minutes. The following activities were included:    No moneys YellowTB 3 x 5 x 5" holds   Serratus landmines / slot machines mod range 3 x 8 reps     Not Today:  Seated scapular retractions 2 x 10 x 5" holds   Table slides flex/scap x 3' each   CC pulleys 3# 2x10 - slight increased pain  Seated dowel AAROM flexion 2 x 10 reps   Table slides flexion x 3'   Sidelying dowel flexion with scap retraction 2 x 10 x 3" holds   Pendulum position scap retracts " "2 x 10 x 3" holds  - noticeable UT compensation and cueing to improve   ER isometrics submax into ball 2 x 10 x 3" holds   ER/IR isometrics with Orange/YellowTB 2 x 10 x 3" holds    therapeutic activities to improve functional performance for 00 minutes, including:    Patient Education and Home Exercises     Home Exercises Provided and Patient Education Provided     Education provided:   - Continue with HEP   - Post-op precautions, importance of mobility throughout the day     Written Home Exercises Provided: Patient instructed to cont prior HEP. Exercises were reviewed and Grecia was able to demonstrate them prior to the end of the session.  Grecia demonstrated good  understanding of the education provided. See EMR under Patient Instructions for exercises provided during therapy sessions    ASSESSMENT     Grecia presented to today's therapy session on the wrong day but able to fit patient in for a shortened session. Continued focus with manual and therex on maximizing mobility and LLLD stretching. She was challenged with further AAROM exercises and cues for form with slot machines. She was educated on importance of continued exercises at home as well to help with mobility. Will continue to monitor and progress as able with emphasis on range of motion.     Grecia Is progressing well towards her goals.   Pt prognosis is Good.     Pt will continue to benefit from skilled outpatient physical therapy to address the deficits listed in the problem list box on initial evaluation, provide pt/family education and to maximize pt's level of independence in the home and community environment.     Pt's spiritual, cultural and educational needs considered and pt agreeable to plan of care and goals.     Anticipated barriers to physical therapy: Scheduling, transportation/work, co-morbidities     Goals:   Short Term Goals:  2-4 weeks (Progressing, not met)  Report decreased shoulder pain < / =  3/10  to increase tolerance for " work activities  Increase PROM ER to 20 degrees  Increased strength by 1/3 MMT grade in shoulder to increase tolerance for ADL and work activities.   Pt to tolerate HEP to improve ROM and independence with ADL's    Long Term Goals: 4-18 weeks (Progressing, not met)  Report decreased shoulder pain  < / =  1 /10  to increase tolerance for work activities  Increase AROM to ER to 20 degrees  Increase strength to >/= 4/5 in shoulder to increase tolerance for ADL and work activities.  Pt goal:  improve range and pain for everyday use, work activities, and prevent another surgery.   Pt will have improved gcode of CJ (20-40% limited) on FOTO shoulder in order to demonstrate true functional improvement.  Pt will improve shoulder flex AROM to 120 in order to show improvement with range of motion     PLAN   Plan of care Certification: 12/27/2024 to 4/15/2025.     Continue with current PT plan of care with progression as able within protocol.     Kay Carolina, PT, DPT, OCS

## 2025-03-17 NOTE — TELEPHONE ENCOUNTER
Refill Routing Note   Medication(s) are not appropriate for processing by [facility] for the following reason(s):        Patient not seen by provider within 15 months    [facility] action(s):  Defer             Appointments  past 12m or future 3m with PCP    Date Provider   Last Visit   11/28/2023 Scott Tripathi, DO   Next Visit   Visit date not found Scott Tripathi, DO   ED visits in past 90 days: 0        Note composed:1:10 PM 03/17/2025

## 2025-03-17 NOTE — TELEPHONE ENCOUNTER
No care due was identified.  [facility] Embedded Care Due Messages. Reference number: [id number].   3/17/2025 1:00:06 PM CDT

## 2025-03-27 ENCOUNTER — TELEPHONE (OUTPATIENT)
Dept: PHARMACY | Facility: CLINIC | Age: 60
End: 2025-03-27
Payer: COMMERCIAL

## 2025-03-27 NOTE — TELEPHONE ENCOUNTER
[facility]/Population Health Chart Review & Patient Outreach Details For Medication Adherence Project    Reason for Outreach Encounter: 3rd Party payor non-compliance report ([employer], [employer], [employer], etc)  2.  Patient Outreach Method: Reviewed patient chart   3.   Medication in question:    Diabetes Medications              semaglutide (OZEMPIC) 2 mg/dose (8 mg/3 mL) PnIj Inject 2 mg into the skin every 7 days.                 ozempic  last filled  3/25 for 28 day supply    4.  Reviewed and or Updates Made To: Patient Chart  5. Outreach Outcomes and/or actions taken: Patient filled medication and is on track to be adherent  Additional Notes:

## 2025-04-04 ENCOUNTER — TELEPHONE (OUTPATIENT)
Dept: PHARMACY | Facility: CLINIC | Age: 60
End: 2025-04-04
Payer: COMMERCIAL

## 2025-04-04 NOTE — TELEPHONE ENCOUNTER
[facility]/Population Health Chart Review & Patient Outreach Details For Medication Adherence Project    Reason for Outreach Encounter: 3rd Party payor non-compliance report ([employer], [employer], [employer], etc)  2.  Patient Outreach Method: Reviewed patient chart   3.   Medication in question:    Diabetes Medications              semaglutide (OZEMPIC) 2 mg/dose (8 mg/3 mL) PnIj Inject 2 mg into the skin every 7 days.                 ozempic  last filled  3/26 for 28 day supply      4.  Reviewed and or Updates Made To: Patient Chart  5. Outreach Outcomes and/or actions taken: Patient filled medication and is on track to be adherent  Additional Notes:

## 2025-04-15 ENCOUNTER — OFFICE VISIT (OUTPATIENT)
Dept: SPORTS MEDICINE | Facility: CLINIC | Age: 60
End: 2025-04-15
Payer: COMMERCIAL

## 2025-04-15 VITALS
WEIGHT: 188.63 LBS | DIASTOLIC BLOOD PRESSURE: 75 MMHG | SYSTOLIC BLOOD PRESSURE: 113 MMHG | HEART RATE: 70 BPM | BODY MASS INDEX: 34.5 KG/M2

## 2025-04-15 DIAGNOSIS — M19.012 PRIMARY OSTEOARTHRITIS OF LEFT SHOULDER: ICD-10-CM

## 2025-04-15 DIAGNOSIS — Z98.890 S/P ARTHROSCOPY OF LEFT SHOULDER: Primary | ICD-10-CM

## 2025-04-15 PROCEDURE — 99999 PR PBB SHADOW E&M-EST. PATIENT-LVL III: CPT | Mod: PBBFAC,,, | Performed by: PHYSICIAN ASSISTANT

## 2025-04-15 NOTE — PROGRESS NOTES
POST-OPERATIVE EXAMINATION    59 y.o. Female who returns for follow after surgery. She is 3.5 months s/p:     Procedures Performed:  1. Left shoulder Arthroscopic lysis of adhesions CPT - 81292     2. Left shoulder Arthroscopic loose body removal CPT - 59793     3. Left shoulder arthroscopic extensive debridement - 98806    She is doing well without any issues. She is still having stiffness but is improving.       PHYSICAL EXAMINATION:  /75 (Patient Position: Sitting)   Pulse 70   Wt 85.6 kg (188 lb 9.7 oz)   BMI 34.50 kg/m²   General: Well-developed well-nourished 59 y.o. female in no acute distress   Cardiovascular: Regular rhythm   Lungs: No labored breathing or wheezing appreciated   Neuro: Alert and oriented ×3   Psychiatric: well oriented to person, place and time, demonstrates normal mood and affect   Skin: No rashes, lesions or ulcers, normal temperature, turgor, and texture on involved extremity    ORTHOPEDIC EXAM:  Normal post-operative swelling  Normal post-operative scarring  Strength: Grossly intact  ROM: FE- 110; ER/Abd- 10; IR/Abd- 15; ER/Add- 10  Tests: 5/5 supraspinatus    ASSESSMENT:      ICD-10-CM ICD-9-CM   1. S/P arthroscopy of left shoulder  Z98.890 V45.89   2. Primary osteoarthritis of left shoulder  M19.012 715.11             PLAN:       Continue HEP  It is clear she has posttraumatic osteoarthritis of the glenohumeral joint.  If her symptoms do not improve with the surgery, I did recommend proceeding at some point with a left total shoulder arthroplasty.  She understands.  We will try and continue to treat this with further nonsurgical management and anti-inflammatories with the occasional injections until she wants to proceed with an arthroplasty.  RTC as needed              Yoandy Kelly PA-C, MMS

## 2025-04-17 ENCOUNTER — TELEPHONE (OUTPATIENT)
Dept: INTERNAL MEDICINE | Facility: CLINIC | Age: 60
End: 2025-04-17
Payer: COMMERCIAL

## 2025-04-17 DIAGNOSIS — E11.9 TYPE 2 DIABETES MELLITUS WITHOUT COMPLICATION, WITHOUT LONG-TERM CURRENT USE OF INSULIN: Primary | ICD-10-CM

## 2025-04-17 DIAGNOSIS — E11.69 HYPERLIPIDEMIA ASSOCIATED WITH TYPE 2 DIABETES MELLITUS: ICD-10-CM

## 2025-04-17 DIAGNOSIS — E78.5 HYPERLIPIDEMIA ASSOCIATED WITH TYPE 2 DIABETES MELLITUS: ICD-10-CM

## 2025-04-17 NOTE — TELEPHONE ENCOUNTER
----- Message from Josselinbibi sent at 4/17/2025  3:31 PM CDT -----  Contact: [phone] .[email or web address] Patient is calling for Medical Advice regarding: Pt said se needs a call back about her labs she missed the appt and no orders are in How long has patient had these symptoms:Pharmacy name and phone#:Patient wants a call back or [facility]: call back Comments:Please advise patient replies from provider may take up to 48 hours.

## 2025-04-17 NOTE — TELEPHONE ENCOUNTER
Sent pt a message to let her know she is due for an annual apt as well as her labs and to let me know when I can schedule the apt so I can schedule labs a week prior

## 2025-04-21 ENCOUNTER — PATIENT MESSAGE (OUTPATIENT)
Dept: INTERNAL MEDICINE | Facility: CLINIC | Age: 60
End: 2025-04-21
Payer: COMMERCIAL

## 2025-04-22 ENCOUNTER — OFFICE VISIT (OUTPATIENT)
Dept: INTERNAL MEDICINE | Facility: CLINIC | Age: 60
End: 2025-04-22
Payer: COMMERCIAL

## 2025-04-22 VITALS
SYSTOLIC BLOOD PRESSURE: 116 MMHG | HEIGHT: 62 IN | OXYGEN SATURATION: 99 % | HEART RATE: 64 BPM | RESPIRATION RATE: 18 BRPM | BODY MASS INDEX: 34.79 KG/M2 | WEIGHT: 189.06 LBS | DIASTOLIC BLOOD PRESSURE: 72 MMHG | TEMPERATURE: 98 F

## 2025-04-22 DIAGNOSIS — E78.5 HYPERLIPIDEMIA ASSOCIATED WITH TYPE 2 DIABETES MELLITUS: ICD-10-CM

## 2025-04-22 DIAGNOSIS — J30.9 ALLERGIC SINUSITIS: Primary | ICD-10-CM

## 2025-04-22 DIAGNOSIS — E66.9 OBESITY (BMI 30-39.9): ICD-10-CM

## 2025-04-22 DIAGNOSIS — E11.69 HYPERLIPIDEMIA ASSOCIATED WITH TYPE 2 DIABETES MELLITUS: ICD-10-CM

## 2025-04-22 DIAGNOSIS — E11.59 HYPERTENSION ASSOCIATED WITH TYPE 2 DIABETES MELLITUS: ICD-10-CM

## 2025-04-22 DIAGNOSIS — G47.33 OSA (OBSTRUCTIVE SLEEP APNEA): ICD-10-CM

## 2025-04-22 DIAGNOSIS — E11.9 TYPE 2 DIABETES MELLITUS WITHOUT COMPLICATION, WITHOUT LONG-TERM CURRENT USE OF INSULIN: ICD-10-CM

## 2025-04-22 DIAGNOSIS — I15.2 HYPERTENSION ASSOCIATED WITH TYPE 2 DIABETES MELLITUS: ICD-10-CM

## 2025-04-22 PROCEDURE — 3008F BODY MASS INDEX DOCD: CPT | Mod: CPTII,S$GLB,, | Performed by: INTERNAL MEDICINE

## 2025-04-22 PROCEDURE — 99214 OFFICE O/P EST MOD 30 MIN: CPT | Mod: S$GLB,,, | Performed by: INTERNAL MEDICINE

## 2025-04-22 PROCEDURE — 99999 PR PBB SHADOW E&M-EST. PATIENT-LVL IV: CPT | Mod: PBBFAC,,, | Performed by: INTERNAL MEDICINE

## 2025-04-22 PROCEDURE — G2211 COMPLEX E/M VISIT ADD ON: HCPCS | Mod: S$GLB,,, | Performed by: INTERNAL MEDICINE

## 2025-04-22 PROCEDURE — 3074F SYST BP LT 130 MM HG: CPT | Mod: CPTII,S$GLB,, | Performed by: INTERNAL MEDICINE

## 2025-04-22 PROCEDURE — 1159F MED LIST DOCD IN RCRD: CPT | Mod: CPTII,S$GLB,, | Performed by: INTERNAL MEDICINE

## 2025-04-22 PROCEDURE — 3078F DIAST BP <80 MM HG: CPT | Mod: CPTII,S$GLB,, | Performed by: INTERNAL MEDICINE

## 2025-04-22 PROCEDURE — 4010F ACE/ARB THERAPY RXD/TAKEN: CPT | Mod: CPTII,S$GLB,, | Performed by: INTERNAL MEDICINE

## 2025-04-22 RX ORDER — AZELASTINE 1 MG/ML
1 SPRAY, METERED NASAL 2 TIMES DAILY
Qty: 30 ML | Refills: 1 | Status: SHIPPED | OUTPATIENT
Start: 2025-04-22

## 2025-04-22 RX ORDER — LEVOCETIRIZINE DIHYDROCHLORIDE 5 MG/1
5 TABLET, FILM COATED ORAL NIGHTLY
Qty: 30 TABLET | Refills: 11 | Status: SHIPPED | OUTPATIENT
Start: 2025-04-22 | End: 2026-04-22

## 2025-04-22 NOTE — PROGRESS NOTES
Patient ID: Grecia Boyd is a 59 y.o. female.    Chief Complaint: Allergies    History of Present Illness    CHIEF COMPLAINT:  Grecia presents today for evaluation of allergies.    ALLERGIES AND ENT:  She reports experiencing allergy symptoms for the past 2 months, including significant nasal congestion, sinus pressure, itchy eyes, and post-nasal drip. Symptoms worsen at night. She has been using Mucinex and Flonase without significant improvement. She denies sore throat.    MUSCULOSKELETAL:  She reports improvement in her shoulder condition, though recovery is ongoing. She is now able to sleep on the affected shoulder but continues to limit exercise due to this condition.         Physical Exam    General: No acute distress. Well-developed. Well-nourished.  Eyes: EOMI. Sclerae anicteric.  HENT: Normocephalic. Atraumatic. Nares patent. Moist oral mucosa.  Ears: Bilateral TMs clear. Bilateral EACs clear.  Cardiovascular: Regular rate. Regular rhythm. No murmurs. No rubs. No gallops. Normal S1, S2.  Respiratory: Normal respiratory effort. Clear to auscultation bilaterally. No rales. No rhonchi. No wheezing.  Abdomen: Soft. Non-tender. Non-distended. Normoactive bowel sounds.  Musculoskeletal: No  obvious deformity.  Extremities: No lower extremity edema.  Neurological: Alert & oriented x3. No slurred speech. Normal gait.  Psychiatric: Normal mood. Normal affect. Good insight. Good judgment.  Skin: Warm. Dry. No rash.         Assessment & Plan    E11.69, E78.5 Type 2 diabetes mellitus with other specified complication  J30.1 Allergic rhinitis due to pollen  M25.519 Pain in unspecified shoulder    IMPRESSION:  - Diagnosed allergic sinusitis based on reported symptoms of congestion, itchy eyes, and sinus pressure persisting for 2 months.  - Current treatment with Mucinex and Flonase ineffective.  - Added antihistamine and additional nasal spray to treatment regimen to address ongoing symptoms.  - Symptoms  likely due to seasonal allergies, prevalent among patients currently.    TYPE 2 DIABETES MELLITUS WITH OTHER SPECIFIED COMPLICATION:  - Monitored patient's type 2 diabetes and hyperlipidemia, noting A1C level require improvement.  - Acknowledged patient's efforts in controlling diet and encouraged increased exercise, particularly walking frequency.  - Continued current diabetes medication regimen.  - Scheduled annual physical to review condition and check routine labs.    ALLERGIC RHINITIS:  - Grecia has experienced persistent symptoms of congestion, itchy eyes, and sinus drip for approximately 2 months, worsening at night.  - Examination revealed sinus pressure, and the patient was diagnosed with allergic sinusitis, likely due to seasonal allergies.  - Current treatment with Mucinex and Flonase has been ineffective.  - New treatment plan includes: - Azelastine nasal spray: twice daily - Zyrtec: taken at night - Continue Flonase nasal spray: at night - Continue Mucinex: as needed for thick mucus  - Grecia denies fever, chills, or sore throat.    SHOULDER PAIN:  - Grecia's shoulder pain is improving, with the ability to now sleep on the affected shoulder.  - Advised to maintain shoulder mobility as part of the ongoing recovery process.    GENERAL HEALTH:  - Annual physical scheduled for July 17th.              This note was generated with the assistance of ambient listening technology. Verbal consent was obtained by the patient and accompanying visitor(s) for the recording of patient appointment to facilitate this note. I attest to having reviewed and edited the generated note for accuracy, though some syntax or spelling errors may persist. Please contact the author of this note for any clarification.

## 2025-05-10 ENCOUNTER — TELEPHONE (OUTPATIENT)
Dept: PHARMACY | Facility: CLINIC | Age: 60
End: 2025-05-10
Payer: COMMERCIAL

## 2025-05-10 NOTE — TELEPHONE ENCOUNTER
[facility]/Population Health Chart Review & Patient Outreach Details For Medication Adherence Project    Reason for Outreach Encounter: 3rd Party payor non-compliance report ([employer], [employer], [employer], etc)  2.  Patient Outreach Method: Reviewed patient chart  and [employer] message  3.   Medication in question:    Diabetes Medications              semaglutide (OZEMPIC) 2 mg/dose (8 mg/3 mL) PnIj Inject 2 mg into the skin every 7 days.                 ozempic  last filled  3/26 for 28 day supply      4.  Reviewed and or Updates Made To: Patient Chart  5. Outreach Outcomes and/or actions taken: Sent inquiry to patient: Waiting for response  Additional Notes:

## 2025-06-12 ENCOUNTER — PATIENT MESSAGE (OUTPATIENT)
Dept: INTERNAL MEDICINE | Facility: CLINIC | Age: 60
End: 2025-06-12
Payer: COMMERCIAL

## 2025-06-13 RX ORDER — VALSARTAN 160 MG/1
TABLET ORAL
Qty: 90 TABLET | Refills: 1 | Status: SHIPPED | OUTPATIENT
Start: 2025-06-13

## 2025-06-13 NOTE — TELEPHONE ENCOUNTER
Provider Staff:  Action required for this patient    Requires labs      Please see care gap opportunities below in Care Due Message.    Thanks!  [facility]     Appointments      Date Provider   Last Visit   4/22/2025 Scott Tripathi, DO   Next Visit   7/17/2025 Scott Tripathi, DO     Refill Decision Note   Grecia Boyd  is requesting a refill authorization.  Brief Assessment and Rationale for Refill:  Approve     Medication Therapy Plan:        Comments:     Note composed:1:13 PM 06/13/2025

## 2025-06-13 NOTE — TELEPHONE ENCOUNTER
Care Due:                  Date            Visit Type   Department     Provider  --------------------------------------------------------------------------------                                EP -                              PRIMARY      MET INTERNAL  Last Visit: 04-      CARE ([facility])   MEDICINE       Scott Tripathi                              EP -                              PRIMARY      [facility] INTERNAL  Next Visit: 07-      CARE ([facility])   [facility]       Scott Tripathi                                                            Last  Test          Frequency    Reason                     Performed    Due Date  --------------------------------------------------------------------------------    HBA1C.......  6 months...  semaglutide..............  12-   06-    [facility] Embedded Care Due Messages. Reference number: [id number].   6/13/2025 9:30:11 AM CDT

## 2025-06-18 ENCOUNTER — RESULTS FOLLOW-UP (OUTPATIENT)
Dept: INTERNAL MEDICINE | Facility: CLINIC | Age: 60
End: 2025-06-18

## 2025-06-18 ENCOUNTER — LAB VISIT (OUTPATIENT)
Dept: LAB | Facility: HOSPITAL | Age: 60
End: 2025-06-18
Attending: INTERNAL MEDICINE
Payer: COMMERCIAL

## 2025-06-18 DIAGNOSIS — E11.9 TYPE 2 DIABETES MELLITUS WITHOUT COMPLICATION, WITHOUT LONG-TERM CURRENT USE OF INSULIN: ICD-10-CM

## 2025-06-18 LAB
EAG (OHS): 166 MG/DL (ref 68–131)
HBA1C MFR BLD: 7.4 % (ref 4–5.6)

## 2025-06-18 PROCEDURE — 83036 HEMOGLOBIN GLYCOSYLATED A1C: CPT

## 2025-06-18 PROCEDURE — 36415 COLL VENOUS BLD VENIPUNCTURE: CPT | Mod: PN

## 2025-06-19 ENCOUNTER — TELEPHONE (OUTPATIENT)
Dept: PHARMACY | Facility: CLINIC | Age: 60
End: 2025-06-19
Payer: COMMERCIAL

## 2025-06-19 NOTE — TELEPHONE ENCOUNTER
[facility]/Population Health Chart Review & Patient Outreach Details For Medication Adherence Project    Reason for Outreach Encounter: 3rd Party payor non-compliance report ([employer], [employer]BS, C, etc)  2.  Patient Outreach Method: Reviewed Patient Chart  3.   Medication in question: Ozempic 2mg/dose   LAST FILLED: 6/7/25 for 28 day supply  Diabetes Medications              semaglutide (OZEMPIC) 2 mg/dose (8 mg/3 mL) PnIj Inject 2 mg into the skin every 7 days.              4.  Reviewed and or Updates Made To: Patient Chart  5. Outreach Outcomes and/or actions taken: Patient filled medication and is on track to be adherent

## 2025-07-02 ENCOUNTER — OFFICE VISIT (OUTPATIENT)
Dept: DERMATOLOGY | Facility: CLINIC | Age: 60
End: 2025-07-02
Payer: COMMERCIAL

## 2025-07-02 DIAGNOSIS — D48.5 NEOPLASM OF UNCERTAIN BEHAVIOR OF SKIN: Primary | ICD-10-CM

## 2025-07-02 DIAGNOSIS — L98.9 SCALP LESION: ICD-10-CM

## 2025-07-02 PROCEDURE — 99499 UNLISTED E&M SERVICE: CPT | Mod: S$GLB,,, | Performed by: STUDENT IN AN ORGANIZED HEALTH CARE EDUCATION/TRAINING PROGRAM

## 2025-07-02 PROCEDURE — 11104 PUNCH BX SKIN SINGLE LESION: CPT | Mod: S$GLB,,, | Performed by: STUDENT IN AN ORGANIZED HEALTH CARE EDUCATION/TRAINING PROGRAM

## 2025-07-02 PROCEDURE — 99999 PR PBB SHADOW E&M-EST. PATIENT-LVL III: CPT | Mod: PBBFAC,,, | Performed by: STUDENT IN AN ORGANIZED HEALTH CARE EDUCATION/TRAINING PROGRAM

## 2025-07-02 NOTE — PATIENT INSTRUCTIONS

## 2025-07-02 NOTE — PROGRESS NOTES
Subjective:      Patient ID:  Grecia Boyd is a 59 y.o. female who presents for No chief complaint on file.    Grecia Boyd is a 59 y.o. female who presents for: evaluation of skin lesions.    New patient    The patient has the following lesions of concern:  Location: left side of scalp   Duration: years   Symptoms: none   Relieving factors/Previous treatments: none     Pertinent history:  History of blistering sunburns: No  History of tanning bed use: No  Family history of melanoma: No  Personal history of mole removal: No  Personal history of skin cancer: No         Review of Systems   Skin:  Negative for daily sunscreen use, activity-related sunscreen use and recent sunburn.   Hematologic/Lymphatic: Does not bruise/bleed easily.       Objective:   Physical Exam   Skin:   Areas Examined (abnormalities noted in diagram):   Scalp / Hair Palpated and Inspected            Diagram Legend     Erythematous scaling macule/papule c/w actinic keratosis       Vascular papule c/w angioma      Pigmented verrucoid papule/plaque c/w seborrheic keratosis      Yellow umbilicated papule c/w sebaceous hyperplasia      Irregularly shaped tan macule c/w lentigo     1-2 mm smooth white papules consistent with Milia      Movable subcutaneous cyst with punctum c/w epidermal inclusion cyst      Subcutaneous movable cyst c/w pilar cyst      Firm pink to brown papule c/w dermatofibroma      Pedunculated fleshy papule(s) c/w skin tag(s)      Evenly pigmented macule c/w junctional nevus     Mildly variegated pigmented, slightly irregular-bordered macule c/w mildly atypical nevus      Flesh colored to evenly pigmented papule c/w intradermal nevus       Pink pearly papule/plaque c/w basal cell carcinoma      Erythematous hyperkeratotic cursted plaque c/w SCC      Surgical scar with no sign of skin cancer recurrence      Open and closed comedones      Inflammatory papules and pustules      Verrucoid papule consistent consistent with  wart     Erythematous eczematous patches and plaques     Dystrophic onycholytic nail with subungual debris c/w onychomycosis     Umbilicated papule    Erythematous-base heme-crusted tan verrucoid plaque consistent with inflamed seborrheic keratosis     Erythematous Silvery Scaling Plaque c/w Psoriasis     See annotation            Assessment / Plan:      Pathology Orders:       Normal Orders This Visit    Specimen to Pathology, Dermatology     Questions:    Procedure Type: Dermatology and skin neoplasms    Number of Specimens: 1    ------------------------: -------------------------    Spec 1 Procedure: Punch Biopsy    Spec 1 Clinical Impression: blue nevus.R/o melanoma    Spec 1 Source: left temporal scalp    Clinical Information: see EPIC    Clinical History: see EPIC    Specimen Source: Skin    Release to patient: Immediate    Send normal result to authorizing provider's In Basket if patient is active on MyChart: Yes          Neoplasm of uncertain behavior of skin  -     Specimen to Pathology, Dermatology    Blue papule on scalp. She only noticed several months ago when she shaved her head. Will biopsy given unclear history    Punch biopsy procedure note:  Punch biopsy performed after verbal consent obtained. Area marked and prepped with alcohol. Approximately 1cc of 1% lidocaine with epinephrine injected. 6 mm disposable punch used to remove lesion. Hemostasis obtained and biopsy site closed with 1 - 2 Prolene sutures. Wound care instructions reviewed with patient and handout given.           Follow up in about 2 weeks (around 7/16/2025) for Suture removal.

## 2025-07-08 ENCOUNTER — PATIENT MESSAGE (OUTPATIENT)
Dept: DERMATOLOGY | Facility: CLINIC | Age: 60
End: 2025-07-08
Payer: COMMERCIAL

## 2025-07-08 DIAGNOSIS — D23.9 BLUE NEVUS: Primary | ICD-10-CM

## 2025-07-08 NOTE — TELEPHONE ENCOUNTER
1. Skin, left temporal scalp, punch biopsy:    - BLUE NEVUS (see comment)      Comment:  Blue nevus involves the peripheral biopsy edges.  The deep biopsy edge is uninvolved.  Although no atypical features are seen in the sections examined, conservative re-excision is recommended given the large size of the lesion in order to ensure complete removal.     This lesion is benign.      Lesion is benign but pathologist still recommending conservative re-excision. Will refer to ENT / plastics for excision

## 2025-07-09 ENCOUNTER — CLINICAL SUPPORT (OUTPATIENT)
Dept: DERMATOLOGY | Facility: CLINIC | Age: 60
End: 2025-07-09
Payer: COMMERCIAL

## 2025-07-09 DIAGNOSIS — Z48.02 VISIT FOR SUTURE REMOVAL: Primary | ICD-10-CM

## 2025-07-09 PROCEDURE — 99024 POSTOP FOLLOW-UP VISIT: CPT | Mod: S$GLB,,, | Performed by: STUDENT IN AN ORGANIZED HEALTH CARE EDUCATION/TRAINING PROGRAM

## 2025-07-09 NOTE — PROGRESS NOTES
CC: 59 y.o.female patient is here for suture removal.     HPI: Patient is s/p punch biopsy of left temporal scalp on 7/2/2025.  Patient reports no problems.    WOUND PE:  Sutures intact.  Wound healing well.  Good approximation of skin edges.  No signs or symptoms of infection.    IMPRESSION:  . Skin, left temporal scalp, punch biopsy:    - BLUE NEVUS     PLAN:  Sutures removed today.  Continue wound care.    RTC: In 6 month(s) for skin check or sooner should any new concerns arise

## 2025-07-17 ENCOUNTER — OFFICE VISIT (OUTPATIENT)
Dept: INTERNAL MEDICINE | Facility: CLINIC | Age: 60
End: 2025-07-17
Payer: COMMERCIAL

## 2025-07-17 VITALS
RESPIRATION RATE: 17 BRPM | OXYGEN SATURATION: 97 % | TEMPERATURE: 98 F | HEIGHT: 62 IN | DIASTOLIC BLOOD PRESSURE: 70 MMHG | BODY MASS INDEX: 35.06 KG/M2 | HEART RATE: 70 BPM | SYSTOLIC BLOOD PRESSURE: 110 MMHG | WEIGHT: 190.5 LBS

## 2025-07-17 DIAGNOSIS — E66.9 OBESITY (BMI 30-39.9): ICD-10-CM

## 2025-07-17 DIAGNOSIS — E11.69 HYPERLIPIDEMIA ASSOCIATED WITH TYPE 2 DIABETES MELLITUS: ICD-10-CM

## 2025-07-17 DIAGNOSIS — E78.5 HYPERLIPIDEMIA ASSOCIATED WITH TYPE 2 DIABETES MELLITUS: ICD-10-CM

## 2025-07-17 DIAGNOSIS — I15.2 HYPERTENSION ASSOCIATED WITH TYPE 2 DIABETES MELLITUS: Primary | ICD-10-CM

## 2025-07-17 DIAGNOSIS — E11.9 TYPE 2 DIABETES MELLITUS WITHOUT COMPLICATION, WITHOUT LONG-TERM CURRENT USE OF INSULIN: ICD-10-CM

## 2025-07-17 DIAGNOSIS — G47.33 OSA (OBSTRUCTIVE SLEEP APNEA): ICD-10-CM

## 2025-07-17 DIAGNOSIS — E11.59 HYPERTENSION ASSOCIATED WITH TYPE 2 DIABETES MELLITUS: Primary | ICD-10-CM

## 2025-07-17 PROBLEM — Z01.818 PREOP EXAM FOR INTERNAL MEDICINE: Status: RESOLVED | Noted: 2024-12-17 | Resolved: 2025-07-17

## 2025-07-17 PROCEDURE — 99999 PR PBB SHADOW E&M-EST. PATIENT-LVL IV: CPT | Mod: PBBFAC,,, | Performed by: INTERNAL MEDICINE

## 2025-07-17 NOTE — PROGRESS NOTES
Subjective     Patient ID: Grecia Boyd is a 59 y.o. female.    Chief Complaint: Annual Exam    HPI  59 y.o. Female here for annual exam.       Vaccines: Influenza (2023); Tetanus (2013); Pneumovax (current); Shingrix (2022)  Eye exam: 11/18  Mammogram: 2/25  Gyn exam: declined  Colonoscopy: 10/23     Exercise: walks daily  Diet: regular    Past Medical History:  No date: Diabetes mellitus, type 2  No date: Hyperlipidemia  No date: Hypertension  No date: Obesity  No date: CHIDI (obstructive sleep apnea)  02/2024: Unspecified chronic bronchitis  Past Surgical History:  No date: ADENOIDECTOMY  5/16/2024: ARTHROSCOPIC DEBRIDEMENT OF SHOULDER; Left      Comment:  Procedure: DEBRIDEMENT, SHOULDER, ARTHROSCOPIC;                 Surgeon: Naif Ramirez MD;  Location: [facility];                 Service: Orthopedics;  Laterality: Left;  5/16/2024: ARTHROSCOPY, SHOULDER, WITH CAPSULORRHAPHY; Left      Comment:  Procedure: ARTHROSCOPY,SHOULDER,WITH CAPSULORRHAPHY;                 Surgeon: Naif Ramirez MD;  Location: [facility] OR;                 Service: Orthopedics;  Laterality: Left;  REGIONAL BLOCK  5/16/2024: ARTHROSCOPY,SHOULDER,WITH BICEPS TENODESIS; Left      Comment:  Procedure: ARTHROSCOPY,SHOULDER,WITH BICEPS TENODESIS;                 Surgeon: Naif Ramirez MD;  Location: [facility];                 Service: Orthopedics;  Laterality: Left;  No date: BREAST BIOPSY; Right      Comment:  @ age 18  2/12/2021: CHONDROPLASTY OF KNEE; Right      Comment:  Procedure: CHONDROPLASTY, KNEE;  Surgeon: ZULEIKA Barahona MD;  Location: [facility] OR;  Service: Orthopedics;                 Laterality: Right;  10/16/2023: COLONOSCOPY; N/A      Comment:  Procedure: COLONOSCOPY;  Surgeon: Grace Wiley MD;                Location: [facility] ENDOSCOPY;  Service: Endoscopy;                 Laterality: N/A;  Referred by: OLEG Lozano Meds:                Ozempic - pt inst to hold per protocolPrep:                 SuprepRoute instructions sent: [id number]                precall complete.  DBM  No date: HYSTERECTOMY      Comment:  2009 2/12/2021: KNEE ARTHROSCOPY W/ MENISCECTOMY; Right      Comment:  Procedure: ARTHROSCOPY, KNEE, WITH MEDIAL MENISCECTOMY                LATERAL RELEASE;  Surgeon: ZULEIKA Barahona MD;                 Location: [facility] OR;  Service: Orthopedics;  Laterality:                Right;  pericapsular injection: 30cc                ropivacaine/epi/clonidine/ketorolac injection   No date: KNEE SURGERY  12/26/2024: LYSIS, ADHESIONS, SHOULDER, ARTHROSCOPIC; Left      Comment:  Procedure: LYSIS,ADHESIONS,SHOULDER,ARTHROSCOPIC;                 Surgeon: Naif Ramirez MD;  Location: [facility] OR;                 Service: Orthopedics;  Laterality: Left;  REGIONAL BLOCK  12/26/2024: MANIPULATION OF SHOULDER JOINT; Left      Comment:  Procedure: MANIPULATION, SHOULDER;  Surgeon: Naif Ramirez MD;  Location: [facility] OR;  Service: Orthopedics;                Laterality: Left;  2/12/2021: SYNOVECTOMY OF KNEE; Right      Comment:  Procedure: SYNOVECTOMY, KNEE;  Surgeon: ZULEIKA Barahona MD;  Location: [facility] OR;  Service: Orthopedics;                 Laterality: Right;  No date: THYROIDECTOMY, PARTIAL  No date: TONSILLECTOMY  Social History    Socioeconomic History      Marital status:       Number of children: 2    Tobacco Use      Smoking status: Never      Smokeless tobacco: Never    Substance and Sexual Activity      Alcohol use: Yes        Comment: rare      Drug use: No      Sexual activity: Yes        Partners: Male    Social Drivers of Health  Financial Resource Strain: Low Risk  (7/1/2025)      Overall Financial Resource Strain (CARDIA)          Difficulty of Paying Living Expenses: Not hard at all  Food Insecurity: No Food Insecurity (7/1/2025)      Hunger Vital Sign          Worried About Running Out of Food in the Last Year: Never true          Ran Out of Food in the  Last Year: Never true  Transportation Needs: No Transportation Needs (7/1/2025)      PRAPARE - Transportation          Lack of Transportation (Medical): No          Lack of Transportation (Non-Medical): No  Physical Activity: Insufficiently Active (7/1/2025)      Exercise Vital Sign          Days of Exercise per Week: 3 days          Minutes of Exercise per Session: 10 min  Stress: No Stress Concern Present (7/1/2025)      [employer] [employer] - Occupational Stress Questionnaire          Feeling of Stress : Not at all  Housing Stability: Low Risk  (7/1/2025)      Housing Stability Vital Sign          Unable to Pay for Housing in the Last Year: No          Number of Times Moved in the Last Year: 0          Homeless in the Last Year: No  Review of patient's allergies indicates:   -- Grass pollen-june grass standard     --  Seasonal allergies  Grecia Boyd had no medications administered during this visit.  Review of Systems   Constitutional:  Negative for activity change, appetite change, chills, diaphoresis, fatigue, fever and unexpected weight change.   HENT:  Negative for nasal congestion, mouth sores, postnasal drip, rhinorrhea, sinus pressure/congestion, sneezing, sore throat, trouble swallowing and voice change.    Eyes:  Negative for pain, discharge and visual disturbance.   Respiratory:  Negative for cough, shortness of breath and wheezing.    Cardiovascular:  Negative for chest pain, palpitations and leg swelling.   Gastrointestinal:  Negative for abdominal pain, blood in stool, constipation, diarrhea, nausea and vomiting.   Endocrine: Negative for cold intolerance and heat intolerance.   Genitourinary:  Negative for difficulty urinating, dysuria, frequency, hematuria and urgency.   Musculoskeletal:  Negative for arthralgias and myalgias.   Integumentary:  Negative for rash and wound.   Allergic/Immunologic: Negative for environmental allergies and food allergies.   Neurological:   Negative for dizziness, tremors, seizures, syncope, weakness, light-headedness and headaches.   Hematological:  Negative for adenopathy. Does not bruise/bleed easily.   Psychiatric/Behavioral:  Negative for confusion and sleep disturbance. The patient is not nervous/anxious.           Objective     Physical Exam  Vitals and nursing note reviewed.   Constitutional:       General: She is not in acute distress.     Appearance: Normal appearance. She is well-developed. She is not diaphoretic.   HENT:      Head: Normocephalic and atraumatic.      Right Ear: External ear normal.      Left Ear: External ear normal.      Nose: Nose normal.      Mouth/Throat:      Pharynx: No oropharyngeal exudate.   Eyes:      General: No scleral icterus.        Right eye: No discharge.         Left eye: No discharge.      Conjunctiva/sclera: Conjunctivae normal.      Pupils: Pupils are equal, round, and reactive to light.   Neck:      Thyroid: No thyromegaly.      Vascular: No JVD.   Cardiovascular:      Rate and Rhythm: Normal rate and regular rhythm.      Pulses: Normal pulses.      Heart sounds: Normal heart sounds. No murmur heard.  Pulmonary:      Effort: Pulmonary effort is normal. No respiratory distress.      Breath sounds: Normal breath sounds. No wheezing, rhonchi or rales.   Chest:      Chest wall: No tenderness.   Abdominal:      General: Bowel sounds are normal. There is no distension.      Palpations: Abdomen is soft.      Tenderness: There is no abdominal tenderness. There is no guarding or rebound.   Musculoskeletal:      Cervical back: Neck supple.      Right lower leg: No edema.      Left lower leg: No edema.   Lymphadenopathy:      Cervical: No cervical adenopathy.   Skin:     General: Skin is warm and dry.      Coloration: Skin is not pale.      Findings: No rash.   Neurological:      General: No focal deficit present.      Mental Status: She is alert and oriented to person, place, and time.      Gait: Gait normal.    Psychiatric:         Behavior: Behavior normal.         Thought Content: Thought content normal.         Judgment: Judgment normal.            Assessment and Plan     1. Hypertension associated with type 2 diabetes mellitus    2. Hyperlipidemia associated with type 2 diabetes mellitus    3. Type 2 diabetes mellitus without complication, without long-term current use of insulin  -     CBC Auto Differential; Future; Expected date: 01/17/2026  -     Comprehensive Metabolic Panel; Future; Expected date: 01/17/2026  -     Hemoglobin A1C; Future; Expected date: 01/17/2026    4. Obesity (BMI 30-39.9)    5. CHIDI (obstructive sleep apnea)         Blood work reviewed with pt      HTN- continue Diovan -12.5 mg qd      T2DM- last HA1C of 7.4(6/25)<--7.4(12/240<--6.9(6/24)<--5.5(7/23)<--5.4(1/23)<--6.5(7/22)<--7.2(1/22)      -continue Ozempic        HLD- continue Lipitor 40 mg daily      Obesity- pt advised on proper diet/exercise for weight loss      CHIDI- pt unable to tolerate CPAP      F/u in 6 months

## 2025-07-19 ENCOUNTER — TELEPHONE (OUTPATIENT)
Dept: PHARMACY | Facility: CLINIC | Age: 60
End: 2025-07-19
Payer: COMMERCIAL

## 2025-07-19 NOTE — TELEPHONE ENCOUNTER
[facility]/Population Health Chart Review & Patient Outreach Details For Medication Adherence Project    Reason for Outreach Encounter: 3rd Party payor non-compliance report ([employer], [employer], C, etc)  2.  Patient Outreach Method: Reviewed patient chart   3.   Medication in question:    Diabetes Medications              semaglutide (OZEMPIC) 2 mg/dose (8 mg/3 mL) PnIj Inject 2 mg into the skin every 7 days.                 ozempic  last filled  7/17/25 for 28 day supply      4.  Reviewed and or Updates Made To: Patient Chart  5. Outreach Outcomes and/or actions taken: Patient filled medication and is on track to be adherent  Additional Notes:

## 2025-08-21 ENCOUNTER — TELEPHONE (OUTPATIENT)
Dept: PHARMACY | Facility: CLINIC | Age: 60
End: 2025-08-21
Payer: COMMERCIAL

## 2025-08-25 ENCOUNTER — PATIENT MESSAGE (OUTPATIENT)
Dept: INTERNAL MEDICINE | Facility: CLINIC | Age: 60
End: 2025-08-25
Payer: COMMERCIAL

## 2025-08-25 ENCOUNTER — TELEPHONE (OUTPATIENT)
Dept: INTERNAL MEDICINE | Facility: CLINIC | Age: 60
End: 2025-08-25
Payer: COMMERCIAL

## 2025-08-26 ENCOUNTER — PATIENT MESSAGE (OUTPATIENT)
Dept: PHARMACY | Facility: CLINIC | Age: 60
End: 2025-08-26
Payer: COMMERCIAL

## 2025-08-26 ENCOUNTER — PATIENT MESSAGE (OUTPATIENT)
Dept: INTERNAL MEDICINE | Facility: CLINIC | Age: 60
End: 2025-08-26
Payer: COMMERCIAL

## (undated) DEVICE — DRAPE STERI U-SHAPED 47X51IN

## (undated) DEVICE — TAPE SURG MEDIPORE 6X72IN

## (undated) DEVICE — DRAPE THREE-QTR REINF 53X77IN

## (undated) DEVICE — GOWN ECLIPSE REINF LVL4 TWL XL

## (undated) DEVICE — BURR OVAL CUTTING 6 MM

## (undated) DEVICE — DRESSING GAUZE OIL EMUL 3X8

## (undated) DEVICE — COVER CAMERA OPERATING ROOM

## (undated) DEVICE — NDL SPINAL 18GX3.5 SPINOCAN

## (undated) DEVICE — GLOVE PROTEXIS LTX MICRO 8

## (undated) DEVICE — COVER LIGHT HANDLE 80/CA

## (undated) DEVICE — PAD DERMAPROX THCK 11X15X1IN

## (undated) DEVICE — BLADE SHAVER 3.5MM

## (undated) DEVICE — SEE MEDLINE ITEM 146292

## (undated) DEVICE — GEMINI ARTHREX SR8

## (undated) DEVICE — GOWN ECLIPSE REINF L4 XLNG XXL

## (undated) DEVICE — DRAPE STERI INSTRUMENT 1018

## (undated) DEVICE — Device

## (undated) DEVICE — PAD ABDOMINAL STERILE 8X10IN

## (undated) DEVICE — GOWN SMART IMP BREATHABLE XXLG

## (undated) DEVICE — GLOVE BIOGEL SKINSENSE PI 8.0

## (undated) DEVICE — DRAPE INCISE IOBAN 2 23X17IN

## (undated) DEVICE — SOL NACL IRR 3000ML

## (undated) DEVICE — GLOVE SENSICARE PI ALOE 8

## (undated) DEVICE — SET EXT MALE LL 34IN

## (undated) DEVICE — BLADE SHAVER LANZA 4.2X13CM

## (undated) DEVICE — BLADE SURG 13CMX4.2MM

## (undated) DEVICE — SYR 30CC LUER LOCK

## (undated) DEVICE — SEE MEDLINE ITEM 146231

## (undated) DEVICE — GLOVE PROTEXIS LIGHT BROWN 8.5

## (undated) DEVICE — KIT TRACTION SHLDR ST DISP LF

## (undated) DEVICE — UNDERGLOVES BIOGEL PI SIZE 8

## (undated) DEVICE — DRAPE EMERALD 87X114.75X113

## (undated) DEVICE — SUT 3-0 ETHILON 18 FS-1

## (undated) DEVICE — SUT SUTURETAPE X 1.3MM 40IN

## (undated) DEVICE — GAUZE SPONGE 4X4 12PLY

## (undated) DEVICE — TRAY ARTHROSCOPY 2/CS

## (undated) DEVICE — DRAPE STERI-DRAPE 1000 17X11IN

## (undated) DEVICE — TOWEL OR DISP STRL BLUE 4/PK

## (undated) DEVICE — NDL 18GA X1 1/2 REG BEVEL

## (undated) DEVICE — DRESSING XEROFORM FOIL PK 1X8

## (undated) DEVICE — SET INFLOW TUBE ARTHSCP

## (undated) DEVICE — PAD ABD 8X10 STERILE

## (undated) DEVICE — CONNECTOR TUBING STR 5 IN 1

## (undated) DEVICE — GOWN ECLIPSE REINF LV4 XLNG XL

## (undated) DEVICE — TUBING SUC UNIV W/CONN 12FT

## (undated) DEVICE — CANNULA TRIPLEDAM 6MM X 7CM

## (undated) DEVICE — WRAP SHLDR HIP ACCU THRM PACK

## (undated) DEVICE — SHAVER ULTRAFFR 4.2MM

## (undated) DEVICE — KIT FIXATION PERC ARTHSCP 2.9

## (undated) DEVICE — PUMP COLD THERAPY

## (undated) DEVICE — ADHESIVE DERMABOND ADVANCED

## (undated) DEVICE — UNDERGLOVES BIOGEL PI SIZE 8.5

## (undated) DEVICE — SUT MONOCRYL PLUS UD 3-0 27

## (undated) DEVICE — APPLICATOR CHLORAPREP ORN 26ML

## (undated) DEVICE — COVER PROXIMA MAYO STAND

## (undated) DEVICE — ELECTRODE COOLPULSE 90 W/HAND

## (undated) DEVICE — GOWN SMARTGOWN LVL4 X-LONG XL

## (undated) DEVICE — SEE MEDLINE ITEM 146313

## (undated) DEVICE — KIT FIBERTAK PERC INSRT 1.8MM

## (undated) DEVICE — SPONGE COTTON TRAY 4X4IN

## (undated) DEVICE — DRAPE PLASTIC U 60X72

## (undated) DEVICE — SOL NACL IRR 1000ML BTL

## (undated) DEVICE — SEE MEDLINE ITEM 157150

## (undated) DEVICE — TOWEL OR XRAY BLUE 17X26IN

## (undated) DEVICE — REPAIR KIT SMALL JOINT BIO TEN

## (undated) DEVICE — TUBE SET INFLOW/OUTFLOW

## (undated) DEVICE — PROBE ARTHSCP RF90 D 140MM

## (undated) DEVICE — SUT ETHILON 3/0 18IN PS-1

## (undated) DEVICE — SEE MEDLINE ITEM 157169

## (undated) DEVICE — PAD COLD THERAPY KNEE WRAP ON

## (undated) DEVICE — GLOVE BIOGEL PI MICRO SZ 7

## (undated) DEVICE — SUT 1 36IN PDS II VIO MONO

## (undated) DEVICE — KIT FIBERTAK CURVED SPEAR 1.8M

## (undated) DEVICE — HOOK SUTURE SPECTRUM II DISP

## (undated) DEVICE — PROBE ARTHSCP EDGE ENERGY 50

## (undated) DEVICE — SOL 9P NACL IRR PIC IL

## (undated) DEVICE — UNDERGLOVES BIOGEL PI SZ 7 LF

## (undated) DEVICE — SOL IRR NACL .9% 3000ML